# Patient Record
Sex: MALE | Race: BLACK OR AFRICAN AMERICAN | NOT HISPANIC OR LATINO | Employment: OTHER | URBAN - METROPOLITAN AREA
[De-identification: names, ages, dates, MRNs, and addresses within clinical notes are randomized per-mention and may not be internally consistent; named-entity substitution may affect disease eponyms.]

---

## 2017-07-31 ENCOUNTER — DOCUMENTATION ONLY (OUTPATIENT)
Dept: NEUROLOGY | Facility: HOSPITAL | Age: 72
End: 2017-07-31

## 2017-07-31 NOTE — PROGRESS NOTES
Patient last scoped one year ago with a single angioectasia of the duodenum.  Since then, he has been hospitalized requiring transfusions and recent VCE done at [facility] demonstrates active bleeding.  He will undergo EGD with push and colonoscopy Friday and if lesions out of reach will plan to see patient August 9th in clinic for evaluation.

## 2017-12-26 ENCOUNTER — TELEPHONE (OUTPATIENT)
Dept: NEUROLOGY | Facility: HOSPITAL | Age: 72
End: 2017-12-26

## 2017-12-26 NOTE — TELEPHONE ENCOUNTER
----- Message from Ra Soto MD sent at 12/24/2017 10:35 AM CST -----  Regarding: Clinic appt  Please arrange follow up with me in 2 weeks for small bowel bleed. [place] will bring VCE. Thanks.

## 2018-01-02 ENCOUNTER — HOSPITAL ENCOUNTER (INPATIENT)
Facility: HOSPITAL | Age: 73
LOS: 2 days | Discharge: HOME HEALTH | DRG: 378 | End: 2018-01-04
Attending: INTERNAL MEDICINE | Admitting: INTERNAL MEDICINE
Payer: MEDICARE

## 2018-01-02 DIAGNOSIS — K92.2 GASTROINTESTINAL HEMORRHAGE, UNSPECIFIED GASTROINTESTINAL HEMORRHAGE TYPE: Primary | ICD-10-CM

## 2018-01-02 DIAGNOSIS — I27.20 PULMONARY HYPERTENSION: ICD-10-CM

## 2018-01-02 DIAGNOSIS — D64.9 SYMPTOMATIC ANEMIA: ICD-10-CM

## 2018-01-02 DIAGNOSIS — I47.10 SVT (SUPRAVENTRICULAR TACHYCARDIA): ICD-10-CM

## 2018-01-02 PROCEDURE — 11000001 HC ACUTE MED/SURG PRIVATE ROOM

## 2018-01-02 RX ORDER — COLCHICINE 0.6 MG/1
0.6 TABLET, FILM COATED ORAL 2 TIMES DAILY
Status: DISCONTINUED | OUTPATIENT
Start: 2018-01-03 | End: 2018-01-04 | Stop reason: HOSPADM

## 2018-01-02 RX ORDER — ATORVASTATIN CALCIUM 40 MG/1
80 TABLET, FILM COATED ORAL DAILY
Status: DISCONTINUED | OUTPATIENT
Start: 2018-01-03 | End: 2018-01-02

## 2018-01-02 RX ORDER — COLCHICINE 0.6 MG/1
1.2 TABLET, FILM COATED ORAL DAILY
Status: DISCONTINUED | OUTPATIENT
Start: 2018-01-03 | End: 2018-01-02

## 2018-01-02 RX ORDER — FERROUS SULFATE 325(65) MG
325 TABLET, DELAYED RELEASE (ENTERIC COATED) ORAL 3 TIMES DAILY
Status: DISCONTINUED | OUTPATIENT
Start: 2018-01-03 | End: 2018-01-04 | Stop reason: HOSPADM

## 2018-01-02 RX ORDER — TRAVOPROST OPHTHALMIC SOLUTION 0.04 MG/ML
1 SOLUTION OPHTHALMIC DAILY
Status: DISCONTINUED | OUTPATIENT
Start: 2018-01-03 | End: 2018-01-04 | Stop reason: HOSPADM

## 2018-01-02 RX ORDER — METOPROLOL SUCCINATE 50 MG/1
300 TABLET, EXTENDED RELEASE ORAL DAILY
Status: DISCONTINUED | OUTPATIENT
Start: 2018-01-03 | End: 2018-01-04 | Stop reason: HOSPADM

## 2018-01-02 RX ORDER — TAMSULOSIN HYDROCHLORIDE 0.4 MG/1
0.4 CAPSULE ORAL DAILY
Status: DISCONTINUED | OUTPATIENT
Start: 2018-01-03 | End: 2018-01-04 | Stop reason: HOSPADM

## 2018-01-02 RX ORDER — METOLAZONE 5 MG/1
5 TABLET ORAL
Status: DISCONTINUED | OUTPATIENT
Start: 2018-01-03 | End: 2018-01-02

## 2018-01-02 RX ORDER — PHENYTOIN SODIUM 100 MG/1
200 CAPSULE, EXTENDED RELEASE ORAL 2 TIMES DAILY
Status: DISCONTINUED | OUTPATIENT
Start: 2018-01-02 | End: 2018-01-04 | Stop reason: HOSPADM

## 2018-01-02 RX ORDER — ALBUTEROL SULFATE 90 UG/1
2 AEROSOL, METERED RESPIRATORY (INHALATION) EVERY 6 HOURS PRN
Status: DISCONTINUED | OUTPATIENT
Start: 2018-01-02 | End: 2018-01-04 | Stop reason: HOSPADM

## 2018-01-02 RX ORDER — METOLAZONE 5 MG/1
5 TABLET ORAL
Status: DISCONTINUED | OUTPATIENT
Start: 2018-01-03 | End: 2018-01-04 | Stop reason: HOSPADM

## 2018-01-02 RX ORDER — SILDENAFIL CITRATE 20 MG/1
20 TABLET ORAL 3 TIMES DAILY
Status: DISCONTINUED | OUTPATIENT
Start: 2018-01-02 | End: 2018-01-04 | Stop reason: HOSPADM

## 2018-01-02 RX ORDER — PANTOPRAZOLE SODIUM 40 MG/10ML
40 INJECTION, POWDER, LYOPHILIZED, FOR SOLUTION INTRAVENOUS 2 TIMES DAILY
Status: DISCONTINUED | OUTPATIENT
Start: 2018-01-02 | End: 2018-01-04 | Stop reason: HOSPADM

## 2018-01-02 RX ORDER — ATORVASTATIN CALCIUM 40 MG/1
80 TABLET, FILM COATED ORAL NIGHTLY
Status: DISCONTINUED | OUTPATIENT
Start: 2018-01-02 | End: 2018-01-04 | Stop reason: HOSPADM

## 2018-01-02 RX ORDER — TRAVOPROST OPHTHALMIC SOLUTION 0.04 MG/ML
1 SOLUTION OPHTHALMIC NIGHTLY
Status: DISCONTINUED | OUTPATIENT
Start: 2018-01-02 | End: 2018-01-02

## 2018-01-02 RX ORDER — TIOTROPIUM BROMIDE 18 UG/1
1 CAPSULE ORAL; RESPIRATORY (INHALATION) DAILY
Status: DISCONTINUED | OUTPATIENT
Start: 2018-01-03 | End: 2018-01-04 | Stop reason: HOSPADM

## 2018-01-02 RX ORDER — LISINOPRIL 2.5 MG/1
2.5 TABLET ORAL DAILY
Status: DISCONTINUED | OUTPATIENT
Start: 2018-01-03 | End: 2018-01-04 | Stop reason: HOSPADM

## 2018-01-02 RX ORDER — BUMETANIDE 0.5 MG/1
4 TABLET ORAL 2 TIMES DAILY
Status: DISCONTINUED | OUTPATIENT
Start: 2018-01-02 | End: 2018-01-04 | Stop reason: HOSPADM

## 2018-01-03 ENCOUNTER — ANESTHESIA EVENT (OUTPATIENT)
Dept: ENDOSCOPY | Facility: HOSPITAL | Age: 73
DRG: 378 | End: 2018-01-03
Payer: MEDICARE

## 2018-01-03 ENCOUNTER — ANESTHESIA (OUTPATIENT)
Dept: ENDOSCOPY | Facility: HOSPITAL | Age: 73
DRG: 378 | End: 2018-01-03
Payer: MEDICARE

## 2018-01-03 PROBLEM — I47.10 SVT (SUPRAVENTRICULAR TACHYCARDIA): Status: ACTIVE | Noted: 2018-01-03

## 2018-01-03 LAB
ALBUMIN SERPL BCP-MCNC: 2.4 G/DL
ALP SERPL-CCNC: 236 U/L
ALT SERPL W/O P-5'-P-CCNC: 26 U/L
ANION GAP SERPL CALC-SCNC: 7 MMOL/L
AST SERPL-CCNC: 29 U/L
BASOPHILS # BLD AUTO: 0.01 K/UL
BASOPHILS # BLD AUTO: 0.01 K/UL
BASOPHILS NFR BLD: 0.1 %
BASOPHILS NFR BLD: 0.1 %
BILIRUB SERPL-MCNC: 0.4 MG/DL
BUN SERPL-MCNC: 45 MG/DL
CALCIUM SERPL-MCNC: 8.3 MG/DL
CHLORIDE SERPL-SCNC: 96 MMOL/L
CO2 SERPL-SCNC: 36 MMOL/L
CREAT SERPL-MCNC: 1.1 MG/DL
DIFFERENTIAL METHOD: ABNORMAL
DIFFERENTIAL METHOD: ABNORMAL
EOSINOPHIL # BLD AUTO: 0.1 K/UL
EOSINOPHIL # BLD AUTO: 0.2 K/UL
EOSINOPHIL NFR BLD: 1.1 %
EOSINOPHIL NFR BLD: 2.1 %
ERYTHROCYTE [DISTWIDTH] IN BLOOD BY AUTOMATED COUNT: 16.3 %
ERYTHROCYTE [DISTWIDTH] IN BLOOD BY AUTOMATED COUNT: 16.4 %
EST. GFR  (AFRICAN AMERICAN): >60 ML/MIN/1.73 M^2
EST. GFR  (NON AFRICAN AMERICAN): >60 ML/MIN/1.73 M^2
GLUCOSE SERPL-MCNC: 130 MG/DL
HCT VFR BLD AUTO: 25.4 %
HCT VFR BLD AUTO: 26.8 %
HGB BLD-MCNC: 7.8 G/DL
HGB BLD-MCNC: 8.2 G/DL
LYMPHOCYTES # BLD AUTO: 0.3 K/UL
LYMPHOCYTES # BLD AUTO: 0.4 K/UL
LYMPHOCYTES NFR BLD: 3.7 %
LYMPHOCYTES NFR BLD: 5.2 %
MCH RBC QN AUTO: 27.5 PG
MCH RBC QN AUTO: 27.7 PG
MCHC RBC AUTO-ENTMCNC: 30.6 G/DL
MCHC RBC AUTO-ENTMCNC: 30.7 G/DL
MCV RBC AUTO: 89 FL
MCV RBC AUTO: 91 FL
MONOCYTES # BLD AUTO: 0.8 K/UL
MONOCYTES # BLD AUTO: 0.9 K/UL
MONOCYTES NFR BLD: 10.3 %
MONOCYTES NFR BLD: 9.7 %
NEUTROPHILS # BLD AUTO: 6.6 K/UL
NEUTROPHILS # BLD AUTO: 7.1 K/UL
NEUTROPHILS NFR BLD: 82.8 %
NEUTROPHILS NFR BLD: 84.7 %
PLATELET # BLD AUTO: 235 K/UL
PLATELET # BLD AUTO: 236 K/UL
PMV BLD AUTO: 9.4 FL
PMV BLD AUTO: 9.5 FL
POTASSIUM SERPL-SCNC: 3.8 MMOL/L
PROT SERPL-MCNC: 5.6 G/DL
RBC # BLD AUTO: 2.84 M/UL
RBC # BLD AUTO: 2.96 M/UL
SODIUM SERPL-SCNC: 139 MMOL/L
WBC # BLD AUTO: 8.03 K/UL
WBC # BLD AUTO: 8.33 K/UL

## 2018-01-03 PROCEDURE — 93010 ELECTROCARDIOGRAM REPORT: CPT | Mod: ,,, | Performed by: INTERNAL MEDICINE

## 2018-01-03 PROCEDURE — 63600175 PHARM REV CODE 636 W HCPCS

## 2018-01-03 PROCEDURE — 25000003 PHARM REV CODE 250: Performed by: STUDENT IN AN ORGANIZED HEALTH CARE EDUCATION/TRAINING PROGRAM

## 2018-01-03 PROCEDURE — 36415 COLL VENOUS BLD VENIPUNCTURE: CPT

## 2018-01-03 PROCEDURE — 37000009 HC ANESTHESIA EA ADD 15 MINS: Performed by: INTERNAL MEDICINE

## 2018-01-03 PROCEDURE — 94761 N-INVAS EAR/PLS OXIMETRY MLT: CPT

## 2018-01-03 PROCEDURE — 0DJD8ZZ INSPECTION OF LOWER INTESTINAL TRACT, VIA NATURAL OR ARTIFICIAL OPENING ENDOSCOPIC: ICD-10-PCS | Performed by: INTERNAL MEDICINE

## 2018-01-03 PROCEDURE — 25000003 PHARM REV CODE 250

## 2018-01-03 PROCEDURE — C9113 INJ PANTOPRAZOLE SODIUM, VIA: HCPCS

## 2018-01-03 PROCEDURE — 93005 ELECTROCARDIOGRAM TRACING: CPT

## 2018-01-03 PROCEDURE — 25000003 PHARM REV CODE 250: Performed by: NURSE ANESTHETIST, CERTIFIED REGISTERED

## 2018-01-03 PROCEDURE — 63600175 PHARM REV CODE 636 W HCPCS: Performed by: INTERNAL MEDICINE

## 2018-01-03 PROCEDURE — 80053 COMPREHEN METABOLIC PANEL: CPT

## 2018-01-03 PROCEDURE — 85025 COMPLETE CBC W/AUTO DIFF WBC: CPT | Mod: 91

## 2018-01-03 PROCEDURE — 27201238 HC BALLOON, OVERTUBE (ANY): Performed by: INTERNAL MEDICINE

## 2018-01-03 PROCEDURE — 25000003 PHARM REV CODE 250: Performed by: INTERNAL MEDICINE

## 2018-01-03 PROCEDURE — 37000008 HC ANESTHESIA 1ST 15 MINUTES: Performed by: INTERNAL MEDICINE

## 2018-01-03 PROCEDURE — 11000001 HC ACUTE MED/SURG PRIVATE ROOM

## 2018-01-03 PROCEDURE — 63600175 PHARM REV CODE 636 W HCPCS: Performed by: NURSE ANESTHETIST, CERTIFIED REGISTERED

## 2018-01-03 PROCEDURE — 25000242 PHARM REV CODE 250 ALT 637 W/ HCPCS

## 2018-01-03 PROCEDURE — 44376 SMALL BOWEL ENDOSCOPY: CPT | Performed by: INTERNAL MEDICINE

## 2018-01-03 RX ORDER — SODIUM CHLORIDE 0.9 % (FLUSH) 0.9 %
10 SYRINGE (ML) INJECTION
Status: CANCELLED | OUTPATIENT
Start: 2018-01-03

## 2018-01-03 RX ORDER — PHENYLEPHRINE HYDROCHLORIDE 10 MG/ML
INJECTION INTRAVENOUS
Status: DISCONTINUED | OUTPATIENT
Start: 2018-01-03 | End: 2018-01-03

## 2018-01-03 RX ORDER — ONDANSETRON 2 MG/ML
4 INJECTION INTRAMUSCULAR; INTRAVENOUS DAILY PRN
Status: CANCELLED | OUTPATIENT
Start: 2018-01-03

## 2018-01-03 RX ORDER — SODIUM CHLORIDE 9 MG/ML
INJECTION, SOLUTION INTRAVENOUS CONTINUOUS PRN
Status: DISCONTINUED | OUTPATIENT
Start: 2018-01-03 | End: 2018-01-03

## 2018-01-03 RX ORDER — SODIUM CHLORIDE 0.9 % (FLUSH) 0.9 %
3 SYRINGE (ML) INJECTION
Status: CANCELLED | OUTPATIENT
Start: 2018-01-03

## 2018-01-03 RX ORDER — ACETAMINOPHEN 500 MG
500 TABLET ORAL EVERY 6 HOURS PRN
Status: DISCONTINUED | OUTPATIENT
Start: 2018-01-03 | End: 2018-01-04 | Stop reason: HOSPADM

## 2018-01-03 RX ORDER — ONDANSETRON 2 MG/ML
INJECTION INTRAMUSCULAR; INTRAVENOUS
Status: DISCONTINUED | OUTPATIENT
Start: 2018-01-03 | End: 2018-01-03

## 2018-01-03 RX ORDER — METOPROLOL TARTRATE 1 MG/ML
INJECTION, SOLUTION INTRAVENOUS
Status: DISCONTINUED | OUTPATIENT
Start: 2018-01-03 | End: 2018-01-03

## 2018-01-03 RX ORDER — HYDROMORPHONE HYDROCHLORIDE 2 MG/ML
0.5 INJECTION, SOLUTION INTRAMUSCULAR; INTRAVENOUS; SUBCUTANEOUS EVERY 5 MIN PRN
Status: CANCELLED | OUTPATIENT
Start: 2018-01-03

## 2018-01-03 RX ORDER — PROPOFOL 10 MG/ML
VIAL (ML) INTRAVENOUS
Status: DISCONTINUED | OUTPATIENT
Start: 2018-01-03 | End: 2018-01-03

## 2018-01-03 RX ORDER — ETOMIDATE 2 MG/ML
INJECTION INTRAVENOUS
Status: DISCONTINUED | OUTPATIENT
Start: 2018-01-03 | End: 2018-01-03

## 2018-01-03 RX ORDER — LIDOCAINE HCL/PF 100 MG/5ML
SYRINGE (ML) INTRAVENOUS
Status: DISCONTINUED | OUTPATIENT
Start: 2018-01-03 | End: 2018-01-03

## 2018-01-03 RX ORDER — OXYCODONE HYDROCHLORIDE 5 MG/1
5 TABLET ORAL
Status: CANCELLED | OUTPATIENT
Start: 2018-01-03

## 2018-01-03 RX ORDER — SUCCINYLCHOLINE CHLORIDE 20 MG/ML
INJECTION INTRAMUSCULAR; INTRAVENOUS
Status: DISCONTINUED | OUTPATIENT
Start: 2018-01-03 | End: 2018-01-03

## 2018-01-03 RX ORDER — DIPHENHYDRAMINE HYDROCHLORIDE 50 MG/ML
25 INJECTION INTRAMUSCULAR; INTRAVENOUS EVERY 6 HOURS PRN
Status: CANCELLED | OUTPATIENT
Start: 2018-01-03

## 2018-01-03 RX ORDER — FENTANYL CITRATE 50 UG/ML
INJECTION, SOLUTION INTRAMUSCULAR; INTRAVENOUS
Status: DISCONTINUED | OUTPATIENT
Start: 2018-01-03 | End: 2018-01-03

## 2018-01-03 RX ORDER — ESMOLOL HYDROCHLORIDE 10 MG/ML
INJECTION INTRAVENOUS
Status: DISCONTINUED | OUTPATIENT
Start: 2018-01-03 | End: 2018-01-03

## 2018-01-03 RX ADMIN — ESMOLOL HYDROCHLORIDE 30 MG: 10 INJECTION, SOLUTION INTRAVENOUS at 04:01

## 2018-01-03 RX ADMIN — COLCHICINE 0.6 MG: 0.6 TABLET, FILM COATED ORAL at 09:01

## 2018-01-03 RX ADMIN — FERROUS SULFATE TAB EC 325 MG (65 MG FE EQUIVALENT) 325 MG: 325 (65 FE) TABLET DELAYED RESPONSE at 09:01

## 2018-01-03 RX ADMIN — BUMETANIDE 4 MG: 0.5 TABLET ORAL at 09:01

## 2018-01-03 RX ADMIN — METOPROLOL TARTRATE 2 MG: 1 INJECTION, SOLUTION INTRAVENOUS at 03:01

## 2018-01-03 RX ADMIN — PANTOPRAZOLE SODIUM 40 MG: 40 INJECTION, POWDER, FOR SOLUTION INTRAVENOUS at 09:01

## 2018-01-03 RX ADMIN — PHENYLEPHRINE HYDROCHLORIDE 100 MCG: 10 INJECTION INTRAVENOUS at 03:01

## 2018-01-03 RX ADMIN — COLCHICINE 0.6 MG: 0.6 TABLET, FILM COATED ORAL at 12:01

## 2018-01-03 RX ADMIN — PROPOFOL 50 MG: 10 INJECTION, EMULSION INTRAVENOUS at 02:01

## 2018-01-03 RX ADMIN — LIDOCAINE HYDROCHLORIDE 100 MG: 20 INJECTION, SOLUTION INTRAVENOUS at 02:01

## 2018-01-03 RX ADMIN — ETOMIDATE 10 MG: 2 INJECTION, SOLUTION INTRAVENOUS at 02:01

## 2018-01-03 RX ADMIN — SUCCINYLCHOLINE CHLORIDE 100 MG: 20 INJECTION, SOLUTION INTRAMUSCULAR; INTRAVENOUS at 02:01

## 2018-01-03 RX ADMIN — PHENYTOIN SODIUM 200 MG: 100 CAPSULE, EXTENDED RELEASE ORAL at 09:01

## 2018-01-03 RX ADMIN — PANTOPRAZOLE SODIUM 40 MG: 40 INJECTION, POWDER, FOR SOLUTION INTRAVENOUS at 12:01

## 2018-01-03 RX ADMIN — METOPROLOL TARTRATE 1 MG: 1 INJECTION, SOLUTION INTRAVENOUS at 04:01

## 2018-01-03 RX ADMIN — ATORVASTATIN CALCIUM 80 MG: 40 TABLET, FILM COATED ORAL at 12:01

## 2018-01-03 RX ADMIN — FENTANYL CITRATE 50 MCG: 50 INJECTION, SOLUTION INTRAMUSCULAR; INTRAVENOUS at 02:01

## 2018-01-03 RX ADMIN — BUMETANIDE 4 MG: 0.5 TABLET ORAL at 12:01

## 2018-01-03 RX ADMIN — PHENYTOIN SODIUM 200 MG: 100 CAPSULE, EXTENDED RELEASE ORAL at 12:01

## 2018-01-03 RX ADMIN — TIOTROPIUM BROMIDE 18 MCG: 18 CAPSULE ORAL; RESPIRATORY (INHALATION) at 08:01

## 2018-01-03 RX ADMIN — ESMOLOL HYDROCHLORIDE 20 MG: 10 INJECTION, SOLUTION INTRAVENOUS at 05:01

## 2018-01-03 RX ADMIN — PROPOFOL 20 MG: 10 INJECTION, EMULSION INTRAVENOUS at 04:01

## 2018-01-03 RX ADMIN — TRAVOPROST 1 DROP: 0.04 SOLUTION/ DROPS OPHTHALMIC at 09:01

## 2018-01-03 RX ADMIN — FERROUS SULFATE TAB EC 325 MG (65 MG FE EQUIVALENT) 325 MG: 325 (65 FE) TABLET DELAYED RESPONSE at 07:01

## 2018-01-03 RX ADMIN — SODIUM CHLORIDE: 0.9 INJECTION, SOLUTION INTRAVENOUS at 02:01

## 2018-01-03 RX ADMIN — OCTREOTIDE ACETATE 50 MCG/HR: 500 INJECTION, SOLUTION INTRAVENOUS; SUBCUTANEOUS at 07:01

## 2018-01-03 RX ADMIN — ACETAMINOPHEN 500 MG: 500 TABLET ORAL at 12:01

## 2018-01-03 RX ADMIN — ONDANSETRON 4 MG: 2 INJECTION, SOLUTION INTRAMUSCULAR; INTRAVENOUS at 03:01

## 2018-01-03 RX ADMIN — TAMSULOSIN HYDROCHLORIDE 0.4 MG: 0.4 CAPSULE ORAL at 09:01

## 2018-01-03 RX ADMIN — ATORVASTATIN CALCIUM 80 MG: 40 TABLET, FILM COATED ORAL at 09:01

## 2018-01-03 RX ADMIN — METOLAZONE 5 MG: 5 TABLET ORAL at 07:01

## 2018-01-03 NOTE — PROGRESS NOTES
Pt's BP measured 101/59. MD called and notified. Ordered to hold metoprolol and lisinopril. Will continue to monitor.

## 2018-01-03 NOTE — PLAN OF CARE
Spoke with [employer] at 2:30pm after speaking with number [phone] for patient's last interrogation for ICD. Left number and they will call back.

## 2018-01-03 NOTE — CONSULTS
[facility]    CC: anemia    HPI 72 y.o. male with past medical history of HTN, mitral and aortic valve replacement due to previous endocarditis, history of ischemic dilated cardiomyopathy with an EF of 36-45 in 2014 with ICD placed, hx of small bowel angioectasias who presents with recurrent iron deficiency anemia requiring weekly blood transfusions with associated fatigue, dark stools since starting oral iron therapy without associated hematochezia, abdominal pain or unexpected weight loss.     He presents with progressively worsening normocytic anemia that required transfer from [facility] to [facility].    Patient has had multiple endoscopies at [facility] and has required transfusions he states every week. Last VCE performed recently at [facility] with findings of multiple small bowel angioectasias, none that were actively bleeding.     Past Medical History  Ischemic dilated cardiomyopathy with a history of an EF 36-45 % now with low normal EF with ICD  Hx of small bowel angioectasias  Recurrent ANDREINA    PSH  Tonsillectomy  Cardiac surgery as noted above    Social History  Social History   Substance Use Topics    Smoking status: Current Every Day Smoker     Years: 28.00     Types: Cigars    Smokeless tobacco: Not on file      Comment: pt smoke a cigar 2x weekly    Alcohol use No      Comment: socially     Family history  No family history of colon cancer    Review of Systems  General ROS: +fatigue, lethargy, negative for chills, fever or weight loss  Psychological ROS: negative for hallucination, depression or suicidal ideation  Ophthalmic ROS: negative for blurry vision, photophobia or eye pain  ENT ROS: negative for epistaxis, sore throat or rhinorrhea  Respiratory ROS: no cough, shortness of breath, or wheezing  Cardiovascular ROS: no chest pain or dyspnea on exertion  Gastrointestinal ROS: +dark stool, no abdominal pain, change in bowel habits,  Genito-Urinary ROS: no dysuria, trouble voiding, or hematuria  Musculoskeletal  ROS: +joint pain, negative for gait disturbance or muscular weakness  Neurological ROS: no syncope or seizures; no ataxia  Dermatological ROS: negative for pruritis, rash and jaundice    Physical Examination  BP (!) 99/54 (BP Location: Left arm, Patient Position: Lying)   Pulse 104   Temp 98.4 °F (36.9 °C) (Oral)   Resp 18   Ht 6' (1.829 m)   Wt 76 kg (167 lb 8.8 oz)   SpO2 99%   BMI 22.72 kg/m²   General appearance: alert, cooperative, no distress, chronically ill appearing elderly male, lying in bed  HENT: Normocephalic, atraumatic, neck symmetrical, no nasal discharge   Eyes: conjunctivae/corneas clear, PERRL, EOM's intact  Lungs: clear to auscultation bilaterally, no dullness to percussion bilaterally  Heart: regular rate and rhythm without rub; no displacement of the PMI   Abdomen: soft, non-tender; bowel sounds normoactive; no organomegaly  Extremities: extremities symmetric; swelling of left elbow, no clubbing, cyanosis,   Integument: Skin color, texture, turgor normal; no rashes; hair distrubution normal  Neurologic: Alert and oriented X 3, did not assess strength, coordination or gait  Psychiatric: no pressured speech; normal affect; no evidence of impaired cognition     Labs:    Lab Results   Component Value Date    WBC 8.03 01/03/2018    HGB 7.8 (L) 01/03/2018    HCT 25.4 (L) 01/03/2018    MCV 89 01/03/2018     01/03/2018       Imaging/Procedures:  Upper SBE: 7/7/16   - One small angioectasia vs TS ablated in the duodenum    Assessment:   72 y.o. male here with recurrent progressive iron deficiency anemia now requiring weekly blood transfusions with associated fatigue, dark stools. Recent VCE with multiple small bowel angioectasias.    Plan:   -NPO, IVFs  -Transfusion for Hb<7 or signs of GI blood loss  -Plan for upper DBE today  -Further recommendations to follow procedure

## 2018-01-03 NOTE — PROGRESS NOTES
Patient has medtronic ICD. Has not been interrogated for many years according to the patient. Has not been shocked for over a year. Does not think battery has ever been changed. Attempted to contact [employer] regarding last interrogation. On hold for over 10 minutes. Plan to contact primary team for further follow-up.

## 2018-01-03 NOTE — ANESTHESIA PREPROCEDURE EVALUATION
01/03/2018  Stefano Granger is a 72 y.o., male. Multiple medical problems including afib on coumadin, CHF EF 35-45% s/p AICD, valvular disease s/p Aortic and mitral valve repair, pulmonary hypertension, HTN, COPD and small bowel angioectasias causing anemia and multiple transfusions, here for small bowel endoscopy.     Anesthesia Evaluation    I have reviewed the Patient Summary Reports.    I have reviewed the Nursing Notes.   I have reviewed the Medications.     Review of Systems  Anesthesia Hx:  No problems with previous Anesthesia  History of prior surgery of interest to airway management or planning: heart surgery. Previous anesthesia: General   Hematology/Oncology:         -- Anemia: Hematology Comments: Small bowel angioectasias, slow drift anemia, requiring multiple recent transfusions   Cardiovascular:   Exercise tolerance: good Pacemaker Hypertension Valvular problems/Murmurs Dysrhythmias atrial fibrillation CHF ECHO 2014 CONCLUSIONS  -----------  1. Normal bioprosthetic mitral valve.  2. Normally functioning bioprosthetic valve in the aortic position.  3. The right ventricular systolic function is moderately impaired.  4. The right ventricle is moderately enlarged in the apical view.  5. The left atrium is markedly dilated by volume.  6. Moderate tricuspid regurgitation.  7. Moderate pulmonary hypertension.  8. MODERATE LV SYSTOLIC DYSFUNCTION    EKG:    NM stress 2013:  CONCLUSIONS  -----------  1. No clinical or electrocardiographic evidence for ischemia at the workload   achieved on the pharmacologic portion of the nuclear perfusion study.      CHF IDCM (LVEF 36-45% on 12/2/14) s/p Medtronic single chamber ICD for primary prevention in '09.  Aortic and mitral porcine valve 2000   Pulmonary:   Pulmonary hypertension PA pressure 60-69/18 on ECHO in 2014   Renal/:   BPH    Hepatic/GI:   Small  bowel angioectasias   Musculoskeletal:   Arthritis     Neurological:   CVA Seizures      No current facility-administered medications on file prior to encounter.      Current Outpatient Prescriptions on File Prior to Encounter   Medication Sig Dispense Refill    atorvastatin (LIPITOR) 80 MG tablet 80 mg once daily.       bumetanide (BUMEX) 0.5 MG Tab 0.5 mg as needed.   1    lisinopril 10 MG tablet Take 10 mg by mouth once daily.  1    metolazone (ZAROXOLYN) 2.5 MG tablet   1    metoprolol succinate (TOPROL-XL) 200 MG 24 hr tablet Take 200 mg by mouth once daily.  1    pantoprazole (PROTONIX) 20 MG tablet       phenytoin (DILANTIN) 100 MG ER capsule 100 mg 4 (four) times daily before meals and nightly.   0    sildenafil (REVATIO) 20 mg Tab 20 mg 3 (three) times daily.   1    SPIRIVA WITH HANDIHALER 18 mcg inhalation capsule inhale contents of 1 capsule by mouth once daily  1    TRAVATAN Z 0.004 % Drop        Active Ambulatory Problems     Diagnosis Date Noted    Iron deficiency anemia 06/02/2016    ANDREINA (iron deficiency anemia) 07/07/2016     Resolved Ambulatory Problems     Diagnosis Date Noted    No Resolved Ambulatory Problems     Past Medical History:   Diagnosis Date    Asthma     Cardiac defibrillator in place     CHF (congestive heart failure)     Gout     Hypertension     Seizures     Stroke      Past Surgical History:   Procedure Laterality Date    CARDIAC SURGERY      year 2000    TONSILLECTOMY                  Physical Exam  General:  Well nourished    Airway/Jaw/Neck:  Airway Findings: Mouth Opening: Normal Tongue: Normal  General Airway Assessment: Adult  Mallampati: II  TM Distance: Normal, at least 6 cm  Jaw/Neck Findings:  Neck ROM: Normal ROM  Neck Findings:  Facial hair      Dental:  Dental Findings:    Chest/Lungs:  Chest/Lungs Findings: Clear to auscultation     Heart/Vascular:  Heart Murmur  Systolic        Mental Status:  Mental Status Findings:  Cooperative, Alert and  Oriented         Anesthesia Plan  Type of Anesthesia, risks & benefits discussed:  Anesthesia Type:  general, MAC  Patient's Preference:   Intra-op Monitoring Plan:   Intra-op Monitoring Plan Comments:   Post Op Pain Control Plan: multimodal analgesia  Post Op Pain Control Plan Comments:   Induction:   IV  Beta Blocker:  Patient is on a Beta-Blocker and has not received dose within the past 24 hours due to non-compliance or for other reasons (Patient should receive a perioperative dose or document why it is withheld). Perioperative Beta Blocker not given due to: Other (see comments)    Beta Blocker Comments: Beta blocker held this am for hypotension  Informed Consent: Patient understands risks and agrees with Anesthesia plan.  Questions answered. Anesthesia consent signed with patient.  ASA Score: 4  emergent   Day of Surgery Review of History & Physical: I have interviewed and examined the patient. I have reviewed the patient's H&P dated:  There are no significant changes.  H&P update referred to the provider.  H&P completed by Anesthesiologist.   Anesthesia Plan Notes: SOB only when anemic  Otherwise can walk up 2 flights of stairs.  Last shock by AICD several years ago, not sure when it was last interrogated.   Hb 7.8 at 530 am  EKG pending  Will call [employer] for update concerning the AICD and it's status.        Ready For Surgery From Anesthesia Perspective.       Chemistry        Component Value Date/Time     01/03/2018 0530    K 3.8 01/03/2018 0530    CL 96 01/03/2018 0530    CO2 36 (H) 01/03/2018 0530    BUN 45 (H) 01/03/2018 0530    CREATININE 1.1 01/03/2018 0530     (H) 01/03/2018 0530        Component Value Date/Time    CALCIUM 8.3 (L) 01/03/2018 0530    ALKPHOS 236 (H) 01/03/2018 0530    AST 29 01/03/2018 0530    ALT 26 01/03/2018 0530    BILITOT 0.4 01/03/2018 0530    ESTGFRAFRICA >60 01/03/2018 0530    EGFRNONAA >60 01/03/2018 0530          Recent Labs  Lab 01/03/18  0530   WBC 8.03    RBC 2.84*   HGB 7.8*   HCT 25.4*      MCV 89   MCH 27.5   MCHC 30.7*

## 2018-01-03 NOTE — PLAN OF CARE
Patient reported he is independent with activities of daily living.  His family is supportive and provide assistance as needed. Patient had [facility] prior to admission and request to resume care with [facility].  Patient reported transportation is not an issue he drives himself to medical appointments and his family will provide transportation post discharge.    [facility] notified regarding admission and clinicals faxed via [employer]          01/03/18 1243   Discharge Assessment   Assessment Type Discharge Planning Assessment   Confirmed/corrected address and phone number on facesheet? Yes   Assessment information obtained from? Patient   Expected Length of Stay (days) 2   Communicated expected length of stay with patient/caregiver yes   Prior to hospitilization cognitive status: Alert/Oriented   Prior to hospitalization functional status: Independent  (Patient reported he has a rolling walker and cane that use occasionally.)   Current cognitive status: Alert/Oriented   Current Functional Status: Independent   Facility Arrived From: (Home)   Lives With other (see comments)  ([place] reported he has a roommate)   Able to Return to Prior Arrangements yes   Is patient able to care for self after discharge? Yes   Who are your caregiver(s) and their phone number(s)? (Daughter, Davis Sandhu [phone])   Patient's perception of discharge disposition home health  (Patient had [facility] prior to admission)   Readmission Within The Last 30 Days no previous admission in last 30 days   Patient currently being followed by outpatient case management? No   Patient currently receives any other outside agency services? No   Equipment Currently Used at Home none   Do you have any problems affording any of your prescribed medications? No   Is the patient taking medications as prescribed? yes   Does the patient receive services at the [facility]? No   Discharge Plan A Home Health   Discharge  Plan B Home Health   Patient/Family In Agreement With Plan yes   Does the patient have transportation to healthcare appointments? Yes

## 2018-01-03 NOTE — H&P
"[facility] History and Physical - Resident Note    Admitting Team: Medicine Team B  Attending Physician: Dr. NIKOLAI Hubbard  Resident: Dr. Angel Brown  Interns: Evan Mcintyre    Date of Admit: 1/2/2018    Chief Complaint     Transferred from [facility] for GI procedure w/ Dr. Soto    Subjective:      History of Present Illness:  Stefano Granger is a 72 y.o. male who  has a past medical history of Asthma; Cardiac defibrillator in place; CHF (congestive heart failure); Gout; Hypertension; Seizures; Afib, and Stroke. The patient was transferred from [facility] to [facility] on 1/2/2018 to undergo Dbl-Balloon scope with Dr. Soto for Hx of chronic GI-bleeding.    The patient reports that he was in his USOH, when on this past Friday (12/29/17), he began to feel weak and dizzy, which caused him to become unsteady on his feet. He denies any fall or LOC, but reports that these are his usual sx's when he has had "low-blood counts" in the past 2/2 his chronic GIB. His daughter brought him to [facility] where his H/H was found to be low. He was transfused 4 units and responded well. He was then transferred to [facility] to undergo Dbl-balloon scope with Dr. Soto to alleviate his suspected continual upper GI bleeding.     Pt has a reported Hx of chronic "bleeding veins" in his GI tract for which he has been hospitalized for many times over the last 3-4yrs. The patient usually requires transfusion of several units during each stay to return H/H back to baseline levels.    The Pt has been seen by Dr. Soto previously for several  C-scopes, EGD w/ push, and has had VCE to assess his chronic GI-bleeding. Pt's last Upper Gi scope revealed single angioectasia in Duodenum.   Pt reports dark stools, but says that his stools are always dark since he started daily PO iron supplementation. Pt denies Cp, SOB, change in stool character, caliber, color, or smell, urinary sx, abd pain, diarrhea, constipation, BRBPR, f/c/n/v.    Past " Medical History:  Past Medical History:   Diagnosis Date    Asthma     Cardiac defibrillator in place     CHF (congestive heart failure)     Gout     Hypertension     Seizures     Stroke        Past Surgical History:  Past Surgical History:   Procedure Laterality Date    CARDIAC SURGERY      year 2000    TONSILLECTOMY         Allergies:  Review of patient's allergies indicates:   Allergen Reactions    Coreg [carvedilol] Palpitations       Home Medications:  Prior to Admission medications    Medication Sig Start Date End Date Taking? Authorizing Provider   atorvastatin (LIPITOR) 80 MG tablet 80 mg once daily.  4/28/16   Historical Provider, MD   bumetanide (BUMEX) 0.5 MG Tab 0.5 mg as needed.  4/28/16   Historical Provider, MD   lisinopril 10 MG tablet Take 10 mg by mouth once daily. 3/31/16   Historical Provider, MD   metolazone (ZAROXOLYN) 2.5 MG tablet  4/26/16   Historical Provider, MD   metoprolol succinate (TOPROL-XL) 200 MG 24 hr tablet Take 200 mg by mouth once daily. 3/9/16   Historical Provider, MD   pantoprazole (PROTONIX) 20 MG tablet  4/26/16   Historical Provider, MD   phenytoin (DILANTIN) 100 MG ER capsule 100 mg 4 (four) times daily before meals and nightly.  4/26/16   Historical Provider, MD   sildenafil (REVATIO) 20 mg Tab 20 mg 3 (three) times daily.  4/26/16   Historical Provider, MD   SPIRIVA WITH HANDIHALER 18 mcg inhalation capsule inhale contents of 1 capsule by mouth once daily 3/31/16   Historical Provider, MD   TRAVATAN Z 0.004 % Drop  4/12/16   Historical Provider, MD   digoxin (LANOXIN) 125 mcg tablet 125 mcg once daily.  4/22/16 1/2/18  Historical Provider, MD   finasteride (PROSCAR) 5 mg tablet Take 5 mg by mouth once daily. 3/31/16 1/2/18  Historical Provider, MD   methimazole (TAPAZOLE) 10 MG Tab 10 mg once daily.  4/19/16 1/2/18  Historical Provider, MD   spironolactone (ALDACTONE) 25 MG tablet 25 mg once daily.  4/1/16 1/2/18  Historical Provider, MD   SYMBICORT 160-4.5  mcg/actuation HFAA 2 puffs 2 (two) times daily. 3/31/16 1/2/18  Historical Provider, MD       Family History:  Uncle: cancer (unknown)  Maternal Aunt- CVA  Brother- Dm2  Sister- Heart Failure (, 61 y/o)    Social History:  Social History   Substance Use Topics    Smoking status: previous Every Day Cig Smoker for 25-30 yrs;  Quit in      Years: 28.00     Types: Currently smokes Cigars a couple times/mth     Smokeless tobacco: Not on file      Comment: pt smoke a cigar 2x weekly    Alcohol use Yes; pt with previous Hx of daily, heavy etoh use; says he hasn't had a drink over the previous Yr.       Review of Systems:  Pertinent positives and negatives are listed in HPI.  All other systems are reviewed and are negative.    Health Maintaince :   Primary Care Physician: [facility] pam  Immunizations:   TDap is not up to date.  Influenza is not up to date.  Pneumovax is up to date.  Cancer Screening:  Colonoscopy: is up to date.     Objective:   Last 24 Hour Vital Signs:  No Data Recorded  There is no height or weight on file to calculate BMI.  No intake/output data recorded.    Physical Examination:  General: Alert and awake in NAD  Head:  Normocephalic and atraumatic  Eyes:  PERRL; EOMi with otto sclera and conjunctival pallor bilaterally   Mouth:  Oropharynx clear and without exudate; moist mucous membranes; poor dentition with several teeth missing  Cardio:  Tachycardic. Regular rhythm with normal S1 and S2; 3/6 systolic murmurs heard over RUSB, LLSB (pt with hx of Bio-prost Aortic and Mitral? vlaves)  Resp:  Unlabored; faint Bibasilar crackles heard bilaterally; No wheezes or rhonchi  Abdom: Soft, protuberant. NTND with normoactive bowel sounds  Extrem: WWP with no clubbing, cyanosis. 2+ pitting edema noted bilaterally from knees distally to dorsum of feet  Skin:  No rashes, lesions, or color changes  Pulses: Radial 2+ and symmetric distally bilaterally; LE opulses difficult to appreciate 2/2 edema, but  b/ Les warm w/ good cap refill  Neuro:  AAOx3; cooperative and pleasant with no focal deficits    Laboratory:  Most Recent Data:  CBC:   Lab Results   Component Value Date    WBC 5.50 07/07/2016    HGB 8.1 (L) 07/07/2016    HCT 27.4 (L) 07/07/2016     07/07/2016    MCV 98 07/07/2016    RDW 16.8 (H) 07/07/2016     BMP:   Lab Results   Component Value Date     07/07/2016    K 3.8 07/07/2016     07/07/2016    CO2 29 07/07/2016    BUN 26 (H) 07/07/2016    CREATININE 1.2 07/07/2016     07/07/2016    CALCIUM 8.8 07/07/2016     LFTs: No results found for: PROT, ALBUMIN, BILITOT, AST, ALKPHOS, ALT, GGT  Coags: No results found for: INR, PROTIME, PTT  Urinalysis: No results found for: LABURIN, COLORU, CLARITYU, SPECGRAV, LABSPEC, NITRITE, PROTEINUR, GLUCOSEU, KETONESU, UROBILINOGEN, BILIRUBINUR, BLOODU, RBCU, WBCUA    Trended Lab Data:  No results for input(s): WBC, HGB, HCT, PLT, MCV, RDW, NA, K, CL, CO2, BUN, CREATININE, GLU, PROT, ALBUMIN, BILITOT, AST, ALKPHOS, ALT in the last 168 hours.    Trended Cardiac Data:  No results for input(s): TROPONINI, CKTOTAL, CKMB, BNP in the last 168 hours.    Microbiology Data:  none    Other Laboratory Data:  none    Other Results:  EKG (7/8/2016):   ventricular paced rhythm with fusion complexes and intrinsic beat with  probable underlying atrial fibrillation   Abnormal ECG  No previous ECGs available  Confirmed by Federico Sanford MD ([id number]) on 7/8/2016 10:58:08 AM    Radiology:  Imaging Results    None            Assessment:     Stefano Granger is a 72 y.o. male with:  Patient Active Problem List   Diagnosis    Symptomatic ANDREINA (iron deficiency anemia)    GI bleed     Asthma   Cardiac defibrillator in place   CHF (congestive heart failure)   Gout   Hypertension   Seizures   Stroke     Plan:   GI Bleed  - Pt admitted to [facility] with sx of weakness, dizziness, and low H/H  - Pt w/ Hx of multiple previous hospitalizations for symptomatic anemia 2/2  suspected GIB  - Sx's improved after transfusion of 4 Units   - Pt's last Upper Gi scope revealed angioectasia in Duodenum which is likely cuprit  - Pt previously managed conservatively for anemia 2/2 blood loss, Iron Def with PO iron replacement, transfusions with no lasting resolution of sx's  - Pt to be undergo Dbl-Balloon scope procedure on 1/3/18 w/ Dr. Soto to achieve hemostasis  - NPO at 00:00   - will hold ASA, other anticoag agents for procedure tomorrow    ANDREINA  - Pt found at previous admission to be iron-deficieient likely 2/2 hx of bleeding with inadequate dietary intake  - placed on daily PO iron supplementation; will cont home supplement at this time  - last iron panel done 5/11/16 found to be WNL  - will cont to monitor during this admission    CHF  - Pt with hx of systolic HF and reports hx of chronic LE edema;  2+ pitting LE edema on exam; Bibasilar crackles on exam  - No complaints of SOB, cough at this time  - cont home Bumex, Lisinopril, metoprolol, metolazone  - admit to Tele overnight   - will cont to monitor    Gout  - cont home colchicine     HTN  - BP 93/51 on admit; Pt asymptomatic at this time  - continue home Bumex, Lisinopril  - will hold home sildenafil for now as Pt is mildly hypotensive and cont to monitor     Asthma  - no sx's, complaints at this time  - cont home Albuterol, tiotropium  - will cont to monitor    Hx of CVA  - no residual deficits noted on exam  - cont statin  - holding  for procedure in AM  - will cont to monitor    Hx of Seizure  - Pt reports last seizure episode more than 2 yrs ago  - cont home Dilantin  - willcont to monitor    Diet: NPO at 00:01; resume adult cardiac diet after procedure  PPx: PPI; SCDs; UZAIR hose  IVF: none  Dispo: pending successful recovery from GI procedure in AM      Code Status:     Full    Evan Mcintyre MD  [facility] HO-I  [facility] Medicine Service    [facility] Medicine Hospitalist Pager numbers:   [facility] Hospitalist Medicine  Team A (Lola/Larry): [phone]  [facility] (Jessie/Reinaldo):  [phone]

## 2018-01-03 NOTE — PROGRESS NOTES
.Pharmacy New Medication Education    Patient accepted medication education.    Pharmacy educated patient on name and purpose of medications and possible side effects, using the teach-back method.     D/C Current Inpatient Medication Orders   D/C albuterol inhaler 2 puff   D/C atorvastatin tablet 80 mg   D/C bumetanide tablet 4 mg   D/C colchicine capsule/tablet 0.6 mg   D/C ferrous sulfate EC tablet 325 mg   D/C lisinopril tablet 2.5 mg   D/C metOLazone tablet 5 mg   D/C metoprolol succinate (TOPROL-XL) 24 hr tablet 300 mg   D/C pantoprazole injection 40 mg   D/C phenytoin (DILANTIN) ER capsule 200 mg   D/C sildenafil (antihypertensive) tablet 20 mg   D/C tamsulosin 24 hr capsule 0.4 mg   D/C tiotropium inhalation capsule 18 mcg   D/C travoprost 0.004 % ophthalmic solution 1 drop       Learners of pharmacy medication education included:  Patient    Patient +/- learner response:  Verbalized Understanding, Teachback

## 2018-01-03 NOTE — TRANSFER OF CARE
Anesthesia Transfer of Care Note    Patient: Stefano Granger    Procedure(s) Performed: Procedure(s) (LRB):  SMALL BOWEL ENDOSCOPY-UPPER DBE (N/A)    Patient location: PACU    Anesthesia Type: general    Transport from OR: Transported from OR on 6-10 L/min O2 by face mask with adequate spontaneous ventilation    Post pain: adequate analgesia    Post assessment: no apparent anesthetic complications and tolerated procedure well    Post vital signs: stable    Level of consciousness: awake, alert and oriented    Nausea/Vomiting: no nausea/vomiting    Complications: other (see comments), melina'александр consulted for SVT with trigeminy appearance, pt ordered to ICU for amiodarone gtt    Transfer of care protocol was followed      Last vitals:   Visit Vitals  /61   Pulse (!) 138   Temp 37.2 °C (99 °F) (Oral)   Resp 20   Ht 6' (1.829 m)   Wt 76 kg (167 lb 8.8 oz)   SpO2 96%   BMI 22.72 kg/m²

## 2018-01-04 VITALS
TEMPERATURE: 98 F | WEIGHT: 167.56 LBS | HEIGHT: 72 IN | DIASTOLIC BLOOD PRESSURE: 59 MMHG | BODY MASS INDEX: 22.69 KG/M2 | HEART RATE: 85 BPM | RESPIRATION RATE: 21 BRPM | OXYGEN SATURATION: 96 % | SYSTOLIC BLOOD PRESSURE: 124 MMHG

## 2018-01-04 PROBLEM — I27.20 PULMONARY HYPERTENSION: Status: ACTIVE | Noted: 2018-01-04

## 2018-01-04 LAB
ALBUMIN SERPL BCP-MCNC: 2.6 G/DL
ALP SERPL-CCNC: 249 U/L
ALT SERPL W/O P-5'-P-CCNC: 27 U/L
ANION GAP SERPL CALC-SCNC: 7 MMOL/L
AST SERPL-CCNC: 26 U/L
BASOPHILS # BLD AUTO: 0.02 K/UL
BASOPHILS NFR BLD: 0.2 %
BILIRUB SERPL-MCNC: 0.5 MG/DL
BUN SERPL-MCNC: 35 MG/DL
CALCIUM SERPL-MCNC: 8.8 MG/DL
CHLORIDE SERPL-SCNC: 94 MMOL/L
CO2 SERPL-SCNC: 37 MMOL/L
CREAT SERPL-MCNC: 1.2 MG/DL
DIFFERENTIAL METHOD: ABNORMAL
EOSINOPHIL # BLD AUTO: 0.2 K/UL
EOSINOPHIL NFR BLD: 2 %
ERYTHROCYTE [DISTWIDTH] IN BLOOD BY AUTOMATED COUNT: 16.3 %
EST. GFR  (AFRICAN AMERICAN): >60 ML/MIN/1.73 M^2
EST. GFR  (NON AFRICAN AMERICAN): >60 ML/MIN/1.73 M^2
GLUCOSE SERPL-MCNC: 158 MG/DL
HCT VFR BLD AUTO: 27.7 %
HGB BLD-MCNC: 8.1 G/DL
LYMPHOCYTES # BLD AUTO: 0.4 K/UL
LYMPHOCYTES NFR BLD: 3.6 %
MCH RBC QN AUTO: 26.8 PG
MCHC RBC AUTO-ENTMCNC: 29.2 G/DL
MCV RBC AUTO: 92 FL
MONOCYTES # BLD AUTO: 0.9 K/UL
MONOCYTES NFR BLD: 8.9 %
NEUTROPHILS # BLD AUTO: 8.5 K/UL
NEUTROPHILS NFR BLD: 85.1 %
PLATELET # BLD AUTO: 230 K/UL
PMV BLD AUTO: 9.3 FL
POTASSIUM SERPL-SCNC: 3.8 MMOL/L
PROT SERPL-MCNC: 6.1 G/DL
RBC # BLD AUTO: 3.02 M/UL
RETICS/RBC NFR AUTO: 7.3 %
SODIUM SERPL-SCNC: 138 MMOL/L
WBC # BLD AUTO: 9.94 K/UL

## 2018-01-04 PROCEDURE — 25000242 PHARM REV CODE 250 ALT 637 W/ HCPCS

## 2018-01-04 PROCEDURE — 63600175 PHARM REV CODE 636 W HCPCS

## 2018-01-04 PROCEDURE — C9113 INJ PANTOPRAZOLE SODIUM, VIA: HCPCS

## 2018-01-04 PROCEDURE — 85025 COMPLETE CBC W/AUTO DIFF WBC: CPT

## 2018-01-04 PROCEDURE — 94761 N-INVAS EAR/PLS OXIMETRY MLT: CPT

## 2018-01-04 PROCEDURE — 25000003 PHARM REV CODE 250: Performed by: STUDENT IN AN ORGANIZED HEALTH CARE EDUCATION/TRAINING PROGRAM

## 2018-01-04 PROCEDURE — 25000003 PHARM REV CODE 250: Performed by: INTERNAL MEDICINE

## 2018-01-04 PROCEDURE — 85045 AUTOMATED RETICULOCYTE COUNT: CPT

## 2018-01-04 PROCEDURE — 36415 COLL VENOUS BLD VENIPUNCTURE: CPT

## 2018-01-04 PROCEDURE — 63600175 PHARM REV CODE 636 W HCPCS: Performed by: INTERNAL MEDICINE

## 2018-01-04 PROCEDURE — 25000003 PHARM REV CODE 250

## 2018-01-04 PROCEDURE — 80053 COMPREHEN METABOLIC PANEL: CPT

## 2018-01-04 RX ORDER — METOPROLOL SUCCINATE 100 MG/1
300 TABLET, EXTENDED RELEASE ORAL DAILY
Qty: 90 TABLET | Refills: 11
Start: 2018-01-05 | End: 2019-01-05

## 2018-01-04 RX ORDER — PHENYTOIN SODIUM 200 MG/1
200 CAPSULE, EXTENDED RELEASE ORAL 2 TIMES DAILY
Qty: 60 CAPSULE | Refills: 11
Start: 2018-01-04 | End: 2018-02-14

## 2018-01-04 RX ORDER — TAMSULOSIN HYDROCHLORIDE 0.4 MG/1
0.4 CAPSULE ORAL DAILY
Qty: 30 CAPSULE | Refills: 11
Start: 2018-01-05 | End: 2019-01-05

## 2018-01-04 RX ORDER — LISINOPRIL 2.5 MG/1
2.5 TABLET ORAL DAILY
Qty: 90 TABLET | Refills: 3 | Status: ON HOLD
Start: 2018-01-05 | End: 2018-01-12 | Stop reason: HOSPADM

## 2018-01-04 RX ORDER — SYRINGE, DISPOSABLE, 3 ML
1 SYRINGE, EMPTY DISPOSABLE MISCELLANEOUS
Qty: 12 EACH | Refills: 0 | Status: SHIPPED | OUTPATIENT
Start: 2018-01-04 | End: 2018-06-13

## 2018-01-04 RX ORDER — TRAVOPROST OPHTHALMIC SOLUTION 0.04 MG/ML
1 SOLUTION OPHTHALMIC DAILY
Start: 2018-01-05 | End: 2019-01-05

## 2018-01-04 RX ORDER — TIOTROPIUM BROMIDE 18 UG/1
1 CAPSULE ORAL; RESPIRATORY (INHALATION) DAILY
Refills: 0 | Status: ON HOLD
Start: 2018-01-05 | End: 2018-08-24 | Stop reason: CLARIF

## 2018-01-04 RX ORDER — PANTOPRAZOLE SODIUM 20 MG/1
40 TABLET, DELAYED RELEASE ORAL DAILY
Status: ON HOLD
Start: 2018-01-04 | End: 2018-01-12

## 2018-01-04 RX ORDER — SILDENAFIL CITRATE 20 MG/1
20 TABLET ORAL 3 TIMES DAILY
Qty: 90 TABLET | Refills: 11
Start: 2018-01-04 | End: 2019-01-04

## 2018-01-04 RX ORDER — FERROUS SULFATE 325(65) MG
325 TABLET, DELAYED RELEASE (ENTERIC COATED) ORAL 3 TIMES DAILY
Refills: 0 | COMMUNITY
Start: 2018-01-04

## 2018-01-04 RX ORDER — ATORVASTATIN CALCIUM 80 MG/1
80 TABLET, FILM COATED ORAL NIGHTLY
Qty: 90 TABLET | Refills: 3 | Status: ON HOLD
Start: 2018-01-04 | End: 2018-02-22 | Stop reason: HOSPADM

## 2018-01-04 RX ORDER — METOLAZONE 5 MG/1
5 TABLET ORAL
Qty: 15 TABLET | Refills: 11 | Status: ON HOLD
Start: 2018-01-05 | End: 2018-07-13

## 2018-01-04 RX ORDER — ALBUTEROL SULFATE 90 UG/1
2 AEROSOL, METERED RESPIRATORY (INHALATION) EVERY 6 HOURS PRN
Start: 2018-01-04

## 2018-01-04 RX ORDER — BUMETANIDE 2 MG/1
4 TABLET ORAL 2 TIMES DAILY
Qty: 120 TABLET | Refills: 11
Start: 2018-01-04 | End: 2019-01-04

## 2018-01-04 RX ORDER — COLCHICINE 0.6 MG/1
0.6 TABLET ORAL 2 TIMES DAILY
Qty: 60 TABLET | Refills: 11 | Status: ON HOLD
Start: 2018-01-04 | End: 2018-01-12 | Stop reason: HOSPADM

## 2018-01-04 RX ADMIN — PHENYTOIN SODIUM 200 MG: 100 CAPSULE, EXTENDED RELEASE ORAL at 08:01

## 2018-01-04 RX ADMIN — PANTOPRAZOLE SODIUM 40 MG: 40 INJECTION, POWDER, FOR SOLUTION INTRAVENOUS at 08:01

## 2018-01-04 RX ADMIN — BUMETANIDE 4 MG: 0.5 TABLET ORAL at 08:01

## 2018-01-04 RX ADMIN — METOPROLOL SUCCINATE 300 MG: 50 TABLET, EXTENDED RELEASE ORAL at 08:01

## 2018-01-04 RX ADMIN — LISINOPRIL 2.5 MG: 2.5 TABLET ORAL at 08:01

## 2018-01-04 RX ADMIN — OCTREOTIDE ACETATE 50 MCG/HR: 500 INJECTION, SOLUTION INTRAVENOUS; SUBCUTANEOUS at 01:01

## 2018-01-04 RX ADMIN — OCTREOTIDE ACETATE 50 MCG/HR: 500 INJECTION, SOLUTION INTRAVENOUS; SUBCUTANEOUS at 11:01

## 2018-01-04 RX ADMIN — TIOTROPIUM BROMIDE 18 MCG: 18 CAPSULE ORAL; RESPIRATORY (INHALATION) at 08:01

## 2018-01-04 RX ADMIN — TAMSULOSIN HYDROCHLORIDE 0.4 MG: 0.4 CAPSULE ORAL at 08:01

## 2018-01-04 RX ADMIN — FERROUS SULFATE TAB EC 325 MG (65 MG FE EQUIVALENT) 325 MG: 325 (65 FE) TABLET DELAYED RESPONSE at 02:01

## 2018-01-04 RX ADMIN — COLCHICINE 0.6 MG: 0.6 TABLET, FILM COATED ORAL at 08:01

## 2018-01-04 RX ADMIN — SILDENAFIL 20 MG: 20 TABLET ORAL at 02:01

## 2018-01-04 RX ADMIN — FERROUS SULFATE TAB EC 325 MG (65 MG FE EQUIVALENT) 325 MG: 325 (65 FE) TABLET DELAYED RESPONSE at 06:01

## 2018-01-04 NOTE — PLAN OF CARE
Tn spoke with Elizabeth in admit at [facility] to inform pt discharging home today. Elizabeth confirmed she has reviewed orders and accepted pt back. RN will visit pt tomorrow. Tn informed [facility] ICU Nurse of pt ready for d/c.     01/04/18 8442   Final Note   Assessment Type Final Discharge Note   Discharge Disposition Home-Health   What phone number can be called within the next 1-3 days to see how you are doing after discharge? [phone]   Hospital Follow Up  Appt(s) scheduled? Yes   Discharge plans and expectations educations in teach back method with documentation complete? Yes   Right Care Referral Info   Post Acute Recommendation Home-care   Facility Name [facility]

## 2018-01-04 NOTE — ANESTHESIA POSTPROCEDURE EVALUATION
Anesthesia Post Evaluation    Patient: Stefano Granger    Procedure(s) Performed: Procedure(s) (LRB):  SMALL BOWEL ENDOSCOPY-UPPER DBE (N/A)    Final Anesthesia Type: general  Patient location during evaluation: PACU  Patient participation: Yes- Able to Participate  Level of consciousness: awake and alert  Post-procedure vital signs: reviewed and stable  Pain management: adequate  Airway patency: patent  PONV status at discharge: No PONV  Anesthetic complications: no      Cardiovascular status: hemodynamically stable  Respiratory status: spontaneous ventilation  Hydration status: euvolemic  Follow-up not needed.        Visit Vitals  BP 96/61   Pulse 104   Temp 37.9 °C (100.2 °F) (Oral)   Resp (!) 23   Ht 6' (1.829 m)   Wt 76 kg (167 lb 8.8 oz)   SpO2 96%   BMI 22.72 kg/m²       Pain/William Score: Pain Assessment Performed: Yes (1/4/2018  5:35 AM)  Presence of Pain: non-verbal indicators absent (1/4/2018  5:35 AM)  Pain Rating Prior to Med Admin: 5 (1/3/2018 12:23 PM)  William Score: 9 (1/3/2018  5:30 PM)

## 2018-01-04 NOTE — PLAN OF CARE
Arrived from endo. via stretcher; awake, alert, oriented, moving all extremities, radial pulses 2+ bilaterally; dorsal pedis with doppler;3+ edema to both  legs and feet; skin dry and flaky; heart rate 102 sinus tach. Oriented to room; call light placed in hand; bed in low position, wheels locked, bed alarm on.

## 2018-01-04 NOTE — DISCHARGE SUMMARY
"[facility] Discharge Summary    Primary Team: [facility] Team B   Attending Physician: Dheeraj Najera MD  Resident: Dr. Brown  Intern: Dr. Pinto    Date of Admit: 1/2/2018  Date of Discharge: 1/4/2018    Discharge to: Home  Condition: Stable     Discharge Diagnoses     Patient Active Problem List   Diagnosis    Iron deficiency anemia    ANDREINA (iron deficiency anemia)    Symptomatic anemia    Gastrointestinal hemorrhage    SVT (supraventricular tachycardia)    Pulmonary hypertension       Consultants and Procedures     Consultants:  GI    Procedures:   Small Bowel Endoscopy- Upper DBE 1/3/2017    Brief History of Present Illness      Stefano Granger is a 72 y.o. male who  has a past medical history of Asthma; Ischemic Dilated Cardiomyopathy, CHF EF 35% s/p AICD, valvular disease, s/p AV and MV repair, Gout; Pulmonary HTN, COPD, Hypertension; Seizures; Afib on Coumadin, and Stroke. The patient was transferred from [facility] to [facility] on 1/2/2018 to undergo Dbl-Balloon scope with Dr. Soto for Hx of chronic GI-bleeding 2/2 small bowel angioectasias      The patient reports that he was in his USOH, when on this past Friday (12/29/17), he began to feel weak and dizzy, which caused him to become unsteady on his feet. He denies any fall or LOC, but reports that these are his usual sx's when he has had "low-blood counts" in the past 2/2 his chronic GIB. His daughter brought him to [facility] where his H/H was found to be low. He was transfused 4 units and responded well. He was then transferred to [facility] to undergo Dbl-balloon scope with Dr. Soto to alleviate his suspected continual upper GI bleeding.       The Pt has been seen by Dr. Soto previously for several  C-scopes, EGD w/ push, and has had VCE to assess his chronic GI-bleeding. Pt's last Upper Gi scope revealed single angioectasia in Duodenum.   Pt reports dark stools, but says that his stools are always dark since he " started daily PO iron supplementation. Pt denies Cp, SOB, change in stool character, caliber, color, or smell, urinary sx, abd pain, diarrhea, constipation, BRBPR, f/c/n/v.      Hospital Course By Problem with Pertinent Findings       Recurrent GI Bleed 2/2 Multiple Intestinal Angioectasia   - Pt admitted to [facility] with sx of weakness, dizziness, and low H/H   - Pt w/ Hx of multiple previous hospitalizations for symptomatic anemia 2/2 suspected GIB  - Sx's improved after transfusion of 4 Units   - Pt's last Upper Gi scope revealed angioectasia in Duodenum which is likely cuprit  - Pt previously managed conservatively for anemia 2/2 blood loss, Iron Def with PO iron replacement, transfusions with no lasting resolution of sx's  - Dbl-Balloon scope procedure on 1/3/18 w/ Dr. Soto without significant findings.   - Held ASA as patient with GI bleed, can reassess with PCP  - No continued evidence of active bleeding on octreotide  Hgb remained stable at 8.1 without transfusion at [facility]  Remained hemodynamically stable  diet advanced without issue  stable for discharge    - Discharged with Sandostatin LAR injections 20mg IM each month  - Follow up with GI Dr. Reynoso one month, may consider provocation     SVT  -SVT noted during Dbl-balloon scope on 1/3/18   -Etiology may be secondary to metoprolol being held 2/2 HoTN episode or 2/2 to anesthesia   -Follow up with PCP  -Continue home medication Metoprolol on discharge      Progressive ANDREINA 2/2 Chronic GI Blood Loss from Angioectasias   - Now requiring weekly blood transfusions with associated fatigue, dark stools.  - Continue home medication PO ferrous sulfate 325  - Last iron panel done 5/11/16 found to be WNL  - Follow up with GI Dr. Reynoso one month       CHF  - Pt with hx of systolic HF and reports hx of chronic LE edema;  2+ pitting LE edema on exam; Bibasilar crackles on exam  - No complaints of SOB, cough at this time  - Cont home Bumex and metolazone during  hospitalization and on discharge   - Held Lisinopril, metoprolol as patient as mildly HoTN but will resume on discharge   - Admitted to Tele overnight and stable  - F/u PCP and continue home medications on discharge        Gout  - Cont home colchicine  - Stable     HTN  - BP 93/51 on admit; Pt asymptomatic at this time  - Continued home Bumex during hospital and on discharge   - Held metoprolol and lisinopril as patient was mildly HoTN but will resume on discharge  - Held sildenafil as patient was mildly hypotensive but will resume on discharge   - F/u PCP     Asthma  - No sx's, complaints at this time  - Cont home Albuterol, tiotropium in hospital and on discharge   - F/u PCP       Hx of CVA  - No residual deficits noted on exam  - Cont statin in hospital and on discharge   - F/u PCP       Hx of Seizure  - Pt reports last seizure episode more than 2 yrs ago, no episodes in hospital   - Cont home Dilantin  - F/u PCP        Discharge Medications        Medication List      START taking these medications    albuterol 90 mcg/actuation inhaler  Inhale 2 puffs into the lungs every 6 (six) hours as needed for Wheezing. Rescue     colchicine 0.6 mg tablet  Take 1 tablet (0.6 mg total) by mouth 2 (two) times daily.     ferrous sulfate 325 (65 FE) MG EC tablet  Take 1 tablet (325 mg total) by mouth 3 (three) times daily.     octreotide lar 20 mg injection  Commonly known as:  SANDOSTATIN LAR  Inject 20 mg into the muscle every 28 days.     tamsulosin 0.4 mg Cp24  Commonly known as:  FLOMAX  Take 1 capsule (0.4 mg total) by mouth once daily.  Start taking on:  1/5/2018        CHANGE how you take these medications    atorvastatin 80 MG tablet  Commonly known as:  LIPITOR  Take 1 tablet (80 mg total) by mouth every evening.  What changed:  · how to take this  · when to take this     bumetanide 2 MG tablet  Commonly known as:  BUMEX  Take 2 tablets (4 mg total) by mouth 2 (two) times daily.  What changed:  · medication  strength  · how much to take  · how to take this  · when to take this  · reasons to take this     lisinopril 2.5 MG tablet  Commonly known as:  PRINIVIL,ZESTRIL  Take 1 tablet (2.5 mg total) by mouth once daily.  Start taking on:  1/5/2018  What changed:  · medication strength  · how much to take     metOLazone 5 MG tablet  Commonly known as:  ZAROXOLYN  Take 1 tablet (5 mg total) by mouth every 48 hours.  Start taking on:  1/5/2018  What changed:  · medication strength  · how much to take  · how to take this  · when to take this     metoprolol succinate 100 MG 24 hr tablet  Commonly known as:  TOPROL-XL  Take 3 tablets (300 mg total) by mouth once daily.  Start taking on:  1/5/2018  What changed:  · medication strength  · how much to take     pantoprazole 20 MG tablet  Commonly known as:  PROTONIX  Take 2 tablets (40 mg total) by mouth once daily.  What changed:  · how much to take  · how to take this  · when to take this     phenytoin 200 MG ER capsule  Commonly known as:  DILANTIN  Take 1 capsule (200 mg total) by mouth 2 (two) times daily.  What changed:  · medication strength  · how much to take  · how to take this  · when to take this     sildenafil (antihypertensive) 20 mg Tab  Commonly known as:  REVATIO  Take 1 tablet (20 mg total) by mouth 3 (three) times daily.  What changed:  how to take this     tiotropium 18 mcg inhalation capsule  Commonly known as:  SPIRIVA WITH HANDIHALER  Inhale 1 capsule (18 mcg total) into the lungs once daily. Controller  Start taking on:  1/5/2018  What changed:  See the new instructions.     travoprost 0.004 % Drop  Commonly known as:  TRAVATAN Z  Place 1 drop into both eyes once daily.  Start taking on:  1/5/2018  What changed:  · how much to take  · how to take this  · when to take this        STOP taking these medications    digoxin 125 mcg tablet  Commonly known as:  LANOXIN     finasteride 5 mg tablet  Commonly known as:  PROSCAR     methIMAzole 10 MG Tab  Commonly known  as:  TAPAZOLE     spironolactone 25 MG tablet  Commonly known as:  ALDACTONE     SYMBICORT 160-4.5 mcg/actuation Hfaa  Generic drug:  budesonide-formoterol 160-4.5 mcg           Where to Get Your Medications      You can get these medications from any pharmacy    Bring a paper prescription for each of these medications  · octreotide lar 20 mg injection  You don't need a prescription for these medications  · ferrous sulfate 325 (65 FE) MG EC tablet     Information about where to get these medications is not yet available    Ask your nurse or doctor about these medications  · albuterol 90 mcg/actuation inhaler  · atorvastatin 80 MG tablet  · bumetanide 2 MG tablet  · colchicine 0.6 mg tablet  · lisinopril 2.5 MG tablet  · metOLazone 5 MG tablet  · metoprolol succinate 100 MG 24 hr tablet  · pantoprazole 20 MG tablet  · phenytoin 200 MG ER capsule  · sildenafil (antihypertensive) 20 mg Tab  · tamsulosin 0.4 mg Cp24  · tiotropium 18 mcg inhalation capsule  · travoprost 0.004 % Drop         Discharge Information:   Diet:  Regular     Physical Activity:  As tolerated     Instructions:  1. Take all medications as prescribed  2. Keep all follow-up appointments  3. Return to the hospital or call your primary care physicians if any worsening symptoms such as fatigue, shortness of breath, bright red blood per rectum, or melena occur     Follow-Up Appointments:  GI Dr. Reynoso one month   Dr. Andrew in 2 weeks PCP      Dennise Pinto  [facility] Internal Medicine, -1

## 2018-01-04 NOTE — PROGRESS NOTES
[facility]    CC: anemia    HPI 72 y.o. male with past medical history of HTN, mitral and aortic valve replacement due to previous endocarditis, history of ischemic dilated cardiomyopathy with ICD placed who presents with recurrent iron deficiency anemia requiring weekly blood transfusions with associated fatigue, dark stools since starting oral iron therapy without associated hematochezia, abdominal pain or unexpected weight loss.    Upper DBE performed yesterday without significant findings. No signs of GI blood loss. Patient was transferred to ICU post-procedure due to SVT noted during procedure. No significant issues overnight per nursing.     Past Medical History  Ischemic dilated cardiomyopathy with a history of an EF 36-45 % now with low normal EF with ICD  Hx of small bowel angioectasias  Recurrent ANDREINA    Review of Systems  General ROS: +fatigue, lethargy, negative for chills, fever or weight loss  Respiratory ROS: no cough, shortness of breath, or wheezing  Cardiovascular ROS: no chest pain or dyspnea on exertion  Gastrointestinal ROS: no abdominal pain, change in bowel habits,  Genito-Urinary ROS: no dysuria, trouble voiding, or hematuria  Musculoskeletal ROS: +joint pain, negative for gait disturbance or muscular weakness    Physical Examination  BP 96/61   Pulse 104   Temp 100.2 °F (37.9 °C) (Oral)   Resp (!) 23   Ht 6' (1.829 m)   Wt 76 kg (167 lb 8.8 oz)   SpO2 96%   BMI 22.72 kg/m²   General appearance: alert, cooperative, no distress, chronically ill appearing elderly male, lying in bed  HENT: Normocephalic, atraumatic, neck symmetrical, no nasal discharge   Eyes: conjunctivae/corneas clear, PERRL, EOM's intact  Lungs: clear to auscultation bilaterally, no dullness to percussion bilaterally  Heart: regular rate and rhythm without rub; no displacement of the PMI   Abdomen: soft, non-tender; bowel sounds normoactive; no organomegaly  Extremities: extremities symmetric; swelling of left  elbow, no clubbing, cyanosis,   Integument: Skin color, texture, turgor normal; no rashes; hair distrubution normal    Labs:    Lab Results   Component Value Date    WBC 9.94 01/04/2018    HGB 8.1 (L) 01/04/2018    HCT 27.7 (L) 01/04/2018    MCV 92 01/04/2018     01/04/2018       Imaging/Procedures:  Upper DBE: Normal     Assessment:   72 y.o. male here with recurrent progressive iron deficiency anemia now requiring weekly blood transfusions with associated fatigue, dark stools. Anemia may be related to combination of chronic GI blood loss from angioectasias and potential inadequate RBC production.     Plan:   -Recommend hematology consultation for evaluation of contributors to anemia  -Will plan trial of octreotide as next step in management  -Will consider provocative testing if anemia fails to improve

## 2018-01-04 NOTE — PLAN OF CARE
Problem: Patient Care Overview  Goal: Plan of Care Review  Outcome: Ongoing (interventions implemented as appropriate)  Pt lying comfortably in bed awake and alert, oriented x4. No signs of distress noted. Respirations even and unlabored. SATS 100% on room air while awake and 88% while sleeping. SATS 100% on 2L per nasal cannula while sleeping. Systolic BP 's and diastolic 50-60's. Temp up to 100.2. Tele sinus tachycardia with HR low 100's. HR up to 130's with exertion. No needs or c/o reported. Tolerated full liquid diet well. Will continue to monitor.

## 2018-01-04 NOTE — ANESTHESIA RELEASE NOTE
Anesthesia Release from PACU Note    Patient: Stefano Granger    Procedure(s) Performed: Procedure(s) (LRB):  SMALL BOWEL ENDOSCOPY-UPPER DBE (N/A)    Anesthesia type: general    Post pain: Adequate analgesia    Post assessment: no apparent anesthetic complications    Last Vitals:   Visit Vitals  BP 96/61   Pulse 104   Temp 37.9 °C (100.2 °F) (Oral)   Resp (!) 23   Ht 6' (1.829 m)   Wt 76 kg (167 lb 8.8 oz)   SpO2 96%   BMI 22.72 kg/m²       Post vital signs: stable    Level of consciousness: awake    Nausea/Vomiting: no nausea/no vomiting    Complications: none    Airway Patency: patent    Respiratory: unassisted    Cardiovascular: stable and blood pressure at baseline    Hydration: euvolemic

## 2018-01-04 NOTE — PROGRESS NOTES
[facility] Resident HO-3 Progress Note    Subjective:   Patient feeling well this morning. No complaints.   Had an episode of SVT after procedure yesterday so put in ICU.   Denies SOB, cp, bloody stools.      Objective:   Last 24 Hour Vital Signs:  BP  Min: 94/55  Max: 134/73  Temp  Av.7 °F (37.1 °C)  Min: 97.7 °F (36.5 °C)  Max: 100.2 °F (37.9 °C)  Pulse  Av.3  Min: 68  Max: 144  Resp  Av.6  Min: 13  Max: 23  SpO2  Av.2 %  Min: 95 %  Max: 100 %  I/O last 3 completed shifts:  In: 1450 [P.O.:850; I.V.:600]  Out: 4950 [Urine:4950]    Physical Examination:  Gen: alert, NAD  Head: AC/NT  Eyes: PERRL, EOMI, conjuctiva clear  Neck: trachea midline, no LAD, no JVD  Heart: tachycardia, normal s1/s2, no murmur  Lungs: CTAB, no wheezes, crackles in bases L>R  Abd: soft, NT,obese, no masses, normal BS  Ext: no clubbing, pitting edema b/l LE  Neuro: moving all extremities, no focal defecits  Skin: no trauma, no rashes      Laboratory:  Pertinent Findings:    Recent Labs  Lab 18   WBC 9.94   RBC 3.02*   HGB 8.1*   HCT 27.7*      MCV 92   MCH 26.8*   MCHC 29.2*       Recent Labs  Lab 18      K 3.8   CL 94*   CO2 37*   *   BUN 35*   CREATININE 1.2       Recent Labs  Lab 18   PROT 6.1   ALBUMIN 2.6*   BILITOT 0.5   AST 26   ALT 27   ALKPHOS 249*     Other Results:  EKG (my interpretation): SVT post procedure     Current Medications:     Infusions:   octreotide (SANDOSTATIN) infusion 50 mcg/hr (18)        Scheduled:   atorvastatin  80 mg Oral QHS    bumetanide  4 mg Oral BID    colchicine  0.6 mg Oral BID    ferrous sulfate  325 mg Oral TID    lisinopril  2.5 mg Oral Daily    metOLazone  5 mg Oral Q48H    metoprolol succinate  300 mg Oral Daily    pantoprazole  40 mg Intravenous BID    phenytoin  200 mg Oral BID    sildenafil (antihypertensive)  20 mg Oral TID    tamsulosin  0.4 mg Oral Daily    tiotropium  1 capsule Inhalation Daily     travoprost  1 drop Both Eyes Daily        PRN:  acetaminophen, albuterol      Assessment:     Stefano Granger is a 72 y.o.male with  Patient Active Problem List    Diagnosis Date Noted    SVT (supraventricular tachycardia) 01/03/2018    Symptomatic anemia 01/02/2018    Gastrointestinal hemorrhage 01/02/2018    ANDREINA (iron deficiency anemia) 07/07/2016    Iron deficiency anemia 06/02/2016        Plan:     GI bleed  -recurrent melena, h/o AVM, h/o multiple blood transfusions sent from [facility] for DBE  -DBE with no significant findings yesterday   -no more bloody / black stools overnight  -HH stable   -continue iron replacement and PPI. Avoid nsaids.   -will do octreotide 20mg IM injections Qmonth. Prescription given. GI follow up in 4 weeks.     Combined HF s/p AICD  -no issues, continue home meds     Ao and mitral Valve replacements  -no issues, continue home meds     HTN  -no issues, continue home meds     Afib  -no issues, continue home meds     PulmHTN  -no issues, continue home meds     dispo - home today     Mohan Brown  [facility] HO-3  [facility] Team A    [facility] Medicine Hospitalist Pager numbers:   [facility] Hospitalist Medicine Team A (Lola/Larry):   [facility] Hospitalist Medicine Team B (Jessie/Reinaldo):  [phone]

## 2018-01-05 ENCOUNTER — TELEPHONE (OUTPATIENT)
Dept: NEUROLOGY | Facility: HOSPITAL | Age: 73
End: 2018-01-05

## 2018-01-05 DIAGNOSIS — R07.9 PAIN IN THE CHEST: Primary | ICD-10-CM

## 2018-01-05 NOTE — NURSING
1750 Pt discharged Home per w/c with sister, with RX and follow-up instructions given in NAD, Luz x4, skin w/d/brown.

## 2018-01-05 NOTE — TELEPHONE ENCOUNTER
----- Message from Amilcar Schneider MD sent at 1/5/2018  2:35 PM CST -----  Patient needs follow up in Dr. Brittany scott when available.

## 2018-01-09 ENCOUNTER — TELEPHONE (OUTPATIENT)
Dept: NEUROLOGY | Facility: HOSPITAL | Age: 73
End: 2018-01-09

## 2018-01-09 NOTE — TELEPHONE ENCOUNTER
----- Message from Janet Mejia sent at 1/9/2018 11:46 AM CST -----  Contact: Nurse at [facility]  GI:  Patient asked nurse at [facility] to call, he is currently admitted to ICU there which is why he missed his appointment on 1/8.  Thank you  abd

## 2018-01-10 ENCOUNTER — HOSPITAL ENCOUNTER (INPATIENT)
Facility: HOSPITAL | Age: 73
LOS: 1 days | Discharge: HOME | DRG: 812 | End: 2018-01-12
Attending: EMERGENCY MEDICINE | Admitting: INTERNAL MEDICINE
Payer: MEDICARE

## 2018-01-10 DIAGNOSIS — R53.1 WEAKNESS: ICD-10-CM

## 2018-01-10 DIAGNOSIS — D64.9 SYMPTOMATIC ANEMIA: ICD-10-CM

## 2018-01-10 DIAGNOSIS — I47.10 SVT (SUPRAVENTRICULAR TACHYCARDIA): ICD-10-CM

## 2018-01-10 DIAGNOSIS — D50.0 ANEMIA DUE TO BLOOD LOSS: ICD-10-CM

## 2018-01-10 DIAGNOSIS — D64.9 ANEMIA, UNSPECIFIED TYPE: Primary | ICD-10-CM

## 2018-01-10 DIAGNOSIS — D50.9 IRON DEFICIENCY ANEMIA, UNSPECIFIED IRON DEFICIENCY ANEMIA TYPE: ICD-10-CM

## 2018-01-10 DIAGNOSIS — K92.2 GASTROINTESTINAL HEMORRHAGE, UNSPECIFIED GASTROINTESTINAL HEMORRHAGE TYPE: ICD-10-CM

## 2018-01-10 LAB
ALBUMIN SERPL BCP-MCNC: 2.7 G/DL
ALP SERPL-CCNC: 265 U/L
ALT SERPL W/O P-5'-P-CCNC: 39 U/L
ANION GAP SERPL CALC-SCNC: 7 MMOL/L
APTT BLDCRRT: 27 SEC
AST SERPL-CCNC: 34 U/L
BASOPHILS # BLD AUTO: 0.01 K/UL
BASOPHILS NFR BLD: 0.2 %
BILIRUB SERPL-MCNC: 0.3 MG/DL
BUN SERPL-MCNC: 27 MG/DL
CALCIUM SERPL-MCNC: 8.6 MG/DL
CHLORIDE SERPL-SCNC: 97 MMOL/L
CO2 SERPL-SCNC: 35 MMOL/L
CREAT SERPL-MCNC: 1.2 MG/DL
DIFFERENTIAL METHOD: ABNORMAL
EOSINOPHIL # BLD AUTO: 0.1 K/UL
EOSINOPHIL NFR BLD: 2.3 %
ERYTHROCYTE [DISTWIDTH] IN BLOOD BY AUTOMATED COUNT: 15.3 %
EST. GFR  (AFRICAN AMERICAN): >60 ML/MIN/1.73 M^2
EST. GFR  (NON AFRICAN AMERICAN): >60 ML/MIN/1.73 M^2
GLUCOSE SERPL-MCNC: 141 MG/DL
HCT VFR BLD AUTO: 25.2 %
HGB BLD-MCNC: 7.5 G/DL
INR PPP: 1
LIPASE SERPL-CCNC: 29 U/L
LYMPHOCYTES # BLD AUTO: 0.5 K/UL
LYMPHOCYTES NFR BLD: 7.9 %
MCH RBC QN AUTO: 26.7 PG
MCHC RBC AUTO-ENTMCNC: 29.8 G/DL
MCV RBC AUTO: 90 FL
MONOCYTES # BLD AUTO: 0.8 K/UL
MONOCYTES NFR BLD: 12.1 %
NEUTROPHILS # BLD AUTO: 4.8 K/UL
NEUTROPHILS NFR BLD: 77.5 %
OB PNL STL: POSITIVE
PLATELET # BLD AUTO: 265 K/UL
PMV BLD AUTO: 10.6 FL
POTASSIUM SERPL-SCNC: 3.3 MMOL/L
PROT SERPL-MCNC: 6.3 G/DL
PROTHROMBIN TIME: 11 SEC
RBC # BLD AUTO: 2.81 M/UL
SODIUM SERPL-SCNC: 139 MMOL/L
WBC # BLD AUTO: 6.22 K/UL

## 2018-01-10 PROCEDURE — 82272 OCCULT BLD FECES 1-3 TESTS: CPT

## 2018-01-10 PROCEDURE — 86901 BLOOD TYPING SEROLOGIC RH(D): CPT

## 2018-01-10 PROCEDURE — 86920 COMPATIBILITY TEST SPIN: CPT

## 2018-01-10 PROCEDURE — 99285 EMERGENCY DEPT VISIT HI MDM: CPT | Mod: 25

## 2018-01-10 PROCEDURE — 85730 THROMBOPLASTIN TIME PARTIAL: CPT

## 2018-01-10 PROCEDURE — C9113 INJ PANTOPRAZOLE SODIUM, VIA: HCPCS | Performed by: EMERGENCY MEDICINE

## 2018-01-10 PROCEDURE — 96365 THER/PROPH/DIAG IV INF INIT: CPT

## 2018-01-10 PROCEDURE — 63600175 PHARM REV CODE 636 W HCPCS: Performed by: EMERGENCY MEDICINE

## 2018-01-10 PROCEDURE — 83690 ASSAY OF LIPASE: CPT

## 2018-01-10 PROCEDURE — 96375 TX/PRO/DX INJ NEW DRUG ADDON: CPT

## 2018-01-10 PROCEDURE — 25000003 PHARM REV CODE 250: Performed by: EMERGENCY MEDICINE

## 2018-01-10 PROCEDURE — 36430 TRANSFUSION BLD/BLD COMPNT: CPT | Mod: 59

## 2018-01-10 PROCEDURE — 85025 COMPLETE CBC W/AUTO DIFF WBC: CPT

## 2018-01-10 PROCEDURE — 93005 ELECTROCARDIOGRAM TRACING: CPT

## 2018-01-10 PROCEDURE — 85610 PROTHROMBIN TIME: CPT

## 2018-01-10 PROCEDURE — 80053 COMPREHEN METABOLIC PANEL: CPT

## 2018-01-10 PROCEDURE — 96361 HYDRATE IV INFUSION ADD-ON: CPT

## 2018-01-10 RX ORDER — SODIUM CHLORIDE 9 MG/ML
125 INJECTION, SOLUTION INTRAVENOUS CONTINUOUS
Status: DISCONTINUED | OUTPATIENT
Start: 2018-01-10 | End: 2018-01-11

## 2018-01-10 RX ORDER — PANTOPRAZOLE SODIUM 40 MG/10ML
80 INJECTION, POWDER, LYOPHILIZED, FOR SOLUTION INTRAVENOUS
Status: COMPLETED | OUTPATIENT
Start: 2018-01-10 | End: 2018-01-10

## 2018-01-10 RX ADMIN — SODIUM CHLORIDE 125 ML/HR: 0.9 INJECTION, SOLUTION INTRAVENOUS at 10:01

## 2018-01-10 RX ADMIN — PANTOPRAZOLE SODIUM 80 MG: 40 INJECTION, POWDER, FOR SOLUTION INTRAVENOUS at 10:01

## 2018-01-11 PROBLEM — D64.9 ANEMIA: Status: ACTIVE | Noted: 2018-01-11

## 2018-01-11 LAB
ABO + RH BLD: NORMAL
APTT BLDCRRT: 27.6 SEC
BASOPHILS # BLD AUTO: 0.01 K/UL
BASOPHILS NFR BLD: 0.2 %
BILIRUB UR QL STRIP: NEGATIVE
BLD GP AB SCN CELLS X3 SERPL QL: NORMAL
CLARITY UR: CLEAR
COLOR UR: YELLOW
DIFFERENTIAL METHOD: ABNORMAL
EOSINOPHIL # BLD AUTO: 0.1 K/UL
EOSINOPHIL NFR BLD: 2.1 %
ERYTHROCYTE [DISTWIDTH] IN BLOOD BY AUTOMATED COUNT: 15.2 %
GLUCOSE UR QL STRIP: NEGATIVE
HCT VFR BLD AUTO: 22.7 %
HGB BLD-MCNC: 6.8 G/DL
HGB UR QL STRIP: NEGATIVE
INR PPP: 1.1
KETONES UR QL STRIP: NEGATIVE
LEUKOCYTE ESTERASE UR QL STRIP: NEGATIVE
LYMPHOCYTES # BLD AUTO: 0.5 K/UL
LYMPHOCYTES NFR BLD: 9.9 %
MCH RBC QN AUTO: 26.9 PG
MCHC RBC AUTO-ENTMCNC: 30 G/DL
MCV RBC AUTO: 90 FL
MONOCYTES # BLD AUTO: 0.4 K/UL
MONOCYTES NFR BLD: 7.8 %
NEUTROPHILS # BLD AUTO: 4.3 K/UL
NEUTROPHILS NFR BLD: 80 %
NITRITE UR QL STRIP: NEGATIVE
PH UR STRIP: 8 [PH] (ref 5–8)
PLATELET # BLD AUTO: 260 K/UL
PMV BLD AUTO: 10.1 FL
PROT UR QL STRIP: NEGATIVE
PROTHROMBIN TIME: 11.2 SEC
RBC # BLD AUTO: 2.53 M/UL
SP GR UR STRIP: 1.01 (ref 1–1.03)
URN SPEC COLLECT METH UR: NORMAL
UROBILINOGEN UR STRIP-ACNC: NEGATIVE EU/DL
WBC # BLD AUTO: 5.36 K/UL

## 2018-01-11 PROCEDURE — 85610 PROTHROMBIN TIME: CPT

## 2018-01-11 PROCEDURE — 94640 AIRWAY INHALATION TREATMENT: CPT

## 2018-01-11 PROCEDURE — P9021 RED BLOOD CELLS UNIT: HCPCS

## 2018-01-11 PROCEDURE — 85025 COMPLETE CBC W/AUTO DIFF WBC: CPT

## 2018-01-11 PROCEDURE — 25000003 PHARM REV CODE 250

## 2018-01-11 PROCEDURE — 63600175 PHARM REV CODE 636 W HCPCS: Performed by: EMERGENCY MEDICINE

## 2018-01-11 PROCEDURE — 21400001 HC TELEMETRY ROOM

## 2018-01-11 PROCEDURE — 93005 ELECTROCARDIOGRAM TRACING: CPT

## 2018-01-11 PROCEDURE — 25500020 PHARM REV CODE 255: Performed by: STUDENT IN AN ORGANIZED HEALTH CARE EDUCATION/TRAINING PROGRAM

## 2018-01-11 PROCEDURE — 63600175 PHARM REV CODE 636 W HCPCS: Performed by: STUDENT IN AN ORGANIZED HEALTH CARE EDUCATION/TRAINING PROGRAM

## 2018-01-11 PROCEDURE — 99221 1ST HOSP IP/OBS SF/LOW 40: CPT | Mod: GC,,, | Performed by: INTERNAL MEDICINE

## 2018-01-11 PROCEDURE — 94761 N-INVAS EAR/PLS OXIMETRY MLT: CPT

## 2018-01-11 PROCEDURE — 25000003 PHARM REV CODE 250: Performed by: STUDENT IN AN ORGANIZED HEALTH CARE EDUCATION/TRAINING PROGRAM

## 2018-01-11 PROCEDURE — C9113 INJ PANTOPRAZOLE SODIUM, VIA: HCPCS

## 2018-01-11 PROCEDURE — 81003 URINALYSIS AUTO W/O SCOPE: CPT

## 2018-01-11 PROCEDURE — 25000003 PHARM REV CODE 250: Performed by: EMERGENCY MEDICINE

## 2018-01-11 PROCEDURE — 85730 THROMBOPLASTIN TIME PARTIAL: CPT

## 2018-01-11 PROCEDURE — 63600175 PHARM REV CODE 636 W HCPCS

## 2018-01-11 PROCEDURE — 36415 COLL VENOUS BLD VENIPUNCTURE: CPT

## 2018-01-11 PROCEDURE — 25000242 PHARM REV CODE 250 ALT 637 W/ HCPCS

## 2018-01-11 PROCEDURE — C9113 INJ PANTOPRAZOLE SODIUM, VIA: HCPCS | Performed by: EMERGENCY MEDICINE

## 2018-01-11 RX ORDER — PANTOPRAZOLE SODIUM 40 MG/10ML
40 INJECTION, POWDER, LYOPHILIZED, FOR SOLUTION INTRAVENOUS 2 TIMES DAILY
Status: DISCONTINUED | OUTPATIENT
Start: 2018-01-11 | End: 2018-01-11

## 2018-01-11 RX ORDER — HYDROCODONE BITARTRATE AND ACETAMINOPHEN 500; 5 MG/1; MG/1
TABLET ORAL
Status: DISCONTINUED | OUTPATIENT
Start: 2018-01-11 | End: 2018-01-12 | Stop reason: HOSPADM

## 2018-01-11 RX ORDER — TIOTROPIUM BROMIDE 18 UG/1
1 CAPSULE ORAL; RESPIRATORY (INHALATION) DAILY
Status: DISCONTINUED | OUTPATIENT
Start: 2018-01-11 | End: 2018-01-12 | Stop reason: HOSPADM

## 2018-01-11 RX ORDER — ALBUTEROL SULFATE 90 UG/1
2 AEROSOL, METERED RESPIRATORY (INHALATION) EVERY 6 HOURS PRN
Status: DISCONTINUED | OUTPATIENT
Start: 2018-01-11 | End: 2018-01-12 | Stop reason: HOSPADM

## 2018-01-11 RX ORDER — FUROSEMIDE 10 MG/ML
40 INJECTION INTRAMUSCULAR; INTRAVENOUS ONCE
Status: COMPLETED | OUTPATIENT
Start: 2018-01-12 | End: 2018-01-12

## 2018-01-11 RX ORDER — ATORVASTATIN CALCIUM 40 MG/1
80 TABLET, FILM COATED ORAL NIGHTLY
Status: DISCONTINUED | OUTPATIENT
Start: 2018-01-11 | End: 2018-01-12 | Stop reason: HOSPADM

## 2018-01-11 RX ORDER — FERROUS SULFATE 325(65) MG
325 TABLET, DELAYED RELEASE (ENTERIC COATED) ORAL 3 TIMES DAILY
Status: DISCONTINUED | OUTPATIENT
Start: 2018-01-11 | End: 2018-01-12 | Stop reason: HOSPADM

## 2018-01-11 RX ORDER — LISINOPRIL 2.5 MG/1
2.5 TABLET ORAL DAILY
Status: DISCONTINUED | OUTPATIENT
Start: 2018-01-11 | End: 2018-01-11

## 2018-01-11 RX ORDER — TRAVOPROST OPHTHALMIC SOLUTION 0.04 MG/ML
1 SOLUTION OPHTHALMIC DAILY
Status: DISCONTINUED | OUTPATIENT
Start: 2018-01-11 | End: 2018-01-12 | Stop reason: HOSPADM

## 2018-01-11 RX ORDER — ACETAMINOPHEN 500 MG
500 TABLET ORAL EVERY 6 HOURS PRN
Status: DISCONTINUED | OUTPATIENT
Start: 2018-01-11 | End: 2018-01-12 | Stop reason: HOSPADM

## 2018-01-11 RX ORDER — METOPROLOL SUCCINATE 50 MG/1
300 TABLET, EXTENDED RELEASE ORAL DAILY
Status: DISCONTINUED | OUTPATIENT
Start: 2018-01-11 | End: 2018-01-12 | Stop reason: HOSPADM

## 2018-01-11 RX ORDER — PHENYTOIN SODIUM 100 MG/1
200 CAPSULE, EXTENDED RELEASE ORAL 2 TIMES DAILY
Status: DISCONTINUED | OUTPATIENT
Start: 2018-01-11 | End: 2018-01-12 | Stop reason: HOSPADM

## 2018-01-11 RX ORDER — POTASSIUM CHLORIDE 20 MEQ/1
20 TABLET, EXTENDED RELEASE ORAL ONCE
Status: DISCONTINUED | OUTPATIENT
Start: 2018-01-11 | End: 2018-01-12 | Stop reason: HOSPADM

## 2018-01-11 RX ORDER — OCTREOTIDE ACETATE 50 UG/ML
50 INJECTION, SOLUTION INTRAVENOUS; SUBCUTANEOUS ONCE
Status: COMPLETED | OUTPATIENT
Start: 2018-01-11 | End: 2018-01-11

## 2018-01-11 RX ORDER — PANTOPRAZOLE SODIUM 40 MG/10ML
40 INJECTION, POWDER, LYOPHILIZED, FOR SOLUTION INTRAVENOUS 2 TIMES DAILY
Status: DISCONTINUED | OUTPATIENT
Start: 2018-01-11 | End: 2018-01-12 | Stop reason: HOSPADM

## 2018-01-11 RX ADMIN — PHENYTOIN SODIUM 200 MG: 100 CAPSULE, EXTENDED RELEASE ORAL at 09:01

## 2018-01-11 RX ADMIN — SODIUM CHLORIDE: 9 INJECTION, SOLUTION INTRAVENOUS at 12:01

## 2018-01-11 RX ADMIN — PHENYTOIN SODIUM 200 MG: 100 CAPSULE, EXTENDED RELEASE ORAL at 01:01

## 2018-01-11 RX ADMIN — ATORVASTATIN CALCIUM 80 MG: 40 TABLET, FILM COATED ORAL at 09:01

## 2018-01-11 RX ADMIN — OCTREOTIDE ACETATE 50 MCG: 50 INJECTION, SOLUTION INTRAVENOUS; SUBCUTANEOUS at 03:01

## 2018-01-11 RX ADMIN — FERROUS SULFATE TAB EC 325 MG (65 MG FE EQUIVALENT) 325 MG: 325 (65 FE) TABLET DELAYED RESPONSE at 05:01

## 2018-01-11 RX ADMIN — FERROUS SULFATE TAB EC 325 MG (65 MG FE EQUIVALENT) 325 MG: 325 (65 FE) TABLET DELAYED RESPONSE at 09:01

## 2018-01-11 RX ADMIN — PANTOPRAZOLE SODIUM 40 MG: 40 INJECTION, POWDER, FOR SOLUTION INTRAVENOUS at 09:01

## 2018-01-11 RX ADMIN — TRAVOPROST 1 DROP: 0.04 SOLUTION/ DROPS OPHTHALMIC at 12:01

## 2018-01-11 RX ADMIN — IOHEXOL 125 ML: 350 INJECTION, SOLUTION INTRAVENOUS at 02:01

## 2018-01-11 RX ADMIN — ATORVASTATIN CALCIUM 80 MG: 40 TABLET, FILM COATED ORAL at 01:01

## 2018-01-11 RX ADMIN — OCTREOTIDE ACETATE 50 MCG/HR: 500 INJECTION, SOLUTION INTRAVENOUS; SUBCUTANEOUS at 04:01

## 2018-01-11 RX ADMIN — TIOTROPIUM BROMIDE 18 MCG: 18 CAPSULE ORAL; RESPIRATORY (INHALATION) at 08:01

## 2018-01-11 NOTE — PLAN OF CARE
Problem: Patient Care Overview  Goal: Plan of Care Review  Outcome: Ongoing (interventions implemented as appropriate)  The proper method of use, as well as anticipated side effects, of this metered-dose inhaler are discussed and demonstrated to the patient.

## 2018-01-11 NOTE — SUBJECTIVE & OBJECTIVE
Past Medical History:   Diagnosis Date    Asthma     Cardiac defibrillator in place     CHF (congestive heart failure)     Gout     Hypertension     Seizures     Stroke        Past Surgical History:   Procedure Laterality Date    CARDIAC SURGERY      year 2000    TONSILLECTOMY         Review of patient's allergies indicates:   Allergen Reactions    Coreg [carvedilol] Palpitations     Family History     None        Social History Main Topics    Smoking status: Current Every Day Smoker     Years: 28.00     Types: Cigars    Smokeless tobacco: Not on file      Comment: pt smoke a cigar 2x weekly    Alcohol use No      Comment: socially    Drug use: Yes     Types: Marijuana      Comment: occassionally    Sexual activity: Not on file     Review of Systems   Constitutional: Positive for fatigue.   HENT: Negative.    Eyes: Negative.    Respiratory: Negative.    Cardiovascular: Negative.    Gastrointestinal: Positive for blood in stool.   Genitourinary: Negative.    Skin: Negative.    Hematological: Negative.    Psychiatric/Behavioral: Negative.    All other systems reviewed and are negative.    Objective:     Vital Signs (Most Recent):  Temp: 98.2 °F (36.8 °C) (01/11/18 0710)  Pulse: 86 (01/11/18 1020)  Resp: 17 (01/11/18 1020)  BP: (!) 98/50 (01/11/18 1020)  SpO2: 100 % (01/11/18 1020) Vital Signs (24h Range):  Temp:  [98 °F (36.7 °C)-98.2 °F (36.8 °C)] 98.2 °F (36.8 °C)  Pulse:  [86-88] 86  Resp:  [14-22] 17  SpO2:  [97 %-100 %] 100 %  BP: ()/(37-55) 98/50     Weight:  (168 lbs) (01/11/18 0000)  Body mass index is 22.78 kg/m².      Intake/Output Summary (Last 24 hours) at 01/11/18 1026  Last data filed at 01/11/18 0601   Gross per 24 hour   Intake                0 ml   Output              300 ml   Net             -300 ml       Lines/Drains/Airways     Peripheral Intravenous Line                 Peripheral IV - Single Lumen 01/10/18 2115 Right Hand less than 1 day                Physical Exam    Constitutional: He is oriented to person, place, and time. He appears well-developed and well-nourished.   HENT:   Head: Normocephalic and atraumatic.   Eyes: Pupils are equal, round, and reactive to light.   Cardiovascular: Normal rate and regular rhythm.    Pulmonary/Chest: Effort normal and breath sounds normal.   Abdominal: Soft. Bowel sounds are normal.   Neurological: He is alert and oriented to person, place, and time.   Skin: Skin is warm and dry.   Psychiatric: He has a normal mood and affect. His behavior is normal.       Significant Labs:  CBC:   Recent Labs  Lab 01/10/18  2204   WBC 6.22   HGB 7.5*   HCT 25.2*          Significant Imaging:  Imaging results within the past 24 hours have been reviewed.

## 2018-01-11 NOTE — PLAN OF CARE
Patient just recently in the hospital discharged on 1/4 (had double balloon endoscopy with Dr. Soto).    Owns [facility]    Has followup with Dr. Andrew at [facility] on 2/7 at 3pm ([phone])       01/11/18 1620   Discharge Assessment   Assessment Type Discharge Planning Assessment   Confirmed/corrected address and phone number on facesheet? Yes   Assessment information obtained from? Patient   Communicated expected length of stay with patient/caregiver yes   Prior to hospitilization cognitive status: Alert/Oriented   Prior to hospitalization functional status: Independent   Current cognitive status: Alert/Oriented   Current Functional Status: Independent   Lives With other (see comments)   Able to Return to Prior Arrangements yes   Is patient able to care for self after discharge? Yes   Patient's perception of discharge disposition home or selfcare   Readmission Within The Last 30 Days no previous admission in last 30 days   Patient currently being followed by outpatient case management? No   Patient currently receives any other outside agency services? No   Equipment Currently Used at Home none   Do you have any problems affording any of your prescribed medications? No   Is the patient taking medications as prescribed? yes   Does the patient have transportation home? Yes   Transportation Available family or friend will provide   Discharge Plan A Home   Discharge Plan B Home;Home Health   Patient/Family In Agreement With Plan yes     Vy Ramos, RN, CCM, CMSRN  RN Transition Navigator  [phone]

## 2018-01-11 NOTE — ED NOTES
Pt presents to ed with c/o muscle weakness and possible GI bleed. Pt has hx of GI bleed, [facility]. Reports possible dark stool. Pt is aaox4 at this time, neurologically intact. Denies pain at this time. Denies urinary symptoms. Denies chest pain, denies nausea, denies SOB. Lungs clear to auscultation. Will continue to monitor.

## 2018-01-11 NOTE — ED PROVIDER NOTES
Encounter Date: 1/10/2018    SCRIBE #1 NOTE: I, Ansley Moulton, am scribing for, and in the presence of,  Dr. Bush. I have scribed the entire note.   SCRIBE #2 NOTE: I, Luther George, am scribing for, and in the presence of,  Dr. Bush. I have scribed the remaining portions of the note not scribed by Scribe #1.     History     Chief Complaint   Patient presents with    Weakness     reports was admitted to hospital early january for surgery.  was seen at [facility] last night and was told blood count was low. Unsure if pt signed AMA or was d/c from [facility].  has been feeling weak today.      Time patient was seen by the provider: 9:26 PM      The patient is a 72 y.o. male with co-morbidities including CHF (AFib) and HTN who presents to the ED with a complaint of hypotension. Patient reports that he feels weak, that his pressure has fallen, and that his blood count has dropped from 8 to 6. Patient states that he had a colonoscopy 6 months ago, and that he has since reported trace amounts of blood in his stool. The patient was at [facility] last night, but left because there were no beds. The patient has had a blood transfusion in the past, and has reported having both aortic and mitral valve replacements. Patient denies drinking alcohol. He has had no additional chest pain, shortness of breath, or abdominal pain.              Review of patient's allergies indicates:   Allergen Reactions    Coreg [carvedilol] Palpitations     Past Medical History:   Diagnosis Date    Asthma     Cardiac defibrillator in place     CHF (congestive heart failure)     Gout     Hypertension     Seizures     Stroke      Past Surgical History:   Procedure Laterality Date    CARDIAC SURGERY      year 2000    TONSILLECTOMY       No family history on file.  Social History   Substance Use Topics    Smoking status: Current Every Day Smoker     Years: 28.00     Types: Cigars    Smokeless tobacco: Not on file      Comment: pt  smoke a cigar 2x weekly    Alcohol use No      Comment: socially     Review of Systems   Constitutional: Negative for fever.   HENT: Negative for sore throat.    Eyes: Negative.    Respiratory: Negative for shortness of breath.    Cardiovascular: Negative for chest pain.   Gastrointestinal: Positive for blood in stool. Negative for nausea.   Endocrine: Negative.    Genitourinary: Negative for dysuria.   Musculoskeletal: Negative for back pain.   Skin: Negative for rash.   Neurological: Negative for weakness.   Hematological: Does not bruise/bleed easily.   Psychiatric/Behavioral: Negative for agitation, behavioral problems and confusion.   All other systems reviewed and are negative.      Physical Exam     Initial Vitals [01/10/18 2048]   BP Pulse Resp Temp SpO2   (!) 86/51 88 18 98 °F (36.7 °C) 100 %      MAP       62.67         Physical Exam    Nursing note and vitals reviewed.  Constitutional: He appears well-developed and well-nourished.   HENT:   Head: Normocephalic and atraumatic.   Eyes: EOM are normal. Pupils are equal, round, and reactive to light.   Pale conjunctivae.   Neck: Neck supple.   Cardiovascular: Normal rate, regular rhythm, normal heart sounds and intact distal pulses. Exam reveals no gallop and no friction rub.    No murmur heard.  Pulmonary/Chest: Breath sounds normal. He has no wheezes. He has no rhonchi. He has no rales.   Abdominal: Soft. He exhibits no distension. There is no tenderness.   Genitourinary:   Genitourinary Comments: Normal rectal tone, melena, no active bleeding   Musculoskeletal: Normal range of motion.   Neurological: He is alert and oriented to person, place, and time.   Skin: No rash noted. No erythema.   Psychiatric: He has a normal mood and affect. Thought content normal.         ED Course   Procedures  Labs Reviewed   CBC W/ AUTO DIFFERENTIAL - Abnormal; Notable for the following:        Result Value    RBC 2.81 (*)     Hemoglobin 7.5 (*)     Hematocrit 25.2 (*)      MCH 26.7 (*)     MCHC 29.8 (*)     RDW 15.3 (*)     Lymph # 0.5 (*)     Gran% 77.5 (*)     Lymph% 7.9 (*)     All other components within normal limits   COMPREHENSIVE METABOLIC PANEL - Abnormal; Notable for the following:     Potassium 3.3 (*)     CO2 35 (*)     Glucose 141 (*)     BUN, Bld 27 (*)     Calcium 8.6 (*)     Albumin 2.7 (*)     Alkaline Phosphatase 265 (*)     Anion Gap 7 (*)     All other components within normal limits   OCCULT BLOOD X 1, STOOL - Abnormal; Notable for the following:     Occult Blood Positive (*)     All other components within normal limits   PROTIME-INR   APTT   URINALYSIS   LIPASE   PROTIME-INR   PROTIME-INR   APTT   TYPE & SCREEN   PREPARE RBC SOFT     EKG Readings: (Independently Interpreted)   Initial Reading: No STEMI. Rhythm: Normal Sinus Rhythm. Heart Rate: 88.       diffuse t waves, LAFB, 1st degree AV block,          Medical Decision Making:   Initial Assessment:   The patient is a 72 y.o. male with co-morbidities including CHF (AFib) and HTN who presents to the ED with a complaint of hypotension.  Differential Diagnosis:   Anemia, GI bleeding   ED Management:  11:19 PM -- discussed patient case with [facility] Gastroenterologist on call (Dr. Prakash) -- told him to call Dr. Chris Del Rosario   12:09 AM -- discussed patient case with Dr. Del Rosario  12:11 AM -- discussed case with Dr. Evan Leon of [facility] family practice -- they will admit              Attending Attestation:           Physician Attestation for Darrel:      Comments: I, Dr. Bush, personally performed the services described in this documentation. All medical record entries made by the joseeibmed were at my direction and in my presence.  I have reviewed the chart and agree that the record reflects my personal performance and is accurate and complete.              ED Course      Clinical Impression:     1. Anemia, unspecified type    2. Weakness    3. Anemia due to blood loss    4. Gastrointestinal hemorrhage, unspecified  gastrointestinal hemorrhage type          Disposition:   Disposition: Admitted  Condition: Stable                        Fauzia Bush MD  01/11/18 8701

## 2018-01-11 NOTE — ASSESSMENT & PLAN NOTE
Assessment:   72 y.o. male here with recurrent progressive iron deficiency anemia now requiring weekly blood transfusions with associated fatigue, dark stools. Recent upper DBE unrevealing of source of blood loss despite small amount of blood seen in mid ileum on last VCE. No significant blood loss noted this admission despite report of dark stools and no severe drop in H/H. Could potentially be bleeding from angioectasia vs. Dieulafoy lesion in that location in mid-ileum.     Plan:   -CT Angiography for evaluation of source of GI blood loss  -Would start Octreotide with 50 mcg bolus then 50 mcg/hr   -Transfusion for Hb<7 or signs of GI blood loss

## 2018-01-11 NOTE — ED NOTES
Admitting resident confirmed blood was cancelled for now. Consent will remain at bedside incase patient needs blood later on.

## 2018-01-11 NOTE — H&P
"[facility] Internal Medicine History and Physical - Resident Note    Admitting Team: Medicine Team B  Attending Physician: Dr. BREANNA Roman  Resident: Dr. Angel Brwon  Interns: Evan Mcintyre    Date of Admit: 1/10/2018    Chief Complaint     Weakness (reports was admitted to hospital early january for surgery. states was seen at [facility] last night and was told blood count was low. Unsure if pt signed AMA or was d/c from [facility]. States has been feeling weak today. )   for 3 days    Subjective:      History of Present Illness:  Stefano Granger is a 72 y.o. male who  has a past medical history of Asthma; Cardiac defibrillator in place; CHF (congestive heart failure); Gout; Hypertension; Seizures; and Stroke.. The patient presented to [facility] on 1/10/2018 with a primary complaint of Weakness (reports was admitted to hospital early january for surgery. states was seen at [facility] last night and was told blood count was low. Unsure if pt signed AMA or was d/c from [facility]. States has been feeling weak today. )    Patient reports feeling "weak and chilly" upon waking up from sleep Monday morning. He reports that he usually feels that way when his "blood counts are low" as he has a hx of chronic GI bleed and anemia and the patient is well known top [facility]. He also rep[orts a 3-day hx of diarrhea as well, but denies dark-color or unusually fols smell to the diarrhea. He does however report 1 episode of dark stool prior to the diarrheal episodes on Subday night. He reports that he went to [facility], was told that his blood counts were low, but eloped after waiting 6 hrs in the Ed. He then decided to come to [facility] to get checked out.   The patient was transferred from [facility] to [facility] on 1/2/2018  and underwent Dbl-Balloon scope with Dr. Soto for Hx of chronic GI-bleeding.  Pt has a reported Hx of chronic "bleeding veins" in his GI tract for which he has been hospitalized for many times over the last 3-4yrs. The " patient usually requires transfusion of several units during each stay to return H/H back to baseline levels.   The Pt has been seen by Dr. Soto previously for several  C-scopes, EGD w/ push, and has had VCE to assess his chronic GI-bleeding. Pt's last Upper Gi scope revealed single angioectasia in Duodenum.   Pt denies Cp, SOB, change in stool character, caliber, color, or smell, urinary sx, abd pain, diarrhea, constipation, BRBPR, f/c/n/v.    Past Medical History:  Past Medical History:   Diagnosis Date    Asthma     Cardiac defibrillator in place     CHF (congestive heart failure)     Gout     Hypertension     Seizures     Stroke        Past Surgical History:  Past Surgical History:   Procedure Laterality Date    CARDIAC SURGERY      year 2000    TONSILLECTOMY       Allergies:  Review of patient's allergies indicates:   Allergen Reactions    Coreg [carvedilol] Palpitations     Home Medications:  Prior to Admission medications    Medication Sig Start Date End Date Taking? Authorizing Provider   albuterol 90 mcg/actuation inhaler Inhale 2 puffs into the lungs every 6 (six) hours as needed for Wheezing. Rescue 1/4/18   Mohan Brown MD   atorvastatin (LIPITOR) 80 MG tablet Take 1 tablet (80 mg total) by mouth every evening. 1/4/18 1/4/19  Mohan Brown MD   bumetanide (BUMEX) 2 MG tablet Take 2 tablets (4 mg total) by mouth 2 (two) times daily. 1/4/18 1/4/19  Mohan Brown MD   colchicine 0.6 mg tablet Take 1 tablet (0.6 mg total) by mouth 2 (two) times daily. 1/4/18 1/4/19  Mohan Brown MD   ferrous sulfate 325 (65 FE) MG EC tablet Take 1 tablet (325 mg total) by mouth 3 (three) times daily. 1/4/18   Mohan Brown MD   lisinopril (PRINIVIL,ZESTRIL) 2.5 MG tablet Take 1 tablet (2.5 mg total) by mouth once daily. 1/5/18 1/5/19  Mohan Brown MD   metOLazone (ZAROXOLYN) 5 MG tablet Take 1 tablet (5 mg total) by mouth every 48 hours. 1/5/18 1/5/19  Mohan VILLANUEVA  "MD Stephanie   metoprolol succinate (TOPROL-XL) 100 MG 24 hr tablet Take 3 tablets (300 mg total) by mouth once daily. 18  Mohan Brown MD   needle, disp, 21 G 21 gauge x 1 1/2" Ndle 1 each by Misc.(Non-Drug; Combo Route) route every 30 days. 18   Mohan Brown MD   octreotide lar (SANDOSTATIN LAR) 20 mg injection Inject 20 mg into the muscle every 28 days. 18  Mohan Brown MD   pantoprazole (PROTONIX) 20 MG tablet Take 2 tablets (40 mg total) by mouth once daily. 18   Mohan Brown MD   phenytoin (DILANTIN) 200 MG ER capsule Take 1 capsule (200 mg total) by mouth 2 (two) times daily. 18  Mohan Brown MD   sildenafil, antihypertensive, (REVATIO) 20 mg Tab Take 1 tablet (20 mg total) by mouth 3 (three) times daily. 18  Mohan Brwon MD   syringe, disposable, 20 mL Syrg 1 each by Misc.(Non-Drug; Combo Route) route every 30 days. 18   Mohan Brown MD   tamsulosin (FLOMAX) 0.4 mg Cp24 Take 1 capsule (0.4 mg total) by mouth once daily. 18  Mohan Brown MD   tiotropium (SPIRIVA WITH HANDIHALER) 18 mcg inhalation capsule Inhale 1 capsule (18 mcg total) into the lungs once daily. Controller 18  Mohan Brown MD   travoprost (TRAVATAN Z) 0.004 % Drop Place 1 drop into both eyes once daily. 18  Mohan Brown MD     Family History:  Uncle: cancer (unknown)  Maternal Aunt- CVA  Brother- Dm2  Sister- Heart Failure (, 59 y/o)     Social History:  Social History   Substance Use Topics    Smoking status: Current Every Day Smoker     Years: 28.00     Types: Cigars    Smokeless tobacco: Not on file      Comment: pt smoke a cigar 2x weekly    Alcohol use No      Comment: socially     Review of Systems:  Pertinent positives and negatives are listed in HPI.  All other systems are reviewed and are negative.    Health Maintaince :   Primary Care Physician: " Larry  Immunizations:   TDap is not up to date.  Influenza is not up to date.  Pneumovax is up to date.  Cancer Screening:  Colonoscopy: is up to date.     Objective:   Last 24 Hour Vital Signs:  BP  Min: 84/38  Max: 111/55  Temp  Av.1 °F (36.7 °C)  Min: 98 °F (36.7 °C)  Max: 98.2 °F (36.8 °C)  Pulse  Av.9  Min: 86  Max: 88  Resp  Av.9  Min: 14  Max: 22  SpO2  Av.8 %  Min: 97 %  Max: 100 %  Height  Av' (182.9 cm)  Min: 6' (182.9 cm)  Max: 6' (182.9 cm)  Weight  Av.2 kg (168 lb)  Min: 76.2 kg (168 lb)  Max: 76.2 kg (168 lb)  Body mass index is 22.78 kg/m².  I/O last 3 completed shifts:  In: -   Out: 300 [Urine:300]    Physical Examination:  General:          Alert and awake in NAD  Head:               Normocephalic and atraumatic  Eyes:               PERRL; EOMi with otto sclera and conjunctival pallor bilaterally   Mouth:             Oropharynx clear and without exudate; dry mucous membranes; poor dentition with several teeth missing  Cardio:             Tachycardic. Regular rhythm with normal S1 and S2; 3/6 systolic murmurs heard over RUSB, LLSB (pt with hx of Bio-prost Aortic and Mitral? vlaves)  Resp:               CTAB; No wheezes or rhonchi  Abdom:            Soft, protuberant. NTND with normoactive bowel sounds  Extrem:            WWP with no clubbing, cyanosis. 2+ pitting edema noted bilaterally from knees distally to dorsum of feet  Skin:                No rashes, lesions, or color changes  Pulses:            Radial 2+ and symmetric distally bilaterally; LE opulses difficult to appreciate 2/2 edema, but b/ Les warm w/ good cap refill  Neuro:             AAOx3; cooperative and pleasant with no focal deficits    Laboratory:  Most Recent Data:  CBC: Lab Results   Component Value Date    WBC 6.22 01/10/2018    HGB 7.5 (L) 01/10/2018    HCT 25.2 (L) 01/10/2018     01/10/2018    MCV 90 01/10/2018    RDW 15.3 (H) 01/10/2018     BMP: Lab Results   Component Value Date    NA  139 01/10/2018    K 3.3 (L) 01/10/2018    CL 97 01/10/2018    CO2 35 (H) 01/10/2018    BUN 27 (H) 01/10/2018    CREATININE 1.2 01/10/2018     (H) 01/10/2018    CALCIUM 8.6 (L) 01/10/2018     LFTs: Lab Results   Component Value Date    PROT 6.3 01/10/2018    ALBUMIN 2.7 (L) 01/10/2018    BILITOT 0.3 01/10/2018    AST 34 01/10/2018    ALKPHOS 265 (H) 01/10/2018    ALT 39 01/10/2018     Coags:   Lab Results   Component Value Date    INR 1.0 01/10/2018     Urinalysis: Lab Results   Component Value Date    COLORU Yellow 01/11/2018    SPECGRAV 1.010 01/11/2018    NITRITE Negative 01/11/2018    KETONESU Negative 01/11/2018    UROBILINOGEN Negative 01/11/2018       Trended Lab Data:    Recent Labs  Lab 01/10/18  2204   WBC 6.22   HGB 7.5*   HCT 25.2*      MCV 90   RDW 15.3*      K 3.3*   CL 97   CO2 35*   BUN 27*   CREATININE 1.2   *   PROT 6.3   ALBUMIN 2.7*   BILITOT 0.3   AST 34   ALKPHOS 265*   ALT 39       Trended Cardiac Data:  No results for input(s): TROPONINI, CKTOTAL, CKMB, BNP in the last 168 hours.    Microbiology Data:  none    Other Laboratory Data:  none    Other Results:  EKG (my interpretation):   Sinus rhythm with 1st degree A-V block  Left posterior fascicular block  LVH with QRS widening and repolarization abnormality  Cannot rule out Inferior infarct ,age undetermined  Abnormal ECG  No previous ECGs available    Radiology:  Imaging Results          X-Ray Chest AP Portable (Final result)  Result time 01/10/18 23:42:18    Final result by Andrea Richards MD (01/10/18 23:42:18)                 Impression:       Cardiomegaly with bibasilar airspace opacities.  The findings are suggestive of mild pulmonary edema secondary to CHF.              Electronically signed by: ANDREA RICHARDS MD  Date:     01/10/18  Time:    23:42              Narrative:    Exam: [id number]  01/10/18  22:54:06 [id number] ([facility]) : XR CHEST AP PORTABLE    Technique:    Single frontal chest x-ray    Comparison:     None      Findings:      There is a left-sided single lead pacer with the tip overlying the right ventricular apex.  The patient is status post median sternotomy with valvular replacement.  Monitoring EKG leads are present.    The trachea is unremarkable.  There is enlargement of the cardiomediastinal silhouette.  There may be a small right-sided pleural effusion.  The left hemidiaphragm appears unremarkable.  There is no evidence of free air in the hemidiaphragms.  There is no evidence of pneumothorax.  There is no evidence of pneumomediastinum.  There are bibasilar airspace opacities.  There are degenerative changes in the osseous structures.                            Assessment:     Stefano Granger is a 72 y.o. male with:  Patient Active Problem List   Diagnosis    Iron deficiency anemia    ANDREINA (iron deficiency anemia)    Symptomatic anemia    Gastrointestinal hemorrhage    SVT (supraventricular tachycardia)    Pulmonary hypertension    Anemia       Plan:   GI bleed  - Hx of recurrent melena, h/o AVM, h/o multiple blood transfusions last week sent from [facility] for DBE w/ Dr. Soto  -no bloody/black stools over last 24 hrs; No diarrhea in last 24 hrs  -HH stable on admit (7.5/25.2); will transfuse if Hgb<7  - will continue iron replacement and PPI. Cont to avoid NSAIDs.  - Pt underwent DB-scope with Dr. Soto last week; No overt bleeding source found   - Pt currently receiving octreotide 20mg IM injections Qmonth -- Pt has GI follow up in 3 weeks scheduled from previous admission.  - will consult GI as patient is followed by Dr. Bender; appreciate recs     Combined H F s/p AICD  -no issues, continue home meds      Hx of AO and mitral Valve replacements  -no issues, continue home meds      HTN  -no issues, continue home meds      Afib  -no issues, continue home meds      PulmHTN  -no issues, continue home meds      Dispo - pending GI recs; H/H status      Code Status:     Full    Evan Mcintyre MD  U  Internal Medicine HO-I  [facility] Medicine Service    [facility] Medicine Hospitalist Pager numbers:   [facility] Hospitalist Medicine Team A (Lola/Larry): [phone]  [facility] Hospitalist Medicine Team B (Jessie/Reinaldo):  [phone]

## 2018-01-11 NOTE — CONSULTS
[facility]  Gastroenterology  Consult Note    Patient Name: Stefano Granger  MRN: [id number]  Admission Date: 1/10/2018  Hospital Length of Stay: 0 days  Code Status: Full Code   Attending Provider: No att. providers found   Consulting Provider: Amilcar Solitario MD  Primary Care Physician: VIRAJ Juarez MD  Principal Problem:Anemia    Inpatient consult to [facility]  Consult performed by: AMILCAR SOLITARIO  Consult ordered by: LOIS BRADY        Subjective:     HPI:  72 y.o. male with past medical history of HTN, mitral and aortic valve replacement due to previous endocarditis, history of ischemic dilated cardiomyopathy with an EF of 36-45 in 2014 with ICD placed, hx of small bowel angioectasias who presents with recurrent iron deficiency anemia requiring weekly blood transfusions with associated fatigue, dark stools ongoing since Monday. Patient was recently discharged from the hospital on 1/4/17, was doing well then on Monday started having multiple dark stools. He reports associated fatigue. He denies hematemesis or hematochezia. He denies rectal bleeding. He had recent upper DBE without significant findings but prior capsule at [facility] showed small volume red blood in mid ileum.       Past Medical History  Ischemic dilated cardiomyopathy with a history of an EF 36-45 % now with low normal EF with ICD  Hx of small bowel angioectasias  Recurrent ANDREINA     PSH  Tonsillectomy  Cardiac surgery as noted above     Social History         Social History   Substance Use Topics    Smoking status: Current Every Day Smoker       Years: 28.00       Types: Cigars    Smokeless tobacco: Not on file         Comment: pt smoke a cigar 2x weekly    Alcohol use No         Comment: socially      Family history  No family history of colon cancer         Past Medical History:   Diagnosis Date    Asthma     Cardiac defibrillator in place     CHF (congestive heart failure)     Gout      Hypertension     Seizures     Stroke        Past Surgical History:   Procedure Laterality Date    CARDIAC SURGERY      year 2000    TONSILLECTOMY         Review of patient's allergies indicates:   Allergen Reactions    Coreg [carvedilol] Palpitations     Family History     None        Social History Main Topics    Smoking status: Current Every Day Smoker     Years: 28.00     Types: Cigars    Smokeless tobacco: Not on file      Comment: pt smoke a cigar 2x weekly    Alcohol use No      Comment: socially    Drug use: Yes     Types: Marijuana      Comment: occassionally    Sexual activity: Not on file     Review of Systems   Constitutional: Positive for fatigue.   HENT: Negative.    Eyes: Negative.    Respiratory: Negative.    Cardiovascular: Negative.    Gastrointestinal: Positive for blood in stool.   Genitourinary: Negative.    Skin: Negative.    Hematological: Negative.    Psychiatric/Behavioral: Negative.    All other systems reviewed and are negative.    Objective:     Vital Signs (Most Recent):  Temp: 98.2 °F (36.8 °C) (01/11/18 0710)  Pulse: 86 (01/11/18 1020)  Resp: 17 (01/11/18 1020)  BP: (!) 98/50 (01/11/18 1020)  SpO2: 100 % (01/11/18 1020) Vital Signs (24h Range):  Temp:  [98 °F (36.7 °C)-98.2 °F (36.8 °C)] 98.2 °F (36.8 °C)  Pulse:  [86-88] 86  Resp:  [14-22] 17  SpO2:  [97 %-100 %] 100 %  BP: ()/(37-55) 98/50     Weight:  (168 lbs) (01/11/18 0000)  Body mass index is 22.78 kg/m².      Intake/Output Summary (Last 24 hours) at 01/11/18 1026  Last data filed at 01/11/18 0601   Gross per 24 hour   Intake                0 ml   Output              300 ml   Net             -300 ml       Lines/Drains/Airways     Peripheral Intravenous Line                 Peripheral IV - Single Lumen 01/10/18 2115 Right Hand less than 1 day                Physical Exam   Constitutional: He is oriented to person, place, and time. He appears well-developed and well-nourished.   HENT:   Head: Normocephalic and  atraumatic.   Eyes: Pupils are equal, round, and reactive to light.   Cardiovascular: Normal rate and regular rhythm.    Pulmonary/Chest: Effort normal and breath sounds normal.   Abdominal: Soft. Bowel sounds are normal.   Neurological: He is alert and oriented to person, place, and time.   Skin: Skin is warm and dry.   Psychiatric: He has a normal mood and affect. His behavior is normal.       Significant Labs:  CBC:   Recent Labs  Lab 01/10/18  2204   WBC 6.22   HGB 7.5*   HCT 25.2*          Significant Imaging:  Imaging results within the past 24 hours have been reviewed.    Assessment/Plan:     Iron deficiency anemia    Assessment:   72 y.o. male here with recurrent progressive iron deficiency anemia now requiring weekly blood transfusions with associated fatigue, dark stools. Recent upper DBE unrevealing of source of blood loss despite small amount of blood seen in mid ileum on last VCE. No significant blood loss noted this admission despite report of dark stools and no severe drop in H/H. Could potentially be bleeding from angioectasia vs. Dieulafoy lesion in that location in mid-ileum.     Plan:   -CT Angiography for evaluation of source of GI blood loss  -Would start Octreotide with 50 mcg bolus then 50 mcg/hr   -Transfusion for Hb<7 or signs of GI blood loss               Thank you for your consult. I will follow-up with patient. Please contact us if you have any additional questions.    Amilcar Schneider MD  Gastroenterology  [facility]

## 2018-01-11 NOTE — HPI
72 y.o. male with past medical history of HTN, mitral and aortic valve replacement due to previous endocarditis, history of ischemic dilated cardiomyopathy with an EF of 36-45 in 2014 with ICD placed, hx of small bowel angioectasias who presents with recurrent iron deficiency anemia requiring weekly blood transfusions with associated fatigue, dark stools ongoing since Monday. Patient was recently discharged from the hospital on 1/4/17, was doing well then on Monday started having multiple dark stools. He reports associated fatigue. He denies hematemesis or hematochezia. He denies rectal bleeding. He had recent upper DBE without significant findings but prior capsule at [facility] showed small volume red blood in mid ileum.       Past Medical History  Ischemic dilated cardiomyopathy with a history of an EF 36-45 % now with low normal EF with ICD  Hx of small bowel angioectasias  Recurrent ANDREINA     PSH  Tonsillectomy  Cardiac surgery as noted above     Social History         Social History   Substance Use Topics    Smoking status: Current Every Day Smoker       Years: 28.00       Types: Cigars    Smokeless tobacco: Not on file         Comment: pt smoke a cigar 2x weekly    Alcohol use No         Comment: socially      Family history  No family history of colon cancer

## 2018-01-12 VITALS
TEMPERATURE: 98 F | HEART RATE: 84 BPM | OXYGEN SATURATION: 94 % | RESPIRATION RATE: 19 BRPM | DIASTOLIC BLOOD PRESSURE: 58 MMHG | BODY MASS INDEX: 22.72 KG/M2 | WEIGHT: 167.75 LBS | HEIGHT: 72 IN | SYSTOLIC BLOOD PRESSURE: 101 MMHG

## 2018-01-12 LAB
ANISOCYTOSIS BLD QL SMEAR: SLIGHT
APTT BLDCRRT: 25.2 SEC
BASOPHILS # BLD AUTO: 0.01 K/UL
BASOPHILS # BLD AUTO: 0.02 K/UL
BASOPHILS # BLD AUTO: 0.02 K/UL
BASOPHILS NFR BLD: 0.1 %
BASOPHILS NFR BLD: 0.2 %
BASOPHILS NFR BLD: 0.2 %
BLD PROD TYP BPU: NORMAL
BLD PROD TYP BPU: NORMAL
BLOOD UNIT EXPIRATION DATE: NORMAL
BLOOD UNIT EXPIRATION DATE: NORMAL
BLOOD UNIT TYPE CODE: 5100
BLOOD UNIT TYPE CODE: 5100
BLOOD UNIT TYPE: NORMAL
BLOOD UNIT TYPE: NORMAL
CODING SYSTEM: NORMAL
CODING SYSTEM: NORMAL
DIFFERENTIAL METHOD: ABNORMAL
DISPENSE STATUS: NORMAL
DISPENSE STATUS: NORMAL
EOSINOPHIL # BLD AUTO: 0.1 K/UL
EOSINOPHIL # BLD AUTO: 0.1 K/UL
EOSINOPHIL # BLD AUTO: 0.2 K/UL
EOSINOPHIL NFR BLD: 1 %
EOSINOPHIL NFR BLD: 1.1 %
EOSINOPHIL NFR BLD: 1.8 %
ERYTHROCYTE [DISTWIDTH] IN BLOOD BY AUTOMATED COUNT: 15 %
ERYTHROCYTE [DISTWIDTH] IN BLOOD BY AUTOMATED COUNT: 15.2 %
ERYTHROCYTE [DISTWIDTH] IN BLOOD BY AUTOMATED COUNT: 15.3 %
HCT VFR BLD AUTO: 23.8 %
HCT VFR BLD AUTO: 25.9 %
HCT VFR BLD AUTO: 28 %
HGB BLD-MCNC: 7.1 G/DL
HGB BLD-MCNC: 7.7 G/DL
HGB BLD-MCNC: 8.3 G/DL
INR PPP: 1
LYMPHOCYTES # BLD AUTO: 0.4 K/UL
LYMPHOCYTES # BLD AUTO: 0.5 K/UL
LYMPHOCYTES # BLD AUTO: 0.7 K/UL
LYMPHOCYTES NFR BLD: 3.9 %
LYMPHOCYTES NFR BLD: 6.3 %
LYMPHOCYTES NFR BLD: 8.4 %
MCH RBC QN AUTO: 26 PG
MCH RBC QN AUTO: 26.3 PG
MCH RBC QN AUTO: 26.6 PG
MCHC RBC AUTO-ENTMCNC: 29.6 G/DL
MCHC RBC AUTO-ENTMCNC: 29.7 G/DL
MCHC RBC AUTO-ENTMCNC: 29.8 G/DL
MCV RBC AUTO: 88 FL
MCV RBC AUTO: 88 FL
MCV RBC AUTO: 89 FL
MONOCYTES # BLD AUTO: 0.6 K/UL
MONOCYTES # BLD AUTO: 0.9 K/UL
MONOCYTES # BLD AUTO: 0.9 K/UL
MONOCYTES NFR BLD: 11.6 %
MONOCYTES NFR BLD: 6.6 %
MONOCYTES NFR BLD: 8.4 %
NEUTROPHILS # BLD AUTO: 6.5 K/UL
NEUTROPHILS # BLD AUTO: 7 K/UL
NEUTROPHILS # BLD AUTO: 9.3 K/UL
NEUTROPHILS NFR BLD: 80.1 %
NEUTROPHILS NFR BLD: 83.8 %
NEUTROPHILS NFR BLD: 86.1 %
PLATELET # BLD AUTO: 267 K/UL
PLATELET # BLD AUTO: 281 K/UL
PLATELET # BLD AUTO: 341 K/UL
PMV BLD AUTO: 10.3 FL
PMV BLD AUTO: 10.4 FL
PMV BLD AUTO: 9.9 FL
POIKILOCYTOSIS BLD QL SMEAR: SLIGHT
POLYCHROMASIA BLD QL SMEAR: ABNORMAL
PROTHROMBIN TIME: 11 SEC
RBC # BLD AUTO: 2.67 M/UL
RBC # BLD AUTO: 2.93 M/UL
RBC # BLD AUTO: 3.19 M/UL
TRANS ERYTHROCYTES VOL PATIENT: NORMAL ML
TRANS ERYTHROCYTES VOL PATIENT: NORMAL ML
WBC # BLD AUTO: 10.77 K/UL
WBC # BLD AUTO: 8.13 K/UL
WBC # BLD AUTO: 8.32 K/UL

## 2018-01-12 PROCEDURE — C9113 INJ PANTOPRAZOLE SODIUM, VIA: HCPCS

## 2018-01-12 PROCEDURE — 25000003 PHARM REV CODE 250

## 2018-01-12 PROCEDURE — 85025 COMPLETE CBC W/AUTO DIFF WBC: CPT

## 2018-01-12 PROCEDURE — 63600175 PHARM REV CODE 636 W HCPCS: Performed by: STUDENT IN AN ORGANIZED HEALTH CARE EDUCATION/TRAINING PROGRAM

## 2018-01-12 PROCEDURE — 85610 PROTHROMBIN TIME: CPT

## 2018-01-12 PROCEDURE — 94761 N-INVAS EAR/PLS OXIMETRY MLT: CPT

## 2018-01-12 PROCEDURE — 63600175 PHARM REV CODE 636 W HCPCS: Performed by: INTERNAL MEDICINE

## 2018-01-12 PROCEDURE — 25000003 PHARM REV CODE 250: Performed by: STUDENT IN AN ORGANIZED HEALTH CARE EDUCATION/TRAINING PROGRAM

## 2018-01-12 PROCEDURE — 36415 COLL VENOUS BLD VENIPUNCTURE: CPT

## 2018-01-12 PROCEDURE — P9021 RED BLOOD CELLS UNIT: HCPCS

## 2018-01-12 PROCEDURE — 94640 AIRWAY INHALATION TREATMENT: CPT

## 2018-01-12 PROCEDURE — 85730 THROMBOPLASTIN TIME PARTIAL: CPT

## 2018-01-12 PROCEDURE — 25000242 PHARM REV CODE 250 ALT 637 W/ HCPCS

## 2018-01-12 PROCEDURE — 63600175 PHARM REV CODE 636 W HCPCS

## 2018-01-12 RX ORDER — FUROSEMIDE 10 MG/ML
80 INJECTION INTRAMUSCULAR; INTRAVENOUS ONCE
Status: COMPLETED | OUTPATIENT
Start: 2018-01-12 | End: 2018-01-12

## 2018-01-12 RX ORDER — PANTOPRAZOLE SODIUM 40 MG/1
40 TABLET, DELAYED RELEASE ORAL
Start: 2018-01-12 | End: 2018-02-14

## 2018-01-12 RX ORDER — SILDENAFIL CITRATE 20 MG/1
20 TABLET ORAL 3 TIMES DAILY
Status: DISCONTINUED | OUTPATIENT
Start: 2018-01-12 | End: 2018-01-12 | Stop reason: HOSPADM

## 2018-01-12 RX ORDER — BUMETANIDE 0.5 MG/1
4 TABLET ORAL 2 TIMES DAILY
Status: DISCONTINUED | OUTPATIENT
Start: 2018-01-13 | End: 2018-01-12 | Stop reason: HOSPADM

## 2018-01-12 RX ADMIN — FUROSEMIDE 40 MG: 10 INJECTION, SOLUTION INTRAMUSCULAR; INTRAVENOUS at 03:01

## 2018-01-12 RX ADMIN — OCTREOTIDE ACETATE 50 MCG/HR: 500 INJECTION, SOLUTION INTRAVENOUS; SUBCUTANEOUS at 01:01

## 2018-01-12 RX ADMIN — TIOTROPIUM BROMIDE 18 MCG: 18 CAPSULE ORAL; RESPIRATORY (INHALATION) at 07:01

## 2018-01-12 RX ADMIN — PHENYTOIN SODIUM 200 MG: 100 CAPSULE, EXTENDED RELEASE ORAL at 09:01

## 2018-01-12 RX ADMIN — FUROSEMIDE 80 MG: 10 INJECTION, SOLUTION INTRAMUSCULAR; INTRAVENOUS at 09:01

## 2018-01-12 RX ADMIN — TRAVOPROST 1 DROP: 0.04 SOLUTION/ DROPS OPHTHALMIC at 12:01

## 2018-01-12 RX ADMIN — FERROUS SULFATE TAB EC 325 MG (65 MG FE EQUIVALENT) 325 MG: 325 (65 FE) TABLET DELAYED RESPONSE at 02:01

## 2018-01-12 RX ADMIN — METOPROLOL SUCCINATE 300 MG: 50 TABLET, EXTENDED RELEASE ORAL at 09:01

## 2018-01-12 RX ADMIN — SILDENAFIL 20 MG: 20 TABLET ORAL at 02:01

## 2018-01-12 RX ADMIN — FERROUS SULFATE TAB EC 325 MG (65 MG FE EQUIVALENT) 325 MG: 325 (65 FE) TABLET DELAYED RESPONSE at 05:01

## 2018-01-12 RX ADMIN — OCTREOTIDE ACETATE 50 MCG/HR: 500 INJECTION, SOLUTION INTRAVENOUS; SUBCUTANEOUS at 09:01

## 2018-01-12 RX ADMIN — PANTOPRAZOLE SODIUM 40 MG: 40 INJECTION, POWDER, FOR SOLUTION INTRAVENOUS at 09:01

## 2018-01-12 NOTE — PLAN OF CARE
Care assumed and report received from Marielle. Patient in NAD. Fall precautions maintained. Bed in lowest position, locked, call light within reach, and bed alarm on. Side rails up x's 2 with slip resistant socks on. Nurse instructed patient to notify staff for any assistance and pt verbalized complete understanding.

## 2018-01-12 NOTE — PROGRESS NOTES
.Pharmacy New Medication Education    Patient accepted medication education.    Pharmacy educated patient on name and purpose of medications and possible side effects, using the teach-back method.     D/C Current Inpatient Medication Orders   D/C 0.9% NaCl infusion (for blood administration)   D/C 0.9% NaCl infusion (for blood administration)   D/C acetaminophen tablet 500 mg   D/C albuterol inhaler 2 puff   D/C atorvastatin tablet 80 mg   D/C ferrous sulfate EC tablet 325 mg   D/C metoprolol succinate (TOPROL-XL) 24 hr tablet 300 mg   D/C octreotide (SANDOSTATIN) 500 mcg in sodium chloride 0.9% 100 mL infusion   D/C pantoprazole injection 40 mg   D/C phenytoin (DILANTIN) ER capsule 200 mg   D/C potassium chloride SA CR tablet 20 mEq   D/C tiotropium inhalation capsule 18 mcg   D/C travoprost 0.004 % ophthalmic solution 1 drop       Learners of pharmacy medication education included:  Patient    Patient +/- learner response:  Verbalized Understanding, Teachback

## 2018-01-12 NOTE — NURSING
MD notified of pt low H/H. Will place orders for blood transfusion. No acute distress, will continue to monitor.

## 2018-01-12 NOTE — PROGRESS NOTES
[facility] IM Resident HO-3 Progress Note    Subjective:   Pt doing well this morning.   Getting 2units RBC.     Objective:   Last 24 Hour Vital Signs:  BP  Min: 90/48  Max: 112/55  Temp  Av.1 °F (37.3 °C)  Min: 98.7 °F (37.1 °C)  Max: 99.4 °F (37.4 °C)  Pulse  Av.2  Min: 67  Max: 87  Resp  Av  Min: 14  Max: 20  SpO2  Av.7 %  Min: 94 %  Max: 100 %  Weight  Av.1 kg (167 lb 12.3 oz)  Min: 76.1 kg (167 lb 12.3 oz)  Max: 76.1 kg (167 lb 12.3 oz)  I/O last 3 completed shifts:  In: 709 [P.O.:250; I.V.:107.3; Blood:351.7]  Out: 1400 [Urine:1400]    Physical Examination:  Gen: alert, NAD  Head: AC/NT  Eyes: PERRL, EOMI, conjuctiva clear  Neck: trachea midline, no LAD, no JVD  Heart: RRR, normal s1/s2, no murmur  Lungs: CTAB, no wheezes, no crackles  Abd: soft, NT, ND, no masses, normal BS  Ext: no clubbing, no cyanosis, no edema  Neuro: moving all extremities, no focal defecits  Skin: no trauma, no rashes      Laboratory:  Pertinent Findings:    Recent Labs  Lab 18  0524   WBC 10.77   RBC 2.67*   HGB 7.1*   HCT 23.8*      MCV 89   MCH 26.6*   MCHC 29.8*     No results for input(s): NA, K, CL, CO2, GLU, BUN, GLUCOSE in the last 24 hours.    Invalid input(s): CR  No results for input(s): PROT, ALBUMIN, BILITOT, AST, ALT, ALKPHOS in the last 24 hours.     Current Medications:     Infusions:   octreotide (SANDOSTATIN) infusion 50 mcg/hr (18 0100)        Scheduled:   atorvastatin  80 mg Oral QHS    ferrous sulfate  325 mg Oral TID    metoprolol succinate  300 mg Oral Daily    pantoprazole  40 mg Intravenous BID    phenytoin  200 mg Oral BID    potassium chloride  20 mEq Oral Once    tiotropium  1 capsule Inhalation Daily    travoprost  1 drop Both Eyes Daily        PRN:  sodium chloride, sodium chloride, acetaminophen, albuterol    Assessment:     Stefano Granger is a 72 y.o.male with  Patient Active Problem List    Diagnosis Date Noted    Anemia 2018    Pulmonary  hypertension 01/04/2018    SVT (supraventricular tachycardia) 01/03/2018    Symptomatic anemia 01/02/2018    Gastrointestinal hemorrhage 01/02/2018    ANDREINA (iron deficiency anemia) 07/07/2016    Iron deficiency anemia 06/02/2016        Plan:     GI bleed  - Hx of recurrent melena, h/o AVM, h/o multiple blood transfusions last week sent from [facility] for DBE w/ Dr. Soto  - HH stable on admit (7.5/25.2); will transfuse if Hgb<7  - will continue iron replacement and PPI. Cont to avoid NSAIDs.  - Pt underwent DB-scope with Dr. Soto last week; No overt bleeding source found   - Pt currently receiving octreotide 20mg IM injections Qmonth -- Pt has GI follow up in 3 weeks scheduled from previous admission.  - GI following. Continue octreotide. Continue PPI BID  - CTA negative   - getting 2 units for drop in HH     Combined H F s/p AICD  -cont lasix, BB, ACEi      Hx of AO and mitral Valve replacements  -no issues, continue home meds      HTN  -no issues, continue home meds      Afib  -no issues, continue toprolxl      PulmHTN  -no issues, continue sildenafil        Dispo - pending GI recs; H/H status      Mohan Brown  [facility] Internal Medicine HO-3  [facility] Team B    [facility] Medicine Hospitalist Pager numbers:   [facility] Hospitalist Medicine Team A (Lola/Larry): [phone]  [facility] Hospitalist Medicine Team B (Jessie/Reinaldo):  [phone]

## 2018-01-12 NOTE — PLAN OF CARE
Problem: Patient Care Overview  Goal: Plan of Care Review  Outcome: Ongoing (interventions implemented as appropriate)  Plan of care reviewed with patient. Patient verbalized complete understanding. Fall/safety precautions maintained. Slip resistant socks on. Bed in lowest position, locked, call light within reach. Bed alarm on, Side rails up x's 2. Nurse instructed patient to notify staff for any assistance and pt verbalized complete understanding. I&O's done. Pt on continuous Sandostatin running at 10 ml/hr. Pt currently denies pain, SOB, or chills. Pt receiving 1 unit out of 2 units of RBC's. New IV access started for blood administration, IV CDI. Bleeding risk assessed, will continue to monitor H/H values. No acute distress noted, will continue to monitor.    Pt on telemetry, no ectopy or true red alarms noted. SR, HR 80's

## 2018-01-12 NOTE — NURSING
Notified MD with Jessie team about pt HR sustaining 172's and HR adjusting to 80's after vagal manuvers. No new orders given, will continue to monitor.

## 2018-01-12 NOTE — PLAN OF CARE
01/12/18 1721   Final Note   Assessment Type Final Discharge Note   Discharge Disposition Home   Hospital Follow Up  Appt(s) scheduled? Yes   Discharge plans and expectations educations in teach back method with documentation complete? Yes   Right Care Referral Info   Post Acute Recommendation No Care

## 2018-01-12 NOTE — PROGRESS NOTES
[facility] Gastroenterology  Patient Name: Stefano Granger  MRN: [id number]  Admission Date: 1/10/2018  Hospital Length of Stay: 1 days  Code Status: Full Code   Attending Provider: No att. providers found   Consulting Provider: Amilcar Schneider MD  Primary Care Physician: VIRAJ Juarez MD  Principal Problem:Anemia    CC: anemia    HPI 72 y.o. male with past medical history of HTN, mitral and aortic valve replacement due to previous endocarditis, history of ischemic dilated cardiomyopathy with an EF of 36-45 in 2014 with ICD placed, hx of small bowel angioectasias who presents with recurrent iron deficiency anemia requiring weekly blood transfusions with associated fatigue, dark stools ongoing since Monday. Patient was recently discharged from the hospital on 1/4/17, was doing well then on Monday started having multiple dark stools. He reports associated fatigue. He denies hematemesis or hematochezia. He denies rectal bleeding. He had recent upper DBE without significant findings but prior capsule at [facility] showed small volume red blood in mid ileum.      This morning, patient states he is feeling ok and is hungry.  Endorsed one dark stool yesterday.  Hemoglobin was 7.1 this morning; 1u pRBCs was transfusing.     Past Medical History:   Diagnosis Date    Asthma     Cardiac defibrillator in place     CHF (congestive heart failure)     Gout     Hypertension     Seizures     Stroke        Review of Systems  General ROS: negative for chills, fever or weight loss  Cardiovascular ROS: no chest pain or dyspnea on exertion  Gastrointestinal ROS: no abdominal pain, change in bowel habits, or black/ bloody stools    Physical Examination  BP (!) 112/55   Pulse 86   Temp 99.2 °F (37.3 °C)   Resp 14   Ht 6' (1.829 m)   Wt 76.1 kg (167 lb 12.3 oz)   SpO2 (!) 94%   BMI 22.75 kg/m²   General appearance: alert, cooperative, no distress  HENT: Normocephalic, atraumatic, neck symmetrical, no nasal discharge   Lungs: clear to  auscultation bilaterally, no dullness to percussion bilaterally  Heart: regular rate and rhythm without rub; no displacement of the PMI   Abdomen: soft, non-tender; bowel sounds normoactive; no organomegaly  Extremities: extremities symmetric; no clubbing, cyanosis, or edema  Neurologic: Alert and oriented X 3, normal strength, normal coordination and gait    Labs:    Lab Results   Component Value Date    WBC 10.77 01/12/2018    HGB 7.1 (L) 01/12/2018    HCT 23.8 (L) 01/12/2018    MCV 89 01/12/2018     01/12/2018     BMP  Lab Results   Component Value Date     01/10/2018    K 3.3 (L) 01/10/2018    CL 97 01/10/2018    CO2 35 (H) 01/10/2018    BUN 27 (H) 01/10/2018    CREATININE 1.2 01/10/2018    CALCIUM 8.6 (L) 01/10/2018    ANIONGAP 7 (L) 01/10/2018    ESTGFRAFRICA >60 01/10/2018    EGFRNONAA >60 01/10/2018     Lab Results   Component Value Date    ALT 39 01/10/2018    AST 34 01/10/2018    ALKPHOS 265 (H) 01/10/2018    BILITOT 0.3 01/10/2018     Lab Results   Component Value Date    IRON 105 05/11/2016    TIBC 376 05/11/2016    FERRITIN 56 05/11/2016     Lab Results   Component Value Date    INR 1.0 01/12/2018    INR 1.1 01/11/2018    INR 1.0 01/10/2018     Imaging:  CT Angio:     CT ABDOMEN, and, PELVIS without and with IV contrast    Protocol:  Axial CT images of the abdomen and pelvis were acquired without and are in  after the use of  cc [id number] IV contrast.  Coronal and sagittal reconstructions were acquired.    HISTORY:  72 year old M with GI bleed    COMPARISON: None.     FINDINGS:  Heart: Normal in size. No pericardial effusion.     Lung Bases: There are bilateral pleural effusions right larger than left     Liver: Normal in size and attenuation, with no focal hepatic lesions.       Gallbladder: No calcified gallstones.     Bile Ducts: No evidence of dilated ducts.     Pancreas: No mass or peripancreatic fat stranding.      Spleen: Unremarkable.     Adrenals: Unremarkable.     Kidneys/ Ureters:  Normal in size and location. Normal concentration and excretion of contrast. No hydronephrosis or nephrolithiasis. No ureteral dilatation.     Bladder: No evidence of wall thickening.     GI Tract/Mesentery: No evidence of bowel obstruction or inflammation.  Enhanced imaging demonstrates no evidence of active contrast extravasation or pseudoaneurysm.     Peritoneal Space: No ascites. No free air.     Retroperitoneum:  No significant adenopathy.      Abdominal wall:  Unremarkable.     Vasculature: No significant atherosclerosis or aneurysm.     Bones: No acute fracture. Age-appropriate degenerative changes.   Impression        1. No evidence of active contrast extravasation or pseudoaneurysm  2. Right greater than left bilateral pleural effusions      Electronically signed by: DARA FLORES  Date: 01/11/18  Time: 14:33        Assessment:  Iron deficiency anemia:    72 y.o. male here with recurrent progressive iron deficiency anemia now requiring weekly blood transfusions with associated fatigue, dark stools. Recent upper DBE unrevealing of source of blood loss despite small amount of blood seen in mid ileum on last VCE. No significant blood loss noted this admission despite report of dark stools and no severe drop in H/H. Could potentially be bleeding from angioectasia vs. Dieulafoy lesion in that location in mid-ileum.    Plan:    -CT Angiography for evaluation of source of GI blood loss showed No evidence of bowel obstruction or inflammation, no evidence of active contrast extravasation or pseudoaneurysm. No signs of GI blood loss.  -Continue Octreotide 50 mcg/hr during admission  -Transfusion for Hb<7 or signs of GI blood loss  -advance diet as tolerated, ok for discharge  -consider IV iron as needed prior to discharge  -Follow-up with GI outpatient upon discharge    Thank you for your consult. I will follow-up with patient. Please contact us if you have any additional questions.      Renu Montoya  [facility]  Internal Medicine/Emergency Medicine, PGY-1  8:34 AM

## 2018-01-12 NOTE — PLAN OF CARE
Problem: Patient Care Overview  Goal: Plan of Care Review  Outcome: Ongoing (interventions implemented as appropriate)  The proper method of use, as well as anticipated side effects, of this metered-dose inhaler are discussed and demonstrated to the patient. Pt on RA with sats of 94%. Will continue to monitor.

## 2018-01-13 PROBLEM — D50.0 ANEMIA DUE TO BLOOD LOSS: Status: ACTIVE | Noted: 2018-01-11

## 2018-01-15 ENCOUNTER — PATIENT OUTREACH (OUTPATIENT)
Dept: ADMINISTRATIVE | Facility: CLINIC | Age: 73
End: 2018-01-15

## 2018-01-15 ENCOUNTER — TELEPHONE (OUTPATIENT)
Dept: NEUROLOGY | Facility: HOSPITAL | Age: 73
End: 2018-01-15

## 2018-01-15 NOTE — TELEPHONE ENCOUNTER
Hospital follow up scheduled with pt on Wednesday, January 24, 2018 at 1015am.  Pt will call this nurse back to confirm he has transportation to this appt.

## 2018-01-15 NOTE — TELEPHONE ENCOUNTER
----- Message from Ra Soto MD sent at 1/13/2018  9:39 AM CST -----  María,   Please schedule this patient to see me as a hospital follow up in 1-2 weeks     ----- Message -----  From: Mohan Brown MD  Sent: 1/12/2018   2:33 PM  To: Ra Soto MD    For 1 week follow up

## 2018-01-15 NOTE — DISCHARGE SUMMARY
hospitals Hospital Medicine Discharge Summary    Primary Team: hospitals Hospitalist Team B  Attending Physician: Jessie  Resident: Stephanie  Intern: Blair    Date of Admit: 1/10/2018  Date of Discharge: 1/12/18    Discharge to: home   Condition: stable    Discharge Diagnoses     Patient Active Problem List   Diagnosis    Iron deficiency anemia    ANDREINA (iron deficiency anemia)    Symptomatic anemia    Gastrointestinal hemorrhage    SVT (supraventricular tachycardia)    Pulmonary hypertension    Anemia due to blood loss       Consultants and Procedures     Consultants:  GI    Procedures:   n/a    Imaging:  CXR  CTA abd/pelvis - no active bleeding    Brief History of Present Illness      73 yo M with PMH Combined HF s/p AICD, AVR, MVR, PulmHTN, Afib, HTN, h/o CVA, h/o seizure d/o, h/o GIB 2/2 small bowel AVM who presented for recurrent melena and weakness x 3 days. He was seen at [facility] day prior to admit and was told he had low blood counts.     For the full HPI please refer to the History & Physical from this admission.    Hospital Course By Problem with Pertinent Findings     Recurrent GI bleed  - Hx of recurrent melena, h/o AVM, h/o multiple blood transfusions  - last week sent from [facility] for DBE w/ Dr. Soto - no source of bleeding found at that time   - HH stable on admit (7.5/25.2);   - Pt currently receiving octreotide 20mg IM injections Qmonth   - GI consulted and followed. Continue octreotide and IV PPI BID  - CTA abdomen pelvis done - negative   - transfused 2 units for drop in HH. Appropriate response after transfusion.   - GI follow up arrange - possible repeat DBE as outpatient     Combined H F s/p AICD  -cont lasix, BB, ACEi      Hx of AO and mitral Valve replacements  -no issues while in house       HTN  -continue home meds      Afib  -continue toprolxl 300 qd      PulmHTN  -no issues, continue sildenafil        Discharge Medications      Stefano Granger   Home Medication Instructions CHARLI:[id number]     "Printed on:01/14/18 2051   Medication Information                      albuterol 90 mcg/actuation inhaler  Inhale 2 puffs into the lungs every 6 (six) hours as needed for Wheezing. Rescue             atorvastatin (LIPITOR) 80 MG tablet  Take 1 tablet (80 mg total) by mouth every evening.             bumetanide (BUMEX) 2 MG tablet  Take 2 tablets (4 mg total) by mouth 2 (two) times daily.             ferrous sulfate 325 (65 FE) MG EC tablet  Take 1 tablet (325 mg total) by mouth 3 (three) times daily.             metOLazone (ZAROXOLYN) 5 MG tablet  Take 1 tablet (5 mg total) by mouth every 48 hours.             metoprolol succinate (TOPROL-XL) 100 MG 24 hr tablet  Take 3 tablets (300 mg total) by mouth once daily.             needle, disp, 21 G 21 gauge x 1 1/2" Ndle  1 each by Misc.(Non-Drug; Combo Route) route every 30 days.             octreotide lar (SANDOSTATIN LAR) 20 mg injection  Inject 20 mg into the muscle every 28 days.             pantoprazole (PROTONIX) 40 MG tablet  Take 1 tablet (40 mg total) by mouth 2 (two) times daily before meals.             phenytoin (DILANTIN) 200 MG ER capsule  Take 1 capsule (200 mg total) by mouth 2 (two) times daily.             sildenafil, antihypertensive, (REVATIO) 20 mg Tab  Take 1 tablet (20 mg total) by mouth 3 (three) times daily.             syringe, disposable, 20 mL Syrg  1 each by Misc.(Non-Drug; Combo Route) route every 30 days.             tamsulosin (FLOMAX) 0.4 mg Cp24  Take 1 capsule (0.4 mg total) by mouth once daily.             tiotropium (SPIRIVA WITH HANDIHALER) 18 mcg inhalation capsule  Inhale 1 capsule (18 mcg total) into the lungs once daily. Controller             travoprost (TRAVATAN Z) 0.004 % Drop  Place 1 drop into both eyes once daily.                 Discharge Information:   Diet:  Cardiac     Physical Activity:  As tolerated              Instructions:  1. Take all medications as prescribed  2. Keep all follow-up appointments  3. Return to the " hospital or call your primary care physicians if any worsening symptoms such as fever, chest pain, shortness of breath, return of symptoms, or any other concerns.    Follow-Up Appointments:  GI, Dr. Soto in 1 week  PCP in 1 week    Mohan Brown  [facility] Internal Medicine, [facility]

## 2018-01-15 NOTE — PATIENT INSTRUCTIONS

## 2018-01-16 ENCOUNTER — TELEPHONE (OUTPATIENT)
Dept: NEUROLOGY | Facility: HOSPITAL | Age: 73
End: 2018-01-16

## 2018-01-16 NOTE — TELEPHONE ENCOUNTER
Elizabeth with [facility] request resume of care orders for Nursing, PT/OT for this pt.  Elizabeth notified request to be forwarded to Dr. Soto.

## 2018-01-16 NOTE — TELEPHONE ENCOUNTER
----- Message from Mindy Blackwell sent at 1/16/2018  1:29 PM CST -----  Contact: Home Health nurse Elizabeth  GI- Home Health nurse called for orders for patient. Call back number is [phone]

## 2018-01-19 ENCOUNTER — TELEPHONE (OUTPATIENT)
Dept: NEUROLOGY | Facility: HOSPITAL | Age: 73
End: 2018-01-19

## 2018-01-19 NOTE — TELEPHONE ENCOUNTER
----- Message from Debbie Monet sent at 1/19/2018 11:41 AM CST -----  GI- Patient is headed back to the ER at [facility] because his high heart rate and low BP per the home health nurse.

## 2018-01-26 ENCOUNTER — TELEPHONE (OUTPATIENT)
Dept: NEUROLOGY | Facility: HOSPITAL | Age: 73
End: 2018-01-26

## 2018-01-26 NOTE — TELEPHONE ENCOUNTER
Pt notified of rescheduled follow up appt on Wednesday, February 7, 2018 at 145pm.  Pt given clinic address and telephone number.  Pt repeated information given correctly.

## 2018-01-29 ENCOUNTER — TELEPHONE (OUTPATIENT)
Dept: NEUROLOGY | Facility: HOSPITAL | Age: 73
End: 2018-01-29

## 2018-01-29 NOTE — TELEPHONE ENCOUNTER
Pt hospitalized and discharged from [facility], orders for resumption of care requested by Birgit with [facility].  Verbal orders given to resume care.  Birgit to fax forms to [phone].

## 2018-01-29 NOTE — TELEPHONE ENCOUNTER
----- Message from Tanya Carnes sent at 1/29/2018 10:08 AM CST -----  Contact: Yulia Genao with [facility] called for resumption of care orders for the patient. Phone number is [phone] and fax is [phone].

## 2018-01-31 ENCOUNTER — HOSPITAL ENCOUNTER (INPATIENT)
Facility: HOSPITAL | Age: 73
LOS: 5 days | Discharge: HOME HEALTH | DRG: 377 | End: 2018-02-05
Attending: EMERGENCY MEDICINE | Admitting: INTERNAL MEDICINE
Payer: MEDICARE

## 2018-01-31 ENCOUNTER — ANESTHESIA EVENT (OUTPATIENT)
Dept: EMERGENCY MEDICINE | Facility: HOSPITAL | Age: 73
DRG: 377 | End: 2018-01-31
Payer: MEDICARE

## 2018-01-31 ENCOUNTER — ANESTHESIA (OUTPATIENT)
Dept: EMERGENCY MEDICINE | Facility: HOSPITAL | Age: 73
DRG: 377 | End: 2018-01-31
Payer: MEDICARE

## 2018-01-31 DIAGNOSIS — I48.0 PAROXYSMAL ATRIAL FIBRILLATION: ICD-10-CM

## 2018-01-31 DIAGNOSIS — Z95.2 HISTORY OF AORTIC VALVE REPLACEMENT: ICD-10-CM

## 2018-01-31 DIAGNOSIS — R79.89 ELEVATED TROPONIN: ICD-10-CM

## 2018-01-31 DIAGNOSIS — Z95.2 H/O MITRAL VALVE REPLACEMENT: ICD-10-CM

## 2018-01-31 DIAGNOSIS — R53.83 FATIGUE, UNSPECIFIED TYPE: ICD-10-CM

## 2018-01-31 DIAGNOSIS — D50.0 ANEMIA DUE TO BLOOD LOSS: ICD-10-CM

## 2018-01-31 DIAGNOSIS — K92.2 GASTROINTESTINAL HEMORRHAGE, UNSPECIFIED GASTROINTESTINAL HEMORRHAGE TYPE: ICD-10-CM

## 2018-01-31 DIAGNOSIS — K92.1 MELENA: ICD-10-CM

## 2018-01-31 DIAGNOSIS — I27.20 PULMONARY HYPERTENSION: ICD-10-CM

## 2018-01-31 DIAGNOSIS — D50.0 BLOOD LOSS ANEMIA: ICD-10-CM

## 2018-01-31 DIAGNOSIS — I47.20 VENTRICULAR TACHYCARDIA: ICD-10-CM

## 2018-01-31 DIAGNOSIS — R00.0 INCREASED HEART RATE: ICD-10-CM

## 2018-01-31 DIAGNOSIS — I50.42 CHRONIC COMBINED SYSTOLIC AND DIASTOLIC HEART FAILURE: ICD-10-CM

## 2018-01-31 DIAGNOSIS — K92.2 ACUTE GI BLEEDING: Primary | ICD-10-CM

## 2018-01-31 DIAGNOSIS — R79.89 PRERENAL AZOTEMIA: ICD-10-CM

## 2018-01-31 PROBLEM — I48.91 ATRIAL FIBRILLATION: Status: ACTIVE | Noted: 2018-01-31

## 2018-01-31 LAB
ABO + RH BLD: NORMAL
ALBUMIN SERPL BCP-MCNC: 2.4 G/DL
ALP SERPL-CCNC: 223 U/L
ALT SERPL W/O P-5'-P-CCNC: 28 U/L
ANION GAP SERPL CALC-SCNC: 7 MMOL/L
AST SERPL-CCNC: 27 U/L
BILIRUB SERPL-MCNC: 0.3 MG/DL
BILIRUB UR QL STRIP: NEGATIVE
BLD GP AB SCN CELLS X3 SERPL QL: NORMAL
BUN SERPL-MCNC: 67 MG/DL
CALCIUM SERPL-MCNC: 7.9 MG/DL
CHLORIDE SERPL-SCNC: 95 MMOL/L
CLARITY UR: CLEAR
CO2 SERPL-SCNC: 32 MMOL/L
COLOR UR: YELLOW
CREAT SERPL-MCNC: 1.3 MG/DL
ERYTHROCYTE [DISTWIDTH] IN BLOOD BY AUTOMATED COUNT: 17.3 %
EST. GFR  (AFRICAN AMERICAN): >60 ML/MIN/1.73 M^2
EST. GFR  (NON AFRICAN AMERICAN): 55 ML/MIN/1.73 M^2
GLUCOSE SERPL-MCNC: 198 MG/DL
GLUCOSE UR QL STRIP: NEGATIVE
HCT VFR BLD AUTO: 14.9 %
HGB BLD-MCNC: 4.3 G/DL
HGB UR QL STRIP: NEGATIVE
KETONES UR QL STRIP: NEGATIVE
LEUKOCYTE ESTERASE UR QL STRIP: NEGATIVE
LIPASE SERPL-CCNC: 31 U/L
MCH RBC QN AUTO: 26.9 PG
MCHC RBC AUTO-ENTMCNC: 28.9 G/DL
MCV RBC AUTO: 93 FL
NITRITE UR QL STRIP: NEGATIVE
OB PNL STL: POSITIVE
PH UR STRIP: 6 [PH] (ref 5–8)
PLATELET # BLD AUTO: 274 K/UL
PMV BLD AUTO: 9.5 FL
POTASSIUM SERPL-SCNC: 3.7 MMOL/L
PROT SERPL-MCNC: 5.4 G/DL
PROT UR QL STRIP: NEGATIVE
RBC # BLD AUTO: 1.6 M/UL
SODIUM SERPL-SCNC: 134 MMOL/L
SP GR UR STRIP: 1.01 (ref 1–1.03)
TROPONIN I SERPL DL<=0.01 NG/ML-MCNC: 0.03 NG/ML
URN SPEC COLLECT METH UR: NORMAL
UROBILINOGEN UR STRIP-ACNC: NEGATIVE EU/DL
WBC # BLD AUTO: 6.56 K/UL

## 2018-01-31 PROCEDURE — 93010 ELECTROCARDIOGRAM REPORT: CPT | Mod: ,,, | Performed by: INTERNAL MEDICINE

## 2018-01-31 PROCEDURE — 83690 ASSAY OF LIPASE: CPT

## 2018-01-31 PROCEDURE — 96374 THER/PROPH/DIAG INJ IV PUSH: CPT

## 2018-01-31 PROCEDURE — 12000002 HC ACUTE/MED SURGE SEMI-PRIVATE ROOM

## 2018-01-31 PROCEDURE — 86901 BLOOD TYPING SEROLOGIC RH(D): CPT

## 2018-01-31 PROCEDURE — 85027 COMPLETE CBC AUTOMATED: CPT

## 2018-01-31 PROCEDURE — 25000003 PHARM REV CODE 250: Performed by: EMERGENCY MEDICINE

## 2018-01-31 PROCEDURE — 81003 URINALYSIS AUTO W/O SCOPE: CPT

## 2018-01-31 PROCEDURE — 99285 EMERGENCY DEPT VISIT HI MDM: CPT | Mod: 25

## 2018-01-31 PROCEDURE — 82272 OCCULT BLD FECES 1-3 TESTS: CPT

## 2018-01-31 PROCEDURE — 96361 HYDRATE IV INFUSION ADD-ON: CPT

## 2018-01-31 PROCEDURE — 80053 COMPREHEN METABOLIC PANEL: CPT

## 2018-01-31 PROCEDURE — 93005 ELECTROCARDIOGRAM TRACING: CPT

## 2018-01-31 PROCEDURE — P9016 RBC LEUKOCYTES REDUCED: HCPCS

## 2018-01-31 PROCEDURE — C9113 INJ PANTOPRAZOLE SODIUM, VIA: HCPCS | Performed by: STUDENT IN AN ORGANIZED HEALTH CARE EDUCATION/TRAINING PROGRAM

## 2018-01-31 PROCEDURE — 84484 ASSAY OF TROPONIN QUANT: CPT

## 2018-01-31 PROCEDURE — 36430 TRANSFUSION BLD/BLD COMPNT: CPT

## 2018-01-31 PROCEDURE — 25000003 PHARM REV CODE 250: Performed by: STUDENT IN AN ORGANIZED HEALTH CARE EDUCATION/TRAINING PROGRAM

## 2018-01-31 PROCEDURE — 36000 PLACE NEEDLE IN VEIN: CPT | Performed by: ANESTHESIOLOGY

## 2018-01-31 PROCEDURE — 86920 COMPATIBILITY TEST SPIN: CPT

## 2018-01-31 PROCEDURE — 63600175 PHARM REV CODE 636 W HCPCS: Performed by: STUDENT IN AN ORGANIZED HEALTH CARE EDUCATION/TRAINING PROGRAM

## 2018-01-31 RX ORDER — BUMETANIDE 1 MG/1
4 TABLET ORAL 2 TIMES DAILY
Status: DISCONTINUED | OUTPATIENT
Start: 2018-01-31 | End: 2018-02-01

## 2018-01-31 RX ORDER — TAMSULOSIN HYDROCHLORIDE 0.4 MG/1
0.4 CAPSULE ORAL DAILY
Status: DISCONTINUED | OUTPATIENT
Start: 2018-02-01 | End: 2018-02-05 | Stop reason: HOSPADM

## 2018-01-31 RX ORDER — DIGOXIN 125 MCG
125 TABLET ORAL EVERY OTHER DAY
COMMUNITY

## 2018-01-31 RX ORDER — ATORVASTATIN CALCIUM 40 MG/1
80 TABLET, FILM COATED ORAL NIGHTLY
Status: DISCONTINUED | OUTPATIENT
Start: 2018-01-31 | End: 2018-02-05 | Stop reason: HOSPADM

## 2018-01-31 RX ORDER — RAMELTEON 8 MG/1
8 TABLET ORAL NIGHTLY PRN
Status: DISCONTINUED | OUTPATIENT
Start: 2018-01-31 | End: 2018-02-05 | Stop reason: HOSPADM

## 2018-01-31 RX ORDER — LISINOPRIL 2.5 MG/1
2.5 TABLET ORAL DAILY
COMMUNITY
End: 2018-06-13

## 2018-01-31 RX ORDER — TRAVOPROST OPHTHALMIC SOLUTION 0.04 MG/ML
1 SOLUTION OPHTHALMIC DAILY
Status: DISCONTINUED | OUTPATIENT
Start: 2018-02-01 | End: 2018-02-05 | Stop reason: HOSPADM

## 2018-01-31 RX ORDER — HYDROCODONE BITARTRATE AND ACETAMINOPHEN 500; 5 MG/1; MG/1
TABLET ORAL
Status: DISCONTINUED | OUTPATIENT
Start: 2018-01-31 | End: 2018-02-02

## 2018-01-31 RX ORDER — PHENYTOIN SODIUM 100 MG/1
200 CAPSULE, EXTENDED RELEASE ORAL 2 TIMES DAILY
Status: DISCONTINUED | OUTPATIENT
Start: 2018-01-31 | End: 2018-02-02

## 2018-01-31 RX ORDER — PANTOPRAZOLE SODIUM 40 MG/10ML
40 INJECTION, POWDER, LYOPHILIZED, FOR SOLUTION INTRAVENOUS 2 TIMES DAILY
Status: DISCONTINUED | OUTPATIENT
Start: 2018-01-31 | End: 2018-02-05

## 2018-01-31 RX ORDER — TIOTROPIUM BROMIDE 18 UG/1
1 CAPSULE ORAL; RESPIRATORY (INHALATION) DAILY
Status: DISCONTINUED | OUTPATIENT
Start: 2018-02-01 | End: 2018-02-05 | Stop reason: HOSPADM

## 2018-01-31 RX ORDER — METOLAZONE 5 MG/1
5 TABLET ORAL
Status: DISCONTINUED | OUTPATIENT
Start: 2018-01-31 | End: 2018-02-01

## 2018-01-31 RX ORDER — HYDROCODONE BITARTRATE AND ACETAMINOPHEN 500; 5 MG/1; MG/1
TABLET ORAL
Status: DISCONTINUED | OUTPATIENT
Start: 2018-01-31 | End: 2018-02-03

## 2018-01-31 RX ORDER — DIGOXIN 125 MCG
125 TABLET ORAL EVERY OTHER DAY
Status: DISCONTINUED | OUTPATIENT
Start: 2018-02-01 | End: 2018-02-02

## 2018-01-31 RX ADMIN — BUMETANIDE 4 MG: 1 TABLET ORAL at 09:01

## 2018-01-31 RX ADMIN — ATORVASTATIN CALCIUM 80 MG: 40 TABLET, FILM COATED ORAL at 09:01

## 2018-01-31 RX ADMIN — PHENYTOIN SODIUM 200 MG: 100 CAPSULE, EXTENDED RELEASE ORAL at 09:01

## 2018-01-31 RX ADMIN — METOLAZONE 5 MG: 5 TABLET ORAL at 09:01

## 2018-01-31 RX ADMIN — PANTOPRAZOLE SODIUM 40 MG: 40 INJECTION, POWDER, FOR SOLUTION INTRAVENOUS at 10:01

## 2018-01-31 RX ADMIN — SODIUM CHLORIDE 2000 ML: 0.9 INJECTION, SOLUTION INTRAVENOUS at 03:01

## 2018-01-31 NOTE — ED NOTES
"APPEARANCE: Alert, oriented and in no acute distress.  CARDIAC: increassed rate and normal rhythm, no murmur heard. defibrillator to left upper chest wall.   PERIPHERAL VASCULAR: peripheral pulses present. delayed cap refill. Pitting edema. Warm to touch.    RESPIRATORY:Normal rate and effort, breath sounds clear bilaterally throughout chest. Respirations are equal and unlabored no obvious signs of distress.  GASTRO: soft, bowel sounds normal, no tenderness, no abdominal distention. Denies pain. Reports "black stool"   MUSC: Full ROM. No bony tenderness or soft tissue tenderness. No obvious deformity.  SKIN: Skin is warm,dry, and flaky delayed skin turgor, mucous membranes moist.  NEURO: 5/5 strength major flexors/extensors bilaterally. Sensory intact to light touch bilaterally. [place] coma scale: eyes open spontaneously-4, oriented & converses-5, obeys commands-6. No neurological abnormalities.   MENTAL STATUS: awake, alert and aware of environment.  EYE: PERRL, both eyes: pupils brisk and reactive to light. Normal size.  ENT: EARS: no obvious drainage. NOSE: no active bleeding.       "

## 2018-01-31 NOTE — MEDICAL/APP STUDENT
[facility] Internal Medicine History and Physical - Resident Note     Admitting Team: [facility] Internal Medicine Team A  Attending Physician: Lola  Resident: Moses  Intern: Antony     Date of Admit: 1/31/2018     Chief Complaint     Weakness and melena x 3 days     Subjective:      History of Present Illness:  Stefano Granger is a 72 y.o. male with pmhx of recurrent low GI bleed, combined HF s/p AICD, HTN, Afib, PulmHTN, seizures, gout, CVA, and Ao+mitral valve replacements who presents to the ED at [facility] with c/o weakness and dark/black stools x 3 days. Patient reports he has been coming to the hospital once a week for the past few months for said problem. He receives blood transfusions, his condition improves, and he is discharged. Patient complains of dizziness and fatigue, but denies syncope. Endorses 1 black stool each day for the past 3 days.     Patient has seen Dr. Soto for several C-scopes, EGD w/ push, and vce to assess his chronic GI bleeding. Last upper GI scope at [facility] (1/3/18) revealed normal upper GI through ileum, however patient has a hx of several angioectasias in the small bowel likely leading to chronic GI blood loss.     Patient currently denies any n/v, constipation, diarrhea, SOB, CP, fevers, chills, bright red blood per rectum.     Past Medical History:       Past Medical History:   Diagnosis Date    Asthma      Cardiac defibrillator in place      CHF (congestive heart failure)      Gout      Hypertension      Seizures      Stroke           Past Surgical History:        Past Surgical History:   Procedure Laterality Date    CARDIAC SURGERY         year 2000    TONSILLECTOMY             Allergies:       Review of patient's allergies indicates:   Allergen Reactions    Coreg [carvedilol] Palpitations         Home Medications:          Prior to Admission medications    Medication Sig Start Date End Date Taking? Authorizing Provider   albuterol 90 mcg/actuation  "inhaler Inhale 2 puffs into the lungs every 6 (six) hours as needed for Wheezing. Rescue 1/4/18     Mohan Brown MD   atorvastatin (LIPITOR) 80 MG tablet Take 1 tablet (80 mg total) by mouth every evening. 1/4/18 1/4/19   Mohan Brown MD   bumetanide (BUMEX) 2 MG tablet Take 2 tablets (4 mg total) by mouth 2 (two) times daily. 1/4/18 1/4/19   Mohan Brown MD   ferrous sulfate 325 (65 FE) MG EC tablet Take 1 tablet (325 mg total) by mouth 3 (three) times daily. 1/4/18     Mohan Brown MD   metOLazone (ZAROXOLYN) 5 MG tablet Take 1 tablet (5 mg total) by mouth every 48 hours. 1/5/18 1/5/19   Mohan Brown MD   metoprolol succinate (TOPROL-XL) 100 MG 24 hr tablet Take 3 tablets (300 mg total) by mouth once daily. 1/5/18 1/5/19   Mohan Brown MD   needle, disp, 21 G 21 gauge x 1 1/2" Ndle 1 each by Misc.(Non-Drug; Combo Route) route every 30 days. 1/4/18     Mohan Brown MD   octreotide lar (SANDOSTATIN LAR) 20 mg injection Inject 20 mg into the muscle every 28 days. 1/4/18 1/4/19   Mohan Brown MD   pantoprazole (PROTONIX) 40 MG tablet Take 1 tablet (40 mg total) by mouth 2 (two) times daily before meals. 1/12/18     Mohan Brown MD   phenytoin (DILANTIN) 200 MG ER capsule Take 1 capsule (200 mg total) by mouth 2 (two) times daily. 1/4/18 1/4/19   Mohan Brown MD   sildenafil, antihypertensive, (REVATIO) 20 mg Tab Take 1 tablet (20 mg total) by mouth 3 (three) times daily. 1/4/18 1/4/19   Mohan Brown MD   syringe, disposable, 20 mL Syrg 1 each by Misc.(Non-Drug; Combo Route) route every 30 days. 1/4/18     Mohan Brown MD   tamsulosin (FLOMAX) 0.4 mg Cp24 Take 1 capsule (0.4 mg total) by mouth once daily. 1/5/18 1/5/19   Mohan Brown MD   tiotropium (SPIRIVA WITH HANDIHALER) 18 mcg inhalation capsule Inhale 1 capsule (18 mcg total) into the lungs once daily. Controller 1/5/18 1/5/19   Mohan Brown MD "   travoprost (TRAVATAN Z) 0.004 % Drop Place 1 drop into both eyes once daily. 18   Mohan Brown MD         Family History:  Uncle: cancer (unknown)  Maternal Aunt- CVA  Brother- Dm2  Sister- Heart Failure (, 59 y/o)     Social History:         Social History   Substance Use Topics    Smoking status: Quit smoking 6 months ago       Years: 28.00       Types: Cigars    Smokeless tobacco: Not on file         Comment: pt smoke a cigar 2x weekly    Alcohol use No         Comment: socially         Review of Systems:  Pertinent positives and negatives listed in HPI. All other systems are reviewed and are negative.     Health Maintaince :   Primary Care Physician: Larry  Immunizations:   TDap is not up to date  Influenza is up to date  Pneumovax is up to date.  Cancer Screening:  Colonoscopy: is up to date     Objective:   Last 24 Hour Vital Signs:  BP  Min: 78/44  Max: 91/42  Temp  Av.6 °F (36.4 °C)  Min: 97.6 °F (36.4 °C)  Max: 97.6 °F (36.4 °C)  Pulse  Av  Min: 101  Max: 121  Resp  Av  Min: 10  Max: 16  SpO2  Av %  Min: 95 %  Max: 99 %  Height  Av' (182.9 cm)  Min: 6' (182.9 cm)  Max: 6' (182.9 cm)  Weight  Av.8 kg (165 lb)  Min: 74.8 kg (165 lb)  Max: 74.8 kg (165 lb)  Body mass index is 22.38 kg/m².  No intake/output data recorded.     Physical Examination:  General: Oriented to person, place, and time. Appears weak.   Head: Normocephalic and atraumatic.   Eyes: Giancarlo sclera and conjunctival pallor bilaterally, moist. Pupils are equal, round, and reactive to light. Extraocular movements intact.  Neck: Normal range of motion, neck supple.  JVP cm.  Cardiovascular: Normal rate, regular rhythm. No murmurs, rub, or extra heart sounds appreciated. 3/6 systolic murmurs heard over RUSB, LLSB (pt with hx of Bio-prost Aortic and Mitral valves)  Pulmonary/Chest: Effort normal and breath sounds normal without wheezing, crackles, or rhonchi.   Abdominal: Soft, bowel  sounds are normoactive.  No tenderness, guarding, or rebound.  Musculoskeletal: Normal range of motion, 5/5 strength to gross flexion and extension to upper and lower extremities, bilaterally.  Extremities: 2+ radial pulses, CR <2s, no cyanosis or 2+ pitting edema bilaterally from knees distally to dorsum of feet.  Skin: Skin is warm and dry.     Laboratory:  Most Recent Data:        CBC: Lab Results   Component Value Date     WBC 6.56 01/31/2018     HGB 4.3 (LL) 01/31/2018     HCT 14.9 (LL) 01/31/2018      01/31/2018     MCV 93 01/31/2018     RDW 17.3 (H) 01/31/2018            BMP: Lab Results   Component Value Date      (L) 01/31/2018     K 3.7 01/31/2018     CL 95 01/31/2018     CO2 32 (H) 01/31/2018     BUN 67 (H) 01/31/2018     CREATININE 1.3 01/31/2018      (H) 01/31/2018     CALCIUM 7.9 (L) 01/31/2018      LFTs: Lab Results   Component Value Date     PROT 5.4 (L) 01/31/2018     ALBUMIN 2.4 (L) 01/31/2018     BILITOT 0.3 01/31/2018     AST 27 01/31/2018     ALKPHOS 223 (H) 01/31/2018     ALT 28 01/31/2018      Coags:         Lab Results   Component Value Date     INR 1.0 01/12/2018      FLP: No results found for: CHOL, HDL, LDLCALC, TRIG, CHOLHDL        DM: Lab Results   Component Value Date     CREATININE 1.3 01/31/2018      Thyroid: No results found for: TSH, FREET4, S6OEMBS, W6DBFQB, THYROIDAB        Anemia: Lab Results   Component Value Date     IRON 105 05/11/2016     TIBC 376 05/11/2016     FERRITIN 56 05/11/2016            Cardiac: Lab Results   Component Value Date     TROPONINI 0.030 (H) 01/31/2018            Urinalysis: Lab Results   Component Value Date     COLORU Yellow 01/31/2018     SPECGRAV 1.015 01/31/2018     NITRITE Negative 01/31/2018     KETONESU Negative 01/31/2018     UROBILINOGEN Negative 01/31/2018         Trended Cardiac Data:     Recent Labs  Lab 01/31/18  1414   TROPONINI 0.030*         Microbiology Data:  n/a     Other Laboratory Data:  H/H: 4.3/14.9  MCV:  93  WBC: 6.56  Platelets: 274     Other Results:  EKG (my interpretation): none available     Radiology:  Imaging Results    None               Assessment:      Stefano Granger is a 72 y.o. male with          Patient Active Problem List     Diagnosis Date Noted    Anemia due to blood loss 01/11/2018    Pulmonary hypertension 01/04/2018    SVT (supraventricular tachycardia) 01/03/2018    Symptomatic anemia 01/02/2018    Gastrointestinal hemorrhage 01/02/2018    ANDREINA (iron deficiency anemia) 07/07/2016    Iron deficiency anemia 06/02/2016         Plan:      GI bleed  - Hx of recurrent melena, h/o AVM, h/o multiple blood transfusions beginning of month sent from [facility] for DBE w/ Dr. Soto  -3 black stools over last 3 days; No diarrhea in last 24 hrs  -H/H on admit 4.3/14.9; will transfuse  - will continue iron replacement and PPI. Avoid NSAIDs.  - Pt underwent DB-scope with Dr. Soto January 3, 2018; No overt bleeding source found.  - Pt currently receiving octreotide 20mg IM injections Qmonth  - will consult GI as patient is followed by Dr. Bender; appreciate recs     Combined H F s/p AICD  -no issues, continue home meds      Hx of AO and mitral Valve replacements  -no issues, continue home meds      HTN  -no issues, continue home meds      Afib  -no issues, continue home meds      PulmHTN  -no issues, continue home meds      Dispo - pending GI recs; H/H s/p blood transfusion          Code Status:     Full     Lonnie Celis  [employer]     [facility] Medicine Hospitalist Pager numbers:   [facility] Hospitalist Medicine Team A (Lola/Larry):          [phone]

## 2018-01-31 NOTE — H&P
\[facility]\"" Internal Medicine History and Physical - Resident Note    Admitting Team: \[facility]\"" Internal Medicine Team A  Attending Physician: Dr. Toya Juarez   Resident: Dr. Bailey   Interns: Dr. Hardy    Date of Admit: 1/31/2018    Chief Complaint     Weakness and melena x 3 days    Subjective:      History of Present Illness:  Stefano Granger is a 72 y.o. male with pmhx of recurrent low GI bleed, combined HF s/p AICD, HTN, Afib, PulmHTN, seizures, gout, CVA, and Ao+mitral valve replacements who presents to the ED at [facility] with c/o weakness and dark/black stools x 3 days. Patient reports he has been coming to the hospital once a week for the past few months for said problem. He receives blood transfusions, his condition improves, and he is discharged. Patient complains of dizziness and fatigue, but denies syncope. Endorses 1 black stool each day for the past 3 days.     Patient has seen Dr. oSto for several C-scopes, EGD w/ push, and vce to assess his chronic GI bleeding. Last upper GI scope at [facility] (1/3/18) revealed normal upper GI through ileum, however patient has a hx of several angioectasias in the small bowel likely leading to chronic GI blood loss.     Patient currently denies any n/v, constipation, diarrhea, SOB, CP, fevers, chills, bright red blood per rectum.    Past Medical History:  Past Medical History:   Diagnosis Date    Asthma     Cardiac defibrillator in place     CHF (congestive heart failure)     Gout     Hypertension     Seizures     Stroke        Past Surgical History:  Past Surgical History:   Procedure Laterality Date    CARDIAC SURGERY      year 2000    TONSILLECTOMY         Allergies:  Review of patient's allergies indicates:   Allergen Reactions    Coreg [carvedilol] Palpitations       Home Medications:  Prior to Admission medications    Medication Sig Start Date End Date Taking? Authorizing Provider   albuterol 90 mcg/actuation inhaler Inhale 2 puffs  into the lungs every 6 (six) hours as needed for Wheezing. Rescue 1/4/18  Yes Mohan Brown MD   atorvastatin (LIPITOR) 80 MG tablet Take 1 tablet (80 mg total) by mouth every evening. 1/4/18 1/4/19 Yes Mohan Brown MD   bumetanide (BUMEX) 2 MG tablet Take 2 tablets (4 mg total) by mouth 2 (two) times daily. 1/4/18 1/4/19 Yes Mohan Brown MD   digoxin (LANOXIN) 125 mcg tablet Take 125 mcg by mouth every other day.   Yes Historical Provider, MD   ferrous sulfate 325 (65 FE) MG EC tablet Take 1 tablet (325 mg total) by mouth 3 (three) times daily. 1/4/18  Yes Mohan Brown MD   lisinopril (PRINIVIL,ZESTRIL) 2.5 MG tablet Take 2.5 mg by mouth once daily.   Yes Historical Provider, MD   metOLazone (ZAROXOLYN) 5 MG tablet Take 1 tablet (5 mg total) by mouth every 48 hours. 1/5/18 1/5/19 Yes Mohan Brown MD   metoprolol succinate (TOPROL-XL) 100 MG 24 hr tablet Take 3 tablets (300 mg total) by mouth once daily.  Patient taking differently: Take 200 mg by mouth 2 (two) times daily.  1/5/18 1/5/19 Yes Mohan Brown MD   pantoprazole (PROTONIX) 40 MG tablet Take 1 tablet (40 mg total) by mouth 2 (two) times daily before meals. 1/12/18  Yes Mohan Brown MD   phenytoin (DILANTIN) 200 MG ER capsule Take 1 capsule (200 mg total) by mouth 2 (two) times daily. 1/4/18 1/4/19 Yes Mohan Brown MD   sildenafil, antihypertensive, (REVATIO) 20 mg Tab Take 1 tablet (20 mg total) by mouth 3 (three) times daily. 1/4/18 1/4/19 Yes Mohan Brown MD   tamsulosin (FLOMAX) 0.4 mg Cp24 Take 1 capsule (0.4 mg total) by mouth once daily. 1/5/18 1/5/19 Yes Mohan Brown MD   tiotropium (SPIRIVA WITH HANDIHALER) 18 mcg inhalation capsule Inhale 1 capsule (18 mcg total) into the lungs once daily. Controller 1/5/18 1/5/19 Yes Mohan Brown MD   travoprost (TRAVATAN Z) 0.004 % Drop Place 1 drop into both eyes once daily. 1/5/18 1/5/19 Yes Mohan Brown MD   needle,  "disp, 21 G 21 gauge x 1 " Ndle 1 each by Misc.(Non-Drug; Combo Route) route every 30 days. 18   Mohan Brown MD   octreotide lar (SANDOSTATIN LAR) 20 mg injection Inject 20 mg into the muscle every 28 days. 18  Mohan Brown MD   syringe, disposable, 20 mL Syrg 1 each by Misc.(Non-Drug; Combo Route) route every 30 days. 18   Mohan Brown MD       Family History:  No family history on file.    Social History:  Social History   Substance Use Topics    Smoking status: Current Every Day Smoker     Years: 28.00     Types: Cigars    Smokeless tobacco: Not on file      Comment: pt smoke a cigar 2x weekly    Alcohol use No      Comment: socially       Review of Systems:  Pertinent positives and negatives listed in HPI. All other systems are reviewed and are negative.    Health Maintaince :   Primary Care Physician: No primary care provider on file.  Immunizations:   TDap is not up to date, Will administer during this Admit   Influenza is up to date, .  Pneumovax is up to date, .  Cancer Screening:  Colonoscopy: is up to date. 2018     Objective:   Last 24 Hour Vital Signs:  BP  Min: 78/44  Max: 91/42  Temp  Av.6 °F (36.4 °C)  Min: 97.6 °F (36.4 °C)  Max: 97.6 °F (36.4 °C)  Pulse  Av  Min: 101  Max: 121  Resp  Av  Min: 10  Max: 16  SpO2  Av %  Min: 95 %  Max: 99 %  Height  Av' (182.9 cm)  Min: 6' (182.9 cm)  Max: 6' (182.9 cm)  Weight  Av.8 kg (165 lb)  Min: 74.8 kg (165 lb)  Max: 74.8 kg (165 lb)  Body mass index is 22.38 kg/m².  No intake/output data recorded.    Physical Examination:  General: Oriented to person, place, and time. Appears weak.   Head: Normocephalic and atraumatic.   Eyes:  conjunctival pallor bilaterally Pupils are equal, round, and reactive to light. Extraocular movements intact.  Neck: Normal range of motion, neck supple.  JVP unable to assess to hypovolemia   Cardiovascular: Normal rate, regular rhythm. +1 pulses, weak but " palpable on UE not on Le. No murmurs, rub, or extra heart sounds appreciated. 3/6 systolic murmurs heard over RUSB, LLSB (pt with hx of Bio-prost Aortic and Mitral valves)  Pulmonary/Chest: Effort normal and breath sounds normal without wheezing, crackles, or rhonchi.   Abdominal: Soft, bowel sounds are normoactive.  No tenderness, guarding, or rebound.  Musculoskeletal: Normal range of motion, 4/5 strength to gross flexion and extension to upper and lower extremities, bilaterally.  Extremities: 2+ radial pulses,  no cyanosis or 2+ pitting edema bilaterally from knees distally to dorsum of feet.   Skin: Stasis Dermatitis of both Le, Dermatitis of Left forearm (resolving infection, not active)      Laboratory:  Most Recent Data:  CBC: Lab Results   Component Value Date    WBC 6.56 01/31/2018    HGB 4.3 (LL) 01/31/2018    HCT 14.9 (LL) 01/31/2018     01/31/2018    MCV 93 01/31/2018    RDW 17.3 (H) 01/31/2018     BMP: Lab Results   Component Value Date     (L) 01/31/2018    K 3.7 01/31/2018    CL 95 01/31/2018    CO2 32 (H) 01/31/2018    BUN 67 (H) 01/31/2018    CREATININE 1.3 01/31/2018     (H) 01/31/2018    CALCIUM 7.9 (L) 01/31/2018     LFTs: Lab Results   Component Value Date    PROT 5.4 (L) 01/31/2018    ALBUMIN 2.4 (L) 01/31/2018    BILITOT 0.3 01/31/2018    AST 27 01/31/2018    ALKPHOS 223 (H) 01/31/2018    ALT 28 01/31/2018     Coags:   Lab Results   Component Value Date    INR 1.0 01/12/2018     FLP: No results found for: CHOL, HDL, LDLCALC, TRIG, CHOLHDL  DM: Lab Results   Component Value Date    CREATININE 1.3 01/31/2018       Anemia: Lab Results   Component Value Date    IRON 105 05/11/2016    TIBC 376 05/11/2016    FERRITIN 56 05/11/2016     Cardiac: Lab Results   Component Value Date    TROPONINI 0.030 (H) 01/31/2018     Urinalysis: Lab Results   Component Value Date    COLORU Yellow 01/31/2018    SPECGRAV 1.015 01/31/2018    NITRITE Negative 01/31/2018    KETONESU Negative 01/31/2018     UROBILINOGEN Negative 01/31/2018       Trended Cardiac Data:    Recent Labs  Lab 01/31/18  1414   TROPONINI 0.030*       Microbiology Data:  none        Radiology:  Imaging Results    None            Assessment:     Stefano Granger is a 72 y.o. male with:  Patient Active Problem List    Diagnosis Date Noted    Atrial fibrillation 01/31/2018    Chronic combined systolic and diastolic heart failure 01/31/2018    Acute GI bleeding 01/31/2018    Anemia due to blood loss 01/11/2018    Pulmonary hypertension 01/04/2018    SVT (supraventricular tachycardia) 01/03/2018    Symptomatic anemia 01/02/2018    Gastrointestinal hemorrhage 01/02/2018    ANDREINA (iron deficiency anemia) 07/07/2016    Iron deficiency anemia 06/02/2016        Plan:     Hypovolemic shock 2/2 to Acute blood loss Anemia 2/2 to Recurrent GI bleed  - Hx of recurrent melena, h/o AVM, h/o multiple blood transfusions  -pt recently discharged on 01/14/2018 from this hospital due to GI bleed. Was transfused 2 units.   - pt cared for in 11/2017, and 12/2017, and early 1/2018 at [facility] for DBE w/ Dr. Soto - no source of bleeding found at that time   - presented with Hb=4.3, will receive a total of 4 units. 2 units first with recheck CBC, then another 2  -BUN=67, will monitor, elevated 2/2 to GI bleed  - GI consulted, appreciate recs   -imaging: CTA abdomen pelvis negative from 1/11/18    Intermediate Troponin  -trop =0.030  -trending trop and ekg  -will continue to monitor       Combined H F s/p AICD  -cont lasix, all other meds that will effect his BP are held  -echo from [facility] showed EF 45-50 (12/2017)  -AICD placement evaluated via CXR  -we are continuing his flomax as well     Hx of AO and mitral Valve replacements  -currently not on Ac, home med aspirin is being held     HTN  -holding home meds, except lasix       Afib  -giving Digitalis, holding beta-blocker due to hypotensive      PulmHTN  -no issues, holding sildenafil, due to hypotensive     Diet:  NPO  Ppx: SCD  Dispo: placed in ICU, GI consulted, receiving 2 units, pending improvement, pending GI recs and possible endoscoping procedure         Code Status:     Full    Vlad Hardy  [facility]  [facility]

## 2018-01-31 NOTE — ED PROVIDER NOTES
Encounter Date: 1/31/2018    SCRIBE #1 NOTE: I, Rosalba Keane, am scribing for, and in the presence of,  Dr. Stewart. I have scribed the entire note.       History     Chief Complaint   Patient presents with    Hypotension     pt c/o hypotension, dizziness, and fatigue since this am.  Pt adds that he noticied blood to BM since yesterday     This is a 72 y.o. male who  has a past medical history of Asthma; Cardiac defibrillator in place; CHF (congestive heart failure); Gout; Hypertension; Seizures; and Stroke.    Time seen by provider: 1:18 PM     This patient presents with complaint of weakness and fatigue, starting 2 days ago. The patient reports noticing black stools this AM. He denies associated N/V, abdominal pain, painful bowel movements, chest pain, or shortness of breath. He denies any headache, syncope, or fall/LOC.    He reports similar symptoms last week, and was admitted from 1/10-1/12. He reports symptoms improved after discharge, and states multiple normal bowel movements during that time. He has an appointment with a gastroenterologist scheduled for next week. He is followed by Dr. Soto.    Patient has a past surgical history that includes Tonsillectomy and Cardiac surgery.        The history is provided by the patient.     Review of patient's allergies indicates:   Allergen Reactions    Coreg [carvedilol] Palpitations     Past Medical History:   Diagnosis Date    Asthma     Cardiac defibrillator in place     CHF (congestive heart failure)     Gout     Hypertension     Seizures     Stroke      Past Surgical History:   Procedure Laterality Date    CARDIAC SURGERY      year 2000    TONSILLECTOMY       No family history on file.  Social History   Substance Use Topics    Smoking status: Current Every Day Smoker     Years: 28.00     Types: Cigars    Smokeless tobacco: Not on file      Comment: pt smoke a cigar 2x weekly    Alcohol use No      Comment: socially     Review of Systems    Constitutional: Positive for fatigue. Negative for activity change and fever.   HENT: Negative for congestion, ear pain and sore throat.    Respiratory: Negative for cough and shortness of breath.    Cardiovascular: Negative for chest pain.   Gastrointestinal: Positive for blood in stool. Negative for abdominal distention, abdominal pain, constipation, diarrhea, nausea and vomiting.   Genitourinary: Negative for dysuria and flank pain.   Musculoskeletal: Negative for back pain and myalgias.   Skin: Negative for rash and wound.   Neurological: Positive for weakness. Negative for dizziness, numbness and headaches.   All other systems reviewed and are negative.      Physical Exam     Initial Vitals [01/31/18 1249]   BP Pulse Resp Temp SpO2   (!) 78/44 (!) 121 16 97.6 °F (36.4 °C) 99 %      MAP       55.33         Physical Exam    Nursing note and vitals reviewed.  Constitutional: He appears well-developed and well-nourished. He is not diaphoretic. No distress.   HENT:   Head: Normocephalic and atraumatic.   Mouth/Throat: Oropharynx is clear and moist.   Pale mucous membranes   Eyes: EOM are normal. Pupils are equal, round, and reactive to light.   Pale conjunctiva   Neck: Normal range of motion. Neck supple. No tracheal deviation present.   Cardiovascular: Normal rate, regular rhythm, normal heart sounds and intact distal pulses. Exam reveals no gallop and no friction rub.    No murmur heard.  Mild tachycardia to 105 bpm   Pulmonary/Chest: Breath sounds normal. No stridor. No respiratory distress. He has no wheezes. He has no rhonchi. He has no rales.   Abdominal: Soft. He exhibits no distension and no mass. There is no tenderness. There is no rebound and no guarding.   Genitourinary:   Genitourinary Comments: Dark black stool on rectal exam   Musculoskeletal: Normal range of motion. He exhibits no edema.   Neurological: He is alert and oriented to person, place, and time. He has normal strength. No cranial nerve  deficit or sensory deficit.   Skin: Skin is warm and dry. Capillary refill takes less than 2 seconds. No rash noted. No erythema.   Pitting edema up to the knees bilaterally   Psychiatric: He has a normal mood and affect. His behavior is normal. Thought content normal.         ED Course   US - ED Performed - Guidance Vascular Access  Date/Time: 1/31/2018 3:14 PM  Performed by: JORGE MONTOYA  Authorized by: JORGE MONTOYA   Comments: LUE PIV under US guidance placed    Critical Care  Date/Time: 1/31/2018 4:16 PM  Performed by: MEÑO BARBER  Authorized by: JORGE MONTOYA   Direct patient critical care time: 15 minutes  Additional history critical care time: 10 minutes  Ordering / reviewing critical care time: 5 minutes  Documentation critical care time: 15 minutes  Consulting other physicians critical care time: 10 minutes  Consult with family critical care time: 5 minutes  Total critical care time (exclusive of procedural time) : 60 minutes  Critical care was time spent personally by me on the following activities: blood draw for specimens, development of treatment plan with patient or surrogate, discussions with consultants, interpretation of cardiac output measurements, evaluation of patient's response to treatment, examination of patient, obtaining history from patient or surrogate, ordering and performing treatments and interventions, ordering and review of laboratory studies, re-evaluation of patient's condition, review of old charts and vascular access procedures.        Labs Reviewed   CBC WITHOUT DIFFERENTIAL - Abnormal; Notable for the following:        Result Value    RBC 1.60 (*)     Hemoglobin 4.3 (*)     Hematocrit 14.9 (*)     MCH 26.9 (*)     MCHC 28.9 (*)     RDW 17.3 (*)     All other components within normal limits    Narrative:      Hgb/Hct  critical result(s) called and verbal readback obtained from   Stephanie Daley RN. at 15:58 on 01/31/2018, 01/31/2018 15:58   COMPREHENSIVE METABOLIC PANEL -  Abnormal; Notable for the following:     Sodium 134 (*)     CO2 32 (*)     Glucose 198 (*)     BUN, Bld 67 (*)     Calcium 7.9 (*)     Total Protein 5.4 (*)     Albumin 2.4 (*)     Alkaline Phosphatase 223 (*)     Anion Gap 7 (*)     eGFR if non  55 (*)     All other components within normal limits   OCCULT BLOOD X 1, STOOL - Abnormal; Notable for the following:     Occult Blood Positive (*)     All other components within normal limits   TROPONIN I - Abnormal; Notable for the following:     Troponin I 0.030 (*)     All other components within normal limits   CBC W/ AUTO DIFFERENTIAL - Abnormal; Notable for the following:     RBC 2.14 (*)     Hemoglobin 6.0 (*)     Hematocrit 19.7 (*)     MCHC 30.5 (*)     RDW 15.8 (*)     Lymph # 0.5 (*)     Gran% 79.4 (*)     Lymph% 7.5 (*)     All other components within normal limits    Narrative:     h/h   critical result(s) called and verbal readback obtained from   tata munoz, 02/01/2018 06:30   COMPREHENSIVE METABOLIC PANEL - Abnormal; Notable for the following:     CO2 30 (*)     Glucose 118 (*)     BUN, Bld 65 (*)     Calcium 7.8 (*)     Total Protein 5.3 (*)     Albumin 2.5 (*)     Alkaline Phosphatase 212 (*)     All other components within normal limits   CBC W/ AUTO DIFFERENTIAL - Abnormal; Notable for the following:     RBC 2.32 (*)     Hemoglobin 6.4 (*)     Hematocrit 21.4 (*)     MCHC 29.9 (*)     RDW 15.6 (*)     MPV 8.8 (*)     Lymph # 0.5 (*)     Gran% 81.8 (*)     Lymph% 7.1 (*)     All other components within normal limits   URINALYSIS   LIPASE   TROPONIN I   LACTIC ACID, PLASMA   PROTIME-INR   TYPE & SCREEN   PREPARE RBC SOFT     EKG Readings: (Independently Interpreted)   Heart Rate: 106.   Sinus rhythm, T wave inversion to inferolateral leads, stable from prior 01//2018          Medical Decision Making:   History:   Old Medical Records: I decided to obtain old medical records.  Old Records Summarized: records from previous admission(s).        <> Summary of Records: Most recent endoscopy on 1/3/18 revealed no source of bleeding.  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  Initial Assessment:   72 year old with history of CHF, HTN, seizures, recurrent lower GI bleed presents with generalized fatigue, weakness and dark black stool for last 3 days. Pt with recent admission from 1/10-1/12 for lower GI bleed, required multiple transfusions. At that time, he was scheduled to follow up with Gi, with appointment next week. On arrival, BP 90s/60s, mild tachycardia to 105. Gross melena on exam. Plan to obtain labs including type and screen and obtain stool guaiac. Will give IVF bolus. Will plan for GI consult and medicine admission for recurrent GI bleed and potential symptomatic anemia.  Differential Diagnosis:   Differential Diagnosis includes, but is not limited to:  Lower GI bleed (AVM, colitis, colon cancer, polyp, internal/external hemorrhoid, IBS, rectal injury/foreign body, chronic diarrhea, anal fissure), upper GI bleed (PUD, perforated ulcer, esophageal variceal bleed), Crohn's disease, ulcerative colitis, chronic diarrhea, dietary intake   Clinical Tests:   Lab Tests: Ordered and Reviewed  Radiological Study: Reviewed  ED Management:  4:09 PM   Labs with H/H 4.3/14.9. 2U RBCs ordered for emergent transfusion.  Labs otherwise stable.    Consulted [facility], who recommend NPO and will scope tomorrow.   U hospital medicine contacted for admission.    On reassessment, patient hemodynamically stable after IVF and blood transfusion. Recommend ICU placement given patient's recurrent bleeding and low H/H.  Upon re-evaluation, the patient's status has remained stable.  At this time, I believe the patient should be admitted to the hospital for further evaluation and management of LGIB and blood loss anemia.    [facility] service was contacted and the case was discussed. The  consulting physician agrees with plan and will admit under their service. Additional recommendations at this time: none    The patient and family were updated with test results, overall impression, and further plan of care. All questions were answered. The patient expressed understanding and agreed with the current plan.                       ED Course      Clinical Impression:     1. Acute GI bleeding    2. Blood loss anemia    3. Melena    4. Fatigue, unspecified type    5. Elevated troponin    6. Anemia due to blood loss    7. Prerenal azotemia    8. Paroxysmal atrial fibrillation    9. Chronic combined systolic and diastolic heart failure    10. Pulmonary hypertension    11. History of aortic valve replacement    12. H/O mitral valve replacement    13. Increased heart rate    14. Ventricular tachycardia    15. Gastrointestinal hemorrhage, unspecified gastrointestinal hemorrhage type         Disposition:   Disposition: Admitted       I, Dr. Edi Stewart, personally performed the services described in this documentation. All medical record entries made by the scribe were at my direction and in my presence.  I have reviewed the chart and agree that the record reflects my personal performance and is accurate and complete.     Edi Stewart MD.             Edi Stewart MD  03/07/18 [id number]

## 2018-01-31 NOTE — PHYSICIAN QUERY
PT Name: Stefano Granger  MR #: [id number]    Physician Query Form - Atrial Fibrillation Specificity     CDS/: Aissatou Karimi               Contact information: [email or web address]       This form is a permanent document in the medical record.     Query Date: January 31, 2018    By submitting this query, we are merely seeking further clarification of documentation. Please utilize your independent clinical judgment when addressing the question(s) below.    The medical record contains the following:   Indicators     Supporting Clinical Findings Location in Medical Record   X Atrial Fibrillation Afib  -no issues, continue home meds  Progress notes    EKG results     X Medication metoprolol succinate (TOPROL-XL) 24 hr tablet 300 mg  Dose: 300 mg  Freq: Daily Route: Oral  Start: 01/03/18 0900 End: 01/04/18 2020 MAR     Treatment      Other         Provider, please further specify the Atrial Fibrillation diagnosis.    [  ] Chronic  [  ] Paroxysmal  [x  ] Permanent  [  ] Persistent  [  ] Other (please specify): ____________________________  [  ] Clinically Undetermined    Please document in your progress notes daily for the duration of treatment until resolved, and include in your discharge summary.

## 2018-01-31 NOTE — PHYSICIAN QUERY
PT Name: Stefano Granger  MR #: [id number]     Physician Query Form - Documentation Clarification      CDS/: Aissatou Karimi               Contact information: [email or web address]      This form is a permanent document in the medical record.     Query Date: January 31, 2018    By submitting this query, we are merely seeking further clarification of documentation. Please utilize your independent clinical judgment when addressing the question(s) below.    The Medical record reflects the following:    Supporting Clinical Findings Location in Medical Record     PulmHTN  -no issues, continue home meds     sildenafil (REVATIO) 20 mg Tab 20 mg 3 (three) times daily    Combined HF s/p AICD   COPD   Progress notes      Home medications H&P      Progress notes   Discharge summary                                                                             Doctor, Please specify diagnosis or diagnoses associated with above clinical findings.    Provider Use Only      Please specify the type of Pulmonary Hypertension:    [     ] Primary pulmonary hypertension (Group 1)  [   x  ] Pulmonary hypertension due to Left  heart disease (Group 2)  [     ] Pulmonary hypertension due to Lung disease and hypoxia (Group 3)  [     ] Pulmonary hypertension, unspecified   [     ] Other: ___________________                                                                                                                           [  ] Clinically undetermined

## 2018-02-01 ENCOUNTER — ANESTHESIA (OUTPATIENT)
Dept: ENDOSCOPY | Facility: HOSPITAL | Age: 73
End: 2018-02-01

## 2018-02-01 ENCOUNTER — ANESTHESIA EVENT (OUTPATIENT)
Dept: ENDOSCOPY | Facility: HOSPITAL | Age: 73
End: 2018-02-01

## 2018-02-01 LAB
ALBUMIN SERPL BCP-MCNC: 2.5 G/DL
ALP SERPL-CCNC: 212 U/L
ALT SERPL W/O P-5'-P-CCNC: 25 U/L
ANION GAP SERPL CALC-SCNC: 9 MMOL/L
ANISOCYTOSIS BLD QL SMEAR: SLIGHT
AST SERPL-CCNC: 25 U/L
BASOPHILS # BLD AUTO: 0.02 K/UL
BASOPHILS # BLD AUTO: 0.03 K/UL
BASOPHILS # BLD AUTO: 0.03 K/UL
BASOPHILS NFR BLD: 0.3 %
BASOPHILS NFR BLD: 0.4 %
BASOPHILS NFR BLD: 0.5 %
BILIRUB SERPL-MCNC: 0.5 MG/DL
BLD PROD TYP BPU: NORMAL
BLD PROD TYP BPU: NORMAL
BLOOD UNIT EXPIRATION DATE: NORMAL
BLOOD UNIT EXPIRATION DATE: NORMAL
BLOOD UNIT TYPE CODE: 5100
BLOOD UNIT TYPE CODE: 5100
BLOOD UNIT TYPE: NORMAL
BLOOD UNIT TYPE: NORMAL
BUN SERPL-MCNC: 65 MG/DL
CALCIUM SERPL-MCNC: 7.8 MG/DL
CHLORIDE SERPL-SCNC: 98 MMOL/L
CO2 SERPL-SCNC: 30 MMOL/L
CODING SYSTEM: NORMAL
CODING SYSTEM: NORMAL
CREAT SERPL-MCNC: 1.2 MG/DL
DIFFERENTIAL METHOD: ABNORMAL
DISPENSE STATUS: NORMAL
DISPENSE STATUS: NORMAL
EOSINOPHIL # BLD AUTO: 0.1 K/UL
EOSINOPHIL NFR BLD: 0.9 %
EOSINOPHIL NFR BLD: 1.1 %
EOSINOPHIL NFR BLD: 1.7 %
ERYTHROCYTE [DISTWIDTH] IN BLOOD BY AUTOMATED COUNT: 15.6 %
ERYTHROCYTE [DISTWIDTH] IN BLOOD BY AUTOMATED COUNT: 15.8 %
ERYTHROCYTE [DISTWIDTH] IN BLOOD BY AUTOMATED COUNT: 16.2 %
EST. GFR  (AFRICAN AMERICAN): >60 ML/MIN/1.73 M^2
EST. GFR  (NON AFRICAN AMERICAN): >60 ML/MIN/1.73 M^2
GLUCOSE SERPL-MCNC: 118 MG/DL
HCT VFR BLD AUTO: 19.7 %
HCT VFR BLD AUTO: 21.4 %
HCT VFR BLD AUTO: 22.9 %
HGB BLD-MCNC: 6 G/DL
HGB BLD-MCNC: 6.4 G/DL
HGB BLD-MCNC: 7.1 G/DL
HYPOCHROMIA BLD QL SMEAR: ABNORMAL
HYPOCHROMIA BLD QL SMEAR: ABNORMAL
INR PPP: 1.1
LACTATE SERPL-SCNC: 1.2 MMOL/L
LYMPHOCYTES # BLD AUTO: 0.5 K/UL
LYMPHOCYTES NFR BLD: 7.1 %
LYMPHOCYTES NFR BLD: 7.3 %
LYMPHOCYTES NFR BLD: 7.5 %
MCH RBC QN AUTO: 27.6 PG
MCH RBC QN AUTO: 28 PG
MCH RBC QN AUTO: 28.3 PG
MCHC RBC AUTO-ENTMCNC: 29.9 G/DL
MCHC RBC AUTO-ENTMCNC: 30.5 G/DL
MCHC RBC AUTO-ENTMCNC: 31 G/DL
MCV RBC AUTO: 91 FL
MCV RBC AUTO: 92 FL
MCV RBC AUTO: 92 FL
MONOCYTES # BLD AUTO: 0.5 K/UL
MONOCYTES # BLD AUTO: 0.6 K/UL
MONOCYTES # BLD AUTO: 0.8 K/UL
MONOCYTES NFR BLD: 11.6 %
MONOCYTES NFR BLD: 7 %
MONOCYTES NFR BLD: 9.7 %
NEUTROPHILS # BLD AUTO: 5.2 K/UL
NEUTROPHILS # BLD AUTO: 5.7 K/UL
NEUTROPHILS # BLD AUTO: 5.8 K/UL
NEUTROPHILS NFR BLD: 79.4 %
NEUTROPHILS NFR BLD: 81.8 %
NEUTROPHILS NFR BLD: 83.7 %
NUM UNITS TRANS PACKED RBC: NORMAL
NUM UNITS TRANS PACKED RBC: NORMAL
PLATELET # BLD AUTO: 230 K/UL
PLATELET # BLD AUTO: 232 K/UL
PLATELET # BLD AUTO: 245 K/UL
PLATELET BLD QL SMEAR: ABNORMAL
PMV BLD AUTO: 10 FL
PMV BLD AUTO: 10.4 FL
PMV BLD AUTO: 8.8 FL
POIKILOCYTOSIS BLD QL SMEAR: SLIGHT
POLYCHROMASIA BLD QL SMEAR: ABNORMAL
POLYCHROMASIA BLD QL SMEAR: ABNORMAL
POTASSIUM SERPL-SCNC: 3.7 MMOL/L
PROT SERPL-MCNC: 5.3 G/DL
PROTHROMBIN TIME: 11.3 SEC
RBC # BLD AUTO: 2.14 M/UL
RBC # BLD AUTO: 2.32 M/UL
RBC # BLD AUTO: 2.51 M/UL
SODIUM SERPL-SCNC: 137 MMOL/L
TARGETS BLD QL SMEAR: ABNORMAL
TROPONIN I SERPL DL<=0.01 NG/ML-MCNC: 0.04 NG/ML
WBC # BLD AUTO: 6.36 K/UL
WBC # BLD AUTO: 6.98 K/UL
WBC # BLD AUTO: 7.23 K/UL

## 2018-02-01 PROCEDURE — 85610 PROTHROMBIN TIME: CPT

## 2018-02-01 PROCEDURE — 25000003 PHARM REV CODE 250: Performed by: STUDENT IN AN ORGANIZED HEALTH CARE EDUCATION/TRAINING PROGRAM

## 2018-02-01 PROCEDURE — 84484 ASSAY OF TROPONIN QUANT: CPT | Mod: 91

## 2018-02-01 PROCEDURE — 93005 ELECTROCARDIOGRAM TRACING: CPT

## 2018-02-01 PROCEDURE — 99900035 HC TECH TIME PER 15 MIN (STAT)

## 2018-02-01 PROCEDURE — 80053 COMPREHEN METABOLIC PANEL: CPT

## 2018-02-01 PROCEDURE — P9016 RBC LEUKOCYTES REDUCED: HCPCS

## 2018-02-01 PROCEDURE — 85025 COMPLETE CBC W/AUTO DIFF WBC: CPT | Mod: 91

## 2018-02-01 PROCEDURE — C9113 INJ PANTOPRAZOLE SODIUM, VIA: HCPCS | Performed by: STUDENT IN AN ORGANIZED HEALTH CARE EDUCATION/TRAINING PROGRAM

## 2018-02-01 PROCEDURE — 63600175 PHARM REV CODE 636 W HCPCS: Performed by: STUDENT IN AN ORGANIZED HEALTH CARE EDUCATION/TRAINING PROGRAM

## 2018-02-01 PROCEDURE — 99221 1ST HOSP IP/OBS SF/LOW 40: CPT | Mod: GC,,, | Performed by: INTERNAL MEDICINE

## 2018-02-01 PROCEDURE — 83605 ASSAY OF LACTIC ACID: CPT

## 2018-02-01 PROCEDURE — 93010 ELECTROCARDIOGRAM REPORT: CPT | Mod: 76,,, | Performed by: INTERNAL MEDICINE

## 2018-02-01 PROCEDURE — 93010 ELECTROCARDIOGRAM REPORT: CPT | Mod: ,,, | Performed by: INTERNAL MEDICINE

## 2018-02-01 PROCEDURE — 11000001 HC ACUTE MED/SURG PRIVATE ROOM

## 2018-02-01 PROCEDURE — 36415 COLL VENOUS BLD VENIPUNCTURE: CPT

## 2018-02-01 RX ORDER — METOPROLOL TARTRATE 50 MG/1
200 TABLET ORAL 2 TIMES DAILY
Status: DISCONTINUED | OUTPATIENT
Start: 2018-02-01 | End: 2018-02-01

## 2018-02-01 RX ADMIN — TRAVOPROST 1 DROP: 0.04 SOLUTION/ DROPS OPHTHALMIC at 11:02

## 2018-02-01 RX ADMIN — ATORVASTATIN CALCIUM 80 MG: 40 TABLET, FILM COATED ORAL at 10:02

## 2018-02-01 RX ADMIN — RAMELTEON 8 MG: 8 TABLET, FILM COATED ORAL at 10:02

## 2018-02-01 RX ADMIN — DIGOXIN 125 MCG: 0.12 TABLET ORAL at 12:02

## 2018-02-01 RX ADMIN — PHENYTOIN SODIUM 200 MG: 100 CAPSULE, EXTENDED RELEASE ORAL at 10:02

## 2018-02-01 RX ADMIN — PANTOPRAZOLE SODIUM 40 MG: 40 INJECTION, POWDER, FOR SOLUTION INTRAVENOUS at 10:02

## 2018-02-01 RX ADMIN — TAMSULOSIN HYDROCHLORIDE 0.4 MG: 0.4 CAPSULE ORAL at 10:02

## 2018-02-01 NOTE — ED NOTES
Multiple attempts for IV establishment unsuccessful. Pt states RN cannot attempt in left arm. States he was told by MD that no IV should be placed in left arm. Informed by admit team no contraindication to left arm IV. Informed pt, pt states he will not allow RN to attempt left arm unless specific MD who told pt it was contraindicated that it is ok. Unable to administer medications while blood is infusing through same line. Admit team notified. Orders to infuse blood and hold other medications. Reports will put in consult to anesthesia for line placement.

## 2018-02-01 NOTE — ANESTHESIA PREPROCEDURE EVALUATION
02/01/2018  Stefano Granger is a 72 y.o., male undergoing EGD under MAC.    PRIOR ANES (in Epic)  [id number]   none    ANES-RELATED MED/SURG  Patient Active Problem List   Diagnosis    Iron deficiency anemia    ANDREINA (iron deficiency anemia)    Symptomatic anemia    Gastrointestinal hemorrhage    SVT (supraventricular tachycardia)    Pulmonary hypertension    Anemia due to blood loss    Atrial fibrillation    Chronic combined systolic and diastolic heart failure    Acute GI bleeding     Past Medical History:   Diagnosis Date    Asthma     Cardiac defibrillator in place     CHF (congestive heart failure)     Gout     Hypertension     Seizures     Stroke      Past Surgical History:   Procedure Laterality Date    CARDIAC SURGERY      year 2000    TONSILLECTOMY          ~ Mitral & Aortic Valve Replacement (porcine)          Also AICD Placement    ALLERGIES  Review of patient's allergies indicates:   Allergen Reactions    Coreg [carvedilol] Palpitations       ANES-RELATED HOME Rx [id number]  Digoxin Albuterol Octreotide LAR   Metoprolol Tiotropium  Lisinopril  Bumetanide  Atorvastatin      phenytoin      Anesthesia Evaluation    I have reviewed the Patient Summary Reports.    I have reviewed the Nursing Notes.   I have reviewed the Medications.     Review of Systems  Anesthesia Hx:  No problems with previous Anesthesia Denies Hx of Anesthetic complications  History of prior surgery of interest to airway management or planning: Previous anesthesia: General Tonsillectomy, AICD placement with general anesthesia.  Denies Family Hx of Anesthesia complications.   Denies Personal Hx of Anesthesia complications.   Social:  Alcohol Use, Smoker    Hematology/Oncology:         -- Anemia:   Cardiovascular:   Exercise tolerance: good Pacemaker Hypertension  Denies Angina. CHF     ECG has been reviewed.     Pulmonary:   Asthma    Renal/:  Renal/ Normal     Hepatic/GI:  Hepatic/GI Normal Denies Liver Disease.  Denies Hepatitis.    Neurological:   CVA Seizures      Wt Readings from Last 1 Encounters:   01/31/18 74.8 kg (165 lb)     Temp Readings from Last 1 Encounters:   02/01/18 36.8 °C (98.2 °F) (Oral)     BP Readings from Last 1 Encounters:   02/01/18 130/78     Pulse Readings from Last 1 Encounters:   02/01/18 72     SpO2 Readings from Last 1 Encounters:   02/01/18 99%         Physical Exam  General:  Well nourished    Airway/Jaw/Neck:  Airway Findings: Mouth Opening: Normal Tongue: Normal  General Airway Assessment: Adult  Mallampati: II  TM Distance: Normal, at least 6 cm      Dental:  Dental Findings:    Chest/Lungs:  Chest/Lungs Findings: Clear to auscultation     Heart/Vascular:  Heart Findings: Rate: Tachycardia  Rhythm: Regular Rhythm     Abdomen:  Abdomen Findings:  Normal     Musculoskeletal:  Musculoskeletal Findings: (Patient tremulous on exam)     Mental Status:  Mental Status Findings:  Cooperative         Recent Labs  Lab 02/01/18  0519   WBC 7.23   RBC 2.14*   HGB 6.0*   HCT 19.7*      MCV 92   MCH 28.0   MCHC 30.5*       Chemistry        Component Value Date/Time     02/01/2018 0519    K 3.7 02/01/2018 0519    CL 98 02/01/2018 0519    CO2 30 (H) 02/01/2018 0519    BUN 65 (H) 02/01/2018 0519    CREATININE 1.2 02/01/2018 0519     (H) 02/01/2018 0519        Component Value Date/Time    CALCIUM 7.8 (L) 02/01/2018 0519    ALKPHOS 212 (H) 02/01/2018 0519    AST 25 02/01/2018 0519    ALT 25 02/01/2018 0519    BILITOT 0.5 02/01/2018 0519    ESTGFRAFRICA >60 02/01/2018 0519    EGFRNONAA >60 02/01/2018 0519        Last EKG 1/11/2018:  Accelerated Junctional rhythm  Nonspecific intraventricular conduction delay  T wave abnormality, consider inferolateral ischemia  Prolonged QT  Abnormal ECG        Anesthesia Plan  Type of Anesthesia, risks & benefits discussed:  Anesthesia Type:   general, MAC  Patient's Preference:   Intra-op Monitoring Plan: standard ASA monitors  Intra-op Monitoring Plan Comments:   Post Op Pain Control Plan: per primary service following discharge from PACU  Post Op Pain Control Plan Comments:   Induction:   IV  Beta Blocker:  Patient is not currently on a Beta-Blocker (No further documentation required).       Informed Consent: Patient understands risks and agrees with Anesthesia plan.  Questions answered. Anesthesia consent signed with patient.  ASA Score: 3  emergent   Day of Surgery Review of History & Physical:  There are no significant changes.   H&P completed by Anesthesiologist.   Anesthesia Plan Notes: [id number]   - double valve replacement   - AICD (caution w electocautery)     ~ last shock < 1 yr ago   - CHF unknown cause        Ready For Surgery From Anesthesia Perspective.   72 year old M w/ hx of HIV, CHF s/p aortic, and mitral valve replacements, AICD placement with last event <1yr ago, HTN, seizures, recurrent lower GI bleed who presented today with symptoms of fatigue and lower GI bleed. He has had these symptoms before and has had multiple transfusions. On arrival today patient was hypotensive and tachycardic; h/h 6.0/17.7. pRBC transfusion ordered.   Patient describes >4METS stating he can walk two flights of stairs w/o stopping. He denies exertional CP or SOB, just chronic fatigue.     We have no documented previous ECHO on file. Physical exam shows no signs of CHF (bilateral lower extremity edema; patient laying flat in bed currently w/o SOB).  He does not appear to have the AICD info with him. He does have information regarding his aortic valve replacement.    Aortic valve (?) Model: F02295L  Placed 1/24/2000  [employer]: [phone]; ext [phone]  MRI safe

## 2018-02-01 NOTE — ANESTHESIA PROCEDURE NOTES
Peripheral IV Insertion    Diagnosis: gi bleed and I99.8 Other disorder of circulatory system    Patient location during procedure: ED  Procedure start time: 1/31/2018 5:26 PM  Timeout: 1/31/2018 5:25 PM  Procedure end time: 1/31/2018 5:30 PM  Staffing  Anesthesiologist: ANGELLA JIANG  Performed: anesthesiologist   Anesthesiologist was present at the time of the procedure.  Preanesthetic Checklist  Completed: patient identified, pre-op evaluation, timeout performed, IV checked, risks and benefits discussed and monitors and equipment checkedPeripheral IV Insertion  Skin Prep: chlorhexidine gluconate  Orientation: right  Location: antecubital  Catheter Size: 20 G  Catheter placement by Ultrasound guidance. Heme positive aspiration all ports.  Vessel Caliber: small, patent, compressibility normalInsertion Attempts: 1  Assessment  Dressing: secured with tape and tegaderm  Patient: Tolerated well  Line flushed easily.

## 2018-02-01 NOTE — CONSULTS
[facility]    CC: Gi bleed    HPI 72 y.o. male here with recurrent, severe, overt, obscure GI bleeding associated with severe symptomatic anemia.  He has had recurrent admissions between [facility] and here with severe anemia but no clear source of GI blood loss noted on multiple endoscopic evaluations.  He presents today with similar issues. Denies any fevers, hematemesis, chest pain, or any other related complaints.     Past records reviewed.       Past Medical History:   Diagnosis Date    Asthma     Cardiac defibrillator in place     CHF (congestive heart failure)     Gout     Hypertension     Seizures     Stroke          Review of Systems  General ROS: negative for chills, fever or weight loss  Cardiovascular ROS: no chest pain or dyspnea on exertion  Gastrointestinal ROS: no abdominal pain, change in bowel habits, Positive for black stools    Physical Examination  /66   Pulse 107   Temp 97.8 °F (36.6 °C) (Oral)   Resp (!) 21   Ht 6' (1.829 m)   Wt 74.8 kg (165 lb)   SpO2 100%   BMI 22.38 kg/m²   General appearance: alert, cooperative, no distress  HENT: Normocephalic, atraumatic, neck symmetrical, no nasal discharge   Lungs: clear to auscultation bilaterally, no dullness to percussion bilaterally  Heart: regular rate and rhythm without rub; no displacement of the PMI   Abdomen: soft, non-tender; bowel sounds normoactive; no organomegaly  Extremities: extremities symmetric; no clubbing, cyanosis, or edema  Neurologic: Alert and oriented X 3, normal strength, normal coordination and gait    Labs:  Lab Results   Component Value Date    WBC 7.23 02/01/2018    HGB 6.0 (L) 02/01/2018    HCT 19.7 (LL) 02/01/2018    MCV 92 02/01/2018     02/01/2018         Imaging: CTA 1/11  1. No evidence of active contrast extravasation or pseudoaneurysm  2. Right greater than left bilateral pleural effusions    Reviewed by me.    Assessment:   72 year old male with multiple medical co-morbidities and  recurrent overt, obscure GI bleeding. No plans for endoscopy today given no avilablity for balloon enteroscopy.  Patient will need to be transfused and monitored overnight and will be re-evaluated tomorrow.  If evidence of ongoing bleeding, will consider endoscopy.  Otherwise, will plan to see in clinic next week vs. Direct schedule for balloon enteroscopy.     Plan:  1. Trend H/H  2. Transfuse as needed  3. Will -re-evaluate need for urgent endoscopy tomorrow

## 2018-02-01 NOTE — ED NOTES
Report received. Care assumed. Pt AAOx4. Respirations even and unlabored. Pt VSS. Pt informed of NPO status. Pt verbalized understanding. Pt instructed on use of call light. Call light within reach. Will continue to monitor. Per report pharmacy is working on overdue medication.

## 2018-02-01 NOTE — PROGRESS NOTES
[facility] Resident NATALII Progress Note    Subjective:      Stefano Granger received two units of RBCs overnight with improvement in H/H to . No complaints this AM. Hemodynamically stable.     Objective:   Last 24 Hour Vital Signs:  BP  Min: 78/44  Max: 115/78  Temp  Av.9 °F (36.6 °C)  Min: 97.6 °F (36.4 °C)  Max: 98.3 °F (36.8 °C)  Pulse  Av.8  Min: 101  Max: 121  Resp  Avg: 15.8  Min: 10  Max: 22  SpO2  Av.5 %  Min: 95 %  Max: 100 %  Height  Av' (182.9 cm)  Min: 6' (182.9 cm)  Max: 6' (182.9 cm)  Weight  Av.8 kg (165 lb)  Min: 74.8 kg (165 lb)  Max: 74.8 kg (165 lb)  No intake/output data recorded.    Physical Examination:  General: Alert, Awake, Oriented x 3, No Acute Distress  Head: normocephalic, atraumatic  Eyes: PERRL, EOMI, no scleral icterus, conjunctival pallor  Nose: no tenderness to palpation, no drainage or erythema appreciated  Mouth and Throat: poor dentition, no lesions noted, moist mucus membranes  Neck: no lymphadenopathy appreciated, No carotid bruits  Respiratory: lungs clear to ascultation bilaterally, no accessory use of muscles   Cardiovascular: regular rate with regular rhythm, no mumurs, rubs, or S3, S4, normal apical impulse.  Gastrointestinal: soft, nontender, nondistended,no rebound or guarding, normoactive bowel sounds.   Extremities: pulses 2+ in all extremities, 2+ pitting edema to knee with venous stasis changes bilaterally, normal ROM   Neuro:  full strength even in all extremities, no sensory deficits.       Laboratory:  Laboratory Data Reviewed:  Trended Lab Data:    Recent Labs  Lab 18  1414 18  0519   WBC 6.56  --    HGB 4.3* 6.0*   HCT 14.9* 19.7*    245   MCV 93 92   RDW 17.3* 15.8*   * 137   K 3.7 3.7   CL 95 98   CO2 32* 30*   BUN 67* 65*   CREATININE 1.3 1.2   * 118*   PROT 5.4* 5.3*   ALBUMIN 2.4* 2.5*   BILITOT 0.3 0.5   AST 27 25   ALKPHOS 223* 212*   ALT 28 25       Trended Cardiac Data:    Recent  Labs  Lab 01/31/18  1414   TROPONINI 0.030*       Microbiology Data   Microbiology Results (last 7 days)     ** No results found for the last 168 hours. **          Radiology:  Imaging Results    None           Current Medications:     Infusions:       Scheduled:   atorvastatin  80 mg Oral QHS    bumetanide  4 mg Oral BID    digoxin  125 mcg Oral Every other day    metOLazone  5 mg Oral Q48H    pantoprazole  40 mg Intravenous BID    phenytoin  200 mg Oral BID    tamsulosin  0.4 mg Oral Daily    tiotropium  1 capsule Inhalation Daily    travoprost  1 drop Both Eyes Daily        PRN:  sodium chloride, sodium chloride, ramelteon    Antibiotics and Day Number of Therapy:      Lines and Day Number of Therapy:  PIV x2    Assessment and Plan     Stefano Granger is a 72 y.o.male with    Hypovolemic shock 2/2 to Acute blood loss Anemia 2/2 to Recurrent GI bleed  - Hx of recurrent melena, h/o AVM, h/o multiple blood transfusions  -pt recently discharged on 01/14/2018 from this hospital due to GI bleed. Was transfused 2 units.   - pt cared for in 11/2017, and 12/2017, and early 1/2018 at [facility] for DBE w/ Dr. Soto - no source of bleeding found at that time   - presented with Hb=4.3, will receive a total of 4 units. 2 units first with recheck CBC, then another 2  -BUN=67, will monitor, elevated 2/2 to GI bleed  - GI consulted, appreciate recs   -imaging: CTA abdomen pelvis negative from 1/11/18  - hemodynamically stable--improved H/H to 6.0/19 after 2 units--receiving another unit this AM     Intermediate Troponin  -trop =0.030; repeat pending  -trending trop and ekg  -will continue to monitor    Pre-renal azotemia  - BUN 67; will monitor  - likely secondary to GI bleed     Combined H F s/p AICD  -cont lasix, all other meds that will effect his BP are held  -echo from [facility] showed EF 45-50 (12/2017)  -AICD placement evaluated via CXR  -we are continuing his flomax as well     Hx of AO and mitral Valve  replacements  -currently not on Ac, home med aspirin is being held     HTN  -holding home meds, except lasix       Afib  -giving Digitalis, holding beta-blocker due to hypotensive      PulmHTN  -no issues, holding sildenafil, due to hypotensive      Diet: NPO  Ppx: SCD  Dispo: placed in ICU, GI consulted, receiving 2 units, pending improvement, pending GI recs and possible endoscoping procedure         Evan Sibley  [facility] HO-I  [facility] Hospitalitis Service Team A    [facility] Medicine Hospitalist Pager numbers:   [facility] Hospitalist Medicine Team A (Lola/Larry): [phone]  [facility] Hospitalist Medicine Team B (Jessie/Reinaldo):  [phone]

## 2018-02-01 NOTE — ED NOTES
Spoke with admit team regarding blood transfusion. States to administer 2 units, redraw CBC. After CBC decision for following units will be made.

## 2018-02-02 PROBLEM — I47.20 VENTRICULAR TACHYCARDIA: Status: ACTIVE | Noted: 2018-02-02

## 2018-02-02 PROBLEM — K92.1 MELENA: Status: ACTIVE | Noted: 2018-02-02

## 2018-02-02 PROBLEM — R53.83 FATIGUE: Status: ACTIVE | Noted: 2018-02-02

## 2018-02-02 PROBLEM — D50.0 BLOOD LOSS ANEMIA: Status: ACTIVE | Noted: 2018-02-02

## 2018-02-02 PROBLEM — R00.0 INCREASED HEART RATE: Status: ACTIVE | Noted: 2018-02-02

## 2018-02-02 PROBLEM — R79.89 ELEVATED TROPONIN: Status: ACTIVE | Noted: 2018-02-02

## 2018-02-02 LAB
ALBUMIN SERPL BCP-MCNC: 2.6 G/DL
ALP SERPL-CCNC: 229 U/L
ALT SERPL W/O P-5'-P-CCNC: 31 U/L
ANION GAP SERPL CALC-SCNC: 10 MMOL/L
ANISOCYTOSIS BLD QL SMEAR: ABNORMAL
AST SERPL-CCNC: 33 U/L
BASOPHILS # BLD AUTO: 0.01 K/UL
BASOPHILS # BLD AUTO: 0.01 K/UL
BASOPHILS # BLD AUTO: ABNORMAL K/UL
BASOPHILS NFR BLD: 0 %
BASOPHILS NFR BLD: 0.1 %
BASOPHILS NFR BLD: 0.1 %
BILIRUB SERPL-MCNC: 0.4 MG/DL
BLD PROD TYP BPU: NORMAL
BLD PROD TYP BPU: NORMAL
BLOOD UNIT EXPIRATION DATE: NORMAL
BLOOD UNIT EXPIRATION DATE: NORMAL
BLOOD UNIT TYPE CODE: 5100
BLOOD UNIT TYPE CODE: 5100
BLOOD UNIT TYPE: NORMAL
BLOOD UNIT TYPE: NORMAL
BNP SERPL-MCNC: 241 PG/ML
BUN SERPL-MCNC: 57 MG/DL
CALCIUM SERPL-MCNC: 7.9 MG/DL
CHLORIDE SERPL-SCNC: 98 MMOL/L
CO2 SERPL-SCNC: 29 MMOL/L
CODING SYSTEM: NORMAL
CODING SYSTEM: NORMAL
CREAT SERPL-MCNC: 1.3 MG/DL
DIFFERENTIAL METHOD: ABNORMAL
DIGOXIN SERPL-MCNC: 0.2 NG/ML
DISPENSE STATUS: NORMAL
DISPENSE STATUS: NORMAL
EOSINOPHIL # BLD AUTO: 0.1 K/UL
EOSINOPHIL # BLD AUTO: 0.1 K/UL
EOSINOPHIL # BLD AUTO: ABNORMAL K/UL
EOSINOPHIL NFR BLD: 0 %
EOSINOPHIL NFR BLD: 0.6 %
EOSINOPHIL NFR BLD: 1.4 %
ERYTHROCYTE [DISTWIDTH] IN BLOOD BY AUTOMATED COUNT: 16 %
ERYTHROCYTE [DISTWIDTH] IN BLOOD BY AUTOMATED COUNT: 16.1 %
ERYTHROCYTE [DISTWIDTH] IN BLOOD BY AUTOMATED COUNT: 16.1 %
EST. GFR  (AFRICAN AMERICAN): >60 ML/MIN/1.73 M^2
EST. GFR  (NON AFRICAN AMERICAN): 55 ML/MIN/1.73 M^2
GLUCOSE SERPL-MCNC: 146 MG/DL
HCT VFR BLD AUTO: 20.2 %
HCT VFR BLD AUTO: 20.2 %
HCT VFR BLD AUTO: 24.6 %
HGB BLD-MCNC: 6.2 G/DL
HGB BLD-MCNC: 6.2 G/DL
HGB BLD-MCNC: 7.5 G/DL
HYPOCHROMIA BLD QL SMEAR: ABNORMAL
INR PPP: 1.1
LYMPHOCYTES # BLD AUTO: 0.3 K/UL
LYMPHOCYTES # BLD AUTO: 0.5 K/UL
LYMPHOCYTES # BLD AUTO: ABNORMAL K/UL
LYMPHOCYTES NFR BLD: 12 %
LYMPHOCYTES NFR BLD: 3.7 %
LYMPHOCYTES NFR BLD: 5.6 %
MAGNESIUM SERPL-MCNC: 2.1 MG/DL
MCH RBC QN AUTO: 27.5 PG
MCH RBC QN AUTO: 27.9 PG
MCH RBC QN AUTO: 28.1 PG
MCHC RBC AUTO-ENTMCNC: 30.5 G/DL
MCHC RBC AUTO-ENTMCNC: 30.7 G/DL
MCHC RBC AUTO-ENTMCNC: 30.7 G/DL
MCV RBC AUTO: 90 FL
MCV RBC AUTO: 91 FL
MCV RBC AUTO: 91 FL
MONOCYTES # BLD AUTO: 0.8 K/UL
MONOCYTES # BLD AUTO: 1.2 K/UL
MONOCYTES # BLD AUTO: ABNORMAL K/UL
MONOCYTES NFR BLD: 13.8 %
MONOCYTES NFR BLD: 7 %
MONOCYTES NFR BLD: 9 %
NEUTROPHILS # BLD AUTO: 7 K/UL
NEUTROPHILS # BLD AUTO: 7.2 K/UL
NEUTROPHILS NFR BLD: 80 %
NEUTROPHILS NFR BLD: 81.6 %
NEUTROPHILS NFR BLD: 83.7 %
NEUTS BAND NFR BLD MANUAL: 1 %
NUM UNITS TRANS PACKED RBC: NORMAL
NUM UNITS TRANS PACKED RBC: NORMAL
OVALOCYTES BLD QL SMEAR: ABNORMAL
PLATELET # BLD AUTO: 164 K/UL
PLATELET # BLD AUTO: 187 K/UL
PLATELET # BLD AUTO: 221 K/UL
PLATELET BLD QL SMEAR: ABNORMAL
PMV BLD AUTO: 10.4 FL
PMV BLD AUTO: 10.5 FL
PMV BLD AUTO: 9.5 FL
POCT GLUCOSE: 146 MG/DL (ref 70–110)
POIKILOCYTOSIS BLD QL SMEAR: ABNORMAL
POLYCHROMASIA BLD QL SMEAR: ABNORMAL
POTASSIUM SERPL-SCNC: 4.2 MMOL/L
PROT SERPL-MCNC: 5.3 G/DL
PROTHROMBIN TIME: 11.2 SEC
RBC # BLD AUTO: 2.21 M/UL
RBC # BLD AUTO: 2.22 M/UL
RBC # BLD AUTO: 2.73 M/UL
SCHISTOCYTES BLD QL SMEAR: PRESENT
SODIUM SERPL-SCNC: 137 MMOL/L
TROPONIN I SERPL DL<=0.01 NG/ML-MCNC: 0.04 NG/ML
TROPONIN I SERPL DL<=0.01 NG/ML-MCNC: 0.04 NG/ML
WBC # BLD AUTO: 8.56 K/UL
WBC # BLD AUTO: 8.56 K/UL
WBC # BLD AUTO: 9.16 K/UL

## 2018-02-02 PROCEDURE — 85007 BL SMEAR W/DIFF WBC COUNT: CPT

## 2018-02-02 PROCEDURE — 85610 PROTHROMBIN TIME: CPT

## 2018-02-02 PROCEDURE — 94761 N-INVAS EAR/PLS OXIMETRY MLT: CPT

## 2018-02-02 PROCEDURE — 25000003 PHARM REV CODE 250: Performed by: STUDENT IN AN ORGANIZED HEALTH CARE EDUCATION/TRAINING PROGRAM

## 2018-02-02 PROCEDURE — 63600175 PHARM REV CODE 636 W HCPCS: Performed by: STUDENT IN AN ORGANIZED HEALTH CARE EDUCATION/TRAINING PROGRAM

## 2018-02-02 PROCEDURE — 85027 COMPLETE CBC AUTOMATED: CPT

## 2018-02-02 PROCEDURE — 36415 COLL VENOUS BLD VENIPUNCTURE: CPT

## 2018-02-02 PROCEDURE — 83880 ASSAY OF NATRIURETIC PEPTIDE: CPT

## 2018-02-02 PROCEDURE — 11000001 HC ACUTE MED/SURG PRIVATE ROOM

## 2018-02-02 PROCEDURE — 83735 ASSAY OF MAGNESIUM: CPT

## 2018-02-02 PROCEDURE — 94640 AIRWAY INHALATION TREATMENT: CPT

## 2018-02-02 PROCEDURE — 80162 ASSAY OF DIGOXIN TOTAL: CPT

## 2018-02-02 PROCEDURE — 99232 SBSQ HOSP IP/OBS MODERATE 35: CPT | Mod: GC,,, | Performed by: INTERNAL MEDICINE

## 2018-02-02 PROCEDURE — 25000242 PHARM REV CODE 250 ALT 637 W/ HCPCS: Performed by: STUDENT IN AN ORGANIZED HEALTH CARE EDUCATION/TRAINING PROGRAM

## 2018-02-02 PROCEDURE — 85025 COMPLETE CBC W/AUTO DIFF WBC: CPT

## 2018-02-02 PROCEDURE — 93010 ELECTROCARDIOGRAM REPORT: CPT | Mod: ,,, | Performed by: INTERNAL MEDICINE

## 2018-02-02 PROCEDURE — P9016 RBC LEUKOCYTES REDUCED: HCPCS

## 2018-02-02 PROCEDURE — 93005 ELECTROCARDIOGRAM TRACING: CPT

## 2018-02-02 PROCEDURE — C9113 INJ PANTOPRAZOLE SODIUM, VIA: HCPCS | Performed by: STUDENT IN AN ORGANIZED HEALTH CARE EDUCATION/TRAINING PROGRAM

## 2018-02-02 PROCEDURE — 80053 COMPREHEN METABOLIC PANEL: CPT

## 2018-02-02 PROCEDURE — 84484 ASSAY OF TROPONIN QUANT: CPT

## 2018-02-02 RX ORDER — HYDROCODONE BITARTRATE AND ACETAMINOPHEN 500; 5 MG/1; MG/1
TABLET ORAL
Status: DISCONTINUED | OUTPATIENT
Start: 2018-02-02 | End: 2018-02-05

## 2018-02-02 RX ORDER — BUMETANIDE 0.5 MG/1
2 TABLET ORAL 2 TIMES DAILY
Status: DISCONTINUED | OUTPATIENT
Start: 2018-02-02 | End: 2018-02-03

## 2018-02-02 RX ORDER — METOPROLOL SUCCINATE 25 MG/1
100 TABLET, EXTENDED RELEASE ORAL DAILY
Status: DISCONTINUED | OUTPATIENT
Start: 2018-02-02 | End: 2018-02-05 | Stop reason: HOSPADM

## 2018-02-02 RX ORDER — METOPROLOL TARTRATE 50 MG/1
50 TABLET ORAL ONCE
Status: COMPLETED | OUTPATIENT
Start: 2018-02-02 | End: 2018-02-02

## 2018-02-02 RX ADMIN — METOPROLOL SUCCINATE 100 MG: 25 TABLET, EXTENDED RELEASE ORAL at 08:02

## 2018-02-02 RX ADMIN — METOPROLOL TARTRATE 50 MG: 50 TABLET ORAL at 04:02

## 2018-02-02 RX ADMIN — ATORVASTATIN CALCIUM 80 MG: 40 TABLET, FILM COATED ORAL at 09:02

## 2018-02-02 RX ADMIN — PHENYTOIN SODIUM 200 MG: 100 CAPSULE, EXTENDED RELEASE ORAL at 08:02

## 2018-02-02 RX ADMIN — TIOTROPIUM BROMIDE 18 MCG: 18 CAPSULE ORAL; RESPIRATORY (INHALATION) at 11:02

## 2018-02-02 RX ADMIN — BUMETANIDE 2 MG: 0.5 TABLET ORAL at 08:02

## 2018-02-02 RX ADMIN — PANTOPRAZOLE SODIUM 40 MG: 40 INJECTION, POWDER, FOR SOLUTION INTRAVENOUS at 08:02

## 2018-02-02 RX ADMIN — PANTOPRAZOLE SODIUM 40 MG: 40 INJECTION, POWDER, FOR SOLUTION INTRAVENOUS at 09:02

## 2018-02-02 RX ADMIN — OCTREOTIDE ACETATE 50 MCG/HR: 500 INJECTION, SOLUTION INTRAVENOUS; SUBCUTANEOUS at 01:02

## 2018-02-02 RX ADMIN — BUMETANIDE 2 MG: 0.5 TABLET ORAL at 09:02

## 2018-02-02 RX ADMIN — TAMSULOSIN HYDROCHLORIDE 0.4 MG: 0.4 CAPSULE ORAL at 08:02

## 2018-02-02 NOTE — PLAN OF CARE
Future Appointments  Date Time Provider Department Center   2/7/2018 1:45 PM Ra Soto MD [facility]        02/02/18 1722   Readmission Questionnaire   At the time of your discharge, did someone talk to you about what your health problems were? Yes   At the time of discharge, did someone talk to you about what to watch out for regarding worsening of your health problem? Yes   At the time of discharge, did someone talk to you about what to do if you experienced worsening of your health problem? Yes   At the time of discharge, did someone talk to you about which medication to take when you left the hospital and which ones to stop taking? Yes   At the time of discharge, did someone talk to you about when and where to follow up with a doctor after you left the hospital? Yes   What do you believe caused you to be sick enough to be re-admitted? feeling weak   How often do you need to have someone help you when you read instructions, pamphlets, or other written material from your doctor or pharmacy? Rarely   Do you have problems taking your medications as prescribed? No   Do you have any problems affording any of  your prescribed medications? No   Do you have problems obtaining/receiving your medications? No   Does the patient have transportation to healthcare appointments? Yes   Living Arrangements house   Does the patient have family/friends to help with healtcare needs after discharge? no   Who are your caregiver(s) and their phone number(s)? self care has a daughter who can assit at times   Does your caregiver provide all the help you need? I don't know   If no, what kind of help do you need at home? none   Are you currently feeling confused? No   Are you currently having problems thinking? No   Are you currently having memory problems? No   Have you felt down, depressed, or hopeless? Unable to Assess   Have you felt little interest or pleasure in doing things? Unable to assess   In the last 7  days, my sleep quality was: fair

## 2018-02-02 NOTE — PLAN OF CARE
Problem: Patient Care Overview  Goal: Plan of Care Review  Outcome: Ongoing (interventions implemented as appropriate)  Pt on RA with sats of 95. Will continue to monitor.  '

## 2018-02-02 NOTE — MEDICAL/APP STUDENT
[facility] Progress Note     CC: Weakness and melena x 3 days      Subjective:      Stefano Granger has received 5 units pRBC total since admit.     On admission home bumex and metroprolol were held given hypotension - however, on this morning patient complaining of SOB, worse with lying flat and worsening LE edema.     HR was labile overnight, ranging from 90 at rest to 190 with activity. Will resume 1/2 home dose of BB and bumex this AM and, if well tolerated, will resume full dose of home regimen. He had 2 black tarry stools since yesterday. He denies any other problems overnight.      Objective:   Last 24 Hour Vital Signs:  BP  Min: 80/51  Max: 141/65  Temp  Av.1 °F (36.7 °C)  Min: 97.1 °F (36.2 °C)  Max: 98.9 °F (37.2 °C)  Pulse  Av.7  Min: 88  Max: 136  Resp  Av.1  Min: 15  Max: 20  SpO2  Av.1 %  Min: 95 %  Max: 100 %  Height  Av' (182.9 cm)  Min: 6' (182.9 cm)  Max: 6' (182.9 cm)  Weight  Av.8 kg (164 lb 14.5 oz)  Min: 74.8 kg (164 lb 14.5 oz)  Max: 74.8 kg (164 lb 14.5 oz)  I/O last 3 completed shifts:  In: 3522 [P.O.:572; Blood:950; IV Piggyback:2000]  Out: 3000 [Urine:3000]     Physical Examination:  General: Alert, Awake, Oriented x 3, No Acute Distress  Head: normocephalic, atraumatic  Eyes: PERRL, EOMI, no scleral icterus, conjunctival pallor b/l  Nose: no tenderness to palpation, no drainage or erythema appreciated  Mouth and Throat: poor dentition, no lesions noted, moist mucus membranes  Neck: no lymphadenopathy appreciated, No carotid bruits  Respiratory: bibasilar crackles in lower lung fields, no accessory use of muscles   Cardiovascular: tachycardic with regular rhythm, 3/6 systolic murmurs heard over RUSB, LLSB (pt with hx of Bio-prost Aortic and Mitral valves)  Gastrointestinal: soft, nontender, nondistended,no rebound or guarding, normoactive bowel sounds.   Extremities: pulses 2+ in all extremities, 2+ pitting edema to knee with venous stasis changes  bilaterally, normal ROM   Neuro:  full strength even in all extremities, no sensory deficits.         Laboratory:  Laboratory Data Reviewed:  Trended Lab Data:     Recent Labs  Lab 01/31/18  1414 02/01/18  0519   02/01/18 1906 02/01/18 2355 02/02/18  0401   WBC 6.56 7.23  < > 6.98 8.56 9.16   HGB 4.3* 6.0*  < > 7.1* 6.2* 6.2*   HCT 14.9* 19.7*  < > 22.9* 20.2* 20.2*    245  < > 230 187 221   MCV 93 92  < > 91 91 91   RDW 17.3* 15.8*  < > 16.2* 16.1* 16.0*   * 137  --   --   --  137   K 3.7 3.7  --   --   --  4.2   CL 95 98  --   --   --  98   CO2 32* 30*  --   --   --  29   BUN 67* 65*  --   --   --  57*   CREATININE 1.3 1.2  --   --   --  1.3   * 118*  --   --   --  146*   PROT 5.4* 5.3*  --   --   --  5.3*   ALBUMIN 2.4* 2.5*  --   --   --  2.6*   BILITOT 0.3 0.5  --   --   --  0.4   AST 27 25  --   --   --  33   ALKPHOS 223* 212*  --   --   --  229*   ALT 28 25  --   --   --  31   < > = values in this interval not displayed.     Trended Cardiac Data:     Recent Labs  Lab 02/01/18 1906 02/01/18 2355 02/02/18  0401   TROPONINI 0.037* 0.039* 0.041*         Microbiology Data       Microbiology Results (last 7 days)      ** No results found for the last 168 hours. **             Radiology:  Imaging Results    None            Current Medications:     Infusions:     Scheduled:   atorvastatin  80 mg Oral QHS    bumetanide  2 mg Oral BID    digoxin  125 mcg Oral Every other day    metoprolol succinate  100 mg Oral Daily    pantoprazole  40 mg Intravenous BID    phenytoin  200 mg Oral BID    tamsulosin  0.4 mg Oral Daily    tiotropium  1 capsule Inhalation Daily    travoprost  1 drop Both Eyes Daily         PRN:  sodium chloride, sodium chloride, ramelteon     Lines and Day Number of Therapy:  PIV x2     Assessment and Plan      Stefano CANO Elkin is a 72 y.o.male with     Hypovolemic shock 2/2 to Acute blood loss Anemia 2/2 to Recurrent GI bleed  - Hx of recurrent melena, h/o AVM, h/o  multiple blood transfusions  -pt recently discharged on 01/14/2018 from this hospital due to GI bleed. Was transfused 2 units at that time.   - pt cared for in 11/2017, 12/2017, and early 1/2018 at [facility] for DBE w/ Dr. Soto - no source of bleeding found at that time   - presented with Hb=4.3, will receive a total of 4 units. 2 units first with recheck CBC, then another 2 with appropriate response. Necessitated an additional 1 unit overnight 2/2.   -imaging: CTA abdomen pelvis negative from 1/11/18  - GI consulted, appreciate recs: transfuse as needed, considering octreotide ggt, endoscopy with Raines Monday if still in house.  - H/H 6.2 on AM labs. Receiving 5th unit this Am. Will continue to monitor and transfuse as necessary.     Intermediate Troponin  -trop =0.030 --> 0.039 --> 0.041   -EKG with no acute ST segment changes, likely secondary to demand given elevated HRs   -Will continue to monitor     Pre-renal azotemia  - Cr 1.2 BUN 67 on admit --> Cr 1.3 (2/2)  - likely pre-renal secondary to GI bleed  - Receiving IVF and pRBC, will continue to monitor with daily CMP     Combined H F s/p AICD  -On home metoprolol and bumex, held all meds that will effect his BP    -Echo from [facility] showed EF 45-50 (12/2017)  -AICD placement evaluated via CXR  - Patient appeared volume overloaded this morning (2/2), resuming 1/2 dose home lasix. May resume 4mg BID this afternoon if well tolerated.  - HRs elevated overnight, resumed 100 toprol XL this Am (1/2 home dose). Will resume full dose if well tolerated.     BPH  -continuing flomax   -no acute concerns     Hx of AO and mitral Valve replacements  -currently not on AC, home med aspirin is being held given GIB     HTN  -holding home antihypertensive meds as above      Afib  -giving Digitalis, initially held beta-blocker due to hypotension  - resumed metoprolol today (2/2) given elevated HRs overnight.      PulmHTN  -no issues, holding sildenafil, due to hypotension      Diet:  Regular  Ppx: SCD  Dispo: pending stabilization and cessation of GIB. possible endoscopic procedure Monday, Tuesday, or Wednesday w/ Dr. Brittany Celis, [facility] Medical Student

## 2018-02-02 NOTE — NURSING
Notified MD Conway/MD Gandhi of pt's increased HR-184-185.  Patient OOB to restroom.  Patient denied any present discomforts.  Will monitor pt. Throughout shift.  MD Gandhi asked that we supply a bedpan for the pt.  Pt. Refused, will place a commode at bedside.

## 2018-02-02 NOTE — PLAN OF CARE
TN met with pt at bedside. Pt  Plan for scope on Monday,  Patient states she sees Dr Juarez at [facility].      Future Appointments  Date Time Provider Department Center   2/7/2018 1:45 PM Ra Soto MD [facility] TUMOR [facility]           02/02/18 1728   Discharge Assessment   Assessment Type Discharge Planning Assessment   Confirmed/corrected address and phone number on facesheet? Yes   Assessment information obtained from? Patient   Prior to hospitilization cognitive status: Alert/Oriented   Prior to hospitalization functional status: Assistive Equipment   Current cognitive status: Alert/Oriented   Current Functional Status: Assistive Equipment   Lives With other (see comments)  (has a room mate)   Able to Return to Prior Arrangements yes   Is patient able to care for self after discharge? Unable to determine at this time (comments)   Who are your caregiver(s) and their phone number(s)? self care and  has a daughter Phoebe    Patient's perception of discharge disposition home or selfcare   Readmission Within The Last 30 Days no previous admission in last 30 days   Patient currently being followed by outpatient case management? No   Patient currently receives any other outside agency services? No   Equipment Currently Used at Home cane, straight;walker, rolling   Do you have any problems affording any of your prescribed medications? No   Is the patient taking medications as prescribed? yes   Does the patient have transportation home? Yes   Transportation Available family or friend will provide   Does the patient receive services at the [facility]? No   Discharge Plan A Home with family   Discharge Plan B Home with family   Patient/Family In Agreement With Plan yes

## 2018-02-02 NOTE — NURSING
MD Ovalle at pt's bedside, explaining to pt. Reasons for not ordering Metoprolol.  Pt. Verbalized understanding.

## 2018-02-02 NOTE — NURSING
Dr. Gandhi at bedside to evaluate patient.  States they will hold off on Metoprolol and will wait for patient's labs to result.  Report given to Renetta Maguire RN who will assume patient care for the night shift.  Dr. Gandhi giving her instructions for care during the overnight hours.

## 2018-02-02 NOTE — NURSING
Monitor tech reports that patient's heart rate is 170.  Upon entering room, patient is sitting on the side of the bed and requesting Respiratory for a treatment.  Dr. Hartman notified, received a telephone order for a STAT EKG and that she would come see patient.  EKG order entered and respiratory notified for EKG.  Will continue to monitor.

## 2018-02-02 NOTE — NURSING
Patient out of restroom, sitting up on side of bed HR-190's sustained for 3minutes.  Patient denied any discomforts.  MET in progress. B/p 134/-96%oxygen sats on room air.  MD Gandhi/Zoraida at bedside. MD OJ lee reinforcing the reasons for not adm. Metoprolol to pt., low b/p.  Patient stated he remembered her telling him the reasons earlier, but he takes the medication twice daily at home.  However his medical records list the Metoprolol being ordered daily.  Will continue to monitor pt. Throughout shift, bed alarm set and audible.

## 2018-02-02 NOTE — PROGRESS NOTES
Patient c/o SOB after sitting up on the bedside to use urinal. Tele called stated patient -190s.  Paged Dr. Richards. New orders in for Bumex. Will continue to monitor. Nurse at bedside.

## 2018-02-02 NOTE — PROGRESS NOTES
[facility]      HPI 72 y.o. male presented with recurrent, obscure GI bleeding.    Overnight, complained of shortness of breath due he thinks due to his diuretics being held. Denies chest pain or abdominal pain. Reports having 2 tarry stools overnight. Denies any other complaints      Past Medical History:   Diagnosis Date    Asthma     Cardiac defibrillator in place     CHF (congestive heart failure)     Gout     Hypertension     Seizures     Stroke          Review of Systems  General ROS: negative for chills, fever or weight loss  Cardiovascular ROS: no chest pain or dyspnea on exertion  Gastrointestinal ROS: no abdominal pain, change in bowel habits    Physical Examination  BP (!) 106/56   Pulse 92   Temp 98.8 °F (37.1 °C) (Oral)   Resp 18   Ht 6' (1.829 m)   Wt 74.8 kg (164 lb 14.5 oz)   SpO2 99%   BMI 22.37 kg/m²   General appearance: alert, cooperative, no distress  HENT: Normocephalic, atraumatic, neck symmetrical, no nasal discharge   Lungs: clear to auscultation bilaterally, no dullness to percussion bilaterally  Heart: regular rate and rhythm without rub; no displacement of the PMI   Abdomen: soft, non-tender; bowel sounds normoactive; no organomegaly  Extremities: extremities symmetric; no clubbing, cyanosis. 2+ pitting edema from ankle to knee with venous stasis changes bilaterally  Neurologic: Alert and oriented X 3, normal strength, normal coordination and gait    Labs:  WBC 9.2  H/H 6.2/20.2  Platelet 221    Imaging:  No new imaging    Assessment:   72 year old male with multiple medical comorbidities and recurrent over, obscure GI bleeding.     Plan:  1. Continue to trend H/H and transfuse as needed  2. Start octreotide infusion, 50 mcg/hr  3. Monitor over the weekend, will plan for balloon enteroscopy on Monday 2/5    Chelly Abraham DO  [facility]  [facility]

## 2018-02-02 NOTE — PROGRESS NOTES
.Pharmacy New Medication Education    Patient accepted medication education.    Pharmacy educated patient on name and purpose of medications and possible side effects, using the teach-back method.     Lipitor  Bumex  Digoxin  Toprol  Pantoprazole  Dilantin  Ramelteon  Flomax  Spiriva  travoprost    Learners of pharmacy medication education included:  Patient    Patient +/- learner response:  Verbalized Understanding, Teachback

## 2018-02-02 NOTE — PLAN OF CARE
Problem: Patient Care Overview  Goal: Plan of Care Review  Outcome: Ongoing (interventions implemented as appropriate)  Reviewed plan of care with patient.  Patient verbalized understanding.  Will monitor pt. Throughout shift.

## 2018-02-02 NOTE — NURSING
Patient resting quietly in a right lateral position, eyes closed.  Pt. Easily aroused to voice, HR-90.

## 2018-02-02 NOTE — NURSING
EKG resulted, Dr. Hartman notified.  She came to the unit to evaluate.  Verbal order received for Metoprolol Tartrate 200 mg BID to start now.

## 2018-02-02 NOTE — PROGRESS NOTES
Spoke with Dr. Richards, informed that patient did receive IV access via Rt thumb and of patient's CBC. Order to hold off on another order for blood transfusion. Patient will have repeat h/h.

## 2018-02-02 NOTE — NURSING
Patient AAOx3, denies any present discomforts,  HR-91.  Reinforced the importance of bed alarm.  Pt. Verbalized understanding.  Will monitor pt. Throughout shift.  MD Mock at bedside explaining to pt. The reasons for not adm. Metoprolol.  Patient verbalized understanding.

## 2018-02-02 NOTE — NURSING
EKG to bedside.  Dr. Hartman reports that her Senior Resident has told her to hold off on ordering Metoprolol at this time.

## 2018-02-02 NOTE — NURSING
Started transfusion of 1 unit  PRBC without any difficulties.  Pt. AAOx3, NAD noted. Reported off to on coming nurse.

## 2018-02-02 NOTE — NURSING
Notified MD Ovalle of pt's increased HR-150 while sitting on side of bed urinating in urinal.  Once pt. Laid down HR-94.  Pt. Requesting Metoprolol stated he takes med. Daily and today is day 2 without.  MD will visit pt. To discuss matter

## 2018-02-02 NOTE — NURSING
"Monitor tech reports patient's heart rate is increased into the 180's-190's  Upon entering room, patient is sitting on the toilet attempting to have a bowel movement.  Patient states, " I need my Metoprolol, my heart is racing."  At this point, monitor tech reports that patient's heart rate had decreased significantly to 105.  A check of patient's orders reveals no order for Metoprolol.  Dr. Michaela Hartman notified.  Dr. Hartman states to call ojhan if patient's heart rate goes up again, but she would hold off on ordering anything at this time.  Will continue to monitor.    "

## 2018-02-02 NOTE — CONSULTS
Cardiology    Consult Requested By:   Reason for Consult: abnormal EKG    SUBJECTIVE:     History of Present Illness:  Patient is a 72 y.o. male presents with history of bypass surgery most likely valvular with mitral valve replacement; ICD placement and followed by  at [facility]. He was recently seen there and apparently needs a battery change soon. He has no history of hypertension or diabetes but has a history of smoking in the past. He has had GI bleeds and low H/H for which he has followed . Admitted due to weakness and fatigue and black stools       Review of patient's allergies indicates:   Allergen Reactions    Coreg [carvedilol] Palpitations       Past Medical History:   Diagnosis Date    Asthma     Cardiac defibrillator in place     CHF (congestive heart failure)     Gout     Hypertension     Seizures     Stroke      Past Surgical History:   Procedure Laterality Date    CARDIAC SURGERY      year 2000    TONSILLECTOMY       History reviewed. No pertinent family history.  Social History   Substance Use Topics    Smoking status: Current Every Day Smoker     Years: 28.00     Types: Cigars    Smokeless tobacco: Not on file      Comment: pt smoke a cigar 2x weekly    Alcohol use No      Comment: socially        Home meds:  No current facility-administered medications on file prior to encounter.      Current Outpatient Prescriptions on File Prior to Encounter   Medication Sig Dispense Refill    albuterol 90 mcg/actuation inhaler Inhale 2 puffs into the lungs every 6 (six) hours as needed for Wheezing. Rescue      atorvastatin (LIPITOR) 80 MG tablet Take 1 tablet (80 mg total) by mouth every evening. 90 tablet 3    bumetanide (BUMEX) 2 MG tablet Take 2 tablets (4 mg total) by mouth 2 (two) times daily. 120 tablet 11    ferrous sulfate 325 (65 FE) MG EC tablet Take 1 tablet (325 mg total) by mouth 3 (three) times daily.  0    metOLazone (ZAROXOLYN) 5 MG tablet Take 1  "tablet (5 mg total) by mouth every 48 hours. 15 tablet 11    metoprolol succinate (TOPROL-XL) 100 MG 24 hr tablet Take 3 tablets (300 mg total) by mouth once daily. (Patient taking differently: Take 200 mg by mouth 2 (two) times daily. ) 90 tablet 11    pantoprazole (PROTONIX) 40 MG tablet Take 1 tablet (40 mg total) by mouth 2 (two) times daily before meals.      phenytoin (DILANTIN) 200 MG ER capsule Take 1 capsule (200 mg total) by mouth 2 (two) times daily. 60 capsule 11    sildenafil, antihypertensive, (REVATIO) 20 mg Tab Take 1 tablet (20 mg total) by mouth 3 (three) times daily. 90 tablet 11    tamsulosin (FLOMAX) 0.4 mg Cp24 Take 1 capsule (0.4 mg total) by mouth once daily. 30 capsule 11    tiotropium (SPIRIVA WITH HANDIHALER) 18 mcg inhalation capsule Inhale 1 capsule (18 mcg total) into the lungs once daily. Controller  0    travoprost (TRAVATAN Z) 0.004 % Drop Place 1 drop into both eyes once daily.      needle, disp, 21 G 21 gauge x 1 1/2" Ndle 1 each by Misc.(Non-Drug; Combo Route) route every 30 days. 12 each 0    octreotide lar (SANDOSTATIN LAR) 20 mg injection Inject 20 mg into the muscle every 28 days. 1 kit 11    syringe, disposable, 20 mL Syrg 1 each by Misc.(Non-Drug; Combo Route) route every 30 days. 12 each 0       Current meds:  Scheduled Meds:   atorvastatin  80 mg Oral QHS    bumetanide  2 mg Oral BID    digoxin  125 mcg Oral Every other day    metoprolol succinate  100 mg Oral Daily    pantoprazole  40 mg Intravenous BID    phenytoin  200 mg Oral BID    tamsulosin  0.4 mg Oral Daily    tiotropium  1 capsule Inhalation Daily    travoprost  1 drop Both Eyes Daily     Continuous Infusions:  PRN Meds:.sodium chloride, sodium chloride, ramelteon      OBJECTIVE:     Vital Signs (Most Recent)  Temp: 98.8 °F (37.1 °C) (02/02/18 0700)  Pulse: 92 (02/02/18 0800)  Resp: 18 (02/02/18 0700)  BP: (!) 106/56 (02/02/18 0700)  SpO2: 99 % (02/02/18 0704)    Vital Signs Range (Last " 24H):  Temp:  [97.1 °F (36.2 °C)-98.9 °F (37.2 °C)]   Pulse:  []   Resp:  [15-20]   BP: ()/(43-81)   SpO2:  [95 %-100 %]     Physical Exam:  Neck: normal JVP, normal carotids  Lungs: basilar bilateral crackles  Heart: regular, normal S1 prosthetic sound, normal S2 , systolic ejection murmur base and LSB, no AI, no MR or TR heard  Abd: negative  Exts: bilateral LE edema 1+, decreased arterial pulses but faint PT bilaterally     Laboratory:  LABS  CBC    Recent Labs  Lab 02/01/18 1906 02/01/18 2355 02/02/18  0401   WBC 6.98 8.56 9.16   RBC 2.51* 2.21* 2.22*   HGB 7.1* 6.2* 6.2*   HCT 22.9* 20.2* 20.2*    187 221   MCV 91 91 91   MCH 28.3 28.1 27.9   MCHC 31.0* 30.7* 30.7*     BMP    Recent Labs  Lab 01/31/18  1414 02/01/18  0519 02/02/18  0401   * 137 137   K 3.7 3.7 4.2   CO2 32* 30* 29   CL 95 98 98   BUN 67* 65* 57*   CREATININE 1.3 1.2 1.3   * 118* 146*         Recent Labs  Lab 01/31/18  1414 02/01/18  0519 02/02/18  0401   CALCIUM 7.9* 7.8* 7.9*   MG  --   --  2.1       LFT    Recent Labs  Lab 01/31/18  1414 02/01/18  0519 02/02/18  0401   PROT 5.4* 5.3* 5.3*   ALBUMIN 2.4* 2.5* 2.6*   BILITOT 0.3 0.5 0.4   AST 27 25 33   ALKPHOS 223* 212* 229*   ALT 28 25 31       COAGS    Recent Labs  Lab 02/01/18  0519 02/02/18  0401   INR 1.1 1.1     CE    Recent Labs  Lab 02/01/18  1906 02/01/18  2355 02/02/18  0401   TROPONINI 0.037* 0.039* 0.041*     BNP    Recent Labs  Lab 02/02/18  0401   *     Lipid panel:  No results found for: CHOL  No results found for: HDL  No results found for: LDLCALC  No results found for: TRIG  No results found for: CHOLHDL  Diagnostic Results:  EKG: regular rhthm - in 2016, had P waves with long IA but all other EKG's can't see P and wonder if this is all junctional  CXR:increased heart size; increased vascular congestion; prosthetic valve with ICD single lead       Chart review:  None but EKG's - in the past at [facility] showing atrial fibrillation;   Implant  of single lead medtronics: 2009  Echo Report 5/19/2017: Moderate LV systolic dysfunction, severe pulmonary hypertension, normal prosthetic aortic valve, abnormal prosthetic mitral valve with high gradient of questionable etiology, high output failure due to severe anemia, findings are essentially as before on 11/30/16.  CHIKI done at [facility] 12/17: normal aortic and mitral bioprosthic valves; TV ring with moderately severe TR   From notes; history of bypass as well X2, in 2000, atrial fibrillation/flutter in the past but not on anticoagulation due to bleeding; EF in the past of 35%   ASSESSMENT/PLAN:     1. Patient with junctional rhythm vs long DC which is not very clear: in either case, he has remained stable and would not pursue this since he does have an ICD lead with a backup pacemaker. He has a past history of taking 400 mg of metoprolol Xl and also digoxin. Junctional rhythm is usually from digoxin toxicity.   2. Severe anemia     Plan: 1. Discontinue digoxin  2. Continue with lower doses of metoprolol  3. Keep intake and output matched and in fact, with blood transfusion, consider use of lasix to keep his urine output better  4. Nothing further needs to be done.   Federico Bella MD

## 2018-02-03 LAB
ALBUMIN SERPL BCP-MCNC: 2.6 G/DL
ALP SERPL-CCNC: 215 U/L
ALT SERPL W/O P-5'-P-CCNC: 25 U/L
ANION GAP SERPL CALC-SCNC: 9 MMOL/L
ANISOCYTOSIS BLD QL SMEAR: SLIGHT
AST SERPL-CCNC: 21 U/L
BASO STIPL BLD QL SMEAR: ABNORMAL
BASOPHILS # BLD AUTO: 0.03 K/UL
BASOPHILS NFR BLD: 0.4 %
BILIRUB SERPL-MCNC: 0.4 MG/DL
BLD PROD TYP BPU: NORMAL
BLD PROD TYP BPU: NORMAL
BLOOD UNIT EXPIRATION DATE: NORMAL
BLOOD UNIT EXPIRATION DATE: NORMAL
BLOOD UNIT TYPE CODE: 5100
BLOOD UNIT TYPE CODE: 5100
BLOOD UNIT TYPE: NORMAL
BLOOD UNIT TYPE: NORMAL
BUN SERPL-MCNC: 39 MG/DL
CALCIUM SERPL-MCNC: 8.4 MG/DL
CHLORIDE SERPL-SCNC: 99 MMOL/L
CO2 SERPL-SCNC: 32 MMOL/L
CODING SYSTEM: NORMAL
CODING SYSTEM: NORMAL
CREAT SERPL-MCNC: 1.1 MG/DL
DIFFERENTIAL METHOD: ABNORMAL
DISPENSE STATUS: NORMAL
DISPENSE STATUS: NORMAL
EOSINOPHIL # BLD AUTO: 0.3 K/UL
EOSINOPHIL NFR BLD: 3.5 %
ERYTHROCYTE [DISTWIDTH] IN BLOOD BY AUTOMATED COUNT: 16.1 %
EST. GFR  (AFRICAN AMERICAN): >60 ML/MIN/1.73 M^2
EST. GFR  (NON AFRICAN AMERICAN): >60 ML/MIN/1.73 M^2
GLUCOSE SERPL-MCNC: 116 MG/DL
HCT VFR BLD AUTO: 22.8 %
HGB BLD-MCNC: 6.9 G/DL
HYPOCHROMIA BLD QL SMEAR: ABNORMAL
INR PPP: 1.1
LYMPHOCYTES # BLD AUTO: 0.6 K/UL
LYMPHOCYTES NFR BLD: 7.4 %
MAGNESIUM SERPL-MCNC: 2.2 MG/DL
MCH RBC QN AUTO: 27.7 PG
MCHC RBC AUTO-ENTMCNC: 30.3 G/DL
MCV RBC AUTO: 92 FL
MONOCYTES # BLD AUTO: 0.7 K/UL
MONOCYTES NFR BLD: 9.3 %
NEUTROPHILS # BLD AUTO: 6 K/UL
NEUTROPHILS NFR BLD: 79.4 %
NUM UNITS TRANS PACKED RBC: NORMAL
PLATELET # BLD AUTO: 231 K/UL
PMV BLD AUTO: 9.8 FL
POIKILOCYTOSIS BLD QL SMEAR: SLIGHT
POLYCHROMASIA BLD QL SMEAR: ABNORMAL
POTASSIUM SERPL-SCNC: 4.6 MMOL/L
PROT SERPL-MCNC: 5.8 G/DL
PROTHROMBIN TIME: 11.1 SEC
RBC # BLD AUTO: 2.49 M/UL
SODIUM SERPL-SCNC: 140 MMOL/L
TARGETS BLD QL SMEAR: ABNORMAL
TRANS ERYTHROCYTES VOL PATIENT: NORMAL ML
WBC # BLD AUTO: 7.53 K/UL

## 2018-02-03 PROCEDURE — 63600175 PHARM REV CODE 636 W HCPCS: Performed by: STUDENT IN AN ORGANIZED HEALTH CARE EDUCATION/TRAINING PROGRAM

## 2018-02-03 PROCEDURE — 80053 COMPREHEN METABOLIC PANEL: CPT

## 2018-02-03 PROCEDURE — 11000001 HC ACUTE MED/SURG PRIVATE ROOM

## 2018-02-03 PROCEDURE — P9016 RBC LEUKOCYTES REDUCED: HCPCS

## 2018-02-03 PROCEDURE — 36430 TRANSFUSION BLD/BLD COMPNT: CPT

## 2018-02-03 PROCEDURE — 85610 PROTHROMBIN TIME: CPT

## 2018-02-03 PROCEDURE — 83735 ASSAY OF MAGNESIUM: CPT

## 2018-02-03 PROCEDURE — 85025 COMPLETE CBC W/AUTO DIFF WBC: CPT

## 2018-02-03 PROCEDURE — 25000242 PHARM REV CODE 250 ALT 637 W/ HCPCS: Performed by: STUDENT IN AN ORGANIZED HEALTH CARE EDUCATION/TRAINING PROGRAM

## 2018-02-03 PROCEDURE — 94761 N-INVAS EAR/PLS OXIMETRY MLT: CPT

## 2018-02-03 PROCEDURE — 94640 AIRWAY INHALATION TREATMENT: CPT

## 2018-02-03 PROCEDURE — 25000003 PHARM REV CODE 250: Performed by: STUDENT IN AN ORGANIZED HEALTH CARE EDUCATION/TRAINING PROGRAM

## 2018-02-03 PROCEDURE — 86644 CMV ANTIBODY: CPT

## 2018-02-03 PROCEDURE — P9021 RED BLOOD CELLS UNIT: HCPCS

## 2018-02-03 PROCEDURE — C9113 INJ PANTOPRAZOLE SODIUM, VIA: HCPCS | Performed by: STUDENT IN AN ORGANIZED HEALTH CARE EDUCATION/TRAINING PROGRAM

## 2018-02-03 PROCEDURE — 36415 COLL VENOUS BLD VENIPUNCTURE: CPT

## 2018-02-03 RX ORDER — BUMETANIDE 1 MG/1
4 TABLET ORAL 2 TIMES DAILY
Status: DISCONTINUED | OUTPATIENT
Start: 2018-02-03 | End: 2018-02-05 | Stop reason: HOSPADM

## 2018-02-03 RX ORDER — PHENYTOIN SODIUM 100 MG/1
200 CAPSULE, EXTENDED RELEASE ORAL 2 TIMES DAILY
Status: DISCONTINUED | OUTPATIENT
Start: 2018-02-03 | End: 2018-02-05 | Stop reason: HOSPADM

## 2018-02-03 RX ORDER — HYDROCODONE BITARTRATE AND ACETAMINOPHEN 500; 5 MG/1; MG/1
TABLET ORAL
Status: DISCONTINUED | OUTPATIENT
Start: 2018-02-03 | End: 2018-02-05 | Stop reason: HOSPADM

## 2018-02-03 RX ADMIN — PHENYTOIN SODIUM 200 MG: 100 CAPSULE, EXTENDED RELEASE ORAL at 06:02

## 2018-02-03 RX ADMIN — PHENYTOIN SODIUM 200 MG: 100 CAPSULE, EXTENDED RELEASE ORAL at 08:02

## 2018-02-03 RX ADMIN — ATORVASTATIN CALCIUM 80 MG: 40 TABLET, FILM COATED ORAL at 08:02

## 2018-02-03 RX ADMIN — BUMETANIDE 4 MG: 1 TABLET ORAL at 08:02

## 2018-02-03 RX ADMIN — TAMSULOSIN HYDROCHLORIDE 0.4 MG: 0.4 CAPSULE ORAL at 09:02

## 2018-02-03 RX ADMIN — TIOTROPIUM BROMIDE 18 MCG: 18 CAPSULE ORAL; RESPIRATORY (INHALATION) at 08:02

## 2018-02-03 RX ADMIN — TRAVOPROST 1 DROP: 0.04 SOLUTION/ DROPS OPHTHALMIC at 09:02

## 2018-02-03 RX ADMIN — PANTOPRAZOLE SODIUM 40 MG: 40 INJECTION, POWDER, FOR SOLUTION INTRAVENOUS at 08:02

## 2018-02-03 RX ADMIN — PANTOPRAZOLE SODIUM 40 MG: 40 INJECTION, POWDER, FOR SOLUTION INTRAVENOUS at 09:02

## 2018-02-03 NOTE — PROGRESS NOTES
Tolerated 1st blood transfusion well.  2nd blood transfusion initiated.  NAD, VSS.  Will continue to monitor.

## 2018-02-03 NOTE — PLAN OF CARE
Problem: Patient Care Overview  Goal: Plan of Care Review  Outcome: Ongoing (interventions implemented as appropriate)  Pt's SpO2 92% on room air. No adverse reactions to MDI. No respiratory distress noted. Continue to monitor SpO2.

## 2018-02-03 NOTE — PLAN OF CARE
Problem: Patient Care Overview  Goal: Plan of Care Review  Outcome: Outcome(s) achieved Date Met: 02/03/18  PT AAOx4. Respirations even, unlabored on room air. Pt denies any pain/discomfort. No acute events overnight pt remained stable. Encouraged pt to call for assistance. Call light within reach. No distress noted. Will continue to monitor.

## 2018-02-03 NOTE — PROGRESS NOTES
[facility]    CC: melena    HPI 72 y.o. male recurrent, severe, overt, obscure GI bleeding associated with severe symptomatic anemia.  He has had recurrent admissions between [facility] and here with severe anemia but no clear source of GI blood loss noted on multiple endoscopic evaluations. Patient with continued melena overnight. Denies dizziness, lightheadedness, no BRBPR. Denies pain.    Past Medical History    has a past medical history of Asthma; Cardiac defibrillator in place; CHF (congestive heart failure); Gout; Hypertension; Seizures; and Stroke.      Review of Systems  General ROS: negative for - chills, fever or weight loss  Cardiovascular ROS: no chest pain or dyspnea on exertion  Gastrointestinal ROS: no pain, no n/v/d    Physical Examination  BP (!) 105/58   Pulse 94   Temp 99.1 °F (37.3 °C)   Resp 16   Ht 6' (1.829 m)   Wt 74.8 kg (164 lb 14.5 oz)   SpO2 (!) 92%   BMI 22.37 kg/m²   General appearance: alert, cooperative, no distress  HENT: Normocephalic, atraumatic, neck symmetrical, no nasal discharge   Lungs: clear to auscultation in all fields, symmetric chest wall expansion bilaterally, no wheeze, rale, or rhonchi  Heart: normal rate, regular rhythm without rub; palpable peripheral pulsesI   Abdomen: soft, non-tender, non-distended, normoactive bowel sounds  Extremities: extremities symmetric; no clubbing, cyanosis, or edema  Neurologic: Alert and oriented X 3, normal strength, normal coordination    Labs:  Recent Results (from the past 336 hour(s))   CBC auto differential    Collection Time: 02/03/18  5:34 AM   Result Value Ref Range    WBC 7.53 3.90 - 12.70 K/uL    Hemoglobin 6.9 (L) 14.0 - 18.0 g/dL    Hematocrit 22.8 (L) 40.0 - 54.0 %   CBC auto differential    Collection Time: 02/02/18 11:55 AM   Result Value Ref Range    WBC 8.56 3.90 - 12.70 K/uL    Hemoglobin 7.5 (L) 14.0 - 18.0 g/dL    Hematocrit 24.6 (L) 40.0 - 54.0 %    Platelets 164 150 - 350 K/uL   CBC auto differential     Collection Time: 02/02/18  4:01 AM   Result Value Ref Range    WBC 9.16 3.90 - 12.70 K/uL    Hemoglobin 6.2 (L) 14.0 - 18.0 g/dL    Hematocrit 20.2 (L) 40.0 - 54.0 %    Platelets 221 150 - 350 K/uL       Assessment:   72 yoM with prolonged course of obscure GI bleed suspected small bowel etiology. Patient with continued downtrending hgb and melenic stools. Repeat DBE with possible provocation planned.    Plan:   - Transfuse PRBC, consider transfusing to higher hgb given NSTEMI. hgb >8   - continue octreotide gtt for now   - clear liquid diet tomorrow with bowel prep tomorrow evening   - DBE planning for 2/5    Juan Luis Nicolas MD  [facility], PGY IV  [phone]

## 2018-02-03 NOTE — PROGRESS NOTES
[facility] Resident RUI Progress Note    Subjective:      Stefano Granger reports no issues overnight. He had 1 dark, tarry bowel movement. He denies SOB, chest pain, and palpitations. His HR has been better controlled on his home medications. He is urinating often s/p resuming home bumex at 1/2 dose. He is tolerating PO intake without issues. Aware of plan for balloon enteroscopy with GI on Monday.    Adding home anti-epileptic medication this Am.     Objective:   Last 24 Hour Vital Signs:  BP  Min: 93/51  Max: 119/59  Temp  Av.3 °F (36.8 °C)  Min: 96.5 °F (35.8 °C)  Max: 98.9 °F (37.2 °C)  Pulse  Av.6  Min: 79  Max: 92  Resp  Av.8  Min: 17  Max: 18  SpO2  Av.6 %  Min: 92 %  Max: 99 %  I/O last 3 completed shifts:  In: 2972 [P.O.:572; Blood:400; IV Piggyback:]  Out:  [Urine:]    Physical Examination:  General: Alert, Awake, Oriented x 3, No Acute Distress  Head: normocephalic, atraumatic  Eyes: PERRL, EOMI, no scleral icterus, conjunctival pallor  Nose: no tenderness to palpation, no drainage or erythema appreciated  Mouth and Throat: poor dentition, no lesions noted, moist mucus membranes  Neck: no lymphadenopathy appreciated, No carotid bruits  Respiratory: bibasilar crackles appreciated in lower and mid-lung fields, no accessory use of muscles. Breathing comfortably on RA  Cardiovascular: regular rate with regular rhythm, no mumurs, rubs, or S3, S4, normal apical impulse.  Gastrointestinal: soft, nontender, nondistended,no rebound or guarding, normoactive bowel sounds.   Extremities: pulses 2+ in all extremities, 2+ pitting edema to knee with venous stasis changes bilaterally, normal ROM   Neuro:  full strength even in all extremities, no sensory deficits.     Laboratory:  Laboratory Data Reviewed:  Trended Lab Data:    Recent Labs  Lab 18  1414 18  0519  18  2355 18  0401 18  1155   WBC 6.56 7.23  < > 8.56 9.16 8.56   HGB 4.3* 6.0*  < >  6.2* 6.2* 7.5*   HCT 14.9* 19.7*  < > 20.2* 20.2* 24.6*    245  < > 187 221 164   MCV 93 92  < > 91 91 90   RDW 17.3* 15.8*  < > 16.1* 16.0* 16.1*   * 137  --   --  137  --    K 3.7 3.7  --   --  4.2  --    CL 95 98  --   --  98  --    CO2 32* 30*  --   --  29  --    BUN 67* 65*  --   --  57*  --    CREATININE 1.3 1.2  --   --  1.3  --    * 118*  --   --  146*  --    PROT 5.4* 5.3*  --   --  5.3*  --    ALBUMIN 2.4* 2.5*  --   --  2.6*  --    BILITOT 0.3 0.5  --   --  0.4  --    AST 27 25  --   --  33  --    ALKPHOS 223* 212*  --   --  229*  --    ALT 28 25  --   --  31  --    < > = values in this interval not displayed.    Trended Cardiac Data:    Recent Labs  Lab 02/01/18  1906 02/01/18  2355 02/02/18  0401   TROPONINI 0.037* 0.039* 0.041*   BNP  --   --  241*     Current Medications:     Infusions:   octreotide (SANDOSTATIN) infusion 50 mcg/hr (02/02/18 1320)        Scheduled:   atorvastatin  80 mg Oral QHS    bumetanide  2 mg Oral BID    metoprolol succinate  100 mg Oral Daily    pantoprazole  40 mg Intravenous BID    phenytoin  200 mg Oral BID    tamsulosin  0.4 mg Oral Daily    tiotropium  1 capsule Inhalation Daily    travoprost  1 drop Both Eyes Daily        PRN:  sodium chloride, sodium chloride, ramelteon    Lines and Day Number of Therapy:  PIV     Assessment and Plan     Stefano Granger is a 72 y.o.male with    Hypovolemic shock (resolved) 2/2 to Acute blood loss Anemia 2/2 to Recurrent GI bleed  - Hx of recurrent melena, h/o AVM, h/o multiple blood transfusions  - Pt recently discharged on 01/14/2018 from this hospital due to GI bleed. Was transfused 2 units at that time.   - pt cared for in 11/2017, 12/2017, and early 1/2018 at [facility] for DBE w/ Dr. Soto - no source of bleeding found at that time   - presented with Hb=4.3, will receive a total of 4 units. 2 units first with recheck CBC, then another 2 with poor response. Necessitated an additional 1 unit overnight 2/2. Has  received a total of 4.5 u pRBC since admit (repeat CBC Hgb >7 s/p 1/2 of the 5th unit, the remainder had to be disposed given access lost)   - Imaging: CTA abdomen pelvis negative from 1/11/18  - GI consulted, appreciate recs: transfuse as needed, began octreotide ggt, endoscopy scheduled with Raines Monday   - 1 episode of melena overnight. AM labs pending. Am. Will continue to monitor and transfuse as necessary.     Intermediate Troponin  -trop =0.030 --> 0.039 --> 0.041   -EKG with no acute ST segment changes, likely secondary to demand given elevated HRs   -Will continue to monitor. No acute events recorded on telemetry.    Pre-renal azotemia  - Cr 1.2 BUN 67 on admit --> Cr 1.3 (2/2)  - Likely pre-renal secondary to GI bleed  - Receiving IVF and pRBC, will continue to monitor with daily CMP  - AM labs pending     Combined HF s/p AICD  -On home metoprolol and bumex, initially held all meds that will effect his BP    -Echo from [facility] showed EF 45-50 (12/2017)  - AICD placement evaluated via CXR  - Several episodes of SVT and HR elevated to 190s overnight 2/2.   - HR improved s/p resuming 100 toprol XL (1/2 home dose).  - Cardiology consulted: agree with lower dose of home metoprolol, rec' discontinuing digoxin given concern for toxicity w/ junctional rhythm on EKG. Dig level low.   - Patient appeared volume overloaded 2/2, resumed 1/2 dose home lasix.   - Resumed home dose (4mg BID) 2/3 given stable pressures and crackles on exam.    Seizure Disorder  - Home med diltantin BID  - No seizure activity since admit, last seizure several years ago  - Resumed home medication.    BPH  -continuing flomax   -no acute concerns     Hx of AO and mitral Valve replacements  -currently not on AC, home med aspirin is being held given GIB     HTN  -holding home antihypertensive meds as above      Afib  -On Digitalis as home, initially held beta-blocker due to hypotension  -Resumed metoprolol (2/2) given elevated HRs overnight and  discontinued digoxin per cardiology rec' as above.     PulmHTN  -no issues, holding sildenafil due to hypotension      Diet: Regular  Ppx: SCD  Dispo: pending cessation of GIB/VS stabilization, endoscopic procedure Monday w/ Dr. Brittany Richards  [facility] Internal Medicine HO-I  [facility] Hospitalitis Service Team A    [facility] Medicine Hospitalist Pager numbers:   [facility] Hospitalist Medicine Team A (Lola/Larry): [phone]

## 2018-02-04 ENCOUNTER — ANESTHESIA EVENT (OUTPATIENT)
Dept: ENDOSCOPY | Facility: HOSPITAL | Age: 73
DRG: 377 | End: 2018-02-04
Payer: MEDICARE

## 2018-02-04 PROBLEM — I48.0 PAROXYSMAL ATRIAL FIBRILLATION: Status: ACTIVE | Noted: 2018-02-04

## 2018-02-04 LAB
ABO + RH BLD: NORMAL
ALBUMIN SERPL BCP-MCNC: 2.6 G/DL
ALP SERPL-CCNC: 205 U/L
ALT SERPL W/O P-5'-P-CCNC: 20 U/L
ANION GAP SERPL CALC-SCNC: 6 MMOL/L
ANISOCYTOSIS BLD QL SMEAR: SLIGHT
ANISOCYTOSIS BLD QL SMEAR: SLIGHT
AST SERPL-CCNC: 16 U/L
BASO STIPL BLD QL SMEAR: ABNORMAL
BASOPHILS # BLD AUTO: 0.02 K/UL
BASOPHILS # BLD AUTO: ABNORMAL K/UL
BASOPHILS NFR BLD: 0 %
BASOPHILS NFR BLD: 0.2 %
BILIRUB SERPL-MCNC: 0.5 MG/DL
BLD GP AB SCN CELLS X3 SERPL QL: NORMAL
BLD PROD TYP BPU: NORMAL
BLD PROD TYP BPU: NORMAL
BLOOD UNIT EXPIRATION DATE: NORMAL
BLOOD UNIT EXPIRATION DATE: NORMAL
BLOOD UNIT TYPE CODE: 5100
BLOOD UNIT TYPE CODE: 5100
BLOOD UNIT TYPE: NORMAL
BLOOD UNIT TYPE: NORMAL
BUN SERPL-MCNC: 36 MG/DL
CALCIUM SERPL-MCNC: 8.2 MG/DL
CHLORIDE SERPL-SCNC: 99 MMOL/L
CO2 SERPL-SCNC: 32 MMOL/L
CODING SYSTEM: NORMAL
CODING SYSTEM: NORMAL
CREAT SERPL-MCNC: 1.1 MG/DL
DACRYOCYTES BLD QL SMEAR: ABNORMAL
DIFFERENTIAL METHOD: ABNORMAL
DIFFERENTIAL METHOD: ABNORMAL
DISPENSE STATUS: NORMAL
DISPENSE STATUS: NORMAL
EOSINOPHIL # BLD AUTO: 0.2 K/UL
EOSINOPHIL # BLD AUTO: ABNORMAL K/UL
EOSINOPHIL NFR BLD: 2.1 %
EOSINOPHIL NFR BLD: 4 %
ERYTHROCYTE [DISTWIDTH] IN BLOOD BY AUTOMATED COUNT: 16.1 %
ERYTHROCYTE [DISTWIDTH] IN BLOOD BY AUTOMATED COUNT: 16.3 %
EST. GFR  (AFRICAN AMERICAN): >60 ML/MIN/1.73 M^2
EST. GFR  (NON AFRICAN AMERICAN): >60 ML/MIN/1.73 M^2
GLUCOSE SERPL-MCNC: 107 MG/DL
HCT VFR BLD AUTO: 29.2 %
HCT VFR BLD AUTO: 30.3 %
HGB BLD-MCNC: 8.7 G/DL
HGB BLD-MCNC: 9.2 G/DL
HYPOCHROMIA BLD QL SMEAR: ABNORMAL
INR PPP: 1.1
LYMPHOCYTES # BLD AUTO: 0.8 K/UL
LYMPHOCYTES # BLD AUTO: ABNORMAL K/UL
LYMPHOCYTES NFR BLD: 7 %
LYMPHOCYTES NFR BLD: 9.3 %
MAGNESIUM SERPL-MCNC: 1.8 MG/DL
MCH RBC QN AUTO: 27.4 PG
MCH RBC QN AUTO: 27.7 PG
MCHC RBC AUTO-ENTMCNC: 29.8 G/DL
MCHC RBC AUTO-ENTMCNC: 30.4 G/DL
MCV RBC AUTO: 91 FL
MCV RBC AUTO: 92 FL
MONOCYTES # BLD AUTO: 0.5 K/UL
MONOCYTES # BLD AUTO: ABNORMAL K/UL
MONOCYTES NFR BLD: 0 %
MONOCYTES NFR BLD: 6.4 %
NEUTROPHILS # BLD AUTO: 6.7 K/UL
NEUTROPHILS NFR BLD: 82 %
NEUTROPHILS NFR BLD: 88 %
NEUTS BAND NFR BLD MANUAL: 1 %
NUM UNITS TRANS PACKED RBC: NORMAL
OVALOCYTES BLD QL SMEAR: ABNORMAL
PLATELET # BLD AUTO: 229 K/UL
PLATELET # BLD AUTO: 290 K/UL
PLATELET BLD QL SMEAR: ABNORMAL
PLATELET BLD QL SMEAR: ABNORMAL
PMV BLD AUTO: 11.4 FL
PMV BLD AUTO: 9.6 FL
POCT GLUCOSE: 137 MG/DL (ref 70–110)
POIKILOCYTOSIS BLD QL SMEAR: SLIGHT
POIKILOCYTOSIS BLD QL SMEAR: SLIGHT
POLYCHROMASIA BLD QL SMEAR: ABNORMAL
POLYCHROMASIA BLD QL SMEAR: ABNORMAL
POTASSIUM SERPL-SCNC: 4.2 MMOL/L
PROT SERPL-MCNC: 5.8 G/DL
PROTHROMBIN TIME: 11.3 SEC
RBC # BLD AUTO: 3.17 M/UL
RBC # BLD AUTO: 3.32 M/UL
SCHISTOCYTES BLD QL SMEAR: PRESENT
SODIUM SERPL-SCNC: 137 MMOL/L
TARGETS BLD QL SMEAR: ABNORMAL
TRANS ERYTHROCYTES VOL PATIENT: NORMAL ML
WBC # BLD AUTO: 7.58 K/UL
WBC # BLD AUTO: 8.15 K/UL

## 2018-02-04 PROCEDURE — 63600175 PHARM REV CODE 636 W HCPCS: Performed by: STUDENT IN AN ORGANIZED HEALTH CARE EDUCATION/TRAINING PROGRAM

## 2018-02-04 PROCEDURE — 25000003 PHARM REV CODE 250: Performed by: INTERNAL MEDICINE

## 2018-02-04 PROCEDURE — 11000001 HC ACUTE MED/SURG PRIVATE ROOM

## 2018-02-04 PROCEDURE — 25000242 PHARM REV CODE 250 ALT 637 W/ HCPCS: Performed by: STUDENT IN AN ORGANIZED HEALTH CARE EDUCATION/TRAINING PROGRAM

## 2018-02-04 PROCEDURE — 25000003 PHARM REV CODE 250: Performed by: RADIOLOGY

## 2018-02-04 PROCEDURE — P9021 RED BLOOD CELLS UNIT: HCPCS

## 2018-02-04 PROCEDURE — 85007 BL SMEAR W/DIFF WBC COUNT: CPT

## 2018-02-04 PROCEDURE — P9016 RBC LEUKOCYTES REDUCED: HCPCS

## 2018-02-04 PROCEDURE — 86901 BLOOD TYPING SEROLOGIC RH(D): CPT

## 2018-02-04 PROCEDURE — 85027 COMPLETE CBC AUTOMATED: CPT

## 2018-02-04 PROCEDURE — 85025 COMPLETE CBC W/AUTO DIFF WBC: CPT

## 2018-02-04 PROCEDURE — 86920 COMPATIBILITY TEST SPIN: CPT

## 2018-02-04 PROCEDURE — 25000003 PHARM REV CODE 250: Performed by: STUDENT IN AN ORGANIZED HEALTH CARE EDUCATION/TRAINING PROGRAM

## 2018-02-04 PROCEDURE — 80053 COMPREHEN METABOLIC PANEL: CPT

## 2018-02-04 PROCEDURE — 85610 PROTHROMBIN TIME: CPT

## 2018-02-04 PROCEDURE — 94761 N-INVAS EAR/PLS OXIMETRY MLT: CPT

## 2018-02-04 PROCEDURE — 36415 COLL VENOUS BLD VENIPUNCTURE: CPT

## 2018-02-04 PROCEDURE — C9113 INJ PANTOPRAZOLE SODIUM, VIA: HCPCS | Performed by: STUDENT IN AN ORGANIZED HEALTH CARE EDUCATION/TRAINING PROGRAM

## 2018-02-04 PROCEDURE — 94640 AIRWAY INHALATION TREATMENT: CPT

## 2018-02-04 PROCEDURE — 83735 ASSAY OF MAGNESIUM: CPT

## 2018-02-04 RX ORDER — ACETAMINOPHEN 325 MG/1
650 TABLET ORAL ONCE
Status: COMPLETED | OUTPATIENT
Start: 2018-02-04 | End: 2018-02-04

## 2018-02-04 RX ORDER — METOLAZONE 5 MG/1
5 TABLET ORAL EVERY OTHER DAY
Status: DISCONTINUED | OUTPATIENT
Start: 2018-02-04 | End: 2018-02-05 | Stop reason: HOSPADM

## 2018-02-04 RX ORDER — POLYETHYLENE GLYCOL 3350, SODIUM SULFATE ANHYDROUS, SODIUM BICARBONATE, SODIUM CHLORIDE, POTASSIUM CHLORIDE 236; 22.74; 6.74; 5.86; 2.97 G/4L; G/4L; G/4L; G/4L; G/4L
4000 POWDER, FOR SOLUTION ORAL ONCE
Status: COMPLETED | OUTPATIENT
Start: 2018-02-04 | End: 2018-02-04

## 2018-02-04 RX ORDER — HYDROCODONE BITARTRATE AND ACETAMINOPHEN 500; 5 MG/1; MG/1
TABLET ORAL
Status: DISCONTINUED | OUTPATIENT
Start: 2018-02-04 | End: 2018-02-05

## 2018-02-04 RX ORDER — ONDANSETRON 4 MG/1
4 TABLET, ORALLY DISINTEGRATING ORAL ONCE
Status: DISCONTINUED | OUTPATIENT
Start: 2018-02-04 | End: 2018-02-05 | Stop reason: HOSPADM

## 2018-02-04 RX ORDER — DABIGATRAN ETEXILATE 75 MG/1
150 CAPSULE ORAL ONCE
Status: COMPLETED | OUTPATIENT
Start: 2018-02-05 | End: 2018-02-05

## 2018-02-04 RX ADMIN — METOPROLOL SUCCINATE 100 MG: 25 TABLET, EXTENDED RELEASE ORAL at 08:02

## 2018-02-04 RX ADMIN — BUMETANIDE 4 MG: 1 TABLET ORAL at 08:02

## 2018-02-04 RX ADMIN — RAMELTEON 8 MG: 8 TABLET, FILM COATED ORAL at 08:02

## 2018-02-04 RX ADMIN — BUMETANIDE 4 MG: 1 TABLET ORAL at 09:02

## 2018-02-04 RX ADMIN — PANTOPRAZOLE SODIUM 40 MG: 40 INJECTION, POWDER, FOR SOLUTION INTRAVENOUS at 08:02

## 2018-02-04 RX ADMIN — ATORVASTATIN CALCIUM 80 MG: 40 TABLET, FILM COATED ORAL at 08:02

## 2018-02-04 RX ADMIN — TIOTROPIUM BROMIDE 18 MCG: 18 CAPSULE ORAL; RESPIRATORY (INHALATION) at 09:02

## 2018-02-04 RX ADMIN — TAMSULOSIN HYDROCHLORIDE 0.4 MG: 0.4 CAPSULE ORAL at 09:02

## 2018-02-04 RX ADMIN — ACETAMINOPHEN 650 MG: 325 TABLET ORAL at 10:02

## 2018-02-04 RX ADMIN — POLYETHYLENE GLYCOL 3350, SODIUM SULFATE ANHYDROUS, SODIUM BICARBONATE, SODIUM CHLORIDE, POTASSIUM CHLORIDE 4000 ML: 236; 22.74; 6.74; 5.86; 2.97 POWDER, FOR SOLUTION ORAL at 06:02

## 2018-02-04 RX ADMIN — PHENYTOIN SODIUM 200 MG: 100 CAPSULE, EXTENDED RELEASE ORAL at 08:02

## 2018-02-04 RX ADMIN — TRAVOPROST 1 DROP: 0.04 SOLUTION/ DROPS OPHTHALMIC at 09:02

## 2018-02-04 RX ADMIN — OCTREOTIDE ACETATE 50 MCG/HR: 500 INJECTION, SOLUTION INTRAVENOUS; SUBCUTANEOUS at 06:02

## 2018-02-04 RX ADMIN — METOLAZONE 5 MG: 5 TABLET ORAL at 11:02

## 2018-02-04 NOTE — ANESTHESIA PREPROCEDURE EVALUATION
02/04/2018  Stefano Granger is a 72 y.o., male undergoing DBE under MAC.  72 year old M w/ hx of HIV, CHF s/p aortic, and mitral valve replacements, AICD placement with last event <1yr ago, HTN, seizures, recurrent lower GI bleed scheduled for DBE 2/5/2018. Patient describes >4METS stating he can walk two flights of stairs w/o stopping easily. He denies exertional CP or SOB, just chronic fatigue.    Patient Active Problem List   Diagnosis    Iron deficiency anemia    ANDREINA (iron deficiency anemia)    Symptomatic anemia    Gastrointestinal hemorrhage    SVT (supraventricular tachycardia)    Pulmonary hypertension    Anemia due to blood loss    Atrial fibrillation    Chronic combined systolic and diastolic heart failure    Acute GI bleeding    Prerenal azotemia    History of aortic valve replacement    H/O mitral valve replacement    Blood loss anemia    Elevated troponin    Fatigue    Increased heart rate    Melena    Ventricular tachycardia    Paroxysmal atrial fibrillation     Past Medical History:   Diagnosis Date    Asthma     Cardiac defibrillator in place     CHF (congestive heart failure)     Gout     Hypertension     Seizures     Stroke      Past Surgical History:   Procedure Laterality Date    CARDIAC SURGERY      year 2000    TONSILLECTOMY          ~ Mitral & Aortic Valve Replacement (porcine)          Also AICD Placement    ALLERGIES  Review of patient's allergies indicates:   Allergen Reactions    Coreg [carvedilol] Palpitations       Pre-op Assessment    I have reviewed the Patient Summary Reports.     I have reviewed the Nursing Notes.   I have reviewed the Medications.     Review of Systems  Anesthesia Hx:  No problems with previous Anesthesia Denies Hx of Anesthetic complications  History of prior surgery of interest to airway management or planning: Previous  anesthesia: General, MAC Tonsillectomy, AICD placement, colonoscopy with general anesthesia.  Denies Family Hx of Anesthesia complications.   Denies Personal Hx of Anesthesia complications.   Social:  Alcohol Use, Smoker    Hematology/Oncology:         -- Anemia:   Cardiovascular:   Exercise tolerance: good Pacemaker Hypertension  Denies Angina. CHF     ECG has been reviewed.    Pulmonary:   Asthma    Renal/:  Renal/ Normal     Hepatic/GI:  Hepatic/GI Normal    Neurological:   CVA Seizures      Wt Readings from Last 1 Encounters:   02/01/18 74.8 kg (164 lb 14.5 oz)     Temp Readings from Last 1 Encounters:   02/04/18 37.1 °C (98.7 °F)     BP Readings from Last 1 Encounters:   02/04/18 123/63     Pulse Readings from Last 1 Encounters:   02/04/18 90     SpO2 Readings from Last 1 Encounters:   02/04/18 97%         Physical Exam  General:  Well nourished    Airway/Jaw/Neck:  Airway Findings: Mouth Opening: Normal Tongue: Normal  General Airway Assessment: Adult  Mallampati: II  TM Distance: Normal, at least 6 cm      Dental:  Dental Findings:    Chest/Lungs:  Chest/Lungs Findings: Clear to auscultation     Heart/Vascular:  Heart Findings: Rate: Tachycardia  Rhythm: Regular Rhythm     Abdomen:  Abdomen Findings:  Normal     Musculoskeletal:  Musculoskeletal Findings: (Patient tremulous on exam)     Mental Status:  Mental Status Findings:  Cooperative         Recent Labs  Lab 02/04/18  0850   WBC 8.15   RBC 3.17*   HGB 8.7*   HCT 29.2*      MCV 92   MCH 27.4   MCHC 29.8*       Chemistry        Component Value Date/Time     02/04/2018 0608    K 4.2 02/04/2018 0608    CL 99 02/04/2018 0608    CO2 32 (H) 02/04/2018 0608    BUN 36 (H) 02/04/2018 0608    CREATININE 1.1 02/04/2018 0608     02/04/2018 0608        Component Value Date/Time    CALCIUM 8.2 (L) 02/04/2018 0608    ALKPHOS 205 (H) 02/04/2018 0608    AST 16 02/04/2018 0608    ALT 20 02/04/2018 0608    BILITOT 0.5 02/04/2018 0608    ESTGFRAFRICA  >60 02/04/2018 0608    EGFRNONAA >60 02/04/2018 0608        Last EKG 1/11/2018:  Accelerated Junctional rhythm  Nonspecific intraventricular conduction delay  T wave abnormality, consider inferolateral ischemia  Prolonged QTAbn    Combined H F s/p AICD  -cont lasix, all other meds that will effect his BP are held  -echo from [facility] showed EF 45-50 (12/2017)  -AICD placement evaluated via CXR  -we are continuing his flomax as wellormal ECG        Anesthesia Plan  Type of Anesthesia, risks & benefits discussed:  Anesthesia Type:  general, MAC  Patient's Preference:   Intra-op Monitoring Plan: standard ASA monitors  Intra-op Monitoring Plan Comments:   Post Op Pain Control Plan: per primary service following discharge from PACU  Post Op Pain Control Plan Comments:   Induction:   IV  Beta Blocker:  Patient is not currently on a Beta-Blocker (No further documentation required).       Informed Consent: Patient understands risks and agrees with Anesthesia plan.  Questions answered. Anesthesia consent signed with patient.  ASA Score: 3     Day of Surgery Review of History & Physical:    H&P update referred to the provider.     Anesthesia Plan Notes: [id number]   - double valve replacement   - AICD (caution w electocautery)     ~ last shock < 1 yr ago   - CHF unknown cause        Ready For Surgery From Anesthesia Perspective.       Aortic valve (?) Model: [id number]  Placed 1/24/2000  [employer]: [phone]; ext [phone]  MRI safe    AICD [employer]:   Model: [id number]  Defibrillator only.  Magnet mode: will deactivate the device's response to tachyarrhythmias.

## 2018-02-04 NOTE — PROGRESS NOTES
[facility] Resident RUI Progress Note    Subjective:      Stefano Granger reports no issues overnight.     He is tolerating PO intake without issues. Aware of plan for balloon enteroscopy with GI on Monday. No further episodes of melena or blood in stool. Denies chest pain, SOB and lightheadedness.     Objective:   Last 24 Hour Vital Signs:  BP  Min: 100/58  Max: 117/57  Temp  Av.5 °F (36.9 °C)  Min: 97.8 °F (36.6 °C)  Max: 99.2 °F (37.3 °C)  Pulse  Av  Min: 66  Max: 95  Resp  Av  Min: 12  Max: 18  SpO2  Av.8 %  Min: 92 %  Max: 95 %  I/O last 3 completed shifts:  In: 1729.2 [P.O.:910; I.V.:250; Blood:569.2]  Out: 3350 [Urine:3350]    Physical Examination:  General: Alert, Awake, Oriented x 3, No Acute Distress  Head: normocephalic, atraumatic  Eyes: PERRL, EOMI, no scleral icterus, conjunctival pallor  Nose: no tenderness to palpation, no drainage or erythema appreciated  Mouth and Throat: poor dentition, no lesions noted, moist mucus membranes  Neck: no lymphadenopathy appreciated, No carotid bruits  Respiratory: bibasilar crackles appreciated in lower and mid-lung fields, no accessory use of muscles. Breathing comfortably on RA  Cardiovascular: regular rate with regular rhythm, no mumurs, rubs, or S3, S4, normal apical impulse.  Gastrointestinal: soft, nontender, nondistended,no rebound or guarding, normoactive bowel sounds.   Extremities: pulses 2+ in all extremities, 2+ pitting edema to knee with venous stasis changes bilaterally, normal ROM   Neuro:  full strength even in all extremities, no sensory deficits.     Laboratory:  Laboratory Data Reviewed:  Trended Lab Data:    Recent Labs  Lab 18  0519  18  0401 18  1155 18  0534 18  0018   WBC 7.23  < > 9.16 8.56 7.53 7.58   HGB 6.0*  < > 6.2* 7.5* 6.9* 9.2*   HCT 19.7*  < > 20.2* 24.6* 22.8* 30.3*     < > 221 164 231 229   MCV 92  < > 91 90 92 91   RDW 15.8*  < > 16.0* 16.1* 16.1* 16.3*      --  137  --  140  --    K 3.7  --  4.2  --  4.6  --    CL 98  --  98  --  99  --    CO2 30*  --  29  --  32*  --    BUN 65*  --  57*  --  39*  --    CREATININE 1.2  --  1.3  --  1.1  --    *  --  146*  --  116*  --    PROT 5.3*  --  5.3*  --  5.8*  --    ALBUMIN 2.5*  --  2.6*  --  2.6*  --    BILITOT 0.5  --  0.4  --  0.4  --    AST 25  --  33  --  21  --    ALKPHOS 212*  --  229*  --  215*  --    ALT 25  --  31  --  25  --    < > = values in this interval not displayed.    Trended Cardiac Data:    Recent Labs  Lab 02/01/18  1906 02/01/18  2355 02/02/18  0401   TROPONINI 0.037* 0.039* 0.041*   BNP  --   --  241*     Current Medications:     Infusions:   octreotide (SANDOSTATIN) infusion 50 mcg/hr (02/04/18 0622)        Scheduled:   atorvastatin  80 mg Oral QHS    bumetanide  4 mg Oral BID    metoprolol succinate  100 mg Oral Daily    pantoprazole  40 mg Intravenous BID    phenytoin  200 mg Oral BID    tamsulosin  0.4 mg Oral Daily    tiotropium  1 capsule Inhalation Daily    travoprost  1 drop Both Eyes Daily        PRN:  sodium chloride, sodium chloride, ramelteon    Lines and Day Number of Therapy:  PIV     Assessment and Plan     Stefano Granger is a 72 y.o.male with    Hypovolemic shock (resolved) 2/2 to Acute blood loss Anemia 2/2 to Recurrent GI bleed  - Hx of recurrent melena, h/o AVM, h/o multiple blood transfusions  - Pt recently discharged on 01/14/2018 from this hospital due to GI bleed. Was transfused 2 units at that time.   - pt cared for in 11/2017, 12/2017, and early 1/2018 at [facility] for DBE w/ Dr. Soto - no source of bleeding found at that time   - presented with Hb=4.3, will receive a total of 4 units. 2 units first with recheck CBC, then another 2 with poor response. Necessitated an additional 1 unit overnight 2/2. Has received a total of 4.5 u pRBC since admit (repeat CBC Hgb >7 s/p 1/2 of the 5th unit, the remainder had to be disposed given access lost)   - Imaging: CTA abdomen  pelvis negative from 1/11/18  - GI consulted, appreciate recs: transfuse as needed, began octreotide ggt, endoscopy scheduled with Raines Monday   - H/H much improved this AM--9/30, no further bloody BM or melena      Intermediate Troponin  -trop =0.030 --> 0.039 --> 0.041   -EKG with no acute ST segment changes, likely secondary to demand given elevated HRs   -Will continue to monitor. No acute events recorded on telemetry.    Pre-renal azotemia  - Cr 1.2 BUN 67 on admit --> Cr 1.3 (2/2)  - Likely pre-renal secondary to GI bleed  - Receiving IVF and pRBC, will continue to monitor with daily CMP  - 2/4 with 1.1/36     Combined HF s/p AICD  -On home metoprolol and bumex, initially held all meds that will effect his BP    -Echo from [facility] showed EF 45-50 (12/2017)  - AICD placement evaluated via CXR  - Several episodes of SVT and HR elevated to 190s overnight 2/2.   - HR improved s/p resuming 100 toprol XL (1/2 home dose).  - Cardiology consulted: agree with lower dose of home metoprolol, rec' discontinuing digoxin given concern for toxicity w/ junctional rhythm on EKG. Dig level low.   - Patient appeared volume overloaded 2/2, resumed 1/2 dose home lasix.   - Resumed home dose (4mg BID) 2/3 given stable pressures and crackles on exam.    Seizure Disorder  - Home med diltantin BID  - No seizure activity since admit, last seizure several years ago  - Resumed home medication.    BPH  -continuing flomax   -no acute concerns     Hx of AO and mitral Valve replacements  -currently not on AC, home med aspirin is being held given GIB     HTN  -holding home antihypertensive meds as above      Afib  -On Digitalis as home, initially held beta-blocker due to hypotension  -Resumed metoprolol (2/2) given elevated HRs overnight and discontinued digoxin per cardiology rec' as above.     PulmHTN  -no issues, holding sildenafil due to hypotension      Diet: Regular  Ppx: SCD  Dispo: pending cessation of GIB/VS stabilization, endoscopic  procedure Monday w/ Dr. Brittany Sibley  [facility] Internal Medicine HO-I  [facility] Hospitalitis Service Team A    [facility] Medicine Hospitalist Pager numbers:   [facility] Hospitalist Medicine Team A (Lola/Larry): [phone]

## 2018-02-04 NOTE — PROGRESS NOTES
[facility]    CC: melena    HPI 72 y.o. male recurrent, severe, overt, obscure GI bleeding associated with severe symptomatic anemia.  He has had recurrent admissions between [facility] and here with severe anemia but no clear source of GI blood loss noted on multiple endoscopic evaluations. Patient with continued melena overnight. Denies dizziness, lightheadedness, no BRBPR. Denies pain.    Past Medical History    has a past medical history of Asthma; Cardiac defibrillator in place; CHF (congestive heart failure); Gout; Hypertension; Seizures; and Stroke.      Review of Systems  General ROS: negative for - chills, fever or weight loss  Cardiovascular ROS: no chest pain or dyspnea on exertion  Gastrointestinal ROS: no pain, no n/v/d    Physical Examination  BP (!) 117/57 (BP Location: Left arm, Patient Position: Sitting)   Pulse 94   Temp 98.3 °F (36.8 °C) (Oral)   Resp 18   Ht 6' (1.829 m)   Wt 74.8 kg (164 lb 14.5 oz)   SpO2 95%   BMI 22.37 kg/m²   General appearance: alert, cooperative, no distress  HENT: Normocephalic, atraumatic, neck symmetrical, no nasal discharge   Lungs: clear to auscultation in all fields, symmetric chest wall expansion bilaterally, no wheeze, rale, or rhonchi  Heart: normal rate, regular rhythm without rub; palpable peripheral pulsesI   Abdomen: soft, non-tender, non-distended, normoactive bowel sounds  Extremities: extremities symmetric; no clubbing, cyanosis, or edema  Neurologic: Alert and oriented X 3, normal strength, normal coordination    Labs:  Recent Results (from the past 336 hour(s))   CBC auto differential    Collection Time: 02/04/18 12:18 AM   Result Value Ref Range    WBC 7.58 3.90 - 12.70 K/uL    Hemoglobin 9.2 (L) 14.0 - 18.0 g/dL    Hematocrit 30.3 (L) 40.0 - 54.0 %    Platelets 229 150 - 350 K/uL   CBC auto differential    Collection Time: 02/03/18  5:34 AM   Result Value Ref Range    WBC 7.53 3.90 - 12.70 K/uL    Hemoglobin 6.9 (L) 14.0 - 18.0 g/dL     Hematocrit 22.8 (L) 40.0 - 54.0 %    Platelets 231 150 - 350 K/uL   CBC auto differential    Collection Time: 02/02/18 11:55 AM   Result Value Ref Range    WBC 8.56 3.90 - 12.70 K/uL    Hemoglobin 7.5 (L) 14.0 - 18.0 g/dL    Hematocrit 24.6 (L) 40.0 - 54.0 %    Platelets 164 150 - 350 K/uL       Assessment:   72 yoM with prolonged course of obscure GI bleed suspected small bowel etiology. Patient with continued downtrending hgb and melenic stools. Repeat DBE with possible provocation planned.    Plan:   - continue trending H/H   - discontinue octreotide gtt now. Will provoke with pradaxa 150mg tonight. Confirmed praxbind is available in-house.   - clear liquid diet today. bowel prep tonight   - DBE planning for 2/5     Juan Luis Nicolas MD  [facility], PGY IV  [phone]

## 2018-02-04 NOTE — MEDICAL/APP STUDENT
[facility] Medical Student Progress Note    Subjective:      Stefano Granger is a 72 y.o. male who is being followed by the [facility] internal medicine service at [facility] for recurrent lower GI bleeding.    Patient is doing well, had no acute events overnight. Does report insomnia, only having gotten 2-3 hours of sleep. Would like a medication to help him sleep tonight. Has taken Sominex in the past with some success. Has had 3 brown BM since yesterday- 2 soft and solid, 1 this morning more liquid. Denies CP, SOB, palpitations. Weakness has drastically improved. Patient received pRBC x 2 units yesterday, which he tolerated well.    Patient aware of plan for clear liquid diet today, NPO at midnight, and balloon enteroscopy with GI tomorrow     Objective:   Last 24 Hour Vital Signs:  BP  Min: 100/58  Max: 117/57  Temp  Av.5 °F (36.9 °C)  Min: 97.8 °F (36.6 °C)  Max: 99.2 °F (37.3 °C)  Pulse  Av.1  Min: 87  Max: 95  Resp  Av  Min: 12  Max: 18  SpO2  Av.8 %  Min: 92 %  Max: 95 %  I/O last 3 completed shifts:  In: 1729.2 [P.O.:910; I.V.:250; Blood:569.2]  Out: 3350 [Urine:3350]    Physical Examination:  General: Alert, Awake, Oriented x 3, No Acute Distress  Head: normocephalic, atraumatic  Eyes: PERRL, EOMI, no scleral icterus, conjunctival pallor b/l  Nose: no tenderness to palpation, no drainage or erythema appreciated  Mouth and Throat: poor dentition, no lesions noted, moist mucus membranes  Neck: no lymphadenopathy appreciated, No carotid bruits  Respiratory: no crackles, wheezes, rhonchi, no accessory use of muscles. Breathing comfortably on RA  Cardiovascular: regular rate with regular rhythm, 3/6 systolic murmurs heard over RUSB, LLSB (pt with hx of Bio-prost Aortic and Mitral valves)  Gastrointestinal: soft, nontender, nondistended,no rebound or guarding, normoactive bowel sounds.   Extremities: pulses 2+ in all extremities, 2+ pitting edema to knee with venous stasis changes  bilaterally, normal ROM   Neuro:  full strength even in all extremities, no sensory deficits.     Laboratory:  Laboratory Data Reviewed: yes  Pertinent Findings:  H/H 2/4 12AM  9.2/30.3    Microbiology Data Reviewed: yes  Pertinent Findings:  n/a    Other Results:  EKG (my interpretation): no recent    Radiology Data Reviewed: yes  Pertinent Findings:  N/a- no new    Current Medications:     Infusions:   octreotide (SANDOSTATIN) infusion 50 mcg/hr (02/04/18 0622)        Scheduled:   atorvastatin  80 mg Oral QHS    bumetanide  4 mg Oral BID    metoprolol succinate  100 mg Oral Daily    pantoprazole  40 mg Intravenous BID    phenytoin  200 mg Oral BID    tamsulosin  0.4 mg Oral Daily    tiotropium  1 capsule Inhalation Daily    travoprost  1 drop Both Eyes Daily        PRN:  sodium chloride, sodium chloride, ramelteon    Antibiotics and Day Number of Therapy:  none    Lines and Day Number of Therapy:  PIV x4    Assessment:     Stefano Granger is a 72 y.o.male with  Patient Active Problem List    Diagnosis Date Noted    Blood loss anemia 02/02/2018    Elevated troponin 02/02/2018    Fatigue 02/02/2018    Increased heart rate 02/02/2018    Melena 02/02/2018    Ventricular tachycardia 02/02/2018    Prerenal azotemia     History of aortic valve replacement     H/O mitral valve replacement     Atrial fibrillation 01/31/2018    Chronic combined systolic and diastolic heart failure 01/31/2018    Acute GI bleeding 01/31/2018    Anemia due to blood loss 01/11/2018    Pulmonary hypertension 01/04/2018    SVT (supraventricular tachycardia) 01/03/2018    Symptomatic anemia 01/02/2018    Gastrointestinal hemorrhage 01/02/2018    ANDREINA (iron deficiency anemia) 07/07/2016    Iron deficiency anemia 06/02/2016        Plan:     Hypovolemic shock (resolved) 2/2 to Acute blood loss Anemia 2/2 to Recurrent GI bleed  - Hx of recurrent melena, h/o AVM, h/o multiple blood transfusions  - Pt recently discharged on  01/14/2018 from this hospital due to GI bleed. Was transfused 2 units at that time.   - pt cared for in 11/2017, 12/2017, and early 1/2018 at [facility] for DBE w/ Dr. Soto - no source of bleeding found at that time   - presented with Hb=4.3, has received 7.5 units pRBC since admission  - Imaging: CTA abdomen pelvis negative from 1/11/18  - GI consulted, appreciate recs: transfuse as needed, began octreotide ggt, endoscopy scheduled with Brittany Monday   - H/H much improved this AM--8.7/29.2, no further bloody BM or melena  - monitor H/H closely  - transfuse 2 units pRBC if Hb<7  - stop octreotide drip per GI recs  - provoke w/ pradaxa 150mg tonight per GI recs  - Type and screen and give 2 units of blood today to get Hb from 8.7 to 10.    Intermediate Troponin  -trop =0.030 --> 0.039 --> 0.041   -EKG with no acute ST segment changes, likely secondary to demand given elevated HRs   -Will continue to monitor. No acute events recorded on telemetry.     Pre-renal azotemia  - Cr 1.2 BUN 67 on admit --> Cr 1.3 (2/2)  - Likely pre-renal secondary to GI bleed  - Receiving IVF and pRBC, will continue to monitor with daily CMP  - 2/4 with 1.1/36     Combined HF s/p AICD  - On home metoprolol and bumex, initially held all meds that would affect his BP    - Echo from [facility] showed EF 45-50 (12/2017)  - AICD placement evaluated via CXR  - Several episodes of SVT and HR elevated to 190s overnight 2/2.   - HR improved s/p resuming 100 toprol XL (1/2 home dose).  - Cardiology consulted: agree with lower dose of home metoprolol, rec' discontinuing digoxin given concern for toxicity w/ junctional rhythm on EKG. Dig level low.   - Patient appeared volume overloaded 2/2, resumed 1/2 dose home bumex.   - Resumed home dose (4mg BID) 2/3 given stable pressures and crackles on exam.  - no crackles on exam today, still 2+ b/l LE pitting edeme     Seizure Disorder  - Home med diltantin BID  - No seizure activity since admit, last seizure several  years ago  - Resumed home medication.     BPH  -continuing flomax   -no acute concerns     Hx of AO and mitral Valve replacements  -currently not on AC, home med aspirin is being held given GIB     HTN  -holding home antihypertensive meds as above      Afib  -On Digitalis at home, initially held beta-blocker due to hypotension  -Resumed metoprolol (2/2) given elevated HRs overnight and discontinued digoxin per cardiology rec     PulmHTN  -no issues, holding sildenafil due to hypotension      Diet: Regular  Ppx: SCD  Dispo: Endoscopic procedure Monday w/ Dr. Soto. Provocation of bleed w/ Pradaxa prior to procedure. Before provocation will transfuse with 2 units pRBC to increase Hb to 10.    Lonnie Celis  [facility] Medical Student    [facility] Medicine Hospitalist Pager numbers:   [facility] Hospitalist Medicine Team A (Lola/Larry): [phone]  [facility] Hospitalist Medicine Team B (Jessie/Reinaldo):  [phone]

## 2018-02-05 ENCOUNTER — ANESTHESIA (OUTPATIENT)
Dept: ENDOSCOPY | Facility: HOSPITAL | Age: 73
DRG: 377 | End: 2018-02-05
Payer: MEDICARE

## 2018-02-05 VITALS
RESPIRATION RATE: 14 BRPM | TEMPERATURE: 99 F | HEART RATE: 84 BPM | SYSTOLIC BLOOD PRESSURE: 111 MMHG | HEIGHT: 72 IN | OXYGEN SATURATION: 97 % | WEIGHT: 164.88 LBS | BODY MASS INDEX: 22.33 KG/M2 | DIASTOLIC BLOOD PRESSURE: 58 MMHG

## 2018-02-05 LAB
ALBUMIN SERPL BCP-MCNC: 2.7 G/DL
ALP SERPL-CCNC: 246 U/L
ALT SERPL W/O P-5'-P-CCNC: 22 U/L
ANION GAP SERPL CALC-SCNC: 11 MMOL/L
AST SERPL-CCNC: 29 U/L
BASOPHILS # BLD AUTO: 0.02 K/UL
BASOPHILS # BLD AUTO: 0.02 K/UL
BASOPHILS NFR BLD: 0.3 %
BASOPHILS NFR BLD: 0.3 %
BILIRUB SERPL-MCNC: 0.9 MG/DL
BUN SERPL-MCNC: 21 MG/DL
CALCIUM SERPL-MCNC: 8.9 MG/DL
CHLORIDE SERPL-SCNC: 96 MMOL/L
CO2 SERPL-SCNC: 29 MMOL/L
CREAT SERPL-MCNC: 0.9 MG/DL
DIFFERENTIAL METHOD: ABNORMAL
DIFFERENTIAL METHOD: ABNORMAL
EOSINOPHIL # BLD AUTO: 0.2 K/UL
EOSINOPHIL # BLD AUTO: 0.2 K/UL
EOSINOPHIL NFR BLD: 2.9 %
EOSINOPHIL NFR BLD: 2.9 %
ERYTHROCYTE [DISTWIDTH] IN BLOOD BY AUTOMATED COUNT: 15.5 %
ERYTHROCYTE [DISTWIDTH] IN BLOOD BY AUTOMATED COUNT: 15.5 %
EST. GFR  (AFRICAN AMERICAN): >60 ML/MIN/1.73 M^2
EST. GFR  (NON AFRICAN AMERICAN): >60 ML/MIN/1.73 M^2
GLUCOSE SERPL-MCNC: 96 MG/DL
HCT VFR BLD AUTO: 34.1 %
HCT VFR BLD AUTO: 34.4 %
HGB BLD-MCNC: 10.8 G/DL
HGB BLD-MCNC: 10.9 G/DL
LYMPHOCYTES # BLD AUTO: 0.6 K/UL
LYMPHOCYTES # BLD AUTO: 0.7 K/UL
LYMPHOCYTES NFR BLD: 8.3 %
LYMPHOCYTES NFR BLD: 8.8 %
MAGNESIUM SERPL-MCNC: 1.7 MG/DL
MCH RBC QN AUTO: 27.6 PG
MCH RBC QN AUTO: 28.1 PG
MCHC RBC AUTO-ENTMCNC: 31.4 G/DL
MCHC RBC AUTO-ENTMCNC: 32 G/DL
MCV RBC AUTO: 88 FL
MCV RBC AUTO: 88 FL
MONOCYTES # BLD AUTO: 0.4 K/UL
MONOCYTES # BLD AUTO: 0.5 K/UL
MONOCYTES NFR BLD: 5.2 %
MONOCYTES NFR BLD: 6.2 %
NEUTROPHILS # BLD AUTO: 6.3 K/UL
NEUTROPHILS # BLD AUTO: 6.3 K/UL
NEUTROPHILS NFR BLD: 81.7 %
NEUTROPHILS NFR BLD: 83 %
PLATELET # BLD AUTO: 252 K/UL
PLATELET # BLD AUTO: 257 K/UL
PMV BLD AUTO: 10.2 FL
PMV BLD AUTO: 9.8 FL
POCT GLUCOSE: 97 MG/DL (ref 70–110)
POTASSIUM SERPL-SCNC: 3.8 MMOL/L
PROT SERPL-MCNC: 6.3 G/DL
RBC # BLD AUTO: 3.88 M/UL
RBC # BLD AUTO: 3.92 M/UL
SODIUM SERPL-SCNC: 136 MMOL/L
WBC # BLD AUTO: 7.56 K/UL
WBC # BLD AUTO: 7.71 K/UL

## 2018-02-05 PROCEDURE — 27201028 HC NEEDLE, SCLERO: Performed by: INTERNAL MEDICINE

## 2018-02-05 PROCEDURE — 25000003 PHARM REV CODE 250: Performed by: INTERNAL MEDICINE

## 2018-02-05 PROCEDURE — 80053 COMPREHEN METABOLIC PANEL: CPT

## 2018-02-05 PROCEDURE — 94761 N-INVAS EAR/PLS OXIMETRY MLT: CPT

## 2018-02-05 PROCEDURE — 25000003 PHARM REV CODE 250: Performed by: NURSE ANESTHETIST, CERTIFIED REGISTERED

## 2018-02-05 PROCEDURE — 36415 COLL VENOUS BLD VENIPUNCTURE: CPT

## 2018-02-05 PROCEDURE — 44799 UNLISTED PX SMALL INTESTINE: CPT | Performed by: INTERNAL MEDICINE

## 2018-02-05 PROCEDURE — 45381 COLONOSCOPY SUBMUCOUS NJX: CPT | Performed by: INTERNAL MEDICINE

## 2018-02-05 PROCEDURE — 27201030 HC OVERTUBE: Performed by: INTERNAL MEDICINE

## 2018-02-05 PROCEDURE — 25000242 PHARM REV CODE 250 ALT 637 W/ HCPCS: Performed by: STUDENT IN AN ORGANIZED HEALTH CARE EDUCATION/TRAINING PROGRAM

## 2018-02-05 PROCEDURE — 94640 AIRWAY INHALATION TREATMENT: CPT

## 2018-02-05 PROCEDURE — 37000008 HC ANESTHESIA 1ST 15 MINUTES: Performed by: INTERNAL MEDICINE

## 2018-02-05 PROCEDURE — 25000003 PHARM REV CODE 250: Performed by: STUDENT IN AN ORGANIZED HEALTH CARE EDUCATION/TRAINING PROGRAM

## 2018-02-05 PROCEDURE — 0DJ08ZZ INSPECTION OF UPPER INTESTINAL TRACT, VIA NATURAL OR ARTIFICIAL OPENING ENDOSCOPIC: ICD-10-PCS | Performed by: INTERNAL MEDICINE

## 2018-02-05 PROCEDURE — 83735 ASSAY OF MAGNESIUM: CPT

## 2018-02-05 PROCEDURE — 85025 COMPLETE CBC W/AUTO DIFF WBC: CPT | Mod: 91

## 2018-02-05 PROCEDURE — 37000009 HC ANESTHESIA EA ADD 15 MINS: Performed by: INTERNAL MEDICINE

## 2018-02-05 PROCEDURE — 63600175 PHARM REV CODE 636 W HCPCS: Performed by: NURSE ANESTHETIST, CERTIFIED REGISTERED

## 2018-02-05 RX ORDER — PANTOPRAZOLE SODIUM 40 MG/1
40 TABLET, DELAYED RELEASE ORAL
Status: DISCONTINUED | OUTPATIENT
Start: 2018-02-05 | End: 2018-02-05 | Stop reason: HOSPADM

## 2018-02-05 RX ORDER — SODIUM CHLORIDE 9 MG/ML
INJECTION, SOLUTION INTRAVENOUS CONTINUOUS PRN
Status: DISCONTINUED | OUTPATIENT
Start: 2018-02-05 | End: 2018-02-05

## 2018-02-05 RX ORDER — PROPOFOL 10 MG/ML
VIAL (ML) INTRAVENOUS
Status: DISCONTINUED | OUTPATIENT
Start: 2018-02-05 | End: 2018-02-05

## 2018-02-05 RX ORDER — ETOMIDATE 2 MG/ML
INJECTION INTRAVENOUS
Status: DISCONTINUED | OUTPATIENT
Start: 2018-02-05 | End: 2018-02-05

## 2018-02-05 RX ORDER — LIDOCAINE HCL/PF 100 MG/5ML
SYRINGE (ML) INTRAVENOUS
Status: DISCONTINUED | OUTPATIENT
Start: 2018-02-05 | End: 2018-02-05

## 2018-02-05 RX ORDER — PROPOFOL 10 MG/ML
VIAL (ML) INTRAVENOUS CONTINUOUS PRN
Status: DISCONTINUED | OUTPATIENT
Start: 2018-02-05 | End: 2018-02-05

## 2018-02-05 RX ADMIN — PROPOFOL 20 MG: 10 INJECTION, EMULSION INTRAVENOUS at 11:02

## 2018-02-05 RX ADMIN — SODIUM CHLORIDE: 0.9 INJECTION, SOLUTION INTRAVENOUS at 11:02

## 2018-02-05 RX ADMIN — TIOTROPIUM BROMIDE 18 MCG: 18 CAPSULE ORAL; RESPIRATORY (INHALATION) at 08:02

## 2018-02-05 RX ADMIN — METOPROLOL SUCCINATE 100 MG: 25 TABLET, EXTENDED RELEASE ORAL at 09:02

## 2018-02-05 RX ADMIN — PROPOFOL 50 MCG/KG/MIN: 10 INJECTION, EMULSION INTRAVENOUS at 11:02

## 2018-02-05 RX ADMIN — LIDOCAINE HYDROCHLORIDE 100 MG: 20 INJECTION, SOLUTION INTRAVENOUS at 11:02

## 2018-02-05 RX ADMIN — PANTOPRAZOLE SODIUM 40 MG: 40 TABLET, DELAYED RELEASE ORAL at 04:02

## 2018-02-05 RX ADMIN — PROPOFOL 30 MG: 10 INJECTION, EMULSION INTRAVENOUS at 12:02

## 2018-02-05 RX ADMIN — ETOMIDATE 6 MG: 2 INJECTION, SOLUTION INTRAVENOUS at 12:02

## 2018-02-05 RX ADMIN — DABIGATRAN ETEXILATE MESYLATE 150 MG: 75 CAPSULE ORAL at 12:02

## 2018-02-05 RX ADMIN — ETOMIDATE 2 MG: 2 INJECTION, SOLUTION INTRAVENOUS at 12:02

## 2018-02-05 NOTE — PLAN OF CARE
PATIENT TRANSPORT VIA STRETCHER WITH TELEMETRY MONITOR ON BY [facility]. PATIENT VITAL SIGNS STABLE WITH NO SIGNS OR SYMPTOMS OF DISTRESS OR DISCOMFORT NOTED.

## 2018-02-05 NOTE — MEDICAL/APP STUDENT
[employer] Medical Student Progress Note     Subjective:      Mr. Granger is doing well this morning. He had no problems overnight. Reports no dyspnea and improving lower extremity edema. Has had several brown, loose BM since yesterday; no black or bloody BMs.    Pradaxa was started and patient was made NPO overnight. He received 2 units of pRBCs yesterday, which he tolerated well. Patient is ready for enteroscope this morning.        Objective:   Last 24 Hour Vital Signs:  BP  Min: 105/56  Max: 123/56  Temp  Av.4 °F (36.9 °C)  Min: 97.6 °F (36.4 °C)  Max: 98.7 °F (37.1 °C)  Pulse  Av.3  Min: 68  Max: 95  Resp  Av  Min: 16  Max: 20  SpO2  Av.5 %  Min: 94 %  Max: 98 %  I/O last 3 completed shifts:  In: 1080 [P.O.:1010; I.V.:70]  Out: 4550 [Urine:4550]     Physical Examination:  General: Alert, Awake, Oriented x 3, No Acute Distress  Head: normocephalic, atraumatic  Eyes: PERRL, EOMI, no scleral icterus, conjunctival pallor  Nose: no tenderness to palpation, no drainage or erythema appreciated  Mouth and Throat: poor dentition, no lesions noted, moist mucus membranes  Neck: no lymphadenopathy appreciated, No carotid bruits  Respiratory: bibasilar crackles appreciated in lower and mid-lung fields, no accessory use of muscles. Breathing comfortably on RA  Cardiovascular: regular rate with regular rhythm, 3/6 systolic murmurs heard over RUSB, LLSB (pt with hx of Bio-prost Aortic and Mitral valves)  Gastrointestinal: soft, nontender, nondistended,no rebound or guarding, normoactive bowel sounds.   Extremities: pulses 2+ in all extremities, 2+ pitting edema to knee with venous stasis changes bilaterally, normal ROM   Neuro:  full strength even in all extremities, no sensory deficits.      Laboratory:  Laboratory Data Reviewed:  Trended Lab Data:     Recent Labs  Lab 18  0401   18  0534 18  0018 18  0608 18  0850   WBC 9.16  < > 7.53 7.58  --  8.15   HGB 6.2*  < > 6.9* 9.2*  --   8.7*   HCT 20.2*  < > 22.8* 30.3*  --  29.2*     < > 231 229  --  290   MCV 91  < > 92 91  --  92   RDW 16.0*  < > 16.1* 16.3*  --  16.1*     --  140  --  137  --    K 4.2  --  4.6  --  4.2  --    CL 98  --  99  --  99  --    CO2 29  --  32*  --  32*  --    BUN 57*  --  39*  --  36*  --    CREATININE 1.3  --  1.1  --  1.1  --    *  --  116*  --  107  --    PROT 5.3*  --  5.8*  --  5.8*  --    ALBUMIN 2.6*  --  2.6*  --  2.6*  --    BILITOT 0.4  --  0.4  --  0.5  --    AST 33  --  21  --  16  --    ALKPHOS 229*  --  215*  --  205*  --    ALT 31  --  25  --  20  --    < > = values in this interval not displayed.     Trended Cardiac Data:     Recent Labs  Lab 02/01/18  1906 02/01/18  2355 02/02/18  0401   TROPONINI 0.037* 0.039* 0.041*   BNP  --   --  241*      Current Medications:     Infusions:     Scheduled:   atorvastatin  80 mg Oral QHS    bumetanide  4 mg Oral BID    metOLazone  5 mg Oral Every other day    metoprolol succinate  100 mg Oral Daily    ondansetron  4 mg Oral Once    pantoprazole  40 mg Intravenous BID    phenytoin  200 mg Oral BID    tamsulosin  0.4 mg Oral Daily    tiotropium  1 capsule Inhalation Daily    travoprost  1 drop Both Eyes Daily         PRN:  sodium chloride, sodium chloride, sodium chloride, ramelteon     Lines and Day Number of Therapy:  PIV      Assessment and Plan      Stefano Granger is a 72 y.o.male with     Hypovolemic shock (resolved) 2/2 to Acute blood loss Anemia 2/2 to Recurrent GI bleed  - Hx of recurrent melena, h/o AVM, h/o multiple blood transfusions  - Pt recently discharged on 01/14/2018 from this hospital due to GI bleed. Was transfused 2 units at that time.   - pt cared for in 11/2017, 12/2017, and early 1/2018 at [facility] for DBE w/ Dr. Soto - no source of bleeding found at that time   - presented with Hb=4.3  -Has received a total of 8.5 u pRBC since admit  - Imaging: CTA abdomen pelvis negative from 1/11/18  - GI consulted, appreciate  recs: transfuse as needed, began octreotide ggt, endoscopy scheduled with Raines Monday   - H/H much improved yesterday AM--9/30, no further bloody BM or melena  - pradaxa started; no active bleeding  - GI wants Hbg to be 10 before enteroscope.  - labs not drawn yesterday for unknown reason; lab contacted about draws this morning     Intermediate Troponin  -trop =0.030 --> 0.039 --> 0.041   -EKG with no acute ST segment changes, likely secondary to demand given elevated HRs   -Will continue to monitor. No acute events recorded on telemetry.     Pre-renal azotemia  - Cr 1.2 BUN 67 on admit --> Cr 1.3 (2/2)  - Likely pre-renal secondary to GI bleed  - Receiving IVF and pRBC, will continue to monitor with daily CMP  - 2/4 with 1.1/36  - labs pending     Combined HF s/p AICD  -On home metoprolol and bumex, initially held all meds that will effect his BP    -Echo from [facility] showed EF 45-50 (12/2017)  - AICD placement evaluated via CXR  - Several episodes of SVT and HR elevated to 190s overnight 2/2.   - HR improved s/p resuming 100 toprol XL (1/2 home dose).  - Cardiology consulted: agree with lower dose of home metoprolol, rec' discontinuing digoxin given concern for toxicity w/ junctional rhythm on EKG. Dig level low.   - Patient appeared volume overloaded 2/2, resumed 1/2 dose home lasix.   - Resumed home dose (4mg BID) 2/3 given stable pressures and crackles on exam.  - resumed metolazone as well  - negative 1.95 L last 24 hours     Seizure Disorder  - Home med dilantin BID  - No seizure activity since admit, last seizure several years ago  - Resumed home medication.  - no acute issues     BPH  -continuing flomax   -no acute concerns     Hx of AO and mitral Valve replacements  -currently not on AC, home med aspirin is being held given GIB     HTN  -holding home antihypertensive meds as above      Afib  -On Digitalis as home, initially held beta-blocker due to hypotension  -Resumed metoprolol (2/2) given elevated HRs  overnight and discontinued digoxin per cardiology recs as above.     PulmHTN  -no issues, holding sildenafil due to hypotension      Diet: Regular  Ppx: SCD  Dispo: pending GI recs s/p endoscopic procedure today w/ Dr. Brittany Celis  [facility] Medical Student  [facility] Hospitalitis Service Team A     [facility] Medicine Hospitalist Pager numbers:   [facility] Hospitalist Medicine Team A (Lola/Larry):          [phone]

## 2018-02-05 NOTE — OR NURSING
Patient transported to endoscopy dept for lower DBE exam. Alert and talkative, no complaints. Vital signs recorded, consent obtained per Dr. Soto for exam. Anesthesia consent verified. Comfort measures provided.

## 2018-02-05 NOTE — DISCHARGE SUMMARY
[employer] IM Discharge Summary    Primary Team: [employer] IM Team A  Attending Physician: Leonora Davila MD  Resident: Gabriel  Intern: Susie    Date of Admit: 1/31/2018  Date of Discharge: 2/5/2018    Discharge to: Home   Condition: Stable    Discharge Diagnoses     Patient Active Problem List   Diagnosis    Iron deficiency anemia    ANDREINA (iron deficiency anemia)    Symptomatic anemia    Gastrointestinal hemorrhage    SVT (supraventricular tachycardia)    Pulmonary hypertension    Anemia due to blood loss    Atrial fibrillation    Chronic combined systolic and diastolic heart failure    Acute GI bleeding    Prerenal azotemia    History of aortic valve replacement    H/O mitral valve replacement    Blood loss anemia    Elevated troponin    Fatigue    Increased heart rate    Melena    Ventricular tachycardia    Paroxysmal atrial fibrillation       Consultants and Procedures     Consultants:  Gi, Cardiology     Procedures:   Balloon Enteroscopy    Brief History of Present Illness      Stefano Granger is a 72 y.o. male with pmhx of recurrent low GI bleed, combined HF s/p AICD, HTN, Afib, PulmHTN, seizures, gout, CVA, and Ao+mitral valve replacements who presents to the ED at [facility] with c/o weakness and dark/black stools x 3 days. Patient reports he has been coming to the hospital once a week for the past few months for said problem. He receives blood transfusions, his condition improves, and he is discharged. Patient complains of dizziness and fatigue, but denies syncope. Endorses 1 black stool each day for the past 3 days.     Patient has seen Dr. Soto for several C-scopes, EGD w/ push, and vce to assess his chronic GI bleeding. Last upper GI scope at [facility] (1/3/18) revealed normal upper GI through ileum, however patient has a hx of several angioectasias in the small bowel likely leading to chronic GI blood loss.     Patient currently denies any n/v, constipation, diarrhea,  SOB, CP, fevers, chills, bright red blood per rectum.    For the full HPI please refer to the History & Physical from this admission.    Hospital Course By Problem with Pertinent Findings     Hypovolemic shock (resolved) 2/2 to Acute blood loss Anemia 2/2 to Recurrent GI bleed  - Hx of recurrent melena, h/o AVM, h/o multiple blood transfusions  - Pt recently discharged on 01/14/2018 from this hospital due to GI bleed. Was transfused 2 units at that time.   - pt cared for in 11/2017, 12/2017, and early 1/2018 at [facility] for DBE w/ Dr. Soto - no source of bleeding found at that time   - presented with Hb=4.3, will receive a total of 4 units. 2 units first with recheck CBC, then another 2 with poor response. Necessitating an additional transfusions over the next couple of days, total of 9 units given during his day (1/31-2/5).   - Imaging: CTA abdomen pelvis negative from 1/11/18  - GI consulted, appreciate recs: transfuse as needed, began octreotide ggt, DBE performed with Dr. Soto (2/5) with no source of active bleeding identified.  - Pradaxa started with no active bleeding noted.  - H/H stable on day of discharge at 10.8/31.4.   - Discussed with patient the need for outpatient GI follow up with Dr. Soto. Discussed that he will likely need octreotide injections as an outpatient. Return precautions were provided - informed patient to return to [place] ED if melena/BRBPR returns or if he begins to feel SOB, lightheaded, dizzy, and/or chest pain. He acknowledged understanding.    Intermediate Troponin  -trop =0.030 --> 0.039 --> 0.041   -EKG with no acute ST segment changes, likely secondary to demand given elevated HRs   -No acute events recorded on telemetry.     Pre-renal azotemia, resolved   - Cr 1.2 BUN 67 on admit   - Likely pre-renal secondary to GI bleed  - Received IVF and pRBC with renal function monitored daily.   - Cr stable and at baseline on discharge.      Combined HF s/p AICD  - On home metoprolol and  bumex, initially held all meds that will effect his BP    - Echo from [facility] showed EF 45-50 (12/2017)  - AICD placement evaluated via CXR  - Several episodes of SVT and HR elevated to 190s overnight on 2/2.   - HR improved s/p resuming 100 toprol XL (1/2 home dose).  - Cardiology consulted: agree with lower dose of home metoprolol, rec' discontinuing digoxin given concern for toxicity w/ junctional rhythm on EKG. Dig level low. Resumed medication on discharge.   - Patient appeared volume overloaded 2/2, resumed 1/2 dose home bumex.   - Resumed home dose (4mg BID) 2/3 given stable pressures and crackles on exam.  - BP remained stable, resumed metolazone as well.   - No changes to home medications were made at discharge.     Seizure Disorder  - Home med diltantin BID  - No seizure activity since admit, last seizure several years ago  - Resumed home medication.  - no acute issues     BPH  -continued flomax   -no acute concerns     Hx of AO and mitral Valve replacements  -currently not on AC, home med aspirin held initially given GIB  -resumed at discharge     HTN  -held home antihypertensive meds as above   -resumed at discharge     Afib  -On Digitalis at home, initially held beta-blocker due to hypotension as above  -Resumed metoprolol (2/2) given elevated HRs overnight and discontinued digoxin per cardiology rec' as above.  -both resumed at discharge     PulmHTN  -no issues, held sildenafil due to hypotension initially  -resumed at discharge    Discharge Medications        Medication List      CHANGE how you take these medications    metoprolol succinate 100 MG 24 hr tablet  Commonly known as:  TOPROL-XL  Take 3 tablets (300 mg total) by mouth once daily.  What changed:  · how much to take  · when to take this        CONTINUE taking these medications    albuterol 90 mcg/actuation inhaler  Inhale 2 puffs into the lungs every 6 (six) hours as needed for Wheezing. Rescue     atorvastatin 80 MG tablet  Commonly known as:   "LIPITOR  Take 1 tablet (80 mg total) by mouth every evening.     bumetanide 2 MG tablet  Commonly known as:  BUMEX  Take 2 tablets (4 mg total) by mouth 2 (two) times daily.     digoxin 125 mcg tablet  Commonly known as:  LANOXIN     ferrous sulfate 325 (65 FE) MG EC tablet  Take 1 tablet (325 mg total) by mouth 3 (three) times daily.     lisinopril 2.5 MG tablet  Commonly known as:  PRINIVIL,ZESTRIL     metOLazone 5 MG tablet  Commonly known as:  ZAROXOLYN  Take 1 tablet (5 mg total) by mouth every 48 hours.     needle (disp) 21 G 21 gauge x 1 1/2" Ndle  1 each by Misc.(Non-Drug; Combo Route) route every 30 days.     octreotide lar 20 mg injection  Commonly known as:  SANDOSTATIN LAR  Inject 20 mg into the muscle every 28 days.     pantoprazole 40 MG tablet  Commonly known as:  PROTONIX  Take 1 tablet (40 mg total) by mouth 2 (two) times daily before meals.     phenytoin 200 MG ER capsule  Commonly known as:  DILANTIN  Take 1 capsule (200 mg total) by mouth 2 (two) times daily.     sildenafil (antihypertensive) 20 mg Tab  Commonly known as:  REVATIO  Take 1 tablet (20 mg total) by mouth 3 (three) times daily.     syringe (disposable) 20 mL Syrg  1 each by Misc.(Non-Drug; Combo Route) route every 30 days.     tamsulosin 0.4 mg Cp24  Commonly known as:  FLOMAX  Take 1 capsule (0.4 mg total) by mouth once daily.     tiotropium 18 mcg inhalation capsule  Commonly known as:  SPIRIVA WITH HANDIHALER  Inhale 1 capsule (18 mcg total) into the lungs once daily. Controller     travoprost 0.004 % Drop  Commonly known as:  TRAVATAN Z  Place 1 drop into both eyes once daily.            Discharge Information:   Diet:  Cardiac    Physical Activity:  As tolerated    Instructions:  1. Take all medications as prescribed  2. Keep all follow-up appointments  3. Return to the hospital or call your primary care physicians if any worsening symptoms such as recurrent GI blood loss (BRBPR, melena), SOB, Chest Pain, or any other acute " concerns occur.      Follow-Up Appointments:  Dr. Soto with GI within 1 wk    Val Richards  [facility] Internal Medicine, [facility]

## 2018-02-05 NOTE — PROGRESS NOTES
Progress notes reviewed,  Pt for Double Ballon Enteroscopy today.  D/c pending GI reccs. TN to follow up.

## 2018-02-05 NOTE — PROGRESS NOTES
[facility] Resident RUI Progress Note    Subjective:      Stefano Granger reports no issues overnight. He reports loose, brown bowel movements overnight but no blood or black stools.     Pradaxa started overnight. Transfused 2 units yesterday--no worsening of SOB. Plan for enteroscopy today.      Objective:   Last 24 Hour Vital Signs:  BP  Min: 105/56  Max: 123/56  Temp  Av.4 °F (36.9 °C)  Min: 97.6 °F (36.4 °C)  Max: 98.7 °F (37.1 °C)  Pulse  Av.3  Min: 68  Max: 95  Resp  Av  Min: 16  Max: 20  SpO2  Av.5 %  Min: 94 %  Max: 98 %  I/O last 3 completed shifts:  In: 1080 [P.O.:1010; I.V.:70]  Out: 4550 [Urine:4550]    Physical Examination:  General: Alert, Awake, Oriented x 3, No Acute Distress  Head: normocephalic, atraumatic  Eyes: PERRL, EOMI, no scleral icterus, conjunctival pallor  Nose: no tenderness to palpation, no drainage or erythema appreciated  Mouth and Throat: poor dentition, no lesions noted, moist mucus membranes  Neck: no lymphadenopathy appreciated, No carotid bruits  Respiratory: bibasilar crackles appreciated in lower and mid-lung fields, no accessory use of muscles. Breathing comfortably on RA  Cardiovascular: regular rate with regular rhythm, no mumurs, rubs, or S3, S4, normal apical impulse.  Gastrointestinal: soft, nontender, nondistended,no rebound or guarding, normoactive bowel sounds.   Extremities: pulses 2+ in all extremities, 2+ pitting edema to knee with venous stasis changes bilaterally, normal ROM   Neuro:  full strength even in all extremities, no sensory deficits.     Laboratory:  Laboratory Data Reviewed:  Trended Lab Data:    Recent Labs  Lab 18  0401  18  0534 18  0018 18  0608 18  0850   WBC 9.16  < > 7.53 7.58  --  8.15   HGB 6.2*  < > 6.9* 9.2*  --  8.7*   HCT 20.2*  < > 22.8* 30.3*  --  29.2*     < > 231 229  --  290   MCV 91  < > 92 91  --  92   RDW 16.0*  < > 16.1* 16.3*  --  16.1*     --  140  --  137   --    K 4.2  --  4.6  --  4.2  --    CL 98  --  99  --  99  --    CO2 29  --  32*  --  32*  --    BUN 57*  --  39*  --  36*  --    CREATININE 1.3  --  1.1  --  1.1  --    *  --  116*  --  107  --    PROT 5.3*  --  5.8*  --  5.8*  --    ALBUMIN 2.6*  --  2.6*  --  2.6*  --    BILITOT 0.4  --  0.4  --  0.5  --    AST 33  --  21  --  16  --    ALKPHOS 229*  --  215*  --  205*  --    ALT 31  --  25  --  20  --    < > = values in this interval not displayed.    Trended Cardiac Data:    Recent Labs  Lab 02/01/18  1906 02/01/18  2355 02/02/18  0401   TROPONINI 0.037* 0.039* 0.041*   BNP  --   --  241*     Current Medications:     Infusions:       Scheduled:   atorvastatin  80 mg Oral QHS    bumetanide  4 mg Oral BID    metOLazone  5 mg Oral Every other day    metoprolol succinate  100 mg Oral Daily    ondansetron  4 mg Oral Once    pantoprazole  40 mg Intravenous BID    phenytoin  200 mg Oral BID    tamsulosin  0.4 mg Oral Daily    tiotropium  1 capsule Inhalation Daily    travoprost  1 drop Both Eyes Daily        PRN:  sodium chloride, sodium chloride, sodium chloride, ramelteon    Lines and Day Number of Therapy:  PIV     Assessment and Plan     Stefano Granger is a 72 y.o.male with    Hypovolemic shock (resolved) 2/2 to Acute blood loss Anemia 2/2 to Recurrent GI bleed  - Hx of recurrent melena, h/o AVM, h/o multiple blood transfusions  - Pt recently discharged on 01/14/2018 from this hospital due to GI bleed. Was transfused 2 units at that time.   - pt cared for in 11/2017, 12/2017, and early 1/2018 at [facility] for DBE w/ Dr. Soto - no source of bleeding found at that time   - presented with Hb=4.3, will receive a total of 4 units. 2 units first with recheck CBC, then another 2 with poor response. Necessitated an additional 1 unit overnight 2/2. Has received a total of 4.5 u pRBC since admit (repeat CBC Hgb >7 s/p 1/2 of the 5th unit, the remainder had to be disposed given access lost)   - Imaging: CTA  abdomen pelvis negative from 1/11/18  - GI consulted, appreciate recs: transfuse as needed, began octreotide ggt, endoscopy scheduled with [facility]es Monday   - H/H much improved this AM--9/30, no further bloody BM or melena   - pradaxa started; no active bleeding  - labs not drawn yesterday for unknown reason; lab contacted about draws this morning     Intermediate Troponin  -trop =0.030 --> 0.039 --> 0.041   -EKG with no acute ST segment changes, likely secondary to demand given elevated HRs   -Will continue to monitor. No acute events recorded on telemetry.    Pre-renal azotemia  - Cr 1.2 BUN 67 on admit --> Cr 1.3 (2/2)  - Likely pre-renal secondary to GI bleed  - Receiving IVF and pRBC, will continue to monitor with daily CMP  - 2/4 with 1.1/36  - labs pending     Combined HF s/p AICD  -On home metoprolol and bumex, initially held all meds that will effect his BP    -Echo from [facility] showed EF 45-50 (12/2017)  - AICD placement evaluated via CXR  - Several episodes of SVT and HR elevated to 190s overnight 2/2.   - HR improved s/p resuming 100 toprol XL (1/2 home dose).  - Cardiology consulted: agree with lower dose of home metoprolol, rec' discontinuing digoxin given concern for toxicity w/ junctional rhythm on EKG. Dig level low.   - Patient appeared volume overloaded 2/2, resumed 1/2 dose home lasix.   - Resumed home dose (4mg BID) 2/3 given stable pressures and crackles on exam.  - resumed metolazone as well  - negative 2.3 L last 24 hours    Seizure Disorder  - Home med diltantin BID  - No seizure activity since admit, last seizure several years ago  - Resumed home medication.  - no acute issues    BPH  -continuing flomax   -no acute concerns     Hx of AO and mitral Valve replacements  -currently not on AC, home med aspirin is being held given GIB     HTN  -holding home antihypertensive meds as above      Afib  -On Digitalis as home, initially held beta-blocker due to hypotension  -Resumed metoprolol (2/2) given  elevated HRs overnight and discontinued digoxin per cardiology rec' as above.     PulmHTN  -no issues, holding sildenafil due to hypotension      Diet: Regular  Ppx: SCD  Dispo: pending cessation of GIB/VS stabilization, endoscopic procedure Monday w/ Dr. Brittany Sibley  [facility] HO-I  [facility] Hospitalitis Service Team A    [facility] Medicine Hospitalist Pager numbers:   [facility] Hospitalist Medicine Team A (Lola/Larry): [phone]

## 2018-02-05 NOTE — PLAN OF CARE
Retrograde DBE to the mid ileum unrevealing despite provocation with Pradaxa 150mg last night. 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

## 2018-02-05 NOTE — TRANSFER OF CARE
Anesthesia Transfer of Care Note    Patient: Stefano Granger    Procedure(s) Performed: Procedure(s) (LRB):  Lower DBE (N/A)    Patient location: GI    Anesthesia Type: MAC    Transport from OR: Transported from OR on room air with adequate spontaneous ventilation    Post pain: adequate analgesia    Post assessment: no apparent anesthetic complications and tolerated procedure well    Post vital signs: stable    Level of consciousness: awake    Nausea/Vomiting: no nausea/vomiting    Complications: none    Transfer of care protocol was followed      Last vitals:   Visit Vitals  /67   Pulse 83   Temp 37.3 °C (99.2 °F) (Oral)   Resp 20   Ht 6' (1.829 m)   Wt 74.8 kg (164 lb 14.5 oz)   SpO2 97%   BMI 22.37 kg/m²

## 2018-02-05 NOTE — NURSING
Notified Lab that pt.s blood transfusion is done and labs can now be drawn.Lab states that they are on their way.

## 2018-02-06 ENCOUNTER — TELEPHONE (OUTPATIENT)
Dept: NEUROLOGY | Facility: HOSPITAL | Age: 73
End: 2018-02-06

## 2018-02-06 ENCOUNTER — PATIENT OUTREACH (OUTPATIENT)
Dept: ADMINISTRATIVE | Facility: CLINIC | Age: 73
End: 2018-02-06

## 2018-02-06 NOTE — PATIENT INSTRUCTIONS
When You Have Gastrointestinal (GI) Bleeding    Blood in your vomit or stool can be a sign of gastrointestinal (GI) bleeding. GI bleeding can be scary. But the cause may not be serious. You should always see a doctor if GI bleeding occurs.  The GI tract  The GI tract is the path through which food travels in the body. Food passes from the mouth down the esophagus (the tube from the mouth to the stomach). Food begins to break down in the stomach. It then moves through the duodenum, the first part of the small intestine. Nutrients are absorbed as food travels through the small intestine. What is left passes into the colon (large intestine) as waste. The colon removes water from the waste. Waste continues from the colon to the rectum (where stool is stored). Waste then leaves the body through the anus.  Causes of GI bleeding  GI bleeding can be caused by many different problems. Some of the more common causes include:  · Swollen veins in the anus (hemorrhoids)  · Swollen veins in the esophagus (varices)  · Sore on the lining of the GI tract (ulcer)  · Cuts or scrapes in the mouth or throat  · Infection caused by germs such as bacteria or parasites  · Food allergies, such as milk allergy in young children  · Medicines  · Inflammation of the GI tract (gastritis or esophagitis)  · Colitis (Crohn's disease or ulcerative colitis)  · Cancer (tumors or polyps)  · Abnormal pouches in the colon (diverticula)  · Tears in the esophagus or anus  · Nosebleed  · Abnormal blood vessels in the GI tract (angiodysplasia)  Diagnosing the cause of blood in stool  If blood is coming out in your stool, you may have a lower GI tract problem or a very fast upper GI tract bleed. Bleeding from the GI tract can be bright red. Or it may look dark and tarry. Tests may also find blood in your stool that cant be seen with the eye (occult blood). To find out the cause, tests that may be ordered include:  · Blood tests. A blood sample is taken and  sent to a lab for exam.  · Hemoccult test. Checks a stool sample for blood.  · Stool culture. Checks a stool sample for bacteria or parasites.  · X-ray, ultrasound, or CT scan. Imaging tests that take pictures of the digestive tract.  · Colonoscopy or sigmoidoscopy. This test uses a flexible tube with a tiny camera. The tube is inserted through your anus into your rectum to see the inside of your colon. Your provider can also take a tiny tissue sample (biopsy) and treat a bleeding source  Diagnosing the cause of blood in vomit  If you are vomiting blood or something that looks like coffee grounds, you may have an upper GI tract problem. To find the cause, tests that may be done include:  · Upper Endoscopy. A flexible tube with a tiny camera is inserted through your mouth and throat to see inside your upper GI tract. This lets your provider take a tiny tissue sample (biopsy) and treat a bleeding source.  · Nasogastric lavage. This can tell if you have upper GI or lower GI bleeding.  · X-ray, ultrasound, or CT scan. Imaging tests that take pictures of your digestive tract.  · Upper GI series. X-rays of the upper part of your GI tract taken from inside your body.  · Enteroscopy. This sends a flexible tube or a small, swallowed capsule camera into your small intestine.  When to call your healthcare provider  Call your healthcare provider right away if you have any of the following:  · Bleeding from your mouth or anus that can't be stopped  · Fever of 100.4°F (38.0°) or higher  · Bleeding along with feeling lightheaded or dizzy  · Signs of fluid loss (dehydration). These include a dry, sticky mouth, decreased urine output; and very dark urine.  · Belly (abdominal) pain   Date Last Reviewed: 7/1/2016  © 8974[employer]2327 [employer]. [address] [place] [address], [place] [address]. All rights reserved. This information is not intended as a substitute for professional medical care. Always follow your healthcare  professional's instructions.

## 2018-02-06 NOTE — PLAN OF CARE
Pt to d/c home, pt has no new DME needs. Pt HH resumed with [facility] . Pt's daughter will come  patient after work.      Future Appointments  Date Time Provider Department Center   2/7/2018 1:45 PM Ra Soto MD [facility]           02/05/18 1813   Final Note   Assessment Type Final Discharge Note   Discharge Disposition Home   What phone number can be called within the next 1-3 days to see how you are doing after discharge? [phone]   Hospital Follow Up  Appt(s) scheduled? Yes   Discharge plans and expectations educations in teach back method with documentation complete? Yes   Right Care Referral Info   Post Acute Recommendation Home-care   Referral Type home health    Facility Name [facility]

## 2018-02-06 NOTE — TELEPHONE ENCOUNTER
Clinic follow up rescheduled with pt on Wednesday, February 14, 2018, at 115pm.  Pt acknowledged understanding.

## 2018-02-07 ENCOUNTER — TELEPHONE (OUTPATIENT)
Dept: NEUROLOGY | Facility: HOSPITAL | Age: 73
End: 2018-02-07

## 2018-02-08 ENCOUNTER — TELEPHONE (OUTPATIENT)
Dept: NEUROLOGY | Facility: HOSPITAL | Age: 73
End: 2018-02-08

## 2018-02-08 NOTE — TELEPHONE ENCOUNTER
----- Message from Tanya Carnes sent at 2/7/2018  3:35 PM CST -----  Contact: Kennedy Bender  GI- Patient had severe back pain and fatigue so Kennedy with [facility] sent the patient to [facility]. Home Health recommends a skilled or nursing rehab facility for the patient until a colonoscopy can be done due to blood loss.  Call Kennedy Bender at [phone].

## 2018-02-08 NOTE — TELEPHONE ENCOUNTER
Kennedy with [facility] recommended pt go to [facility] on 2/7/18 for severe back pain and fatigue.  [facility] is recommending skilled nursing or rehab for this patient.  [facility] does not feel pt is safe in home setting.  Kennedy notified Dr. Soto to be contacted with  recommendations.

## 2018-02-08 NOTE — TELEPHONE ENCOUNTER
"Call made to check status of patient.  Pt did not go to Emergency Dept on yesterday.  Pt states "my daughter has my truck and she started a new job.  I'll go when she is able to bring me".  Pt is taking Tylenol for back pain.  Pt advised to go to ER.    "

## 2018-02-09 ENCOUNTER — HOSPITAL ENCOUNTER (OUTPATIENT)
Facility: HOSPITAL | Age: 73
Discharge: HOME | End: 2018-02-11
Attending: EMERGENCY MEDICINE | Admitting: EMERGENCY MEDICINE
Payer: MEDICARE

## 2018-02-09 DIAGNOSIS — I50.42 CHRONIC COMBINED SYSTOLIC AND DIASTOLIC HEART FAILURE: ICD-10-CM

## 2018-02-09 DIAGNOSIS — Z95.2 H/O MITRAL VALVE REPLACEMENT: ICD-10-CM

## 2018-02-09 DIAGNOSIS — Z95.810 AICD (AUTOMATIC CARDIOVERTER/DEFIBRILLATOR) PRESENT: ICD-10-CM

## 2018-02-09 DIAGNOSIS — I10 HTN (HYPERTENSION): ICD-10-CM

## 2018-02-09 DIAGNOSIS — R06.02 SOB (SHORTNESS OF BREATH): ICD-10-CM

## 2018-02-09 DIAGNOSIS — Z95.2 HISTORY OF AORTIC VALVE REPLACEMENT: ICD-10-CM

## 2018-02-09 DIAGNOSIS — J90 PLEURAL EFFUSION: ICD-10-CM

## 2018-02-09 DIAGNOSIS — K92.1 MELENA: Primary | ICD-10-CM

## 2018-02-09 DIAGNOSIS — I48.91 ATRIAL FIBRILLATION, UNSPECIFIED TYPE: ICD-10-CM

## 2018-02-09 DIAGNOSIS — K92.2 ACUTE GI BLEEDING: ICD-10-CM

## 2018-02-09 PROBLEM — Z87.898 HISTORY OF SEIZURE: Status: ACTIVE | Noted: 2018-02-09

## 2018-02-09 PROBLEM — E78.5 HYPERLIPIDEMIA: Status: ACTIVE | Noted: 2018-02-09

## 2018-02-09 PROBLEM — N40.0 BPH (BENIGN PROSTATIC HYPERPLASIA): Status: ACTIVE | Noted: 2018-02-09

## 2018-02-09 LAB
ABO + RH BLD: NORMAL
ALBUMIN SERPL BCP-MCNC: 2.5 G/DL
ALP SERPL-CCNC: 355 U/L
ALT SERPL W/O P-5'-P-CCNC: 46 U/L
ANION GAP SERPL CALC-SCNC: 10 MMOL/L
AST SERPL-CCNC: 60 U/L
BASOPHILS # BLD AUTO: 0.04 K/UL
BASOPHILS NFR BLD: 0.6 %
BILIRUB SERPL-MCNC: 0.5 MG/DL
BLD GP AB SCN CELLS X3 SERPL QL: NORMAL
BNP SERPL-MCNC: 239 PG/ML
BUN SERPL-MCNC: 23 MG/DL
CALCIUM SERPL-MCNC: 9.2 MG/DL
CHLORIDE SERPL-SCNC: 99 MMOL/L
CO2 SERPL-SCNC: 30 MMOL/L
CREAT SERPL-MCNC: 1.2 MG/DL
DIFFERENTIAL METHOD: ABNORMAL
EOSINOPHIL # BLD AUTO: 0.1 K/UL
EOSINOPHIL NFR BLD: 2.1 %
ERYTHROCYTE [DISTWIDTH] IN BLOOD BY AUTOMATED COUNT: 15.8 %
EST. GFR  (AFRICAN AMERICAN): >60 ML/MIN/1.73 M^2
EST. GFR  (NON AFRICAN AMERICAN): >60 ML/MIN/1.73 M^2
GLUCOSE SERPL-MCNC: 104 MG/DL
HCT VFR BLD AUTO: 30.6 %
HGB BLD-MCNC: 9.5 G/DL
IMM GRANULOCYTES # BLD AUTO: 0.02 K/UL
IMM GRANULOCYTES NFR BLD AUTO: 0.3 %
INR PPP: 1
LYMPHOCYTES # BLD AUTO: 0.4 K/UL
LYMPHOCYTES NFR BLD: 6.8 %
MCH RBC QN AUTO: 27.5 PG
MCHC RBC AUTO-ENTMCNC: 31 G/DL
MCV RBC AUTO: 89 FL
MONOCYTES # BLD AUTO: 0.5 K/UL
MONOCYTES NFR BLD: 7.9 %
NEUTROPHILS # BLD AUTO: 5.2 K/UL
NEUTROPHILS NFR BLD: 82.3 %
NRBC BLD-RTO: 0 /100 WBC
PLATELET # BLD AUTO: 277 K/UL
PMV BLD AUTO: 10.5 FL
POCT GLUCOSE: 99 MG/DL (ref 70–110)
POTASSIUM SERPL-SCNC: 3.9 MMOL/L
PROT SERPL-MCNC: 6.9 G/DL
PROTHROMBIN TIME: 10.8 SEC
RBC # BLD AUTO: 3.45 M/UL
SODIUM SERPL-SCNC: 139 MMOL/L
WBC # BLD AUTO: 6.34 K/UL

## 2018-02-09 PROCEDURE — 99220 PR INITIAL OBSERVATION CARE,LEVL III: CPT | Mod: ,,, | Performed by: NURSE PRACTITIONER

## 2018-02-09 PROCEDURE — 85610 PROTHROMBIN TIME: CPT

## 2018-02-09 PROCEDURE — 25000003 PHARM REV CODE 250: Performed by: NURSE PRACTITIONER

## 2018-02-09 PROCEDURE — 80053 COMPREHEN METABOLIC PANEL: CPT

## 2018-02-09 PROCEDURE — 99285 EMERGENCY DEPT VISIT HI MDM: CPT | Mod: 25

## 2018-02-09 PROCEDURE — 96374 THER/PROPH/DIAG INJ IV PUSH: CPT

## 2018-02-09 PROCEDURE — 85025 COMPLETE CBC W/AUTO DIFF WBC: CPT

## 2018-02-09 PROCEDURE — 93010 ELECTROCARDIOGRAM REPORT: CPT | Mod: ,,, | Performed by: INTERNAL MEDICINE

## 2018-02-09 PROCEDURE — 83880 ASSAY OF NATRIURETIC PEPTIDE: CPT

## 2018-02-09 PROCEDURE — 86901 BLOOD TYPING SEROLOGIC RH(D): CPT

## 2018-02-09 PROCEDURE — G0378 HOSPITAL OBSERVATION PER HR: HCPCS

## 2018-02-09 PROCEDURE — 63600175 PHARM REV CODE 636 W HCPCS: Performed by: PHYSICIAN ASSISTANT

## 2018-02-09 PROCEDURE — 93005 ELECTROCARDIOGRAM TRACING: CPT

## 2018-02-09 PROCEDURE — 86920 COMPATIBILITY TEST SPIN: CPT

## 2018-02-09 PROCEDURE — 99285 EMERGENCY DEPT VISIT HI MDM: CPT | Mod: ,,, | Performed by: EMERGENCY MEDICINE

## 2018-02-09 RX ORDER — PANTOPRAZOLE SODIUM 40 MG/1
40 TABLET, DELAYED RELEASE ORAL
Status: DISCONTINUED | OUTPATIENT
Start: 2018-02-09 | End: 2018-02-11 | Stop reason: HOSPADM

## 2018-02-09 RX ORDER — FERROUS SULFATE 325(65) MG
325 TABLET, DELAYED RELEASE (ENTERIC COATED) ORAL 3 TIMES DAILY
Status: DISCONTINUED | OUTPATIENT
Start: 2018-02-10 | End: 2018-02-11 | Stop reason: HOSPADM

## 2018-02-09 RX ORDER — INSULIN ASPART 100 [IU]/ML
0-5 INJECTION, SOLUTION INTRAVENOUS; SUBCUTANEOUS
Status: DISCONTINUED | OUTPATIENT
Start: 2018-02-09 | End: 2018-02-10

## 2018-02-09 RX ORDER — ATORVASTATIN CALCIUM 20 MG/1
80 TABLET, FILM COATED ORAL NIGHTLY
Status: DISCONTINUED | OUTPATIENT
Start: 2018-02-09 | End: 2018-02-11 | Stop reason: HOSPADM

## 2018-02-09 RX ORDER — FUROSEMIDE 10 MG/ML
160 INJECTION INTRAMUSCULAR; INTRAVENOUS
Status: COMPLETED | OUTPATIENT
Start: 2018-02-09 | End: 2018-02-09

## 2018-02-09 RX ORDER — ACETAMINOPHEN 325 MG/1
650 TABLET ORAL EVERY 4 HOURS PRN
Status: DISCONTINUED | OUTPATIENT
Start: 2018-02-09 | End: 2018-02-11 | Stop reason: HOSPADM

## 2018-02-09 RX ORDER — TAMSULOSIN HYDROCHLORIDE 0.4 MG/1
0.4 CAPSULE ORAL DAILY
Status: DISCONTINUED | OUTPATIENT
Start: 2018-02-10 | End: 2018-02-11 | Stop reason: HOSPADM

## 2018-02-09 RX ORDER — GLUCAGON 1 MG
1 KIT INJECTION
Status: DISCONTINUED | OUTPATIENT
Start: 2018-02-09 | End: 2018-02-11 | Stop reason: HOSPADM

## 2018-02-09 RX ORDER — ONDANSETRON 2 MG/ML
4 INJECTION INTRAMUSCULAR; INTRAVENOUS EVERY 8 HOURS PRN
Status: DISCONTINUED | OUTPATIENT
Start: 2018-02-09 | End: 2018-02-11 | Stop reason: HOSPADM

## 2018-02-09 RX ORDER — ONDANSETRON 8 MG/1
8 TABLET, ORALLY DISINTEGRATING ORAL EVERY 8 HOURS PRN
Status: DISCONTINUED | OUTPATIENT
Start: 2018-02-09 | End: 2018-02-11 | Stop reason: HOSPADM

## 2018-02-09 RX ORDER — BUMETANIDE 1 MG/1
4 TABLET ORAL 2 TIMES DAILY
Status: DISCONTINUED | OUTPATIENT
Start: 2018-02-09 | End: 2018-02-11 | Stop reason: HOSPADM

## 2018-02-09 RX ORDER — ALBUTEROL SULFATE 90 UG/1
2 AEROSOL, METERED RESPIRATORY (INHALATION) EVERY 6 HOURS PRN
Status: DISCONTINUED | OUTPATIENT
Start: 2018-02-09 | End: 2018-02-11 | Stop reason: HOSPADM

## 2018-02-09 RX ORDER — SODIUM CHLORIDE 0.9 % (FLUSH) 0.9 %
5 SYRINGE (ML) INJECTION
Status: DISCONTINUED | OUTPATIENT
Start: 2018-02-09 | End: 2018-02-11 | Stop reason: HOSPADM

## 2018-02-09 RX ORDER — IBUPROFEN 200 MG
24 TABLET ORAL
Status: DISCONTINUED | OUTPATIENT
Start: 2018-02-09 | End: 2018-02-11 | Stop reason: HOSPADM

## 2018-02-09 RX ORDER — TIOTROPIUM BROMIDE 18 UG/1
1 CAPSULE ORAL; RESPIRATORY (INHALATION) DAILY
Status: DISCONTINUED | OUTPATIENT
Start: 2018-02-10 | End: 2018-02-11 | Stop reason: HOSPADM

## 2018-02-09 RX ORDER — PHENYTOIN SODIUM 100 MG/1
200 CAPSULE, EXTENDED RELEASE ORAL 2 TIMES DAILY
Status: DISCONTINUED | OUTPATIENT
Start: 2018-02-09 | End: 2018-02-11 | Stop reason: HOSPADM

## 2018-02-09 RX ORDER — IPRATROPIUM BROMIDE AND ALBUTEROL SULFATE 2.5; .5 MG/3ML; MG/3ML
3 SOLUTION RESPIRATORY (INHALATION) EVERY 4 HOURS PRN
Status: DISCONTINUED | OUTPATIENT
Start: 2018-02-09 | End: 2018-02-11 | Stop reason: HOSPADM

## 2018-02-09 RX ORDER — IBUPROFEN 200 MG
16 TABLET ORAL
Status: DISCONTINUED | OUTPATIENT
Start: 2018-02-09 | End: 2018-02-11 | Stop reason: HOSPADM

## 2018-02-09 RX ORDER — BUMETANIDE 0.25 MG/ML
4 INJECTION INTRAMUSCULAR; INTRAVENOUS
Status: DISCONTINUED | OUTPATIENT
Start: 2018-02-09 | End: 2018-02-09

## 2018-02-09 RX ORDER — METOLAZONE 2.5 MG/1
5 TABLET ORAL
Status: DISCONTINUED | OUTPATIENT
Start: 2018-02-09 | End: 2018-02-11 | Stop reason: HOSPADM

## 2018-02-09 RX ORDER — DIGOXIN 125 MCG
125 TABLET ORAL EVERY OTHER DAY
Status: DISCONTINUED | OUTPATIENT
Start: 2018-02-10 | End: 2018-02-11 | Stop reason: HOSPADM

## 2018-02-09 RX ORDER — SILDENAFIL CITRATE 20 MG/1
20 TABLET ORAL 3 TIMES DAILY
Status: DISCONTINUED | OUTPATIENT
Start: 2018-02-10 | End: 2018-02-11 | Stop reason: HOSPADM

## 2018-02-09 RX ORDER — LISINOPRIL 2.5 MG/1
2.5 TABLET ORAL DAILY
Status: DISCONTINUED | OUTPATIENT
Start: 2018-02-10 | End: 2018-02-11 | Stop reason: HOSPADM

## 2018-02-09 RX ORDER — METOPROLOL SUCCINATE 100 MG/1
200 TABLET, EXTENDED RELEASE ORAL DAILY
Status: DISCONTINUED | OUTPATIENT
Start: 2018-02-10 | End: 2018-02-11 | Stop reason: HOSPADM

## 2018-02-09 RX ADMIN — BUMETANIDE 4 MG: 1 TABLET ORAL at 10:02

## 2018-02-09 RX ADMIN — ACETAMINOPHEN 650 MG: 325 TABLET, FILM COATED ORAL at 11:02

## 2018-02-09 RX ADMIN — ATORVASTATIN CALCIUM 80 MG: 20 TABLET, FILM COATED ORAL at 10:02

## 2018-02-09 RX ADMIN — PANTOPRAZOLE SODIUM 40 MG: 40 TABLET, DELAYED RELEASE ORAL at 10:02

## 2018-02-09 RX ADMIN — FUROSEMIDE 160 MG: 10 INJECTION, SOLUTION INTRAMUSCULAR; INTRAVENOUS at 09:02

## 2018-02-09 RX ADMIN — PHENYTOIN SODIUM 200 MG: 100 CAPSULE ORAL at 10:02

## 2018-02-10 PROBLEM — E78.5 HYPERLIPIDEMIA: Status: ACTIVE | Noted: 2018-02-10

## 2018-02-10 PROBLEM — R79.89 ELEVATED LFTS: Status: ACTIVE | Noted: 2018-02-10

## 2018-02-10 PROBLEM — E46 PROTEIN CALORIE MALNUTRITION: Status: ACTIVE | Noted: 2018-02-10

## 2018-02-10 PROBLEM — N40.0 BPH (BENIGN PROSTATIC HYPERPLASIA): Status: ACTIVE | Noted: 2018-02-10

## 2018-02-10 LAB
ALBUMIN SERPL BCP-MCNC: 2.3 G/DL
ALP SERPL-CCNC: 313 U/L
ALT SERPL W/O P-5'-P-CCNC: 41 U/L
ANION GAP SERPL CALC-SCNC: 7 MMOL/L
AST SERPL-CCNC: 57 U/L
BASOPHILS # BLD AUTO: 0.02 K/UL
BASOPHILS NFR BLD: 0.5 %
BILIRUB SERPL-MCNC: 0.4 MG/DL
BLD PROD TYP BPU: NORMAL
BLOOD UNIT EXPIRATION DATE: NORMAL
BLOOD UNIT TYPE CODE: 5100
BLOOD UNIT TYPE: NORMAL
BUN SERPL-MCNC: 22 MG/DL
CALCIUM SERPL-MCNC: 8.7 MG/DL
CHLORIDE SERPL-SCNC: 99 MMOL/L
CO2 SERPL-SCNC: 33 MMOL/L
CODING SYSTEM: NORMAL
CREAT SERPL-MCNC: 1 MG/DL
DIFFERENTIAL METHOD: ABNORMAL
DISPENSE STATUS: NORMAL
EOSINOPHIL # BLD AUTO: 0.2 K/UL
EOSINOPHIL NFR BLD: 3.9 %
ERYTHROCYTE [DISTWIDTH] IN BLOOD BY AUTOMATED COUNT: 15.4 %
EST. GFR  (AFRICAN AMERICAN): >60 ML/MIN/1.73 M^2
EST. GFR  (NON AFRICAN AMERICAN): >60 ML/MIN/1.73 M^2
ESTIMATED AVG GLUCOSE: 94 MG/DL
GLUCOSE SERPL-MCNC: 88 MG/DL
HBA1C MFR BLD HPLC: 4.9 %
HCT VFR BLD AUTO: 25.6 %
HCT VFR BLD AUTO: 26.7 %
HCT VFR BLD AUTO: 30.2 %
HGB BLD-MCNC: 7.8 G/DL
HGB BLD-MCNC: 8.2 G/DL
HGB BLD-MCNC: 8.2 G/DL
HGB BLD-MCNC: 9.3 G/DL
IMM GRANULOCYTES # BLD AUTO: 0.02 K/UL
IMM GRANULOCYTES NFR BLD AUTO: 0.5 %
LYMPHOCYTES # BLD AUTO: 0.4 K/UL
LYMPHOCYTES NFR BLD: 9 %
MAGNESIUM SERPL-MCNC: 1.7 MG/DL
MCH RBC QN AUTO: 26.4 PG
MCHC RBC AUTO-ENTMCNC: 30.5 G/DL
MCV RBC AUTO: 87 FL
MONOCYTES # BLD AUTO: 0.5 K/UL
MONOCYTES NFR BLD: 11 %
NEUTROPHILS # BLD AUTO: 3.1 K/UL
NEUTROPHILS NFR BLD: 75.1 %
NRBC BLD-RTO: 0 /100 WBC
PHOSPHATE SERPL-MCNC: 3.1 MG/DL
PLATELET # BLD AUTO: 241 K/UL
PMV BLD AUTO: 10.1 FL
POCT GLUCOSE: 85 MG/DL (ref 70–110)
POTASSIUM SERPL-SCNC: 3.2 MMOL/L
PREALB SERPL-MCNC: 18 MG/DL
PROT SERPL-MCNC: 5.9 G/DL
RBC # BLD AUTO: 2.95 M/UL
SODIUM SERPL-SCNC: 139 MMOL/L
TRANS ERYTHROCYTES VOL PATIENT: NORMAL ML
TSH SERPL DL<=0.005 MIU/L-ACNC: 0.65 UIU/ML
WBC # BLD AUTO: 4.09 K/UL

## 2018-02-10 PROCEDURE — P9021 RED BLOOD CELLS UNIT: HCPCS

## 2018-02-10 PROCEDURE — 85014 HEMATOCRIT: CPT | Mod: 91

## 2018-02-10 PROCEDURE — 84134 ASSAY OF PREALBUMIN: CPT

## 2018-02-10 PROCEDURE — 99226 PR SUBSEQUENT OBSERVATION CARE,LEVEL III: CPT | Mod: ,,, | Performed by: PHYSICIAN ASSISTANT

## 2018-02-10 PROCEDURE — G8987 SELF CARE CURRENT STATUS: HCPCS | Mod: CJ

## 2018-02-10 PROCEDURE — 25000242 PHARM REV CODE 250 ALT 637 W/ HCPCS: Performed by: NURSE PRACTITIONER

## 2018-02-10 PROCEDURE — 85025 COMPLETE CBC W/AUTO DIFF WBC: CPT

## 2018-02-10 PROCEDURE — 84100 ASSAY OF PHOSPHORUS: CPT

## 2018-02-10 PROCEDURE — 25000003 PHARM REV CODE 250: Performed by: NURSE PRACTITIONER

## 2018-02-10 PROCEDURE — G0378 HOSPITAL OBSERVATION PER HR: HCPCS

## 2018-02-10 PROCEDURE — 25000003 PHARM REV CODE 250: Performed by: PHYSICIAN ASSISTANT

## 2018-02-10 PROCEDURE — 80074 ACUTE HEPATITIS PANEL: CPT

## 2018-02-10 PROCEDURE — 84443 ASSAY THYROID STIM HORMONE: CPT

## 2018-02-10 PROCEDURE — 80053 COMPREHEN METABOLIC PANEL: CPT

## 2018-02-10 PROCEDURE — 36415 COLL VENOUS BLD VENIPUNCTURE: CPT

## 2018-02-10 PROCEDURE — 83735 ASSAY OF MAGNESIUM: CPT

## 2018-02-10 PROCEDURE — G8988 SELF CARE GOAL STATUS: HCPCS | Mod: CI

## 2018-02-10 PROCEDURE — 85018 HEMOGLOBIN: CPT | Mod: 91

## 2018-02-10 PROCEDURE — 36430 TRANSFUSION BLD/BLD COMPNT: CPT

## 2018-02-10 PROCEDURE — 83036 HEMOGLOBIN GLYCOSYLATED A1C: CPT

## 2018-02-10 PROCEDURE — 97165 OT EVAL LOW COMPLEX 30 MIN: CPT

## 2018-02-10 RX ORDER — COLCHICINE 0.6 MG/1
0.6 TABLET, FILM COATED ORAL DAILY
Status: DISCONTINUED | OUTPATIENT
Start: 2018-02-10 | End: 2018-02-11 | Stop reason: HOSPADM

## 2018-02-10 RX ORDER — HYDROCODONE BITARTRATE AND ACETAMINOPHEN 500; 5 MG/1; MG/1
TABLET ORAL
Status: DISCONTINUED | OUTPATIENT
Start: 2018-02-10 | End: 2018-02-11 | Stop reason: HOSPADM

## 2018-02-10 RX ADMIN — SILDENAFIL 20 MG: 20 TABLET ORAL at 05:02

## 2018-02-10 RX ADMIN — SILDENAFIL 20 MG: 20 TABLET ORAL at 10:02

## 2018-02-10 RX ADMIN — COLCHICINE 0.6 MG: 0.6 TABLET, FILM COATED ORAL at 12:02

## 2018-02-10 RX ADMIN — LISINOPRIL 2.5 MG: 2.5 TABLET ORAL at 09:02

## 2018-02-10 RX ADMIN — FERROUS SULFATE TAB EC 325 MG (65 MG FE EQUIVALENT) 325 MG: 325 (65 FE) TABLET DELAYED RESPONSE at 10:02

## 2018-02-10 RX ADMIN — PHENYTOIN SODIUM 200 MG: 100 CAPSULE ORAL at 10:02

## 2018-02-10 RX ADMIN — PANTOPRAZOLE SODIUM 40 MG: 40 TABLET, DELAYED RELEASE ORAL at 03:02

## 2018-02-10 RX ADMIN — FERROUS SULFATE TAB EC 325 MG (65 MG FE EQUIVALENT) 325 MG: 325 (65 FE) TABLET DELAYED RESPONSE at 02:02

## 2018-02-10 RX ADMIN — BUMETANIDE 4 MG: 1 TABLET ORAL at 10:02

## 2018-02-10 RX ADMIN — PANTOPRAZOLE SODIUM 40 MG: 40 TABLET, DELAYED RELEASE ORAL at 05:02

## 2018-02-10 RX ADMIN — ATORVASTATIN CALCIUM 80 MG: 20 TABLET, FILM COATED ORAL at 10:02

## 2018-02-10 RX ADMIN — FERROUS SULFATE TAB EC 325 MG (65 MG FE EQUIVALENT) 325 MG: 325 (65 FE) TABLET DELAYED RESPONSE at 05:02

## 2018-02-10 RX ADMIN — DIGOXIN 125 MCG: 0.12 TABLET ORAL at 09:02

## 2018-02-10 RX ADMIN — TAMSULOSIN HYDROCHLORIDE 0.4 MG: 0.4 CAPSULE ORAL at 09:02

## 2018-02-10 RX ADMIN — PHENYTOIN SODIUM 200 MG: 100 CAPSULE ORAL at 09:02

## 2018-02-10 RX ADMIN — METOPROLOL SUCCINATE 200 MG: 100 TABLET, EXTENDED RELEASE ORAL at 09:02

## 2018-02-10 RX ADMIN — BUMETANIDE 4 MG: 1 TABLET ORAL at 09:02

## 2018-02-10 RX ADMIN — POTASSIUM BICARBONATE 50 MEQ: 25 TABLET, EFFERVESCENT ORAL at 02:02

## 2018-02-10 RX ADMIN — TIOTROPIUM BROMIDE 18 MCG: 18 CAPSULE ORAL; RESPIRATORY (INHALATION) at 12:02

## 2018-02-10 NOTE — ED PROVIDER NOTES
Encounter Date: 2/9/2018    SCRIBE #1 NOTE: I, Nataliia Maurice, am scribing for, and in the presence of,  Dr. Benitez. I have scribed the following portions of the note - the APC attestation and the EKG reading. Other sections scribed: CXR.       History     Chief Complaint   Patient presents with    Back Pain    Rectal Bleeding     reports dark tarry stools; recently discharged from [place] for GI bleed     Patient is a 72 year old male with PMHx of ischemic cardiomyopathy with AICD, hx of Bio-prost Aortic and Mitral valves, CHF, Afib, HTN, HLD, asthma, GERD, BPH, gout, seizures, and hx of CVA. He presents to the ED for rectal bleeding. Patient was recently discharged from [facility] on 02/05/18 for similar complaints. He reports having black tarry stool since yesterday. He reports having 3 bowel movements of black tarry stools. Reports associated generalized weakness. Currently on  mg. Denies hx of anticoagulation. Also, reports having low back pain since hospital discharge. Denies urinary/fecal incontinence, paresthesias, or lower extremity weakness. Denies recent falls. Denies head trauma or LOC. He denies fever,chills, nausea, vomiting, sob, chest pain, abd pain, dysuria, diarrhea, or constipation. He is a current everyday smoker and denies alcohol use. Reports walking with cane at home. Reports having home health once a week.     According to our records, patient received total of 9 units during his admission (1/31-2/5). H/H stable on day of discharge at 10.8/31.4. Patient underwent LOWER DEVICE-ASSISTED ENTEROSCOPY WITHOUT FLUOROSCOPY on 02/05/18 by Dr. Soto. Patient was found to have unrevealing lower DBE without blood or bleeding sources despite provocation with Pradaxa 150mg given 12 hours prior to endoscopy. Patient advised to continue Regular diet and Proceed with Octreotide LAR injections as outpatient.                           Review of patient's allergies indicates:   Allergen  Reactions    Coreg [carvedilol] Palpitations     Past Medical History:   Diagnosis Date    Asthma     Cardiac defibrillator in place     CHF (congestive heart failure)     Gout     Hypertension     Seizures     Stroke      Past Surgical History:   Procedure Laterality Date    CARDIAC SURGERY      year 2000    TONSILLECTOMY       History reviewed. No pertinent family history.  Social History   Substance Use Topics    Smoking status: Current Every Day Smoker     Years: 28.00     Types: Cigars    Smokeless tobacco: Not on file      Comment: pt smoke a cigar 2x weekly    Alcohol use No      Comment: socially     Review of Systems   Constitutional: Negative for fever.   HENT: Negative for sore throat.    Respiratory: Negative for shortness of breath.    Cardiovascular: Negative for chest pain.   Gastrointestinal: Positive for blood in stool. Negative for abdominal pain, nausea and vomiting.   Genitourinary: Negative for dysuria.   Musculoskeletal: Positive for back pain.   Skin: Negative for rash.   Neurological: Positive for weakness.   Hematological: Does not bruise/bleed easily.       Physical Exam     Initial Vitals [02/09/18 1824]   BP Pulse Resp Temp SpO2   111/68 100 18 97.3 °F (36.3 °C) 99 %      MAP       82.33         Physical Exam    Vitals reviewed.  Constitutional: He appears well-developed and well-nourished.   Patient resting comfortably in NAD on RA.   HENT:   Head: Normocephalic and atraumatic.   Nose: Nose normal.   Eyes: Conjunctivae and EOM are normal. Pupils are equal, round, and reactive to light.   Neck: Normal range of motion.   Cardiovascular: Normal rate and regular rhythm. Exam reveals no friction rub.    No murmur heard.The patient has a device (subcutaneous AICD) in the left chest.   Pulmonary/Chest: Breath sounds normal. No respiratory distress. He has no wheezes. He has no rales.   Abdominal: Soft. Bowel sounds are normal. He exhibits no distension. There is no tenderness.    Genitourinary: Rectal exam shows guaiac positive stool. Rectal exam shows no tenderness. Guaiac positive stool. : Acceptable.  Genitourinary Comments: NGHIA performed, patient tolerated exam well. Black tarry stool noted to gloved finger. Heme positive.    Musculoskeletal: Normal range of motion. He exhibits edema. He exhibits no tenderness.   Symmetric 3+ pitting edema of b/l lower extremities with chronic venous skin changes.   No midline spinal tenderness or paraspinal musculature tenderness.    Neurological: He is alert and oriented to person, place, and time. He has normal strength. No sensory deficit.   Skin: Skin is warm and dry. No erythema.   Psychiatric: He has a normal mood and affect. Thought content normal.         ED Course   Procedures  Labs Reviewed   CBC W/ AUTO DIFFERENTIAL - Abnormal; Notable for the following:        Result Value    RBC 3.45 (*)     Hemoglobin 9.5 (*)     Hematocrit 30.6 (*)     MCHC 31.0 (*)     RDW 15.8 (*)     Lymph # 0.4 (*)     Gran% 82.3 (*)     Lymph% 6.8 (*)     All other components within normal limits   COMPREHENSIVE METABOLIC PANEL - Abnormal; Notable for the following:     CO2 30 (*)     Albumin 2.5 (*)     Alkaline Phosphatase 355 (*)     AST 60 (*)     ALT 46 (*)     All other components within normal limits   B-TYPE NATRIURETIC PEPTIDE - Abnormal; Notable for the following:      (*)     All other components within normal limits   PROTIME-INR   HEMOGLOBIN   HEMATOCRIT   TYPE & SCREEN        Imaging Results          X-Ray Chest PA And Lateral (Final result)  Result time 02/09/18 20:39:22    Final result by Javi Arcos MD (02/09/18 20:39:22)                 Impression:         Cardiomegaly with mild basilar edema and minimal effusions.  AICD present.        Electronically signed by: JAVI ARCOS MD  Date:     02/09/18  Time:    20:39              Narrative:    Chest PA and Lateral    Indication:.    Comparison:January 10,  2018.    Findings:     Stable postop changes in the chest.  AICD lead appears unchanged.    Cardiomegaly with mild basilar edema and minimal effusions.    Heart and lungs unchanged when allowing for differences in technique and positioning.                                 Medical Decision Making:   History:   Old Medical Records: I decided to obtain old medical records.  Independently Interpreted Test(s):   I have ordered and independently interpreted X-rays - see prior notes.  I have ordered and independently interpreted EKG Reading(s) - see prior notes  Clinical Tests:   Lab Tests: Ordered  Radiological Study: Ordered  Medical Tests: Ordered       APC / Resident Notes:   Patient is a 72 year old male with PMHx of ischemic cardiomyopathy with AICD, hx of Bio-prost Aortic and Mitral valves, CHF, Afib, HTN, HLD, asthma, GERD, BPH, gout, seizures, and hx of CVA. He presents to the ED for rectal bleeding.    Will order labs and imaging. Will continue to monitor.     Differential diagnoses include, but are not limited to: GI bleed, symptomatic anemia, CHF exacerbation, pneumonia, or electrolyte imbalance.     No leukocytosis. H/H 9.5/30.6 from H/H 10.9/34.1 on 02/05/18. Type and screen pending. Elevated ALK PHOS 355. Elevated AST 60 and ALT 46. PT-INR 10.8-1.0. Elevated . CXR found to have Cardiomegaly with mild basilar edema and minimal effusions.  AICD present. Will order equivalent dosage of home bumex 4 mg, will order Lasix 160 mg IV. Will consult GI, awaiting recommendations. Appreciate GI consult. They recommend admitting patient to medicine. Will obtain consent for blood transfusion.   Will admit for observation.     I have discussed and reviewed with my supervising physician.           Scribe Attestation:   Scribe #1: I performed the above scribed service and the documentation accurately describes the services I performed. I attest to the accuracy of the note.    I have reviewed the case with my MOMO and  agree with the history, review of systems, physical exam, assessment and plan of care as documented by my advance practice clinician.  ROS as above and as addressed on MOMO documentation.   All other systems reviewed and are negative.    Labs and imaging studies reviewed and independently Interpreted.     PHYSICAL EXAM  Vital signs and nurse note reviewed.    GEN:  NAD, A & O X 3, atraumatic, well appearing, nontoxic  HEENT:   PERRLA, EOMI, nl conjunciva, no node/nodule, neck supple, no rigidity, moist membranes  CV:   2+ radial pulses, <2 sec cap refill, no obvious JVD,   CHEST/Resp: equal and bilateral chest rise, no obvious wheezing  ABD:     Soft, nondistended  EXT:   PASCUAL x 4, FROM, 3+ pedal edema  NEURO:  CN II-XII grossly intact, no obvious motor/sensory deficit  PSYCH:  no SI/HI, no anxiety, nl mood/affect  SKIN:  Warm, dry, intact, no rashes/lesions or masses      The results and physical exam findings were reviewed with the patient. Pt agrees with assessment, disposition and treatment plan and has no further questions or complaints at this time.    HUMBLE Benitez M.D. 11:01 PM 2/9/2018            ED Course      Clinical Impression:   The primary encounter diagnosis was Melena. A diagnosis of SOB (shortness of breath) was also pertinent to this visit.    Disposition:   Disposition: Placed in Observation  Condition: Stable                        Neha Barth PA-C  02/09/18 8246

## 2018-02-10 NOTE — CONSULTS
[facility]  Gastroenterology  Consult Note    Patient Name: Stefano Granger  MRN: [id number]  Admission Date: 2/9/2018  Hospital Length of Stay: 0 days  Code Status: Full Code   Attending Provider: Eryn Ramesh MD   Consulting Provider: Clarisa Love MD  Primary Care Physician: Primary Doctor No  Principal Problem:Melena    Inpatient consult to Gastroenterology  Consult performed by: CLARISA LOVE  Consult ordered by: HELENA RAMOS        Subjective:     HPI:  This is a 73 y/o male with hx of CHF s/p AICD, pulm HTN, Afib, seizures, aortic and mitral valve replacements who presents to [facility] for evaluation of ongoing melena and associated prolonged obscure overt GI bleeding. Having some melena at home, but without a BM in 1 day. Was told by his home health nurse to come to [facility] rather than [facility].  Pt has undergone multiple endoscopic evals with Dr. Steven Soto and [facility].   He was recently discharged from [place] after admission requiring total 9 units prbcs. Underwent provoked retrograde DBE (provoked with pradaxa) wihtout source of bleeding seen. Plan was follow up outpatient with octreotide LAR.      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    10/2013 [facility]   EGD with push enteroscopy complete. 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 One of these lesions was actively oozing   blood on initial inspection and one bled actively with initial   application of cautery. All 4 lesions were ablated using bipolar   cautery due to lack of availability of APC.     Multiple capsules, per notes, with AVMs throuhgout small bowel (no actual reports seen in our system / careeverywhere.          Past Medical History:   Diagnosis Date    Asthma     Cardiac defibrillator in place      CHF (congestive heart failure)     Gout     Hypertension     Seizures     Stroke        Past Surgical History:   Procedure Laterality Date    CARDIAC SURGERY      year 2000    TONSILLECTOMY         Review of patient's allergies indicates:   Allergen Reactions    Coreg [carvedilol] Palpitations     Family History     Problem Relation (Age of Onset)    No Known Problems Mother, Father, Maternal Grandmother, Maternal Grandfather, Paternal Grandmother, Paternal Grandfather        Social History Main Topics    Smoking status: Current Every Day Smoker     Years: 28.00     Types: Cigars    Smokeless tobacco: Not on file      Comment: pt smoke a cigar 2x weekly    Alcohol use No      Comment: socially    Drug use: Yes     Types: Marijuana      Comment: occassionally    Sexual activity: Not on file     Review of Systems   Constitutional: Positive for fatigue. Negative for chills and fever.   HENT: Negative for facial swelling, mouth sores and trouble swallowing.    Eyes: Negative for pain and redness.   Respiratory: Negative for cough and shortness of breath.    Cardiovascular: Negative for chest pain and leg swelling.   Gastrointestinal: Positive for blood in stool. Negative for abdominal pain.   Genitourinary: Negative for dysuria and hematuria.   Musculoskeletal: Negative for gait problem and neck stiffness.   Skin: Negative for color change, rash and wound.   Neurological: Negative for seizures and headaches.   Psychiatric/Behavioral: Negative for confusion and hallucinations.     Objective:     Vital Signs (Most Recent):  Temp: 98.1 °F (36.7 °C) (02/10/18 1345)  Pulse: 92 (02/10/18 1345)  Resp: 18 (02/10/18 1345)  BP: (!) 103/57 (02/10/18 1345)  SpO2: 98 % (02/10/18 1345) Vital Signs (24h Range):  Temp:  [97.3 °F (36.3 °C)-99.2 °F (37.3 °C)] 98.1 °F (36.7 °C)  Pulse:  [] 92  Resp:  [14-24] 18  SpO2:  [95 %-100 %] 98 %  BP: (103-145)/(56-68) 103/57     Weight: 74.3 kg (163 lb 12.8 oz) (02/10/18  0900)  Body mass index is 22.21 kg/m².      Intake/Output Summary (Last 24 hours) at 02/10/18 1441  Last data filed at 02/10/18 1330   Gross per 24 hour   Intake              242 ml   Output             2525 ml   Net            -2283 ml       Lines/Drains/Airways     Peripheral Intravenous Line                 Peripheral IV - Single Lumen 02/09/18 2002 Right Forearm less than 1 day         Peripheral IV - Single Lumen 02/09/18 2139 Right Wrist less than 1 day                Physical Exam   Constitutional: He is oriented to person, place, and time. He appears well-developed and well-nourished. No distress.   HENT:   Head: Normocephalic and atraumatic.   Eyes: Conjunctivae and EOM are normal.   Neck: Neck supple. No thyromegaly present.   Cardiovascular: Normal rate, regular rhythm and intact distal pulses.    Pulmonary/Chest: Effort normal and breath sounds normal. No respiratory distress.   Abdominal: Soft. He exhibits no distension. There is no tenderness.   Musculoskeletal: Normal range of motion. He exhibits no tenderness.   Neurological: He is alert and oriented to person, place, and time. No cranial nerve deficit.   Skin: Skin is warm and dry. No rash noted.   Psychiatric: He has a normal mood and affect. His behavior is normal.   Vitals reviewed.      Significant Labs:  Amylase: No results for input(s): AMYLASE in the last 48 hours.  Blood Culture: No results for input(s): LABBLOO in the last 48 hours.  CBC:   Recent Labs  Lab 02/09/18  2006 02/10/18  0034 02/10/18  0703   WBC 6.34  --  4.09   HGB 9.5* 8.2*  8.2* 7.8*   HCT 30.6* 26.7* 25.6*     --  241     CMP:   Recent Labs  Lab 02/10/18  0703   GLU 88   CALCIUM 8.7   ALBUMIN 2.3*   PROT 5.9*      K 3.2*   CO2 33*   CL 99   BUN 22   CREATININE 1.0   ALKPHOS 313*   ALT 41   AST 57*   BILITOT 0.4     Coagulation:   Recent Labs  Lab 02/09/18 2006   INR 1.0     CRP: No results for input(s): CRP in the last 48 hours.  ESR: No results for input(s):  SEDRATE in the last 48 hours.  H.Pylori Ab IgG: No results for input(s): HPYLORIIGG in the last 48 hours.  Lipase: No results for input(s): LIPASE in the last 48 hours.    Significant Imaging:  Imaging results within the past 24 hours have been reviewed.    Assessment/Plan:     Blood loss anemia    Long standing osbcure, overt GI bleeding.  Followed closely with Dr. Soto at [facility].  Reviewed his plans and notes.  Pt set up for octreotide LAR.    Hgb with slow downtrend, but not actively having melena overnight / this AM.    Recs:  Will elect to monitor closely, if develops overt GI bleeding and continued drop in Hgb, would elect for CT angio.  If remains stable, will likely discharge and have him follow up with Dr. Soto.    Plan discussed with primary team  Can advance diet today.            Thank you for your consult. I will follow-up with patient. Please contact us if you have any additional questions.    Navarro Love MD  Gastroenterology  [facility]

## 2018-02-10 NOTE — PT/OT/SLP EVAL
Occupational Therapy   Evaluation    Name: Stefano Granger  MRN: [id number]  Admitting Diagnosis:  Melena      Recommendations:     Discharge Recommendations: home with home health  Discharge Equipment Recommendations:   (HH therapist to assess needs for bathroom equipment)  Barriers to discharge:  None    History:     Occupational Profile:  Living Environment: Pt lives with a friend in a 1SH with 4 SURYA and R rail, has a tub/shower combo  Previous level of function: (I) in ADLs and uses SPC as needed for ambulation  Roles and Routines: friend  Equipment Owned:  cane, straight  Assistance upon Discharge: available as needed    Past Medical History:   Diagnosis Date    Asthma     Cardiac defibrillator in place     CHF (congestive heart failure)     Gout     Hypertension     Seizures     Stroke        Past Surgical History:   Procedure Laterality Date    CARDIAC SURGERY      year 2000    TONSILLECTOMY         Subjective     Chief Complaint: back pain  Patient/Family stated goals: get better pain control, fix GI issue, go home  Communicated with: RN prior to session.  Pain/Comfort:  · Pain Rating 1:  (did not rate, but c/o back pain)  · Pain Addressed 1: Cessation of Activity, Nurse notified    Patients cultural, spiritual, Anglican conflicts given the current situation: none stated    Objective:     Patient found with:  (none)    General Precautions: Standard, fall   Orthopedic Precautions:N/A   Braces: N/A     Occupational Performance:    Bed Mobility:    · NT, found and left UIC    Functional Mobility/Transfers:  · Patient completed Sit <> Stand Transfer with stand by assistance  with  no assistive device   · Patient completed Bed <> Chair Transfer using Stand Pivot technique with stand by assistance with no assistive device  · Functional Mobility: SBA with no AD, in the room, around the bed and back to the chair, ~20 feet total.    Activities of Daily Living:  · Feeding:  independence    · LB Dressing: stand  by assistance doff/donned socks    Cognitive/Visual Perceptual:  Cognitive/Psychosocial Skills:     -       Oriented to: Person, Place, Time and Situation   -       Follows Commands/attention:Follows multistep  commands  -       Communication: clear/fluent  -       Memory: No Deficits noted  -       Safety awareness/insight to disability: intact   -       Mood/Affect/Coping skills/emotional control: Appropriate to situation  Visual/Perceptual:      -Intact    Physical Exam:  Balance:    -       Good  Postural examination/scapula alignment:    -       slouched posture due to back pain  Dominant hand:    -       R  Upper Extremity Range of Motion:     -       Right Upper Extremity: WFL  -       Left Upper Extremity: WFL  Upper Extremity Strength:    -       Right Upper Extremity: WFL  -       Left Upper Extremity: WFL   Strength:    -       Right Upper Extremity: WNL  -       Left Upper Extremity: 2+/5  Fine Motor Coordination:    -       Impaired  Left hand, manipulation of objects 2/2 weakness  Gross motor coordination:   WFL    Patient left up in chair with all lines intact and call button in reach    AMPA 6 Click:  [facility] Total Score: 21    Treatment & Education:  Pt ed re OT role and POC. Pt performed strength and ROM testing while UIC. Pt able to doff/don socks with increased time due to back pain. Pt performed sit to stand t/f with SBA and no AD and ambulated 20 feet in the room. Pt sat in chair. Pt able to feed self.   Education:    Assessment:     Stefano Granger is a 72 y.o. male with a medical diagnosis of Melena.  He presents with the following performance deficits affecting function are weakness, impaired endurance, pain, impaired self care skills, impaired functional mobilty.  Pt participates well and is motivated to regain functional independence. Pt is safe to return home when medically stable, but would benefit from HH therapy to improve overal strength and balance, as well as posture, pain  "control, and independence in self care.    Rehab Prognosis:  Good; patient would benefit from acute skilled OT services to address these deficits and reach maximum level of function.         Clinical Decision Makin.  OT Low:  "Pt evaluation falls under low complexity for evaluation coding due to performance deficits noted in 1-3 areas as stated above and 0 co-morbities affecting current functional status. Data obtained from problem focused assessments. No modifications or assistance was required for completion of evaluation. Only brief occupational profile and history review completed."     Plan:     Patient to be seen 3 x/week to address the above listed problems via self-care/home management, therapeutic activities, therapeutic exercises  · Plan of Care Expires: 03/10/18  · Plan of Care Reviewed with: patient    This Plan of care has been discussed with the patient who was involved in its development and understands and is in agreement with the identified goals and treatment plan    GOALS:    Occupational Therapy Goals        Problem: Occupational Therapy Goal    Goal Priority Disciplines Outcome Interventions   Occupational Therapy Goal     OT, PT/OT Ongoing (interventions implemented as appropriate)    Description:  Goals to be met by: 18     Patient will increase functional independence with ADLs by performing:    UE Dressing with Modified [place].  LE Dressing with Modified [place].  Grooming while standing at sink with Modified [place].  Toileting from toilet with Modified [place] for hygiene and clothing management.                       Time Tracking:     OT Date of Treatment: 02/10/18  OT Start Time: 1250  OT Stop Time: 1305  OT Total Time (min): 15 min    Billable Minutes:Evaluation 15 minutes    OUMOU Elmore  2/10/2018  Pager: [phone]    "

## 2018-02-10 NOTE — ASSESSMENT & PLAN NOTE
Long standing osbcure, overt GI bleeding.  Followed closely with Dr. Soto at [facility].  Reviewed his plans and notes.  Pt set up for octreotide LAR.    Hgb with slow downtrend, but not actively having melena overnight / this AM.    Recs:  Will elect to monitor closely, if develops overt GI bleeding and continued drop in Hgb, would elect for CT angio.  If remains stable, will likely discharge and have him follow up with Dr. Soto.    Plan discussed with primary team  Can advance diet today.

## 2018-02-10 NOTE — HOSPITAL COURSE
Patient admitted to observation for evaluation of black stools. Hgb on admission 9.5, trended q8-->7.8. GI consulted, recommend transfusing as needed. If stable, can discharge tomorrow and follow up with GI doctor at [facility]. If acute drop observed, will get CT angiogram to localize bleeding. Patient given 1 unit of blood, Hgb stable at 9.4 for 24 hours. Patient stable for discharge with follow up with Dr. Soto this week.

## 2018-02-10 NOTE — ASSESSMENT & PLAN NOTE
- presents with AST 60, ALT 46, Alk Phos 355 (baseline 21-33 / 20-31 / 205-246)  - total bili 0.5  - repeat CMP and hepatitis panel pending   - monitor closely

## 2018-02-10 NOTE — ED NOTES
Patient is awake, alert, and acting age appropriate. Airway is clear and patient. No apparent distress noted at this time. Plan of care updated with patient and all questions addressed. All safety precautions in place. Will continue to monitor.

## 2018-02-10 NOTE — HPI
This is a 73 y/o male with hx of CHF s/p AICD, pulm HTN, Afib, seizures, aortic and mitral valve replacements who presents to [facility] for evaluation of ongoing melena and associated prolonged obscure overt GI bleeding. Having some melena at home, but without a BM in 1 day. Was told by his home health nurse to come to [facility] rather than [facility].  Pt has undergone multiple endoscopic evals with Dr. Steven Soto and [facility].   He was recently discharged from [place] after admission requiring total 9 units prbcs. Underwent provoked retrograde DBE (provoked with pradaxa) wihtout source of bleeding seen. Plan was follow up outpatient with octreotide LAR.      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    10/2013 [facility]   EGD with push enteroscopy complete. 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 One of these lesions was actively oozing   blood on initial inspection and one bled actively with initial   application of cautery. All 4 lesions were ablated using bipolar   cautery due to lack of availability of APC.     Multiple capsules, per notes, with AVMs throuhgout small bowel (no actual reports seen in our system / careeverywhere.

## 2018-02-10 NOTE — PLAN OF CARE
Problem: Occupational Therapy Goal  Goal: Occupational Therapy Goal  Goals to be met by: 2/25/18     Patient will increase functional independence with ADLs by performing:    UE Dressing with Modified [place].  LE Dressing with Modified [place].  Grooming while standing at sink with Modified [place].  Toileting from toilet with Modified [place] for hygiene and clothing management.     Outcome: Ongoing (interventions implemented as appropriate)  OT eval completed. The above goals are established to improve functional (I) and mobility.    OUMOU Elmore  2/10/2018  Pager: [phone]

## 2018-02-10 NOTE — ASSESSMENT & PLAN NOTE
Acute GI bleeding, Anemia due to blood loss   71yo M with history of ischemic cardiomyopathy with AICD, Bio-prost Aortic and Mitral valves, CHF, Afib, HTN, HLD, Asthma, GERD, BPH, Gout, Seizures, and CVA presenting with black tarry stools x 2 days.  Patient discharged from [facility] on 2/5/18 with same complaint, received 9 units of blood to stabilize H/H.    -Melena with chronic GI bleed etiology  -DBE at [facility] by Dr. Soto on 11/2017, 12/2017, 1/2018 no acute GI bleed found  -trend H/H Q 8 for now  -Denies starting Octreotide therapy at this time  -Continue PPI and ferrous sulfate   - avoid NSAID / ASA  -GI consulted, transfuse as needed; if stable can discharge and f/u with [facility], consider CTA if Hgb drops >1 point for acute bleed

## 2018-02-10 NOTE — ED TRIAGE NOTES
"Patient presents to ER for rectal bleeding. Patient states that he has been having rectal bleeding for 7 months after having colonoscopy. Patient states that bleeding occurs intermittently. Patient reports feeling fatigued and being unable to "exercise" like he normally would previously.  "

## 2018-02-10 NOTE — H&P
[facility] Hospital Medicine  History & Physical    Patient Name: Stefano Granger  MRN: [id number]  Admission Date: 2/9/2018  Attending Physician: Eryn Ramesh MD   Primary Care Provider: Primary Doctor [facility] Medicine Team: [facility]OS[facility] MED E Luther Mcdaniel NP     Patient information was obtained from patient and ER records.     Subjective:     Principal Problem:Melena    Chief Complaint:   Chief Complaint   Patient presents with    Back Pain    Rectal Bleeding     reports dark tarry stools; recently discharged from [place] for GI bleed        HPI: Mr. Granger is a 73yo AAM with history of ischemic cardiomyopathy with AICD, Bio-prost Aortic and Mitral valves, CHF, Afib, HTN, HLD, Asthma, GERD, BPH, Gout, Seizures and CVA presenting with black tarry stools x 2 days.  He was recently admitted 1/31/18 with symptomatic anemia (Hgb 4.3) and discharged on 2/5/18 from [facility]. He reportedly received 9 units of blood while at [facility] before being discharged.  His H/H as reported as stable at 10.8/31.4 at time of discharge. He appears to have had 3 admissions during the month of January for similar issues.  He denies any worsening or alleviating factors for black tarry stools. He also states having acute on chronic low back pain since discharge from [facility]. He reports bloody stool, intermittent diarrhea, and chills; denies fever, CP, SOB, weakness, nausea, vomiting, abdominal pain, urinary/bowel incontinence. He denies any family hx of similar findings.  He was prescribed octreotide at time of discharge, but reports not starting as of yet d/t availability.    Past Medical History:   Diagnosis Date    Asthma     Cardiac defibrillator in place     CHF (congestive heart failure)     Gout     Hypertension     Seizures     Stroke        Past Surgical History:   Procedure Laterality Date    CARDIAC SURGERY      year 2000    TONSILLECTOMY         Review of patient's allergies  "indicates:   Allergen Reactions    Coreg [carvedilol] Palpitations       No current facility-administered medications on file prior to encounter.      Current Outpatient Prescriptions on File Prior to Encounter   Medication Sig    albuterol 90 mcg/actuation inhaler Inhale 2 puffs into the lungs every 6 (six) hours as needed for Wheezing. Rescue    atorvastatin (LIPITOR) 80 MG tablet Take 1 tablet (80 mg total) by mouth every evening.    bumetanide (BUMEX) 2 MG tablet Take 2 tablets (4 mg total) by mouth 2 (two) times daily.    digoxin (LANOXIN) 125 mcg tablet Take 125 mcg by mouth every other day.    ferrous sulfate 325 (65 FE) MG EC tablet Take 1 tablet (325 mg total) by mouth 3 (three) times daily.    lisinopril (PRINIVIL,ZESTRIL) 2.5 MG tablet Take 2.5 mg by mouth once daily.    metOLazone (ZAROXOLYN) 5 MG tablet Take 1 tablet (5 mg total) by mouth every 48 hours.    metoprolol succinate (TOPROL-XL) 100 MG 24 hr tablet Take 3 tablets (300 mg total) by mouth once daily. (Patient taking differently: Take 200 mg by mouth 2 (two) times daily. )    needle, disp, 21 G 21 gauge x 1 1/2" Ndle 1 each by Misc.(Non-Drug; Combo Route) route every 30 days.    octreotide lar (SANDOSTATIN LAR) 20 mg injection Inject 20 mg into the muscle every 28 days.    pantoprazole (PROTONIX) 40 MG tablet Take 1 tablet (40 mg total) by mouth 2 (two) times daily before meals.    phenytoin (DILANTIN) 200 MG ER capsule Take 1 capsule (200 mg total) by mouth 2 (two) times daily.    sildenafil, antihypertensive, (REVATIO) 20 mg Tab Take 1 tablet (20 mg total) by mouth 3 (three) times daily.    syringe, disposable, 20 mL Syrg 1 each by Misc.(Non-Drug; Combo Route) route every 30 days.    tamsulosin (FLOMAX) 0.4 mg Cp24 Take 1 capsule (0.4 mg total) by mouth once daily.    tiotropium (SPIRIVA WITH HANDIHALER) 18 mcg inhalation capsule Inhale 1 capsule (18 mcg total) into the lungs once daily. Controller    travoprost (TRAVATAN Z) " 0.004 % Drop Place 1 drop into both eyes once daily.     Family History     None        Social History Main Topics    Smoking status: Current Every Day Smoker     Years: 28.00     Types: Cigars    Smokeless tobacco: Not on file      Comment: pt smoke a cigar 2x weekly    Alcohol use No      Comment: socially    Drug use: Yes     Types: Marijuana      Comment: occassionally    Sexual activity: Not on file     Review of Systems   Constitutional: Positive for chills. Negative for appetite change, fatigue and fever.   HENT: Negative for congestion, sore throat and trouble swallowing.    Respiratory: Negative for cough, shortness of breath and wheezing.    Cardiovascular: Positive for leg swelling. Negative for chest pain and palpitations.   Gastrointestinal: Positive for blood in stool and diarrhea. Negative for abdominal pain, constipation, nausea and vomiting.   Genitourinary: Negative for difficulty urinating, dysuria, frequency and hematuria.   Musculoskeletal: Positive for back pain. Negative for neck pain and neck stiffness.   Skin: Negative for rash.   Neurological: Negative for dizziness, speech difficulty, weakness, light-headedness and headaches.        Denies weakness at time of examination   Psychiatric/Behavioral: Negative for agitation and behavioral problems.     Objective:     Vital Signs (Most Recent):  Temp: 97.3 °F (36.3 °C) (02/09/18 1824)  Pulse: 66 (02/09/18 2116)  Resp: 15 (02/09/18 2116)  BP: (!) 129/59 (02/09/18 2116)  SpO2: 97 % (02/09/18 2116) Vital Signs (24h Range):  Temp:  [97.3 °F (36.3 °C)] 97.3 °F (36.3 °C)  Pulse:  [] 66  Resp:  [14-24] 15  SpO2:  [97 %-100 %] 97 %  BP: (111-145)/(59-68) 129/59     Weight: 74.8 kg (165 lb)  Body mass index is 22.38 kg/m².    Physical Exam   Constitutional: He is oriented to person, place, and time. He appears well-developed and well-nourished. No distress.   HENT:   Head: Normocephalic and atraumatic.   Eyes: EOM are normal. Pupils are equal,  round, and reactive to light.   Pale conjunctivae   Neck: Normal range of motion. Neck supple.   Cardiovascular: Normal rate, regular rhythm, normal heart sounds and intact distal pulses.  Exam reveals no gallop and no friction rub.    No murmur heard.  Subcutaneous AICD left chest   Pulmonary/Chest: Effort normal and breath sounds normal. He has no wheezes. He has no rales.   Abdominal: Soft. Bowel sounds are normal. There is no tenderness. There is no rebound.   Musculoskeletal:   Mild TTP to L spine paraspinals   Neurological: He is alert and oriented to person, place, and time.   Skin: Skin is warm and dry.   Bilateral LE 3+ pitting edema   Psychiatric: He has a normal mood and affect. His behavior is normal.         CRANIAL NERVES     CN III, IV, VI   Pupils are equal, round, and reactive to light.  Extraocular motions are normal.        Significant Labs:   A1C: No results for input(s): HGBA1C in the last 4320 hours.  CBC:   Recent Labs  Lab 02/09/18 2006   WBC 6.34   HGB 9.5*   HCT 30.6*        CMP:   Recent Labs  Lab 02/09/18 2006      K 3.9   CL 99   CO2 30*      BUN 23   CREATININE 1.2   CALCIUM 9.2   PROT 6.9   ALBUMIN 2.5*   BILITOT 0.5   ALKPHOS 355*   AST 60*   ALT 46*   ANIONGAP 10   EGFRNONAA >60.0     Cardiac Markers:   Recent Labs  Lab 02/09/18 2006   *     Magnesium: No results for input(s): MG in the last 48 hours.  TSH: No results for input(s): TSH in the last 4320 hours.  All pertinent labs within the past 24 hours have been reviewed.    Significant Imaging: I have reviewed all pertinent imaging results/findings within the past 24 hours.    Assessment/Plan:     * Melena    Acute GI bleeding, Anemia due to blood loss   71yo M with history of ischemic cardiomyopathy with AICD, Bio-prost Aortic and Mitral valves, CHF, Afib, HTN, HLD, Asthma, GERD, BPH, Gout, Seizures, and CVA presenting with black tarry stools x 2 days.  Patient discharged from [facility] on 2/5/18  with same complaint, received 9 units of blood to stabilize H/H.    -Melena with chronic GI bleed etiology  -DBE at [facility] by Dr. Soto on 11/2017, 12/2017, 1/2018 no acute GI bleed found  -trend H/H Q 8 for now  -GI consult pending  -Denies starting Octreotide therapy at this time  -Continue PPI and ferrous sulfate   - avoid NSAIDs  -Hold ASA  -Fall precaution  -PT/OT        Chronic combined systolic and diastolic heart failure    - received 160 mg furosemide IV in ED x 1  -  (stable), 3+ edema to BLE  -  CXR with stable cardiomegaly with mild basilar edema and minimal effusions  - resume home Bumex dosing  - strict I/O's  - NPO for now consider restricting fluids once cleared for diet  - no prior ECHO on file         Atrial fibrillation    - currently rate controlled with HR ranging between   - continue home medication regimen  - maintain on telemetry for now  - hold ASA         Elevated LFTs    - presents with AST 60, ALT 46, Alk Phos 355 (baseline 21-33 / 20-31 / 205-246)  - total bili 0.5  - repeat CMP and hepatitis panel pending   - monitor closely        Pulmonary hypertension    -Continue sildenafil as directed  - may want to consider obtaining ECHO inpatient vs outpatient - no prior on file         Protein calorie malnutrition    - albumin 2.5 with initial labs  - prealbumin pending   - nutrition consult placed         History of seizure    -Continue phenytoin as directed   -Seizure precautions        BPH (benign prostatic hyperplasia)    - resume tamsulosin as prescribed        Hyperlipidemia    -Continue home dosing of atorvastatin           VTE Risk Mitigation         Ordered     Medium Risk of VTE  Once      02/09/18 2224     Place sequential compression device  Until discontinued      02/09/18 2224     Reason for No Pharmacological VTE Prophylaxis  Once      02/09/18 2224     Place UZAIR hose  Until discontinued      02/09/18 2224             Luther Mcdaniel NP  Department of Hospital Medicine    [facility]

## 2018-02-10 NOTE — ASSESSMENT & PLAN NOTE
Acute GI bleeding, Anemia due to blood loss   71yo M with history of ischemic cardiomyopathy with AICD, Bio-prost Aortic and Mitral valves, CHF, Afib, HTN, HLD, Asthma, GERD, BPH, Gout, Seizures, and CVA presenting with black tarry stools x 2 days.  Patient discharged from [facility] on 2/5/18 with same complaint, received 9 units of blood to stabilize H/H.    -Melena with chronic GI bleed etiology  -DBE at [facility] by Dr. Soto on 11/2017, 12/2017, 1/2018 no acute GI bleed found  -trend H/H Q 8 for now  -GI consult pending  -Denies starting Octreotide therapy at this time  -Continue PPI and ferrous sulfate   - avoid NSAIDs  -Hold ASA  -Fall precaution  -PT/OT

## 2018-02-10 NOTE — ED NOTES
LOC: The patient is awake, alert, and oriented to place, time, situation. Affect is appropriated.  Speech is appropriate and clear.     APPEARANCE: Patient resting comfortably in no acute distress.  Patient is clean and well groomed.    SKIN: The skin is warm and dry; color consistent with ethnicity.  Patient has poor skin turgor and dry mucus membranes.  Skin intact; no breakdown or bruising noted.     MUSCULOSKELETAL: Patient moving upper and lower extremities without difficulty.  Patient reports generalized weakness and feelings of fatigue.    RESPIRATORY: Airway is open and patent. Lungs clear to auscultation in upper lobes with diminished breath sounds in bases. Respirations spontaneous, even, easy, and non-labored.  Patient has a normal effort and rate.  No accessory muscle use noted.     CARDIAC:  Patient is in Afib with controlled rate in the 60s. Patient with +3 pitting edema noted to BLE and LUE. Capillary refill < 3 seconds. No complaints of chest pain      ABDOMEN: Soft and non tender to palpation.  No distention noted. Bowel sounds present x 4.    NEUROLOGIC: PERRLA;  eyes open spontaneously.  Behavior appropriate to situation.  Follows commands; facial expression symmetrical; equal hand grasps bilaterally.  Purposeful motor response noted; normal sensation in all extremities.

## 2018-02-10 NOTE — PROGRESS NOTES
Spoke with SILVIA JEFFERSON dr. And notified him that pt's BP is low 98/57 , pt asymptomatic , no C/O SOB or chest pain . Pt still receiving the blood . MD order to watch the pt , no more order orders at this time , will keep monitoring .

## 2018-02-10 NOTE — PROGRESS NOTES
"    [facility]  Adult Nutrition  Consult Note    SUMMARY     Recommendations    Recommendation/Intervention:   1. Rec. Cardiac diet with texture per SLP when medically able.   2. RD to monitor  Goals: > 85% of EEN, EPN  Nutrition Goal Status: New     Reason for Assessment    Reason for Assessment: nurse/nurse practitioner consult  Diagnosis:  (melena)  Relevent Medical History: Gout, CHF, Stroke, HTN   Interdisciplinary Rounds: did not attend     General Information Comments: Pt admitted to ED with rectal bleeding. Pt states he has had rectal bleeding on and off for 7 months after his colonoscopy. Pt states he was consuming 100% of meals before NPO status. He follows a regular diet at home.  Pt states usual body wt is 170-175lbs, pt has lost 6 lbs in the last month.     Nutrition Discharge Planning: DC on Cardiac diet, texture per SLP.    Nutrition Prescription Ordered    Current Diet Order: NPO     Evaluation of Received Nutrients/Fluid Intake    % Intake of Estimated Energy Needs: Other:   % Meal Intake: NPO     Nutrition Risk Screen     Nutrition Risk Screen: no indicators present    Nutrition/Diet History    Patient Reported Diet/Restrictions/Preferences: general     Food Preferences: No cultural or Jain preferences     Labs/Tests/Procedures/Meds    Pertinent Labs Reviewed: reviewed  Pertinent Labs Comments: potassium 3.2, albumin 2.3  Pertinent Medications Reviewed: reviewed  Pertinent Medications Comments: statin, lisinopril, pantoprazole, tamulosin    Physical Findings    Overall Physical Appearance: weak     Skin: intact    Anthropometrics    Height: 6' 0.01" (182.9 cm)  Weight Method: Bed Scale  Weight: 74.3 kg (163 lb 12.8 oz)  Ideal Body Weight (IBW), Male: 178.06 lb     % Ideal Body Weight, Male (lb): 91.99 lb     BMI (Calculated): 22.3  BMI Grade: 18.5-24.9 - normal  Weight Loss: unintentional  Usual Body Weight (UBW), k kg  Weight Change Amount: 6 lb  % Usual Body Weight: " 96.7  % Weight Change From Usual Weight: -3.51 %    Estimated/Assessed Needs    Weight Used For Calorie Calculations: 74.3 kg (163 lb 12.8 oz)      Energy Calorie Requirements (kcal): 1891kcals  Energy Need Method: [place]-St Jeor (x1.25(PAL))  RMR ([place]-St. Jeor Equation): 1531.13     Weight Used For Protein Calculations: 74.3 kg (163 lb 12.8 oz)  Protein Requirements: 74-89gms/day (1.0-1.2g/kg)     Fluid Need Method: RDA Method     RDA Method (mL): 1891    Assessment and Plan    Nutrition Problem  Inadequate energy intake    Related to (etiology):   Physiological causes    Signs and Symptoms (as evidenced by):   NPO status    Interventions/Recommendations (treatment strategy):  See recs    Nutrition Diagnosis Status:   New    Monitor and Evaluation    Food and Nutrient Intake: food and beverage intake  Food and Nutrient Adminstration: diet order  Physical Activity and Function: nutrition-related ADLs and IADLs  Anthropometric Measurements: weight, weight change  Biochemical Data, Medical Tests and Procedures: electrolyte and renal panel, gastrointestinal profile, glucose/endocrine profile, inflammatory profile, lipid profile  Nutrition-Focused Physical Findings: overall appearance    Nutrition Risk    Level of Risk:  (1x/weekly)    Nutrition Follow-Up    RD Follow-up?: Yes

## 2018-02-10 NOTE — ASSESSMENT & PLAN NOTE
- presents with AST 60, ALT 46, Alk Phos 355 (baseline 21-33 / 20-31 / 205-246)  - total bili 0.5  - repeat CMP trending down, and hepatitis panel pending

## 2018-02-10 NOTE — ASSESSMENT & PLAN NOTE
-Continue sildenafil as directed  - may want to consider obtaining ECHO inpatient vs outpatient - no prior on file

## 2018-02-10 NOTE — PLAN OF CARE
Problem: Patient Care Overview  Goal: Plan of Care Review  Outcome: Ongoing (interventions implemented as appropriate)  Plan of care reviewed with pt . Pt is A,A, O x 4. Stable V/S. No c/O pain during the day . Pt is able to ambulate with cane in the room , pt was up in the chair during the day . Pt is free of falls and injuries . Pt completed 1 unit of blood successfully today . No BM during the day .

## 2018-02-10 NOTE — HPI
Mr. Granger is a 71yo AAM with history of ischemic cardiomyopathy with AICD, Bio-prost Aortic and Mitral valves, CHF, Afib, HTN, HLD, Asthma, GERD, BPH, Gout, Seizures and CVA presenting with black tarry stools x 2 days.  He was recently admitted 1/31/18 with symptomatic anemia (Hgb 4.3) and discharged on 2/5/18 from [facility]. He reportedly received 9 units of blood while at [facility] before being discharged.  His H/H as reported as stable at 10.8/31.4 at time of discharge. He appears to have had 3 admissions during the month of January for similar issues.  He denies any worsening or alleviating factors for black tarry stools. He also states having acute on chronic low back pain since discharge from [facility]. He reports bloody stool, intermittent diarrhea, and chills; denies fever, CP, SOB, weakness, nausea, vomiting, abdominal pain, urinary/bowel incontinence. He denies any family hx of similar findings.  He was prescribed octreotide at time of discharge, but reports not starting as of yet d/t availability.

## 2018-02-10 NOTE — SUBJECTIVE & OBJECTIVE
Interval History: No events overnight. Patient seen by GI, will transfuse as needed.    Review of Systems   Constitutional: Positive for chills. Negative for fatigue and fever.   Respiratory: Negative for cough, shortness of breath and wheezing.    Cardiovascular: Positive for leg swelling. Negative for chest pain and palpitations.   Gastrointestinal: Positive for blood in stool and diarrhea. Negative for abdominal pain, constipation, nausea and vomiting.   Genitourinary: Negative for dysuria, frequency and hematuria.   Musculoskeletal: Positive for back pain. Negative for neck pain and neck stiffness.   Neurological: Negative for dizziness, speech difficulty, weakness, light-headedness and headaches.        Denies weakness at time of examination     Objective:     Vital Signs (Most Recent):  Temp: 98.5 °F (36.9 °C) (02/10/18 1330)  Pulse: 64 (02/10/18 1330)  Resp: 20 (02/10/18 1330)  BP: (!) 130/58 (02/10/18 1330)  SpO2: 96 % (02/10/18 1330) Vital Signs (24h Range):  Temp:  [97.3 °F (36.3 °C)-99.2 °F (37.3 °C)] 98.5 °F (36.9 °C)  Pulse:  [] 64  Resp:  [14-24] 20  SpO2:  [95 %-100 %] 96 %  BP: (111-145)/(56-68) 130/58     Weight: 74.3 kg (163 lb 12.8 oz)  Body mass index is 22.21 kg/m².    Intake/Output Summary (Last 24 hours) at 02/10/18 1357  Last data filed at 02/10/18 1330   Gross per 24 hour   Intake              242 ml   Output             2525 ml   Net            -2283 ml      Physical Exam   Constitutional: He is oriented to person, place, and time. He appears well-developed and well-nourished. No distress.   Eyes:   Pale conjunctivae   Cardiovascular: Normal rate, regular rhythm, normal heart sounds and intact distal pulses.    Subcutaneous AICD left chest   Pulmonary/Chest: Effort normal and breath sounds normal. He has no wheezes.   Abdominal: Soft. Bowel sounds are normal. There is no tenderness.   Musculoskeletal: Normal range of motion. He exhibits edema (chronic, BLE). He exhibits no tenderness.    Mild TTP to L spine paraspinals   Neurological: He is alert and oriented to person, place, and time. No cranial nerve deficit.   Skin: Skin is warm and dry. No rash noted.   Bilateral LE 3+ pitting edema   Psychiatric: He has a normal mood and affect. His behavior is normal.       Significant Labs:   BMP:   Recent Labs  Lab 02/10/18  0703   GLU 88      K 3.2*   CL 99   CO2 33*   BUN 22   CREATININE 1.0   CALCIUM 8.7   MG 1.7     CBC:   Recent Labs  Lab 02/09/18  2006 02/10/18  0034 02/10/18  0703   WBC 6.34  --  4.09   HGB 9.5* 8.2*  8.2* 7.8*   HCT 30.6* 26.7* 25.6*     --  241     Magnesium:   Recent Labs  Lab 02/10/18  0703   MG 1.7     All pertinent labs within the past 24 hours have been reviewed.    Significant Imaging: I have reviewed all pertinent imaging results/findings within the past 24 hours.

## 2018-02-10 NOTE — ASSESSMENT & PLAN NOTE
- received 160 mg furosemide IV in ED x 1  -  (stable), 3+ edema to BLE  -  CXR with stable cardiomegaly with mild basilar edema and minimal effusions  - resume home Bumex dosing  - strict I/O's  - NPO for now consider restricting fluids once cleared for diet  - no prior ECHO on file

## 2018-02-10 NOTE — SUBJECTIVE & OBJECTIVE
Past Medical History:   Diagnosis Date    Asthma     Cardiac defibrillator in place     CHF (congestive heart failure)     Gout     Hypertension     Seizures     Stroke        Past Surgical History:   Procedure Laterality Date    CARDIAC SURGERY      year 2000    TONSILLECTOMY         Review of patient's allergies indicates:   Allergen Reactions    Coreg [carvedilol] Palpitations     Family History     Problem Relation (Age of Onset)    No Known Problems Mother, Father, Maternal Grandmother, Maternal Grandfather, Paternal Grandmother, Paternal Grandfather        Social History Main Topics    Smoking status: Current Every Day Smoker     Years: 28.00     Types: Cigars    Smokeless tobacco: Not on file      Comment: pt smoke a cigar 2x weekly    Alcohol use No      Comment: socially    Drug use: Yes     Types: Marijuana      Comment: occassionally    Sexual activity: Not on file     Review of Systems   Constitutional: Positive for fatigue. Negative for chills and fever.   HENT: Negative for facial swelling, mouth sores and trouble swallowing.    Eyes: Negative for pain and redness.   Respiratory: Negative for cough and shortness of breath.    Cardiovascular: Negative for chest pain and leg swelling.   Gastrointestinal: Positive for blood in stool. Negative for abdominal pain.   Genitourinary: Negative for dysuria and hematuria.   Musculoskeletal: Negative for gait problem and neck stiffness.   Skin: Negative for color change, rash and wound.   Neurological: Negative for seizures and headaches.   Psychiatric/Behavioral: Negative for confusion and hallucinations.     Objective:     Vital Signs (Most Recent):  Temp: 98.1 °F (36.7 °C) (02/10/18 1345)  Pulse: 92 (02/10/18 1345)  Resp: 18 (02/10/18 1345)  BP: (!) 103/57 (02/10/18 1345)  SpO2: 98 % (02/10/18 1345) Vital Signs (24h Range):  Temp:  [97.3 °F (36.3 °C)-99.2 °F (37.3 °C)] 98.1 °F (36.7 °C)  Pulse:  [] 92  Resp:  [14-24] 18  SpO2:  [95 %-100 %]  98 %  BP: (103-145)/(56-68) 103/57     Weight: 74.3 kg (163 lb 12.8 oz) (02/10/18 0900)  Body mass index is 22.21 kg/m².      Intake/Output Summary (Last 24 hours) at 02/10/18 1441  Last data filed at 02/10/18 1330   Gross per 24 hour   Intake              242 ml   Output             2525 ml   Net            -2283 ml       Lines/Drains/Airways     Peripheral Intravenous Line                 Peripheral IV - Single Lumen 02/09/18 2002 Right Forearm less than 1 day         Peripheral IV - Single Lumen 02/09/18 2139 Right Wrist less than 1 day                Physical Exam   Constitutional: He is oriented to person, place, and time. He appears well-developed and well-nourished. No distress.   HENT:   Head: Normocephalic and atraumatic.   Eyes: Conjunctivae and EOM are normal.   Neck: Neck supple. No thyromegaly present.   Cardiovascular: Normal rate, regular rhythm and intact distal pulses.    Pulmonary/Chest: Effort normal and breath sounds normal. No respiratory distress.   Abdominal: Soft. He exhibits no distension. There is no tenderness.   Musculoskeletal: Normal range of motion. He exhibits no tenderness.   Neurological: He is alert and oriented to person, place, and time. No cranial nerve deficit.   Skin: Skin is warm and dry. No rash noted.   Psychiatric: He has a normal mood and affect. His behavior is normal.   Vitals reviewed.      Significant Labs:  Amylase: No results for input(s): AMYLASE in the last 48 hours.  Blood Culture: No results for input(s): LABBLOO in the last 48 hours.  CBC:   Recent Labs  Lab 02/09/18  2006 02/10/18  0034 02/10/18  0703   WBC 6.34  --  4.09   HGB 9.5* 8.2*  8.2* 7.8*   HCT 30.6* 26.7* 25.6*     --  241     CMP:   Recent Labs  Lab 02/10/18  0703   GLU 88   CALCIUM 8.7   ALBUMIN 2.3*   PROT 5.9*      K 3.2*   CO2 33*   CL 99   BUN 22   CREATININE 1.0   ALKPHOS 313*   ALT 41   AST 57*   BILITOT 0.4     Coagulation:   Recent Labs  Lab 02/09/18 2006   INR 1.0     CRP:  No results for input(s): CRP in the last 48 hours.  ESR: No results for input(s): SEDRATE in the last 48 hours.  H.Pylori Ab IgG: No results for input(s): HPYLORIIGG in the last 48 hours.  Lipase: No results for input(s): LIPASE in the last 48 hours.    Significant Imaging:  Imaging results within the past 24 hours have been reviewed.

## 2018-02-10 NOTE — SUBJECTIVE & OBJECTIVE
"Past Medical History:   Diagnosis Date    Asthma     Cardiac defibrillator in place     CHF (congestive heart failure)     Gout     Hypertension     Seizures     Stroke        Past Surgical History:   Procedure Laterality Date    CARDIAC SURGERY      year 2000    TONSILLECTOMY         Review of patient's allergies indicates:   Allergen Reactions    Coreg [carvedilol] Palpitations       No current facility-administered medications on file prior to encounter.      Current Outpatient Prescriptions on File Prior to Encounter   Medication Sig    albuterol 90 mcg/actuation inhaler Inhale 2 puffs into the lungs every 6 (six) hours as needed for Wheezing. Rescue    atorvastatin (LIPITOR) 80 MG tablet Take 1 tablet (80 mg total) by mouth every evening.    bumetanide (BUMEX) 2 MG tablet Take 2 tablets (4 mg total) by mouth 2 (two) times daily.    digoxin (LANOXIN) 125 mcg tablet Take 125 mcg by mouth every other day.    ferrous sulfate 325 (65 FE) MG EC tablet Take 1 tablet (325 mg total) by mouth 3 (three) times daily.    lisinopril (PRINIVIL,ZESTRIL) 2.5 MG tablet Take 2.5 mg by mouth once daily.    metOLazone (ZAROXOLYN) 5 MG tablet Take 1 tablet (5 mg total) by mouth every 48 hours.    metoprolol succinate (TOPROL-XL) 100 MG 24 hr tablet Take 3 tablets (300 mg total) by mouth once daily. (Patient taking differently: Take 200 mg by mouth 2 (two) times daily. )    needle, disp, 21 G 21 gauge x 1 1/2" Ndle 1 each by Misc.(Non-Drug; Combo Route) route every 30 days.    octreotide lar (SANDOSTATIN LAR) 20 mg injection Inject 20 mg into the muscle every 28 days.    pantoprazole (PROTONIX) 40 MG tablet Take 1 tablet (40 mg total) by mouth 2 (two) times daily before meals.    phenytoin (DILANTIN) 200 MG ER capsule Take 1 capsule (200 mg total) by mouth 2 (two) times daily.    sildenafil, antihypertensive, (REVATIO) 20 mg Tab Take 1 tablet (20 mg total) by mouth 3 (three) times daily.    syringe, " disposable, 20 mL Syrg 1 each by Misc.(Non-Drug; Combo Route) route every 30 days.    tamsulosin (FLOMAX) 0.4 mg Cp24 Take 1 capsule (0.4 mg total) by mouth once daily.    tiotropium (SPIRIVA WITH HANDIHALER) 18 mcg inhalation capsule Inhale 1 capsule (18 mcg total) into the lungs once daily. Controller    travoprost (TRAVATAN Z) 0.004 % Drop Place 1 drop into both eyes once daily.     Family History     None        Social History Main Topics    Smoking status: Current Every Day Smoker     Years: 28.00     Types: Cigars    Smokeless tobacco: Not on file      Comment: pt smoke a cigar 2x weekly    Alcohol use No      Comment: socially    Drug use: Yes     Types: Marijuana      Comment: occassionally    Sexual activity: Not on file     Review of Systems   Constitutional: Positive for chills. Negative for appetite change, fatigue and fever.   HENT: Negative for congestion, sore throat and trouble swallowing.    Respiratory: Negative for cough, shortness of breath and wheezing.    Cardiovascular: Positive for leg swelling. Negative for chest pain and palpitations.   Gastrointestinal: Positive for blood in stool and diarrhea. Negative for abdominal pain, constipation, nausea and vomiting.   Genitourinary: Negative for difficulty urinating, dysuria, frequency and hematuria.   Musculoskeletal: Positive for back pain. Negative for neck pain and neck stiffness.   Skin: Negative for rash.   Neurological: Negative for dizziness, speech difficulty, weakness, light-headedness and headaches.        Denies weakness at time of examination   Psychiatric/Behavioral: Negative for agitation and behavioral problems.     Objective:     Vital Signs (Most Recent):  Temp: 97.3 °F (36.3 °C) (02/09/18 1824)  Pulse: 66 (02/09/18 2116)  Resp: 15 (02/09/18 2116)  BP: (!) 129/59 (02/09/18 2116)  SpO2: 97 % (02/09/18 2116) Vital Signs (24h Range):  Temp:  [97.3 °F (36.3 °C)] 97.3 °F (36.3 °C)  Pulse:  [] 66  Resp:  [14-24] 15  SpO2:   [97 %-100 %] 97 %  BP: (111-145)/(59-68) 129/59     Weight: 74.8 kg (165 lb)  Body mass index is 22.38 kg/m².    Physical Exam   Constitutional: He is oriented to person, place, and time. He appears well-developed and well-nourished. No distress.   HENT:   Head: Normocephalic and atraumatic.   Eyes: EOM are normal. Pupils are equal, round, and reactive to light.   Pale conjunctivae   Neck: Normal range of motion. Neck supple.   Cardiovascular: Normal rate, regular rhythm, normal heart sounds and intact distal pulses.  Exam reveals no gallop and no friction rub.    No murmur heard.  Subcutaneous AICD left chest   Pulmonary/Chest: Effort normal and breath sounds normal. He has no wheezes. He has no rales.   Abdominal: Soft. Bowel sounds are normal. There is no tenderness. There is no rebound.   Musculoskeletal:   Mild TTP to L spine paraspinals   Neurological: He is alert and oriented to person, place, and time.   Skin: Skin is warm and dry.   Bilateral LE 3+ pitting edema   Psychiatric: He has a normal mood and affect. His behavior is normal.         CRANIAL NERVES     CN III, IV, VI   Pupils are equal, round, and reactive to light.  Extraocular motions are normal.        Significant Labs:   A1C: No results for input(s): HGBA1C in the last 4320 hours.  CBC:   Recent Labs  Lab 02/09/18 2006   WBC 6.34   HGB 9.5*   HCT 30.6*        CMP:   Recent Labs  Lab 02/09/18 2006      K 3.9   CL 99   CO2 30*      BUN 23   CREATININE 1.2   CALCIUM 9.2   PROT 6.9   ALBUMIN 2.5*   BILITOT 0.5   ALKPHOS 355*   AST 60*   ALT 46*   ANIONGAP 10   EGFRNONAA >60.0     Cardiac Markers:   Recent Labs  Lab 02/09/18 2006   *     Magnesium: No results for input(s): MG in the last 48 hours.  TSH: No results for input(s): TSH in the last 4320 hours.  All pertinent labs within the past 24 hours have been reviewed.    Significant Imaging: I have reviewed all pertinent imaging results/findings within the past 24  hours.

## 2018-02-10 NOTE — ASSESSMENT & PLAN NOTE
- currently rate controlled with HR ranging between   - continue home medication regimen  - maintain on telemetry for now  - hold ASA

## 2018-02-10 NOTE — PROGRESS NOTES
[facility] Hospital Medicine  Progress Note    Patient Name: Stefano Granger  MRN: [id number]  Patient Class: OP- Observation   Admission Date: 2/9/2018  Length of Stay: 0 days  Attending Physician: Eryn Ramesh MD  Primary Care Provider: Primary [facility] Hospital Medicine Team: [facility] Andry Orellana PA-C    Subjective:     Principal Problem:Melena    HPI:  Mr. Granger is a 73yo AAM with history of ischemic cardiomyopathy with AICD, Bio-prost Aortic and Mitral valves, CHF, Afib, HTN, HLD, Asthma, GERD, BPH, Gout, Seizures and CVA presenting with black tarry stools x 2 days.  He was recently admitted 1/31/18 with symptomatic anemia (Hgb 4.3) and discharged on 2/5/18 from [facility]. He reportedly received 9 units of blood while at [facility] before being discharged.  His H/H as reported as stable at 10.8/31.4 at time of discharge. He appears to have had 3 admissions during the month of January for similar issues.  He denies any worsening or alleviating factors for black tarry stools. He also states having acute on chronic low back pain since discharge from [facility]. He reports bloody stool, intermittent diarrhea, and chills; denies fever, CP, SOB, weakness, nausea, vomiting, abdominal pain, urinary/bowel incontinence. He denies any family hx of similar findings.  He was prescribed octreotide at time of discharge, but reports not starting as of yet d/t availability.    Hospital Course:  Patient admitted to observation for evaluation of black stools. Hgb on admission 9.5, trended q8-->7.8. GI consulted, recommend transfusing as needed. If stable, can discharge tomorrow and follow up with GI doctor at [facility]. If acute drop observed, will get CT angiogram to localize bleeding.     Interval History: No events overnight. Patient seen by GI, will transfuse as needed.    Review of Systems   Constitutional: Positive for chills. Negative for fatigue and fever.    Respiratory: Negative for cough, shortness of breath and wheezing.    Cardiovascular: Positive for leg swelling. Negative for chest pain and palpitations.   Gastrointestinal: Positive for blood in stool and diarrhea. Negative for abdominal pain, constipation, nausea and vomiting.   Genitourinary: Negative for dysuria, frequency and hematuria.   Musculoskeletal: Positive for back pain. Negative for neck pain and neck stiffness.   Neurological: Negative for dizziness, speech difficulty, weakness, light-headedness and headaches.        Denies weakness at time of examination     Objective:     Vital Signs (Most Recent):  Temp: 98.5 °F (36.9 °C) (02/10/18 1330)  Pulse: 64 (02/10/18 1330)  Resp: 20 (02/10/18 1330)  BP: (!) 130/58 (02/10/18 1330)  SpO2: 96 % (02/10/18 1330) Vital Signs (24h Range):  Temp:  [97.3 °F (36.3 °C)-99.2 °F (37.3 °C)] 98.5 °F (36.9 °C)  Pulse:  [] 64  Resp:  [14-24] 20  SpO2:  [95 %-100 %] 96 %  BP: (111-145)/(56-68) 130/58     Weight: 74.3 kg (163 lb 12.8 oz)  Body mass index is 22.21 kg/m².    Intake/Output Summary (Last 24 hours) at 02/10/18 1357  Last data filed at 02/10/18 1330   Gross per 24 hour   Intake              242 ml   Output             2525 ml   Net            -2283 ml      Physical Exam   Constitutional: He is oriented to person, place, and time. He appears well-developed and well-nourished. No distress.   Eyes:   Pale conjunctivae   Cardiovascular: Normal rate, regular rhythm, normal heart sounds and intact distal pulses.    Subcutaneous AICD left chest   Pulmonary/Chest: Effort normal and breath sounds normal. He has no wheezes.   Abdominal: Soft. Bowel sounds are normal. There is no tenderness.   Musculoskeletal: Normal range of motion. He exhibits edema (chronic, BLE). He exhibits no tenderness.   Mild TTP to L spine paraspinals   Neurological: He is alert and oriented to person, place, and time. No cranial nerve deficit.   Skin: Skin is warm and dry. No rash noted.    Bilateral LE 3+ pitting edema   Psychiatric: He has a normal mood and affect. His behavior is normal.       Significant Labs:   BMP:   Recent Labs  Lab 02/10/18  0703   GLU 88      K 3.2*   CL 99   CO2 33*   BUN 22   CREATININE 1.0   CALCIUM 8.7   MG 1.7     CBC:   Recent Labs  Lab 02/09/18  2006 02/10/18  0034 02/10/18  0703   WBC 6.34  --  4.09   HGB 9.5* 8.2*  8.2* 7.8*   HCT 30.6* 26.7* 25.6*     --  241     Magnesium:   Recent Labs  Lab 02/10/18  0703   MG 1.7     All pertinent labs within the past 24 hours have been reviewed.    Significant Imaging: I have reviewed all pertinent imaging results/findings within the past 24 hours.    Assessment/Plan:      * Melena    Acute GI bleeding, Anemia due to blood loss   73yo M with history of ischemic cardiomyopathy with AICD, Bio-prost Aortic and Mitral valves, CHF, Afib, HTN, HLD, Asthma, GERD, BPH, Gout, Seizures, and CVA presenting with black tarry stools x 2 days.  Patient discharged from [facility] on 2/5/18 with same complaint, received 9 units of blood to stabilize H/H.    -Melena with chronic GI bleed etiology  -DBE at [facility] by Dr. Soto on 11/2017, 12/2017, 1/2018 no acute GI bleed found  -trend H/H Q 8 for now  -Denies starting Octreotide therapy at this time  -Continue PPI and ferrous sulfate   - avoid NSAID / ASA  -GI consulted, transfuse as needed; if stable can discharge and f/u with [facility], consider CTA if Hgb drops >1 point for acute bleed        Atrial fibrillation    - currently rate controlled with HR ranging between   - continue home medication regimen  - maintain on telemetry for now  - hold ASA         Chronic combined systolic and diastolic heart failure    - received 160 mg furosemide IV in ED x 1  -  (stable), 3+ edema to BLE  -  CXR with stable cardiomegaly with mild basilar edema and minimal effusions  - resume home Bumex dosing  - strict I/O's  - NPO for now consider restricting fluids once cleared for diet  -  no prior ECHO on file         Protein calorie malnutrition    - albumin 2.5 with initial labs  - prealbumin pending   - nutrition consult placed         Elevated LFTs    - presents with AST 60, ALT 46, Alk Phos 355 (baseline 21-33 / 20-31 / 205-246)  - total bili 0.5  - repeat CMP trending down, and hepatitis panel pending         Hyperlipidemia    -Continue home dosing of atorvastatin         History of seizure    -Continue phenytoin as directed   -Seizure precautions        BPH (benign prostatic hyperplasia)    - resume tamsulosin as prescribed        Pulmonary hypertension    -Continue sildenafil as directed  - may want to consider obtaining ECHO inpatient vs outpatient - no prior on file           VTE Risk Mitigation         Ordered     Medium Risk of VTE  Once      02/09/18 2224     Place sequential compression device  Until discontinued      02/09/18 2224     Reason for No Pharmacological VTE Prophylaxis  Once      02/09/18 2224     Place UZAIR hose  Until discontinued      02/09/18 2224              RAUL StrattonC  Department of Hospital Medicine   [facility]

## 2018-02-11 VITALS
TEMPERATURE: 99 F | DIASTOLIC BLOOD PRESSURE: 53 MMHG | HEART RATE: 70 BPM | HEIGHT: 72 IN | SYSTOLIC BLOOD PRESSURE: 106 MMHG | BODY MASS INDEX: 22.48 KG/M2 | OXYGEN SATURATION: 95 % | WEIGHT: 166 LBS | RESPIRATION RATE: 17 BRPM

## 2018-02-11 PROBLEM — R53.1 WEAKNESS: Status: ACTIVE | Noted: 2018-02-02

## 2018-02-11 LAB
ALBUMIN SERPL BCP-MCNC: 2.2 G/DL
ALP SERPL-CCNC: 334 U/L
ALT SERPL W/O P-5'-P-CCNC: 50 U/L
ANION GAP SERPL CALC-SCNC: 8 MMOL/L
AST SERPL-CCNC: 74 U/L
BASOPHILS # BLD AUTO: 0.04 K/UL
BASOPHILS # BLD AUTO: 0.04 K/UL
BASOPHILS NFR BLD: 0.7 %
BASOPHILS NFR BLD: 0.9 %
BILIRUB SERPL-MCNC: 0.5 MG/DL
BUN SERPL-MCNC: 25 MG/DL
CALCIUM SERPL-MCNC: 7.6 MG/DL
CHLORIDE SERPL-SCNC: 98 MMOL/L
CO2 SERPL-SCNC: 33 MMOL/L
CREAT SERPL-MCNC: 0.9 MG/DL
DIFFERENTIAL METHOD: ABNORMAL
DIFFERENTIAL METHOD: ABNORMAL
EOSINOPHIL # BLD AUTO: 0.1 K/UL
EOSINOPHIL # BLD AUTO: 0.1 K/UL
EOSINOPHIL NFR BLD: 2.3 %
EOSINOPHIL NFR BLD: 2.4 %
ERYTHROCYTE [DISTWIDTH] IN BLOOD BY AUTOMATED COUNT: 14.9 %
ERYTHROCYTE [DISTWIDTH] IN BLOOD BY AUTOMATED COUNT: 15 %
EST. GFR  (AFRICAN AMERICAN): >60 ML/MIN/1.73 M^2
EST. GFR  (NON AFRICAN AMERICAN): >60 ML/MIN/1.73 M^2
GLUCOSE SERPL-MCNC: 110 MG/DL
HCT VFR BLD AUTO: 27.8 %
HCT VFR BLD AUTO: 29.3 %
HCT VFR BLD AUTO: 29.4 %
HCT VFR BLD AUTO: 29.5 %
HGB BLD-MCNC: 9.3 G/DL
HGB BLD-MCNC: 9.4 G/DL
IMM GRANULOCYTES # BLD AUTO: 0.01 K/UL
IMM GRANULOCYTES # BLD AUTO: 0.02 K/UL
IMM GRANULOCYTES NFR BLD AUTO: 0.2 %
IMM GRANULOCYTES NFR BLD AUTO: 0.4 %
LYMPHOCYTES # BLD AUTO: 0.3 K/UL
LYMPHOCYTES # BLD AUTO: 0.5 K/UL
LYMPHOCYTES NFR BLD: 7 %
LYMPHOCYTES NFR BLD: 8.7 %
MAGNESIUM SERPL-MCNC: 1.6 MG/DL
MCH RBC QN AUTO: 27.2 PG
MCH RBC QN AUTO: 27.6 PG
MCHC RBC AUTO-ENTMCNC: 31.6 G/DL
MCHC RBC AUTO-ENTMCNC: 32.1 G/DL
MCV RBC AUTO: 85 FL
MCV RBC AUTO: 87 FL
MONOCYTES # BLD AUTO: 0.4 K/UL
MONOCYTES # BLD AUTO: 0.5 K/UL
MONOCYTES NFR BLD: 8.9 %
MONOCYTES NFR BLD: 9.4 %
NEUTROPHILS # BLD AUTO: 3.8 K/UL
NEUTROPHILS # BLD AUTO: 4.3 K/UL
NEUTROPHILS NFR BLD: 79.1 %
NEUTROPHILS NFR BLD: 80 %
NRBC BLD-RTO: 0 /100 WBC
NRBC BLD-RTO: 0 /100 WBC
PHOSPHATE SERPL-MCNC: 2.4 MG/DL
PLATELET # BLD AUTO: 236 K/UL
PLATELET # BLD AUTO: 263 K/UL
PMV BLD AUTO: 10.2 FL
PMV BLD AUTO: 10.4 FL
POTASSIUM SERPL-SCNC: 3.4 MMOL/L
PROT SERPL-MCNC: 5.8 G/DL
RBC # BLD AUTO: 3.37 M/UL
RBC # BLD AUTO: 3.46 M/UL
SODIUM SERPL-SCNC: 139 MMOL/L
WBC # BLD AUTO: 4.69 K/UL
WBC # BLD AUTO: 5.42 K/UL

## 2018-02-11 PROCEDURE — 25000003 PHARM REV CODE 250: Performed by: PHYSICIAN ASSISTANT

## 2018-02-11 PROCEDURE — 36415 COLL VENOUS BLD VENIPUNCTURE: CPT

## 2018-02-11 PROCEDURE — 85014 HEMATOCRIT: CPT | Mod: 91

## 2018-02-11 PROCEDURE — 84100 ASSAY OF PHOSPHORUS: CPT

## 2018-02-11 PROCEDURE — 25000003 PHARM REV CODE 250: Performed by: NURSE PRACTITIONER

## 2018-02-11 PROCEDURE — 97161 PT EVAL LOW COMPLEX 20 MIN: CPT

## 2018-02-11 PROCEDURE — 99217 PR OBSERVATION CARE DISCHARGE: CPT | Mod: ,,, | Performed by: PHYSICIAN ASSISTANT

## 2018-02-11 PROCEDURE — 83735 ASSAY OF MAGNESIUM: CPT

## 2018-02-11 PROCEDURE — G0378 HOSPITAL OBSERVATION PER HR: HCPCS

## 2018-02-11 PROCEDURE — G8979 MOBILITY GOAL STATUS: HCPCS | Mod: CJ

## 2018-02-11 PROCEDURE — 85025 COMPLETE CBC W/AUTO DIFF WBC: CPT | Mod: 91

## 2018-02-11 PROCEDURE — 80053 COMPREHEN METABOLIC PANEL: CPT

## 2018-02-11 PROCEDURE — 25000242 PHARM REV CODE 250 ALT 637 W/ HCPCS: Performed by: NURSE PRACTITIONER

## 2018-02-11 PROCEDURE — G8978 MOBILITY CURRENT STATUS: HCPCS | Mod: CK

## 2018-02-11 RX ORDER — HYDROCODONE BITARTRATE AND ACETAMINOPHEN 5; 325 MG/1; MG/1
1 TABLET ORAL EVERY 6 HOURS PRN
Status: DISCONTINUED | OUTPATIENT
Start: 2018-02-11 | End: 2018-02-11 | Stop reason: HOSPADM

## 2018-02-11 RX ADMIN — ACETAMINOPHEN 650 MG: 325 TABLET, FILM COATED ORAL at 08:02

## 2018-02-11 RX ADMIN — SILDENAFIL 20 MG: 20 TABLET ORAL at 05:02

## 2018-02-11 RX ADMIN — PHENYTOIN SODIUM 200 MG: 100 CAPSULE ORAL at 08:02

## 2018-02-11 RX ADMIN — TIOTROPIUM BROMIDE 18 MCG: 18 CAPSULE ORAL; RESPIRATORY (INHALATION) at 08:02

## 2018-02-11 RX ADMIN — HYDROCODONE BITARTRATE AND ACETAMINOPHEN 1 TABLET: 5; 325 TABLET ORAL at 10:02

## 2018-02-11 RX ADMIN — METOPROLOL SUCCINATE 200 MG: 100 TABLET, EXTENDED RELEASE ORAL at 08:02

## 2018-02-11 RX ADMIN — COLCHICINE 0.6 MG: 0.6 TABLET, FILM COATED ORAL at 08:02

## 2018-02-11 RX ADMIN — LISINOPRIL 2.5 MG: 2.5 TABLET ORAL at 08:02

## 2018-02-11 RX ADMIN — PANTOPRAZOLE SODIUM 40 MG: 40 TABLET, DELAYED RELEASE ORAL at 04:02

## 2018-02-11 RX ADMIN — FERROUS SULFATE TAB EC 325 MG (65 MG FE EQUIVALENT) 325 MG: 325 (65 FE) TABLET DELAYED RESPONSE at 05:02

## 2018-02-11 RX ADMIN — BUMETANIDE 4 MG: 1 TABLET ORAL at 08:02

## 2018-02-11 RX ADMIN — FERROUS SULFATE TAB EC 325 MG (65 MG FE EQUIVALENT) 325 MG: 325 (65 FE) TABLET DELAYED RESPONSE at 02:02

## 2018-02-11 RX ADMIN — PANTOPRAZOLE SODIUM 40 MG: 40 TABLET, DELAYED RELEASE ORAL at 05:02

## 2018-02-11 RX ADMIN — TAMSULOSIN HYDROCHLORIDE 0.4 MG: 0.4 CAPSULE ORAL at 08:02

## 2018-02-11 RX ADMIN — POTASSIUM BICARBONATE 50 MEQ: 25 TABLET, EFFERVESCENT ORAL at 10:02

## 2018-02-11 NOTE — PT/OT/SLP EVAL
"Physical Therapy Evaluation    Patient Name:  Stefano Granger   MRN:  [id number]    Recommendations:     Discharge Recommendations:  home with home health   Discharge Equipment Recommendations: none   Barriers to discharge: Decreased caregiver support    Assessment:     Stefano Granger is a 72 y.o. male admitted with a medical diagnosis of Melena.  He presents with the following impairments/functional limitations:  weakness, impaired endurance, impaired functional mobilty, pain, gait instability.    Rehab Prognosis:  good; patient would benefit from acute skilled PT services to address these deficits and reach maximum level of function.      Recent Surgery: * No surgery found *      Plan:     During this hospitalization, patient to be seen 3 x/week to address the above listed problems via gait training, therapeutic activities, therapeutic exercises  · Plan of Care Expires:  03/09/18   Plan of Care Reviewed with: patient    Subjective     Communicated with RN prior to session.  Patient found sitting in chair upon PT entry to room, agreeable to evaluation.      Chief Complaint: back pain  Patient comments/goals: "do you have any pain pills?"  Pain/Comfort:  · Pain Rating 1:  (pt did not rate however c/o back pain)    Patients cultural, spiritual, Jain conflicts given the current situation: none noted    Living Environment:  Pt lives in a 1 level house with 4 SURYA with 1 handrail and a roommate.  Prior to admission, patients level of function was mod I with RW or SPC.  Patient has the following equipment: cane, straight, walker, rolling.  DME owned (not currently used): none.  Upon discharge, patient will have assistance from roommate.    Objective:     Patient found with:   no equipment    General Precautions: Standard, fall   Orthopedic Precautions:N/A   Braces: N/A     Exams:  · Cognitive Exam:  Patient is oriented to Person, Place, Time and Situation and follows 100% of one-step commands   · Gross Motor " Coordination:  WFL  · Postural Exam:  Patient presented with the following abnormalities:    · -       Rounded shoulders  · -       Forward head  · RLE ROM: WFL  · RLE Strength: WFL  · LLE ROM: WFL  · LLE Strength: WFL    Functional Mobility:  · Transfers:     · Sit to Stand:  supervision with rolling walker  · Gait: ~60 feet with RW with SBA.     AM-PAC 6 CLICK MOBILITY  Total Score:18       Therapeutic Activities and Exercises:   Pt is educated on the importance of mobility and proper use of RW. Pt requires cues for more upright posture, however difficult to correct at this time d/t back pain.     Patient left up in chair with all lines intact, call button in reach and RN notified.    GOALS:    Physical Therapy Goals        Problem: Physical Therapy Goal    Goal Priority Disciplines Outcome Goal Variances Interventions   Physical Therapy Goal     PT/OT, PT Ongoing (interventions implemented as appropriate)     Description:  Goals to be met by: 18     Patient will increase functional independence with mobility by performin. Supine to sit with supervision  2. Sit to supine with supervision  3. Sit to stand transfer with Supervision  4. Gait  x 100 feet with Supervision using Rolling Walker.   5. Ascend/descend 4 stair with right Handrails Stand-by Assistance.                       History:     Past Medical History:   Diagnosis Date    Asthma     Cardiac defibrillator in place     CHF (congestive heart failure)     Gout     Hypertension     Seizures     Stroke        Past Surgical History:   Procedure Laterality Date    CARDIAC SURGERY      year     TONSILLECTOMY         Clinical Decision Making:     History  Co-morbidities and personal factors that may impact the plan of care Examination  Body Structures and Functions, activity limitations and participation restrictions that may impact the plan of care Clinical Presentation   Decision Making/ Complexity Score   Co-morbidities:   [] Time  since onset of injury / illness / exacerbation  [] Status of current condition  []Patient's cognitive status and safety concerns    [x] Multiple Medical Problems (see med hx)  Personal Factors:   [] Patient's age  [] Prior Level of function   [] Patient's home situation (environment and family support)  [] Patient's level of motivation  [] Expected progression of patient      HISTORY:(criteria)    [] 33394 - no personal factors/history    [] 65006 - has 1-2 personal factor/comorbidity     [] 79517 - has >3 personal factor/comorbidity     Body Regions:  [] Objective examination findings  [] Head     []  Neck  [] Trunk   [] Upper Extremity  [] Lower Extremity    Body Systems:  [] For communication ability, affect, cognition, language, and learning style: the assessment of the ability to make needs known, consciousness, orientation (person, place, and time), expected emotional /behavioral responses, and learning preferences (eg, learning barriers, education  needs)  [x] For the neuromuscular system: a general assessment of gross coordinated movement (eg, balance, gait, locomotion, transfers, and transitions) and motor function  (motor control and motor learning)  [] For the musculoskeletal system: the assessment of gross symmetry, gross range of motion, gross strength, height, and weight  [] For the integumentary system: the assessment of pliability(texture), presence of scar formation, skin color, and skin integrity  [] For cardiovascular/pulmonary system: the assessment of heart rate, respiratory rate, blood pressure, and edema     Activity limitations:    [] Patient's cognitive status and saf ety concerns          [x] Status of current condition      [] Weight bearing restriction  [] Cardiopulmunary Restriction    Participation Restrictions:   [] Goals and goal agreement with the patient     [] Rehab potential (prognosis) and probable outcome      Examination of Body System: (criteria)    [x] 44913 - addressing 1-2  elements    [] 99283 - addressing a total of 3 or more elements     [] 57654 -  Addressing a total of 4 or more elements         Clinical Presentation: (criteria)  Stable - 72744     On examination of body system using standardized tests and measures patient presents with 1-2 elements from any of the following: body structures and functions, activity limitations, and/or participation restrictions.  Leading to a clinical presentation that is considered stable and/or uncomplicated                              Clinical Decision Making  (Eval Complexity):  Low- 71044     Time Tracking:     PT Received On: 02/11/18  PT Start Time: 1338     PT Stop Time: 1358  PT Total Time (min): 20 min     Billable Minutes: Evaluation 20 mins      Gisele Hamilton, PT  02/11/2018

## 2018-02-11 NOTE — PLAN OF CARE
Problem: Physical Therapy Goal  Goal: Physical Therapy Goal  Goals to be met by: 18     Patient will increase functional independence with mobility by performin. Supine to sit with supervision  2. Sit to supine with supervision  3. Sit to stand transfer with Supervision  4. Gait  x 100 feet with Supervision using Rolling Walker.   5. Ascend/descend 4 stair with right Handrails Stand-by Assistance.     Outcome: Ongoing (interventions implemented as appropriate)  Goals established this date

## 2018-02-11 NOTE — TREATMENT PLAN
GI Follow-up Note    Pt seen examined.  Reports dark stool x 2    No Hgb drop overnight.    Vitals:    02/11/18 1245   BP: (!) 108/59   Pulse: 78   Resp: 17   Temp: 98.7 °F (37.1 °C)       Gen: NAD, pleasant  Abd: soft , NT ND    Chart reviewed.  H/H remains stable.    Plan:  We would favor discharge and pt to have follow up with Dr. Soto this week for further consideration of VCE and octreotide LAR      Please call with any additional concerns /questions    Navarro Love MD  Gastroenterology Fellow (PGY-V)  Pager: [phone]

## 2018-02-11 NOTE — PLAN OF CARE
Problem: Fall Risk (Adult)  Goal: Absence of Falls  Patient will demonstrate the desired outcomes by discharge/transition of care.   Outcome: Outcome(s) achieved Date Met: 02/11/18  Pt is free of falls and injuries. Fall precautions maintained .     Problem: Patient Care Overview  Goal: Plan of Care Review  Outcome: Outcome(s) achieved Date Met: 02/11/18  Plan of care reviewed with pt . Pt is A,A,O x 4 . Stable V/S. Pt C/O pain in his back 10/10 and PRN pain medication given and heat applied to the pt's back . Medications given as scheduled , sildenafil dose @ 1400 held per MD order for /53 . Pt is able to ambulate in the room using his cane . Pt's H&H today is 9.4 & 29.3 . Pt is ready for discharge to home now per MD order .

## 2018-02-11 NOTE — PROGRESS NOTES
Pt is ready for discharge per MD order . Pt is awake , alert and oriented x 4 . Stable V/S . Pt is free of falls and injuries . IV access removed and discharge papers given and reviewed with pt and pt verbalized  understanding . Pt is waiting for his daughter  to come and take him home .

## 2018-02-12 LAB
HAV IGM SERPL QL IA: NEGATIVE
HBV CORE IGM SERPL QL IA: NEGATIVE
HBV SURFACE AG SERPL QL IA: NEGATIVE
HCV AB SERPL QL IA: NEGATIVE

## 2018-02-12 NOTE — DISCHARGE SUMMARY
[facility] Hospital Medicine  Discharge Summary      Patient Name: Stefano Granger  MRN: [id number]  Admission Date: 2/9/2018  Hospital Length of Stay: 0 days  Discharge Date and Time: 2/11/2018  6:58 PM  Attending Physician: Twila att. providers found   Discharging Provider: Andry Orellana PA-C  Primary Care Provider: Primary Doctor Twila  [facility] Medicine Team: [facility] Andry Orellana PA-C    HPI:   Mr. Granger is a 71yo AAM with history of ischemic cardiomyopathy with AICD, Bio-prost Aortic and Mitral valves, CHF, Afib, HTN, HLD, Asthma, GERD, BPH, Gout, Seizures and CVA presenting with black tarry stools x 2 days.  He was recently admitted 1/31/18 with symptomatic anemia (Hgb 4.3) and discharged on 2/5/18 from [facility]. He reportedly received 9 units of blood while at [facility] before being discharged.  His H/H as reported as stable at 10.8/31.4 at time of discharge. He appears to have had 3 admissions during the month of January for similar issues.  He denies any worsening or alleviating factors for black tarry stools. He also states having acute on chronic low back pain since discharge from [facility]. He reports bloody stool, intermittent diarrhea, and chills; denies fever, CP, SOB, weakness, nausea, vomiting, abdominal pain, urinary/bowel incontinence. He denies any family hx of similar findings.  He was prescribed octreotide at time of discharge, but reports not starting as of yet d/t availability.    * No surgery found *      Hospital Course:   Patient admitted to observation for evaluation of black stools. Hgb on admission 9.5, trended q8-->7.8. GI consulted, recommend transfusing as needed. If stable, can discharge tomorrow and follow up with GI doctor at [facility]. If acute drop observed, will get CT angiogram to localize bleeding. Patient given 1 unit of blood, Hgb stable at 9.4 for 24 hours. Patient stable for discharge with follow up with Dr. Brittany oviedo  week.     Consults:   Consults         Status Ordering Provider     Inpatient consult to Gastroenterology  Once     Provider:  (Not yet assigned)    Completed CHRIS RED     Inpatient consult to Registered Dietitian/Nutritionist  Once     Provider:  (Not yet assigned)    Completed HELENA RAMOS          * Melena    Acute GI bleeding, Anemia due to blood loss   73yo M with history of ischemic cardiomyopathy with AICD, Bio-prost Aortic and Mitral valves, CHF, Afib, HTN, HLD, Asthma, GERD, BPH, Gout, Seizures, and CVA presenting with black tarry stools x 2 days.  Patient discharged from [facility] on 2/5/18 with same complaint, received 9 units of blood to stabilize H/H.    -Melena with chronic GI bleed etiology  -DBE at [facility] by Dr. Soto on 11/2017, 12/2017, 1/2018 no acute GI bleed found  -trend H/H Q 8 for now  -Denies starting Octreotide therapy at this time  -Continue PPI and ferrous sulfate   - avoid NSAID / ASA  -GI consulted, transfuse as needed; if stable can discharge and f/u with Brittany, consider CTA if Hgb drops >1 point for acute bleed  -Hgb stable at 9.4 for 24 hours, discharge and close follow up with Dr. Soto.        Atrial fibrillation    - currently rate controlled with HR ranging between   - continue home medication regimen  - maintain on telemetry for now  - hold ASA         Chronic combined systolic and diastolic heart failure    - received 160 mg furosemide IV in ED x 1  -  (stable), 3+ edema to BLE  -  CXR with stable cardiomegaly with mild basilar edema and minimal effusions  - resume home Bumex dosing  - strict I/O's  - NPO for now consider restricting fluids once cleared for diet  - no prior ECHO on file         Protein calorie malnutrition    - albumin 2.5 with initial labs  - prealbumin pending   - nutrition consult placed         Elevated LFTs    - presents with AST 60, ALT 46, Alk Phos 355 (baseline 21-33 / 20-31 / 205-246)  - total bili 0.5  - repeat CMP  trending down, and hepatitis panel pending         Hyperlipidemia    -Continue home dosing of atorvastatin         History of seizure    -Continue phenytoin as directed   -Seizure precautions        BPH (benign prostatic hyperplasia)    - resume tamsulosin as prescribed        Pulmonary hypertension    -Continue sildenafil as directed  - may want to consider obtaining ECHO inpatient vs outpatient - no prior on file           Final Active Diagnoses:    Diagnosis Date Noted POA    PRINCIPAL PROBLEM:  Melena [K92.1] 02/02/2018 Yes    Atrial fibrillation [I48.91] 01/31/2018 Yes    Chronic combined systolic and diastolic heart failure [I50.42] 01/31/2018 Yes    BPH (benign prostatic hyperplasia) [N40.0] 02/10/2018 Yes    Hyperlipidemia [E78.5] 02/10/2018 Yes    Elevated LFTs [R79.89] 02/10/2018 Yes    Protein calorie malnutrition [E46] 02/10/2018 Yes    History of seizure [Z87.898] 02/09/2018 Not Applicable    Blood loss anemia [D50.0] 02/02/2018 Yes    Acute GI bleeding [K92.2] 01/31/2018 Yes    Anemia due to blood loss [D50.0] 01/11/2018 Yes    Pulmonary hypertension [I27.20] 01/04/2018 Yes      Problems Resolved During this Admission:    Diagnosis Date Noted Date Resolved POA       Discharged Condition: good    Disposition: Home or Self Care    Follow Up:  Follow-up Information     Go to Ra Soto MD.    Specialty:  Gastroenterology  Why:  Follow up with Dr. Soto this week.  Contact information:  [address]  [place] [address]  [phone]                 Patient Instructions:     Diet Cardiac     Diet Cardiac     Activity as tolerated     Notify your health care provider if you experience any of the following:  increased confusion or weakness     Notify your health care provider if you experience any of the following:  severe persistent headache     Notify your health care provider if you experience any of the following:  persistent dizziness, light-headedness, or visual  disturbances     Activity as tolerated     Notify your health care provider if you experience any of the following:  increased confusion or weakness     Notify your health care provider if you experience any of the following:  persistent dizziness, light-headedness, or visual disturbances     Notify your health care provider if you experience any of the following:  severe persistent headache     Notify your health care provider if you experience any of the following:  severe uncontrolled pain     Notify your health care provider if you experience any of the following:  persistent nausea and vomiting or diarrhea         Significant Diagnostic Studies: Labs:   BMP:   Recent Labs  Lab 02/11/18 0327         K 3.4*   CL 98   CO2 33*   BUN 25*   CREATININE 0.9   CALCIUM 7.6*   MG 1.6    and CBC   Recent Labs  Lab 02/10/18  1813  02/11/18 0327 02/11/18  0936   WBC  --   --  4.69  --  5.42   HGB 9.3*  --  9.3*  --  9.4*   HCT 30.2*  < > 29.4*  < > 29.3*   PLT  --   --  236  --  263   < > = values in this interval not displayed.    Pending Diagnostic Studies:     Procedure Component Value Units Date/Time    Hepatitis panel, acute [[id number]] Collected:  02/10/18 0703    Order Status:  Sent Lab Status:  In process Updated:  02/10/18 0713    Specimen:  Blood from Blood          Medications:  Reconciled Home Medications:   Discharge Medication List as of 2/11/2018  2:50 PM      CONTINUE these medications which have NOT CHANGED    Details   albuterol 90 mcg/actuation inhaler Inhale 2 puffs into the lungs every 6 (six) hours as needed for Wheezing. Rescue, Starting Thu 1/4/2018, No Print      atorvastatin (LIPITOR) 80 MG tablet Take 1 tablet (80 mg total) by mouth every evening., Starting Thu 1/4/2018, Until Fri 1/4/2019, No Print      bumetanide (BUMEX) 2 MG tablet Take 2 tablets (4 mg total) by mouth 2 (two) times daily., Starting Thu 1/4/2018, Until Fri 1/4/2019, No Print      digoxin (LANOXIN) 125 mcg tablet  "Take 125 mcg by mouth every other day., Historical Med      ferrous sulfate 325 (65 FE) MG EC tablet Take 1 tablet (325 mg total) by mouth 3 (three) times daily., Starting Thu 1/4/2018, OTC      lisinopril (PRINIVIL,ZESTRIL) 2.5 MG tablet Take 2.5 mg by mouth once daily., Historical Med      metOLazone (ZAROXOLYN) 5 MG tablet Take 1 tablet (5 mg total) by mouth every 48 hours., Starting Fri 1/5/2018, Until Sat 1/5/2019, No Print      metoprolol succinate (TOPROL-XL) 100 MG 24 hr tablet Take 3 tablets (300 mg total) by mouth once daily., Starting Fri 1/5/2018, Until Sat 1/5/2019, No Print      needle, disp, 21 G 21 gauge x 1 1/2" Ndle 1 each by Misc.(Non-Drug; Combo Route) route every 30 days., Starting Thu 1/4/2018, Print      octreotide lar (SANDOSTATIN LAR) 20 mg injection Inject 20 mg into the muscle every 28 days., Starting Thu 1/4/2018, Until Fri 1/4/2019, Print      pantoprazole (PROTONIX) 40 MG tablet Take 1 tablet (40 mg total) by mouth 2 (two) times daily before meals., Starting Fri 1/12/2018, No Print      phenytoin (DILANTIN) 200 MG ER capsule Take 1 capsule (200 mg total) by mouth 2 (two) times daily., Starting Thu 1/4/2018, Until Fri 1/4/2019, No Print      sildenafil, antihypertensive, (REVATIO) 20 mg Tab Take 1 tablet (20 mg total) by mouth 3 (three) times daily., Starting Thu 1/4/2018, Until Fri 1/4/2019, No Print      syringe, disposable, 20 mL Syrg 1 each by Misc.(Non-Drug; Combo Route) route every 30 days., Starting Thu 1/4/2018, Print      tamsulosin (FLOMAX) 0.4 mg Cp24 Take 1 capsule (0.4 mg total) by mouth once daily., Starting Fri 1/5/2018, Until Sat 1/5/2019, No Print      tiotropium (SPIRIVA WITH HANDIHALER) 18 mcg inhalation capsule Inhale 1 capsule (18 mcg total) into the lungs once daily. Controller, Starting Fri 1/5/2018, Until Sat 1/5/2019, No Print      travoprost (TRAVATAN Z) 0.004 % Drop Place 1 drop into both eyes once daily., Starting Fri 1/5/2018, Until Sat 1/5/2019, No Print    "          Indwelling Lines/Drains at time of discharge:   Lines/Drains/Airways          No matching active lines, drains, or airways          Time spent on the discharge of patient: 36 minutes  Patient was seen and examined on the date of discharge and determined to be suitable for discharge.         Andry Orellana PA-C  Department of Hospital Medicine  [facility]

## 2018-02-12 NOTE — ASSESSMENT & PLAN NOTE
Acute GI bleeding, Anemia due to blood loss   71yo M with history of ischemic cardiomyopathy with AICD, Bio-prost Aortic and Mitral valves, CHF, Afib, HTN, HLD, Asthma, GERD, BPH, Gout, Seizures, and CVA presenting with black tarry stools x 2 days.  Patient discharged from [facility] on 2/5/18 with same complaint, received 9 units of blood to stabilize H/H.    -Melena with chronic GI bleed etiology  -DBE at [facility] by Dr. Soto on 11/2017, 12/2017, 1/2018 no acute GI bleed found  -trend H/H Q 8 for now  -Denies starting Octreotide therapy at this time  -Continue PPI and ferrous sulfate   - avoid NSAID / ASA  -GI consulted, transfuse as needed; if stable can discharge and f/u with Brittany, consider CTA if Hgb drops >1 point for acute bleed  -Hgb stable at 9.4 for 24 hours, discharge and close follow up with Dr. Soto.

## 2018-02-14 ENCOUNTER — OFFICE VISIT (OUTPATIENT)
Dept: NEUROLOGY | Facility: HOSPITAL | Age: 73
End: 2018-02-14
Attending: INTERNAL MEDICINE
Payer: MEDICARE

## 2018-02-14 ENCOUNTER — TELEPHONE (OUTPATIENT)
Dept: NEUROLOGY | Facility: HOSPITAL | Age: 73
End: 2018-02-14

## 2018-02-14 VITALS
SYSTOLIC BLOOD PRESSURE: 103 MMHG | TEMPERATURE: 100 F | HEIGHT: 72 IN | DIASTOLIC BLOOD PRESSURE: 67 MMHG | WEIGHT: 168.75 LBS | HEART RATE: 130 BPM | BODY MASS INDEX: 22.86 KG/M2

## 2018-02-14 DIAGNOSIS — D50.0 ANEMIA DUE TO CHRONIC BLOOD LOSS: Primary | ICD-10-CM

## 2018-02-14 PROCEDURE — 99213 OFFICE O/P EST LOW 20 MIN: CPT | Performed by: INTERNAL MEDICINE

## 2018-02-14 RX ORDER — METOPROLOL TARTRATE 100 MG/1
TABLET ORAL
COMMUNITY
Start: 2017-11-15 | End: 2018-06-13 | Stop reason: SDUPTHER

## 2018-02-14 RX ORDER — POTASSIUM CHLORIDE 750 MG/1
20 TABLET, EXTENDED RELEASE ORAL 2 TIMES DAILY
Refills: 0 | COMMUNITY
Start: 2017-11-08

## 2018-02-14 RX ORDER — METOPROLOL SUCCINATE 200 MG/1
200 TABLET, EXTENDED RELEASE ORAL
COMMUNITY
Start: 2015-01-14 | End: 2018-02-14

## 2018-02-14 RX ORDER — FUROSEMIDE 80 MG/1
80 TABLET ORAL
COMMUNITY
Start: 2015-02-11 | End: 2018-02-14

## 2018-02-14 RX ORDER — FERROUS SULFATE 325(65) MG
325 TABLET ORAL
COMMUNITY
Start: 2015-01-14 | End: 2018-02-14 | Stop reason: SDUPTHER

## 2018-02-14 RX ORDER — FINASTERIDE 5 MG/1
5 TABLET, FILM COATED ORAL
COMMUNITY
Start: 2015-01-14 | End: 2018-02-14

## 2018-02-14 RX ORDER — PANTOPRAZOLE SODIUM 20 MG/1
40 TABLET, DELAYED RELEASE ORAL DAILY
Status: ON HOLD | COMMUNITY
Start: 2015-01-14 | End: 2018-07-13

## 2018-02-14 RX ORDER — SIMVASTATIN 40 MG/1
40 TABLET, FILM COATED ORAL NIGHTLY
COMMUNITY
Start: 2015-01-14

## 2018-02-14 RX ORDER — PHENYTOIN SODIUM 100 MG/1
200 CAPSULE, EXTENDED RELEASE ORAL 2 TIMES DAILY
COMMUNITY
Start: 2014-08-06

## 2018-02-14 RX ORDER — AMIODARONE HYDROCHLORIDE 200 MG/1
200 TABLET ORAL
COMMUNITY
Start: 2015-01-14

## 2018-02-14 RX ORDER — TRAVOPROST OPHTHALMIC SOLUTION 0.04 MG/ML
1 SOLUTION OPHTHALMIC
Status: ON HOLD | COMMUNITY
Start: 2015-01-14 | End: 2018-02-22 | Stop reason: HOSPADM

## 2018-02-14 RX ORDER — CLINDAMYCIN HYDROCHLORIDE 150 MG/1
CAPSULE ORAL
Status: ON HOLD | COMMUNITY
Start: 2018-01-31 | End: 2018-02-22 | Stop reason: HOSPADM

## 2018-02-14 RX ORDER — SPIRONOLACTONE 25 MG/1
25 TABLET ORAL DAILY
COMMUNITY
Start: 2015-01-14

## 2018-02-14 RX ORDER — COLCHICINE 0.6 MG/1
0.6 TABLET ORAL 2 TIMES DAILY
COMMUNITY
Start: 2018-01-31

## 2018-02-14 RX ORDER — LISINOPRIL 20 MG/1
20 TABLET ORAL
COMMUNITY
Start: 2015-01-14 | End: 2018-06-13

## 2018-02-14 RX ORDER — WARFARIN SODIUM 5 MG/1
TABLET ORAL
COMMUNITY
Start: 2015-03-23 | End: 2018-02-14

## 2018-02-14 RX ORDER — POTASSIUM CHLORIDE 20 MEQ/1
TABLET, EXTENDED RELEASE ORAL
Refills: 0 | Status: ON HOLD | COMMUNITY
Start: 2017-12-14 | End: 2018-02-22 | Stop reason: HOSPADM

## 2018-02-14 NOTE — TELEPHONE ENCOUNTER
----- Message from María Gaviria LPN sent at 2/8/2018  1:52 PM CST -----  Octreotide 20mg monthly times 6.  DX-D50.0.    Thanks.

## 2018-02-14 NOTE — PROGRESS NOTES
[facility]    CC: GI bleeding    HPI 72 y.o. male here with a history of recurrent, severe, overt, obscure GI bleeding associated with severe symptomatic anemia.  He has had recurrent admissions between [facility] and here with severe anemia but no clear source of GI blood loss noted on multiple endoscopic evaluations including device assisted enteroscopy.  He presents today for follow up with no endorsement of active GI bleeding or any other complaints.    Past records reviewed.       Past Medical History:   Diagnosis Date    Asthma     Cardiac defibrillator in place     CHF (congestive heart failure)     Gout     Hypertension     Seizures     Stroke          Review of Systems  General ROS: negative for chills, fever or weight loss  Cardiovascular ROS: no chest pain or dyspnea on exertion  Gastrointestinal ROS: no abdominal pain, change in bowel habits, or black/ bloody stools    Physical Examination  /67   Pulse (!) 130   Temp 99.8 °F (37.7 °C) (Oral)   Ht 6' (1.829 m)   Wt 76.5 kg (168 lb 12.2 oz)   BMI 22.89 kg/m²   General appearance: alert, cooperative, no distress  HENT: Normocephalic, atraumatic, neck symmetrical, no nasal discharge   Lungs: clear to auscultation bilaterally, no dullness to percussion bilaterally  Heart: regular rate and rhythm without rub; no displacement of the PMI   Abdomen: soft, non-tender; bowel sounds normoactive; no organomegaly  Extremities: extremities symmetric; no clubbing, cyanosis, or edema  Neurologic: Alert and oriented X 3, normal strength, normal coordination and gait    Labs:  Lab Results   Component Value Date    WBC 5.42 02/11/2018    HGB 9.4 (L) 02/11/2018    HCT 29.3 (L) 02/11/2018    MCV 85 02/11/2018     02/11/2018         Imaging: no new imaging to review    Assessment:   72 year old male with recurrent overt obscure GI bleeding with suspected small bowel source with previous video capsule at [facility] clearly showing bright red blood in the  mid-distal ileum. Will plan to start sandostatin LAR and this has been arranged and he should receive a call from the infusion center here in [place].  He was instructed to return to [place] for any overt bleeding for emergent capsule vs. Enteroscopy.  He will likely require provocation in the future.     Plan:  1. Continue iron therapy  2. 20 mg IM sandostatin LAR monthly  3. Return to [place] for any overt bleeding or worsening anemia with plans for repeat video capsule as next step then repeat upper and/or lower DBE with potential provocative testing as needed     Ra Soto MD   [address] [place] [address], [address]   [place] [address] [phone]

## 2018-02-14 NOTE — PATIENT INSTRUCTIONS
Infusion Staff will contact with date and time of Octreotide injections    Contact clinic with additional bleeding issues

## 2018-02-20 ENCOUNTER — INFUSION (OUTPATIENT)
Dept: INFUSION THERAPY | Facility: HOSPITAL | Age: 73
DRG: 378 | End: 2018-02-20
Attending: INTERNAL MEDICINE
Payer: MEDICARE

## 2018-02-20 ENCOUNTER — HOSPITAL ENCOUNTER (INPATIENT)
Facility: HOSPITAL | Age: 73
LOS: 2 days | Discharge: HOME HEALTH | DRG: 378 | End: 2018-02-22
Attending: EMERGENCY MEDICINE | Admitting: FAMILY MEDICINE
Payer: MEDICARE

## 2018-02-20 VITALS
SYSTOLIC BLOOD PRESSURE: 105 MMHG | RESPIRATION RATE: 18 BRPM | TEMPERATURE: 99 F | DIASTOLIC BLOOD PRESSURE: 56 MMHG | HEART RATE: 135 BPM

## 2018-02-20 DIAGNOSIS — D64.9 ANEMIA, UNSPECIFIED TYPE: ICD-10-CM

## 2018-02-20 DIAGNOSIS — D50.9 IRON DEFICIENCY ANEMIA, UNSPECIFIED IRON DEFICIENCY ANEMIA TYPE: ICD-10-CM

## 2018-02-20 DIAGNOSIS — D50.0 BLOOD LOSS ANEMIA: Primary | ICD-10-CM

## 2018-02-20 DIAGNOSIS — K92.2 GASTROINTESTINAL HEMORRHAGE, UNSPECIFIED GASTROINTESTINAL HEMORRHAGE TYPE: Primary | ICD-10-CM

## 2018-02-20 DIAGNOSIS — I47.10 SVT (SUPRAVENTRICULAR TACHYCARDIA): ICD-10-CM

## 2018-02-20 DIAGNOSIS — I27.20 PULMONARY HYPERTENSION: ICD-10-CM

## 2018-02-20 DIAGNOSIS — I47.20 VENTRICULAR TACHYCARDIA: ICD-10-CM

## 2018-02-20 DIAGNOSIS — I50.42 CHRONIC COMBINED SYSTOLIC AND DIASTOLIC HEART FAILURE: ICD-10-CM

## 2018-02-20 LAB
ABO + RH BLD: NORMAL
ALBUMIN SERPL BCP-MCNC: 3.1 G/DL
ALP SERPL-CCNC: 305 U/L
ALT SERPL W/O P-5'-P-CCNC: 41 U/L
ANION GAP SERPL CALC-SCNC: 7 MMOL/L
ANISOCYTOSIS BLD QL SMEAR: SLIGHT
AST SERPL-CCNC: 28 U/L
BASOPHILS # BLD AUTO: 0.02 K/UL
BASOPHILS NFR BLD: 0.2 %
BILIRUB SERPL-MCNC: 0.3 MG/DL
BILIRUB UR QL STRIP: NEGATIVE
BLD GP AB SCN CELLS X3 SERPL QL: NORMAL
BUN SERPL-MCNC: 38 MG/DL
CALCIUM SERPL-MCNC: 8.4 MG/DL
CHLORIDE SERPL-SCNC: 100 MMOL/L
CLARITY UR: CLEAR
CO2 SERPL-SCNC: 29 MMOL/L
COLOR UR: YELLOW
CREAT SERPL-MCNC: 1.5 MG/DL
DACRYOCYTES BLD QL SMEAR: ABNORMAL
DIFFERENTIAL METHOD: ABNORMAL
EOSINOPHIL # BLD AUTO: 0.1 K/UL
EOSINOPHIL NFR BLD: 1.4 %
ERYTHROCYTE [DISTWIDTH] IN BLOOD BY AUTOMATED COUNT: 18.5 %
EST. GFR  (AFRICAN AMERICAN): 53 ML/MIN/1.73 M^2
EST. GFR  (NON AFRICAN AMERICAN): 46 ML/MIN/1.73 M^2
GLUCOSE SERPL-MCNC: 92 MG/DL
GLUCOSE UR QL STRIP: NEGATIVE
HCT VFR BLD AUTO: 19.5 %
HGB BLD-MCNC: 6 G/DL
HGB UR QL STRIP: NEGATIVE
HYPOCHROMIA BLD QL SMEAR: ABNORMAL
KETONES UR QL STRIP: NEGATIVE
LACTATE SERPL-SCNC: 2.3 MMOL/L
LEUKOCYTE ESTERASE UR QL STRIP: NEGATIVE
LYMPHOCYTES # BLD AUTO: 0.7 K/UL
LYMPHOCYTES NFR BLD: 7.9 %
MAGNESIUM SERPL-MCNC: 2.4 MG/DL
MCH RBC QN AUTO: 28.4 PG
MCHC RBC AUTO-ENTMCNC: 30.8 G/DL
MCV RBC AUTO: 92 FL
MONOCYTES # BLD AUTO: 0.9 K/UL
MONOCYTES NFR BLD: 11.1 %
NEUTROPHILS # BLD AUTO: 6.6 K/UL
NEUTROPHILS NFR BLD: 79 %
NITRITE UR QL STRIP: NEGATIVE
OVALOCYTES BLD QL SMEAR: ABNORMAL
PH UR STRIP: 5 [PH] (ref 5–8)
PLATELET # BLD AUTO: 480 K/UL
PLATELET BLD QL SMEAR: ABNORMAL
PMV BLD AUTO: 9.3 FL
POIKILOCYTOSIS BLD QL SMEAR: SLIGHT
POLYCHROMASIA BLD QL SMEAR: ABNORMAL
POTASSIUM SERPL-SCNC: 5.6 MMOL/L
PROT SERPL-MCNC: 6.3 G/DL
PROT UR QL STRIP: NEGATIVE
RBC # BLD AUTO: 2.11 M/UL
SODIUM SERPL-SCNC: 136 MMOL/L
SP GR UR STRIP: <=1.005 (ref 1–1.03)
TARGETS BLD QL SMEAR: ABNORMAL
TROPONIN I SERPL DL<=0.01 NG/ML-MCNC: 0.02 NG/ML
URN SPEC COLLECT METH UR: ABNORMAL
UROBILINOGEN UR STRIP-ACNC: NEGATIVE EU/DL
WBC # BLD AUTO: 8.37 K/UL

## 2018-02-20 PROCEDURE — 36430 TRANSFUSION BLD/BLD COMPNT: CPT

## 2018-02-20 PROCEDURE — 99285 EMERGENCY DEPT VISIT HI MDM: CPT | Mod: 25

## 2018-02-20 PROCEDURE — 86850 RBC ANTIBODY SCREEN: CPT

## 2018-02-20 PROCEDURE — 81003 URINALYSIS AUTO W/O SCOPE: CPT

## 2018-02-20 PROCEDURE — 80053 COMPREHEN METABOLIC PANEL: CPT

## 2018-02-20 PROCEDURE — 36415 COLL VENOUS BLD VENIPUNCTURE: CPT

## 2018-02-20 PROCEDURE — 96372 THER/PROPH/DIAG INJ SC/IM: CPT

## 2018-02-20 PROCEDURE — 83605 ASSAY OF LACTIC ACID: CPT

## 2018-02-20 PROCEDURE — P9021 RED BLOOD CELLS UNIT: HCPCS

## 2018-02-20 PROCEDURE — 84484 ASSAY OF TROPONIN QUANT: CPT

## 2018-02-20 PROCEDURE — 83735 ASSAY OF MAGNESIUM: CPT

## 2018-02-20 PROCEDURE — 85025 COMPLETE CBC W/AUTO DIFF WBC: CPT

## 2018-02-20 PROCEDURE — 11000001 HC ACUTE MED/SURG PRIVATE ROOM

## 2018-02-20 PROCEDURE — 25000003 PHARM REV CODE 250: Performed by: EMERGENCY MEDICINE

## 2018-02-20 PROCEDURE — 93005 ELECTROCARDIOGRAM TRACING: CPT

## 2018-02-20 PROCEDURE — 86920 COMPATIBILITY TEST SPIN: CPT

## 2018-02-20 PROCEDURE — 83036 HEMOGLOBIN GLYCOSYLATED A1C: CPT

## 2018-02-20 PROCEDURE — 63600175 PHARM REV CODE 636 W HCPCS: Mod: JG | Performed by: INTERNAL MEDICINE

## 2018-02-20 RX ORDER — IBUPROFEN 200 MG
16 TABLET ORAL
Status: DISCONTINUED | OUTPATIENT
Start: 2018-02-20 | End: 2018-02-22 | Stop reason: HOSPADM

## 2018-02-20 RX ORDER — GLUCAGON 1 MG
1 KIT INJECTION
Status: DISCONTINUED | OUTPATIENT
Start: 2018-02-20 | End: 2018-02-22 | Stop reason: HOSPADM

## 2018-02-20 RX ORDER — PHENYTOIN SODIUM 100 MG/1
200 CAPSULE, EXTENDED RELEASE ORAL DAILY
Status: DISCONTINUED | OUTPATIENT
Start: 2018-02-21 | End: 2018-02-22 | Stop reason: HOSPADM

## 2018-02-20 RX ORDER — ONDANSETRON 2 MG/ML
4 INJECTION INTRAMUSCULAR; INTRAVENOUS EVERY 8 HOURS PRN
Status: DISCONTINUED | OUTPATIENT
Start: 2018-02-20 | End: 2018-02-22 | Stop reason: HOSPADM

## 2018-02-20 RX ORDER — IBUPROFEN 200 MG
24 TABLET ORAL
Status: DISCONTINUED | OUTPATIENT
Start: 2018-02-20 | End: 2018-02-22 | Stop reason: HOSPADM

## 2018-02-20 RX ORDER — PANTOPRAZOLE SODIUM 40 MG/10ML
40 INJECTION, POWDER, LYOPHILIZED, FOR SOLUTION INTRAVENOUS 2 TIMES DAILY
Status: DISCONTINUED | OUTPATIENT
Start: 2018-02-20 | End: 2018-02-22 | Stop reason: HOSPADM

## 2018-02-20 RX ORDER — SODIUM CHLORIDE 0.9 % (FLUSH) 0.9 %
5 SYRINGE (ML) INJECTION
Status: DISCONTINUED | OUTPATIENT
Start: 2018-02-20 | End: 2018-02-22 | Stop reason: HOSPADM

## 2018-02-20 RX ORDER — SIMVASTATIN 40 MG/1
40 TABLET, FILM COATED ORAL NIGHTLY
Status: DISCONTINUED | OUTPATIENT
Start: 2018-02-20 | End: 2018-02-22 | Stop reason: HOSPADM

## 2018-02-20 RX ORDER — HYDROCODONE BITARTRATE AND ACETAMINOPHEN 500; 5 MG/1; MG/1
TABLET ORAL
Status: DISCONTINUED | OUTPATIENT
Start: 2018-02-20 | End: 2018-02-22 | Stop reason: HOSPADM

## 2018-02-20 RX ADMIN — OCTREOTIDE ACETATE 20 MG: KIT at 01:02

## 2018-02-20 RX ADMIN — SODIUM CHLORIDE 1000 ML: 0.9 INJECTION, SOLUTION INTRAVENOUS at 06:02

## 2018-02-20 NOTE — NURSING
Patient arrived for 1st Marc shot, C/O of fatigue, weakness, SOB.  VS in chart.  Dr Soto notified and Pt escorted to ER for further evaluation.  Pt tolerated Marc injection to Right buttock with no issues.

## 2018-02-20 NOTE — ED PROVIDER NOTES
Encounter Date: 2/20/2018    SCRIBE #1 NOTE: I, Ansley Moulton, am scribing for, and in the presence of,  Dr. Teague. I have scribed the entire note.       History     Chief Complaint   Patient presents with    Fatigue     Patient was brought in from [facility] center after receiving sandostatin injection for GI bleed that is beeing treated by Dr. Soto.  Patient drove himself to clinic, but became weak, fatigue and short of breath.  Was told by Dr. Soto to come in ED     Time patient was seen by the provider: 2:46 PM      The patient is a 72 y.o. male with co-morbidities including asthma, gout, CHF, CVA, HTN, and seizures who presents to the ED with a complaint of fatigue for the past 2 days. The patient went in for a scheduled Sandostatin injection at the GI doctor today, and reports generalized weakness, dizziness, and low BP. Concerned, Dr. Soto (his GI doctor) sent him here. The patient reports chronic GI bleed and dark blood in stool. He states he has had multiple blood transfusions over the past several months. He says he was scoped a few months ago. He denies N/V/D, hematochezia, fever, body aches, CP, or Abd pain. He reports no new medications.               Review of patient's allergies indicates:   Allergen Reactions    Coreg [carvedilol] Palpitations     Palpitations^     Past Medical History:   Diagnosis Date    Asthma     Cardiac defibrillator in place     CHF (congestive heart failure)     Gout     Hypertension     Seizures     Stroke      Past Surgical History:   Procedure Laterality Date    CARDIAC SURGERY      year 2000    TONSILLECTOMY       Family History   Problem Relation Age of Onset    No Known Problems Mother     No Known Problems Father     No Known Problems Maternal Grandmother     No Known Problems Maternal Grandfather     No Known Problems Paternal Grandmother     No Known Problems Paternal Grandfather      Social History   Substance Use Topics    Smoking  status: Current Every Day Smoker     Years: 28.00     Types: Cigars    Smokeless tobacco: Not on file      Comment: pt smoke a cigar 2x weekly    Alcohol use No      Comment: socially     Review of Systems   Constitutional: Positive for activity change and fatigue. Negative for appetite change, diaphoresis and fever.   HENT: Negative for congestion and sore throat.    Respiratory: Negative for chest tightness and shortness of breath.    Cardiovascular: Negative for chest pain, palpitations and leg swelling.   Gastrointestinal: Negative for abdominal pain, nausea and vomiting.   Endocrine: Negative for polydipsia and polyphagia.   Genitourinary: Negative for difficulty urinating, dysuria and hematuria.   Musculoskeletal: Negative for back pain.   Skin: Positive for pallor. Negative for rash.   Neurological: Positive for dizziness and weakness. Negative for syncope and numbness.   Hematological: Does not bruise/bleed easily.   Psychiatric/Behavioral: The patient is not nervous/anxious.    All other systems reviewed and are negative.      Physical Exam     Initial Vitals [02/20/18 1428]   BP Pulse Resp Temp SpO2   (!) 83/50 (!) 113 18 98.3 °F (36.8 °C) 98 %      MAP       61         Physical Exam    Nursing note and vitals reviewed.  Constitutional: He appears well-developed and well-nourished.   Pale     HENT:   Head: Normocephalic and atraumatic.   Eyes:   Pale conjunctiva    Neck: Normal range of motion. Neck supple.   Cardiovascular: Normal rate, regular rhythm, normal heart sounds and intact distal pulses. Exam reveals no gallop and no friction rub.    No murmur heard.  Pulmonary/Chest: Breath sounds normal. He has no wheezes. He has no rhonchi. He has no rales.   Abdominal: Soft. Bowel sounds are normal. He exhibits no distension. There is no tenderness.   Musculoskeletal: Normal range of motion. He exhibits edema.   Bilateral pitting edema up to the level of knees   Neurological: He is alert and oriented to  person, place, and time. He has normal strength.   Skin: Skin is warm and dry. No rash noted. No erythema. There is pallor.   Psychiatric: He has a normal mood and affect. His behavior is normal. Judgment and thought content normal.         ED Course   Critical Care  Date/Time: 2/20/2018 7:53 PM  Performed by: JENNY DENNIS  Authorized by: STACEY SWARTZ   Direct patient critical care time: 12 minutes  Additional history critical care time: 5 minutes  Ordering / reviewing critical care time: 12 minutes  Documentation critical care time: 12 minutes  Consulting other physicians critical care time: 3 minutes  Total critical care time (exclusive of procedural time) : 44 minutes  Critical care was time spent personally by me on the following activities: examination of patient, ordering and performing treatments and interventions, ordering and review of radiographic studies, ordering and review of laboratory studies and obtaining history from patient or surrogate.        Labs Reviewed   CBC W/ AUTO DIFFERENTIAL - Abnormal; Notable for the following:        Result Value    RBC 2.11 (*)     Hemoglobin 6.0 (*)     Hematocrit 19.5 (*)     MCHC 30.8 (*)     RDW 18.5 (*)     Platelets 480 (*)     Lymph # 0.7 (*)     Gran% 79.0 (*)     Lymph% 7.9 (*)     All other components within normal limits    Narrative:     HCT critical result(s) called and verbal readback obtained from   Cassidy Soler RN on 02/20/18 at 18:22 by Vira Hickey,   02/20/2018 18:22   COMPREHENSIVE METABOLIC PANEL - Abnormal; Notable for the following:     Potassium 5.6 (*)     BUN, Bld 38 (*)     Creatinine 1.5 (*)     Calcium 8.4 (*)     Albumin 3.1 (*)     Alkaline Phosphatase 305 (*)     Anion Gap 7 (*)     eGFR if  53 (*)     eGFR if non  46 (*)     All other components within normal limits   LACTIC ACID, PLASMA - Abnormal; Notable for the following:     Lactate (Lactic Acid) 2.3 (*)     All other  components within normal limits   TROPONIN I   MAGNESIUM   URINALYSIS   TYPE & SCREEN     Imaging Results    None         EKG Readings: (Independently Interpreted)   Rhythm: Sinus Tachycardia. Heart Rate: 112. T Waves Flipped: V5, V6, AVF and II. Clinical Impression: Sinus Tachycardia   No new EKG changes today compared to previous.           Medical Decision Making:   Initial Assessment:   Patient is a 72 y.o. male who presents to the ED with a complaint of fatigue since Sunday. Went in for a scheduled injection at the GI doctor today, and reports weakness and low BP. Reports chronic GI bleed and dark blood in stool. Has had multiple blood transfusions over the past several months. Was scoped a few months ago. Denies N/V/D, hematochezia, fever, body aches, CP, or Abd pain. No new medications.     Differential Diagnosis:   Acute blood loss anemia, chronic GI bleed, metabolic derangement, dehydration, infection process, sepsis, acute coronary syndrome  Clinical Tests:   Lab Tests: Ordered and Reviewed  ED Management:  Order labs and EKG.    Pt signed out to Dr. Fermin with all labs pending at 1720    1832  Discussed with [facility] resident to admit.                      Clinical Impression:     1. Anemia, unspecified type    2. Gastrointestinal hemorrhage, unspecified gastrointestinal hemorrhage type            I, Dr. Cindy Teague, personally performed the services described in this documentation. All medical record entries made by the scribe were at my direction and in my presence.  I have reviewed the chart and agree that the record reflects my personal performance and is accurate and complete. Cindy Teague MD.  5:24 PM 02/20/2018                               Matt Fermin MD  02/20/18 1838       Matt Fermin MD  02/20/18 1953

## 2018-02-21 LAB
ALBUMIN SERPL BCP-MCNC: 2.8 G/DL
ALP SERPL-CCNC: 255 U/L
ALT SERPL W/O P-5'-P-CCNC: 30 U/L
ANION GAP SERPL CALC-SCNC: 8 MMOL/L
AST SERPL-CCNC: 21 U/L
BASOPHILS # BLD AUTO: 0.02 K/UL
BASOPHILS NFR BLD: 0.2 %
BILIRUB SERPL-MCNC: 0.7 MG/DL
BUN SERPL-MCNC: 37 MG/DL
CALCIUM SERPL-MCNC: 7.8 MG/DL
CHLORIDE SERPL-SCNC: 99 MMOL/L
CO2 SERPL-SCNC: 26 MMOL/L
CREAT SERPL-MCNC: 1.4 MG/DL
DIFFERENTIAL METHOD: ABNORMAL
EOSINOPHIL # BLD AUTO: 0.1 K/UL
EOSINOPHIL NFR BLD: 1.4 %
ERYTHROCYTE [DISTWIDTH] IN BLOOD BY AUTOMATED COUNT: 17 %
EST. GFR  (AFRICAN AMERICAN): 58 ML/MIN/1.73 M^2
EST. GFR  (NON AFRICAN AMERICAN): 50 ML/MIN/1.73 M^2
ESTIMATED AVG GLUCOSE: 74 MG/DL
FERRITIN SERPL-MCNC: 60 NG/ML
GLUCOSE SERPL-MCNC: 91 MG/DL
HBA1C MFR BLD HPLC: 4.2 %
HCT VFR BLD AUTO: 23.2 %
HGB BLD-MCNC: 7 G/DL
INR PPP: 1.1
IRON SERPL-MCNC: 36 UG/DL
LYMPHOCYTES # BLD AUTO: 0.4 K/UL
LYMPHOCYTES NFR BLD: 4 %
MAGNESIUM SERPL-MCNC: 2.1 MG/DL
MCH RBC QN AUTO: 27.5 PG
MCHC RBC AUTO-ENTMCNC: 30.2 G/DL
MCV RBC AUTO: 91 FL
MONOCYTES # BLD AUTO: 0.9 K/UL
MONOCYTES NFR BLD: 8.9 %
NEUTROPHILS # BLD AUTO: 8.2 K/UL
NEUTROPHILS NFR BLD: 85.2 %
PHOSPHATE SERPL-MCNC: 3.9 MG/DL
PLATELET # BLD AUTO: 419 K/UL
PMV BLD AUTO: 9.5 FL
POTASSIUM SERPL-SCNC: 4.9 MMOL/L
PROT SERPL-MCNC: 5.7 G/DL
PROTHROMBIN TIME: 11.3 SEC
RBC # BLD AUTO: 2.55 M/UL
SATURATED IRON: 9 %
SODIUM SERPL-SCNC: 133 MMOL/L
TOTAL IRON BINDING CAPACITY: 398 UG/DL
TRANSFERRIN SERPL-MCNC: 269 MG/DL
WBC # BLD AUTO: 9.62 K/UL

## 2018-02-21 PROCEDURE — C9113 INJ PANTOPRAZOLE SODIUM, VIA: HCPCS | Performed by: FAMILY MEDICINE

## 2018-02-21 PROCEDURE — 91110 GI TRC IMG INTRAL ESOPH-ILE: CPT | Performed by: INTERNAL MEDICINE

## 2018-02-21 PROCEDURE — 80053 COMPREHEN METABOLIC PANEL: CPT

## 2018-02-21 PROCEDURE — 36415 COLL VENOUS BLD VENIPUNCTURE: CPT

## 2018-02-21 PROCEDURE — 63600175 PHARM REV CODE 636 W HCPCS: Performed by: INTERNAL MEDICINE

## 2018-02-21 PROCEDURE — 85025 COMPLETE CBC W/AUTO DIFF WBC: CPT

## 2018-02-21 PROCEDURE — 83540 ASSAY OF IRON: CPT

## 2018-02-21 PROCEDURE — 85610 PROTHROMBIN TIME: CPT

## 2018-02-21 PROCEDURE — 36430 TRANSFUSION BLD/BLD COMPNT: CPT

## 2018-02-21 PROCEDURE — 82728 ASSAY OF FERRITIN: CPT

## 2018-02-21 PROCEDURE — 25000003 PHARM REV CODE 250: Performed by: FAMILY MEDICINE

## 2018-02-21 PROCEDURE — 63600175 PHARM REV CODE 636 W HCPCS: Performed by: FAMILY MEDICINE

## 2018-02-21 PROCEDURE — 83735 ASSAY OF MAGNESIUM: CPT

## 2018-02-21 PROCEDURE — 94761 N-INVAS EAR/PLS OXIMETRY MLT: CPT

## 2018-02-21 PROCEDURE — 11000001 HC ACUTE MED/SURG PRIVATE ROOM

## 2018-02-21 PROCEDURE — 84100 ASSAY OF PHOSPHORUS: CPT

## 2018-02-21 RX ORDER — PHENYTOIN SODIUM 100 MG/1
200 CAPSULE, EXTENDED RELEASE ORAL ONCE
Status: COMPLETED | OUTPATIENT
Start: 2018-02-21 | End: 2018-02-21

## 2018-02-21 RX ORDER — METOCLOPRAMIDE HYDROCHLORIDE 5 MG/ML
10 INJECTION INTRAMUSCULAR; INTRAVENOUS ONCE
Status: DISCONTINUED | OUTPATIENT
Start: 2018-02-21 | End: 2018-02-21

## 2018-02-21 RX ORDER — METOCLOPRAMIDE HYDROCHLORIDE 5 MG/ML
10 INJECTION INTRAMUSCULAR; INTRAVENOUS ONCE
Status: COMPLETED | OUTPATIENT
Start: 2018-02-21 | End: 2018-02-21

## 2018-02-21 RX ADMIN — PHENYTOIN SODIUM 200 MG: 100 CAPSULE, EXTENDED RELEASE ORAL at 09:02

## 2018-02-21 RX ADMIN — METOCLOPRAMIDE 10 MG: 5 INJECTION, SOLUTION INTRAMUSCULAR; INTRAVENOUS at 03:02

## 2018-02-21 RX ADMIN — PANTOPRAZOLE SODIUM 40 MG: 40 INJECTION, POWDER, FOR SOLUTION INTRAVENOUS at 09:02

## 2018-02-21 RX ADMIN — SIMVASTATIN 40 MG: 40 TABLET, FILM COATED ORAL at 12:02

## 2018-02-21 RX ADMIN — PHENYTOIN SODIUM 200 MG: 100 CAPSULE, EXTENDED RELEASE ORAL at 04:02

## 2018-02-21 RX ADMIN — SIMVASTATIN 40 MG: 40 TABLET, FILM COATED ORAL at 08:02

## 2018-02-21 RX ADMIN — PANTOPRAZOLE SODIUM 40 MG: 40 INJECTION, POWDER, FOR SOLUTION INTRAVENOUS at 08:02

## 2018-02-21 NOTE — PLAN OF CARE
Patient States floyd he follow up @ [facility] . Patient of Dr Soto. States he was sent after doctors appointment due to weakness.      Future Appointments  Date Time Provider Department Center   3/20/2018 2:00 PM CHAIR 07 [facility]MO [facility]           02/21/18 1921   Discharge Assessment   Assessment Type Final Discharge Note   Confirmed/corrected address and phone number on facesheet? Yes   Assessment information obtained from? Patient   Communicated expected length of stay with patient/caregiver yes   Prior to hospitilization cognitive status: Alert/Oriented   Prior to hospitalization functional status: Independent;Assistive Equipment   Current cognitive status: Alert/Oriented   Current Functional Status: Independent;Assistive Equipment   Facility Arrived From: Dr Soto office   Lives With alone  (has room mate)   Able to Return to Prior Arrangements yes   Is patient able to care for self after discharge? Yes   Patient's perception of discharge disposition home or selfcare;home health   Readmission Within The Last 30 Days other (see comments)   Patient currently being followed by outpatient case management? No   Patient currently receives any other outside agency services? No   Equipment Currently Used at Home cane, straight   Do you have any problems affording any of your prescribed medications? No   Is the patient taking medications as prescribed? yes   Does the patient have transportation home? Yes   Transportation Available family or friend will provide  (daughter available to  patient upon discharge)   Does the patient receive services at the [facility]? No   Discharge Plan A Home Health;Home   Discharge Plan B Home;Home Health   Patient/Family In Agreement With Plan yes

## 2018-02-21 NOTE — PLAN OF CARE
02/21/18 1713   Readmission Questionnaire   At the time of your discharge, did someone talk to you about what your health problems were? Yes   At the time of discharge, did someone talk to you about what to watch out for regarding worsening of your health problem? Yes   At the time of discharge, did someone talk to you about what to do if you experienced worsening of your health problem? Yes   At the time of discharge, did someone talk to you about which medication to take when you left the hospital and which ones to stop taking? Yes   At the time of discharge, did someone talk to you about when and where to follow up with a doctor after you left the hospital? Yes   What do you believe caused you to be sick enough to be re-admitted? Doctors office sent me   How often do you need to have someone help you when you read instructions, pamphlets, or other written material from your doctor or pharmacy? Sometimes   Do you have problems taking your medications as prescribed? No   Do you have problems obtaining/receiving your medications? No   Does the patient have transportation to healthcare appointments? Yes   Lives With spouse   Living Arrangements house   Does the patient have family/friends to help with healtcare needs after discharge? yes   Who are your caregiver(s) and their phone number(s)? n/a   Does your caregiver provide all the help you need? Yes  (n/a)   Are you currently feeling confused? No   Are you currently having problems thinking? No   Have you felt down, depressed, or hopeless? 0   Have you felt little interest or pleasure in doing things? 0   In the last 7 days, my sleep quality was: good

## 2018-02-21 NOTE — PROGRESS NOTES
.Pharmacy New Medication Education    Patient accepted medication education.    Pharmacy educated patient on name and purpose of medications and possible side effects, using the teach-back method.     Current Inpatient Medication Orders   0.9% NaCl infusion (for blood administration)   dextrose 50% injection 12.5 g   dextrose 50% injection 25 g   glucagon (human recombinant) injection 1 mg   glucose chewable tablet 16 g   glucose chewable tablet 24 g   ondansetron injection 4 mg   pantoprazole injection 40 mg   phenytoin (DILANTIN) ER capsule 200 mg   simvastatin tablet 40 mg   sodium chloride 0.9% flush 5 mL       Learners of pharmacy medication education included:  Patient    Patient +/- learner response:  Verbalized Understanding, Teachback

## 2018-02-21 NOTE — ED NOTES
Notified Zander GARCIA of pt BLE edema and gave ok to bolus IVF, notified of low h & H and K levels. No new orders given. Will continue to monitor pt. Zander GARCIA gave ok for juice and ice chips PO intake.

## 2018-02-21 NOTE — ED NOTES
Pt tolerating blood transfusion. NAD noted. Pt eating dinner per admitting team ok to eat prior to 0000. Will continue to monitor pt.

## 2018-02-21 NOTE — CONSULTS
[facility]    CC: GI bleeding    HPI 72 y.o. male with recurrent, intermittent, severe GI blood loss thought secondary to a distal small bowel source.  He has undergone VCE demonstrating overt GI blood loss, but no source was found on lower DBE.  He was recently started on Sandostatin LAR monthly and was at the infusion center for his injection when he was found to be short of breath and subsequently brought to the ED and was found to have a hemoglobin/hematocrit of 6/19.  No other new complaints a this time and he has since been transfused with appropriate response.  He reports his stools are now normal in appearance.     Past records reviewed.       Past Medical History:   Diagnosis Date    Asthma     Cardiac defibrillator in place     CHF (congestive heart failure)     Gout     Hypertension     Seizures     Stroke          Review of Systems  General ROS: negative for chills, fever or weight loss  Cardiovascular ROS: no chest pain or dyspnea on exertion  Gastrointestinal ROS: no abdominal pain, change in bowel habits, or black/ bloody stools    Physical Examination  BP (!) 105/55 (Patient Position: Lying)   Pulse 77   Temp 96.1 °F (35.6 °C) (Oral)   Resp 19   Ht 6' (1.829 m)   Wt 78.2 kg (172 lb 6.4 oz)   SpO2 98%   BMI 23.38 kg/m²   General appearance: alert, cooperative, no distress  HENT: Normocephalic, atraumatic, neck symmetrical, no nasal discharge   Lungs: clear to auscultation bilaterally, no dullness to percussion bilaterally  Heart: regular rate and rhythm without rub; no displacement of the PMI   Abdomen: soft, non-tender; bowel sounds normoactive; no organomegaly  Extremities: extremities symmetric; no clubbing, cyanosis, or edema  Neurologic: Alert and oriented X 3, normal strength, normal coordination and gait    Labs:  Lab Results   Component Value Date    WBC 9.62 02/21/2018    HGB 7.0 (L) 02/21/2018    HCT 23.2 (L) 02/21/2018    MCV 91 02/21/2018     (H) 02/21/2018          Imaging: CTA 1/11 results and imaging reviewed.     1. No evidence of active contrast extravasation or pseudoaneurysm  2. Right greater than left bilateral pleural effusions    Assessment:   72 yoM with prolonged course of obscure GI bleed suspected small bowel etiology.    Plan:  1. VCE today with 10 mg IV Reglan prior to pill ingestion  2. Plan on device assisted enteroscopy based on results.   3. Continue to monitor H/H and transfuse for Hb <8 with Lasix administration prior to transfusion

## 2018-02-21 NOTE — H&P
[facility]  History & Physical    Subjective:      Chief Complaint/Reason for Admission: GI Bleed    Stefano Granger is a 72 y.o. male with a history of hypertensionm, CHF, seizures, CVA, and GI bleed with no clear evidence of source on multiple recent EGDs at [facility] and here who presented to GI clinic to see Dr bain and was instructed to f/u at ED due to concern for anemia. The patient states the past 2 days he has noted dark melanotic stool and fatigue. The patient was found to have a hemoglobin of 6 in the ED. Otherwise the patient has not other complaints.    2 Units PRBC started in the ED and on evaluation the patient endorsed improved symptoms and requesting a diet. Pt aware of possible endoscopic procedures tomorrow and will be placed NPO.    Past Medical History:   Diagnosis Date    Asthma     Cardiac defibrillator in place     CHF (congestive heart failure)     Gout     Hypertension     Seizures     Stroke      Past Surgical History:   Procedure Laterality Date    CARDIAC SURGERY      year 2000    TONSILLECTOMY       Family History   Problem Relation Age of Onset    No Known Problems Mother     No Known Problems Father     No Known Problems Maternal Grandmother     No Known Problems Maternal Grandfather     No Known Problems Paternal Grandmother     No Known Problems Paternal Grandfather      Social History   Substance Use Topics    Smoking status: Current Every Day Smoker     Years: 28.00     Types: Cigars    Smokeless tobacco: Not on file      Comment: pt smoke a cigar 2x weekly    Alcohol use No      Comment: socially         (Not in a hospital admission)  Review of patient's allergies indicates:   Allergen Reactions    Coreg [carvedilol] Palpitations     Palpitations^        Review of Systems   Constitutional: Positive for malaise/fatigue. Negative for chills and fever.   HENT: Negative for sore throat.    Eyes: Negative for blurred vision.   Respiratory: Negative  for shortness of breath.    Cardiovascular: Negative for chest pain.   Gastrointestinal: Positive for blood in stool and melena. Negative for abdominal pain, nausea and vomiting.   Genitourinary: Negative for dysuria.   Musculoskeletal: Negative for back pain.   Skin: Negative for rash.   Neurological: Negative for focal weakness.   Psychiatric/Behavioral: Negative for depression.       Objective:      Vital Signs (Most Recent)  Temp: 97 °F (36.1 °C) (02/20/18 2141)  Pulse: 81 (02/20/18 2141)  Resp: 18 (02/20/18 2141)  BP: (!) 111/50 (02/20/18 2141)  SpO2: 100 % (02/20/18 2141)    Vital Signs Range (Last 24H):  Temp:  [96.1 °F (35.6 °C)-98.6 °F (37 °C)]   Pulse:  []   Resp:  [18]   BP: ()/(49-58)   SpO2:  [98 %-100 %]     Physical Exam   Constitutional: He is oriented to person, place, and time. He appears well-developed and well-nourished.   HENT:   Head: Normocephalic and atraumatic.   Eyes: EOM are normal. Pupils are equal, round, and reactive to light.   Neck: Normal range of motion. Neck supple.   Cardiovascular: Normal rate, regular rhythm, normal heart sounds and intact distal pulses.    Pulmonary/Chest: Effort normal and breath sounds normal.   Abdominal: Soft. He exhibits no distension.   Musculoskeletal: Normal range of motion.   Neurological: He is alert and oriented to person, place, and time.   Skin: Skin is warm and dry.   Psychiatric: He has a normal mood and affect. His behavior is normal. Judgment and thought content normal.   Nursing note and vitals reviewed.      Assessment:      Pt is a 72 year old male with a history of GI bleed with negative endoscopic workup who is admitted for symptomatic anemia.    Plan:      1. GI bleed  -hemoglobin of 6, will transfuse for goal of 7-8  - q4h H/H  - protonix IV  - f/u GI in AM for possible capsule enteroscopy    2. CALLIE  - bump in Cr form 0.9 to 1.5  - will continue to monitor and anticipate improvement with fluids  - UA pending    3.  Hypertension  - holding BP meds at this time due to hypotension on admit  - on lisonopril, meotprolol, adlactone at home    4. CHF  - documented history of CHF  - no echo on file, on zaroxolyn 5mg every 48 hours and bumex BID, holding due to hypotension on admit  - on heart meds    5. Seizure d/o  - on phenytoin, restarted on admit    6. COPD  - on spiriva and albuterol  - stable    ppx - protonix and SCD    dipso - f/u GI, monitor H/H    Kennedy Murphy MD  PGY-3 FM  10:39 PM

## 2018-02-21 NOTE — PLAN OF CARE
Problem: Patient Care Overview  Goal: Plan of Care Review  Outcome: Ongoing (interventions implemented as appropriate)  Pt in NAD. V/s stable. AAOx4. Scheduled medications given per MAR. PIV saline locked dressing CDI. Pt denies pain and n/v. Pt received 2 units of blood tolerated well. NPO since 12 am. Pt using bedside urinal. Continuous cardiac monitoring in place NSR HR: 70-80s. Encouraged to call for assistance verbalized understanding. Safety maintained bed in low locked position, SR up X2, bed alarm on, and call bell in reach. Will continue to monitor.

## 2018-02-22 VITALS
OXYGEN SATURATION: 97 % | TEMPERATURE: 98 F | BODY MASS INDEX: 23.35 KG/M2 | RESPIRATION RATE: 20 BRPM | SYSTOLIC BLOOD PRESSURE: 129 MMHG | HEART RATE: 110 BPM | HEIGHT: 72 IN | DIASTOLIC BLOOD PRESSURE: 54 MMHG | WEIGHT: 172.38 LBS

## 2018-02-22 LAB
ALBUMIN SERPL BCP-MCNC: 2.9 G/DL
ALP SERPL-CCNC: 282 U/L
ALT SERPL W/O P-5'-P-CCNC: 32 U/L
ANION GAP SERPL CALC-SCNC: 7 MMOL/L
AST SERPL-CCNC: 30 U/L
BASOPHILS # BLD AUTO: 0.01 K/UL
BASOPHILS NFR BLD: 0.2 %
BILIRUB SERPL-MCNC: 0.5 MG/DL
BUN SERPL-MCNC: 24 MG/DL
CALCIUM SERPL-MCNC: 8.4 MG/DL
CHLORIDE SERPL-SCNC: 100 MMOL/L
CO2 SERPL-SCNC: 28 MMOL/L
CREAT SERPL-MCNC: 1 MG/DL
DIFFERENTIAL METHOD: ABNORMAL
EOSINOPHIL # BLD AUTO: 0.2 K/UL
EOSINOPHIL NFR BLD: 3.2 %
ERYTHROCYTE [DISTWIDTH] IN BLOOD BY AUTOMATED COUNT: 17 %
EST. GFR  (AFRICAN AMERICAN): >60 ML/MIN/1.73 M^2
EST. GFR  (NON AFRICAN AMERICAN): >60 ML/MIN/1.73 M^2
GLUCOSE SERPL-MCNC: 94 MG/DL
HCT VFR BLD AUTO: 23.5 %
HGB BLD-MCNC: 7.2 G/DL
LYMPHOCYTES # BLD AUTO: 0.4 K/UL
LYMPHOCYTES NFR BLD: 7.5 %
MAGNESIUM SERPL-MCNC: 2.4 MG/DL
MCH RBC QN AUTO: 27.6 PG
MCHC RBC AUTO-ENTMCNC: 30.6 G/DL
MCV RBC AUTO: 90 FL
MONOCYTES # BLD AUTO: 0.8 K/UL
MONOCYTES NFR BLD: 13.8 %
NEUTROPHILS # BLD AUTO: 4.3 K/UL
NEUTROPHILS NFR BLD: 74.9 %
PHOSPHATE SERPL-MCNC: 3.5 MG/DL
PLATELET # BLD AUTO: 382 K/UL
PMV BLD AUTO: 9 FL
POTASSIUM SERPL-SCNC: 4.8 MMOL/L
PROT SERPL-MCNC: 5.8 G/DL
RBC # BLD AUTO: 2.61 M/UL
SODIUM SERPL-SCNC: 135 MMOL/L
WBC # BLD AUTO: 5.71 K/UL

## 2018-02-22 PROCEDURE — 63600175 PHARM REV CODE 636 W HCPCS: Performed by: FAMILY MEDICINE

## 2018-02-22 PROCEDURE — 36415 COLL VENOUS BLD VENIPUNCTURE: CPT

## 2018-02-22 PROCEDURE — 84100 ASSAY OF PHOSPHORUS: CPT

## 2018-02-22 PROCEDURE — 25000003 PHARM REV CODE 250: Performed by: FAMILY MEDICINE

## 2018-02-22 PROCEDURE — C9113 INJ PANTOPRAZOLE SODIUM, VIA: HCPCS | Performed by: FAMILY MEDICINE

## 2018-02-22 PROCEDURE — 83735 ASSAY OF MAGNESIUM: CPT

## 2018-02-22 PROCEDURE — 94761 N-INVAS EAR/PLS OXIMETRY MLT: CPT

## 2018-02-22 PROCEDURE — 85025 COMPLETE CBC W/AUTO DIFF WBC: CPT

## 2018-02-22 PROCEDURE — 0DJ07ZZ INSPECTION OF UPPER INTESTINAL TRACT, VIA NATURAL OR ARTIFICIAL OPENING: ICD-10-PCS | Performed by: INTERNAL MEDICINE

## 2018-02-22 PROCEDURE — 80053 COMPREHEN METABOLIC PANEL: CPT

## 2018-02-22 RX ORDER — METOPROLOL TARTRATE 50 MG/1
50 TABLET ORAL 2 TIMES DAILY
Status: DISCONTINUED | OUTPATIENT
Start: 2018-02-22 | End: 2018-02-22 | Stop reason: HOSPADM

## 2018-02-22 RX ADMIN — PHENYTOIN SODIUM 200 MG: 100 CAPSULE, EXTENDED RELEASE ORAL at 08:02

## 2018-02-22 RX ADMIN — PANTOPRAZOLE SODIUM 40 MG: 40 INJECTION, POWDER, FOR SOLUTION INTRAVENOUS at 08:02

## 2018-02-22 RX ADMIN — METOPROLOL TARTRATE 50 MG: 50 TABLET ORAL at 08:02

## 2018-02-22 NOTE — DISCHARGE SUMMARY
Discharge Summary      Admit Date: 2/20/2018    Discharge Date and Time: 02/22/2018    Attending Physician: Dr. Henderson     Discharge Physician: Michael Rosenberg    Principal Diagnoses: Anemia  The primary encounter diagnosis was Gastrointestinal hemorrhage, unspecified gastrointestinal hemorrhage type. Diagnoses of Anemia, unspecified type and Iron deficiency anemia, unspecified iron deficiency anemia type were also pertinent to this visit.    Discharged Condition: stable    Hospital Course: Stefano Granger is a 72 y.o. male with pmh  has a past medical history of Asthma; Cardiac defibrillator in place; CHF (congestive heart failure); Gout; Hypertension; Seizures; and Stroke. who presented with Anemia.     Patient presented to ED with dark melanotic stools and fatigue x2 days having had a thorough work-up in the past at [facility].  He was found to have Hgb 6 in ED. He was given 2 units of blood in ED with goal of 7-8 and admitted for GI bleed and CALLIE. He was started protonix and IV fluids.     Held HTN meds due to hypotension on admission. Hospital day 1, patient had a non-bloody BM and H/H remained stable. Capsule enteroscopy started. Hospital day 2, Hgb 7.2. Cr 1.0, CALLIE resolved. VCE showed no active bleeding, with possible single angiectasia. GI recommended octreotide LAR injections outpatient.     Discharge to home with octreotide and follow up with Dr. Soto in clinic for possible repeat endoscopy.    Pt was without chest pain, urinating well, tolerating PO, ambulating and without pain through his stay.  He was to follow-up with his PCP and Dr. Soto in a short interval.    Consults: IP CONSULT TO ANESTHESIOLOGY, GI    Significant Diagnostic Studies: labs, VCE    Treatments: hydration, bowel rest, VCE with no needed intervention, transfusion    Disposition: Home or Self Care    Patient Instructions:   Current Discharge Medication List      CONTINUE these medications which have NOT CHANGED    Details   albuterol 90  "mcg/actuation inhaler Inhale 2 puffs into the lungs every 6 (six) hours as needed for Wheezing. Rescue      amiodarone (PACERONE) 200 MG Tab Take 200 mg by mouth.      bumetanide (BUMEX) 2 MG tablet Take 2 tablets (4 mg total) by mouth 2 (two) times daily.  Qty: 120 tablet, Refills: 11      colchicine 0.6 mg tablet       digoxin (LANOXIN) 125 mcg tablet Take 125 mcg by mouth every other day.      ferrous sulfate 325 (65 FE) MG EC tablet Take 1 tablet (325 mg total) by mouth 3 (three) times daily.  Refills: 0      !! lisinopril (PRINIVIL,ZESTRIL) 2.5 MG tablet Take 2.5 mg by mouth once daily.      !! lisinopril (PRINIVIL,ZESTRIL) 20 MG tablet Take 20 mg by mouth.      metOLazone (ZAROXOLYN) 5 MG tablet Take 1 tablet (5 mg total) by mouth every 48 hours.  Qty: 15 tablet, Refills: 11      metoprolol succinate (TOPROL-XL) 100 MG 24 hr tablet Take 3 tablets (300 mg total) by mouth once daily.  Qty: 90 tablet, Refills: 11      metoprolol tartrate (LOPRESSOR) 100 MG tablet       needle, disp, 21 G 21 gauge x 1 1/2" Ndle 1 each by Misc.(Non-Drug; Combo Route) route every 30 days.  Qty: 12 each, Refills: 0      octreotide lar (SANDOSTATIN LAR) 20 mg injection Inject 20 mg into the muscle every 28 days.  Qty: 1 kit, Refills: 11      pantoprazole (PROTONIX) 20 MG tablet Take 40 mg by mouth.      phenytoin (DILANTIN) 100 MG ER capsule Take 200 mg by mouth.      potassium chloride (KLOR-CON) 10 MEQ TbSR Refills: 0      sildenafil, antihypertensive, (REVATIO) 20 mg Tab Take 1 tablet (20 mg total) by mouth 3 (three) times daily.  Qty: 90 tablet, Refills: 11      simvastatin (ZOCOR) 40 MG tablet Take 40 mg by mouth.      spironolactone (ALDACTONE) 25 MG tablet Take 25 mg by mouth.      syringe, disposable, 20 mL Syrg 1 each by Misc.(Non-Drug; Combo Route) route every 30 days.  Qty: 12 each, Refills: 0      tamsulosin (FLOMAX) 0.4 mg Cp24 Take 1 capsule (0.4 mg total) by mouth once daily.  Qty: 30 capsule, Refills: 11    "   tiotropium (SPIRIVA WITH HANDIHALER) 18 mcg inhalation capsule Inhale 1 capsule (18 mcg total) into the lungs once daily. Controller  Refills: 0      travoprost (TRAVATAN Z) 0.004 % Drop Place 1 drop into both eyes once daily.      vacuum erection device system Kit Take as directed      ZOSTAVAX, PF, 19,400 unit/0.65 mL injection        !! - Potential duplicate medications found. Please discuss with provider.      STOP taking these medications       atorvastatin (LIPITOR) 80 MG tablet Comments:   Reason for Stopping:         clindamycin (CLEOCIN) 150 MG capsule Comments:   Reason for Stopping:         potassium chloride SA (K-DUR,KLOR-CON) 20 MEQ tablet Comments:   Reason for Stopping:                 Discharge Procedure Orders  Ambulatory referral to Gastroenterology   Referral Priority: Routine Referral Type: Consultation   Referral Reason: Specialty Services Required    Requested Specialty: Gastroenterology    Number of Visits Requested: 1      Diet Cardiac     Activity as tolerated     More than 30 minutes spent on discharge.    Michael Rosenberg  02/22/2018  2:06 PM

## 2018-02-22 NOTE — PROGRESS NOTES
Progress Note  [facility]  Admit Date: 2/20/2018   LOS: 1 day   SUBJECTIVE:   Follow-up For: GI bleed    Patient seen and examined this AM. AF. VSS. Bowel movement this AM non-bloody. Wants to eat.    ROS   No CP, SOB, N/V, D/C, no dysuria.    OBJECTIVE:   Vital Signs (Most Recent)  Temp: 97.9 °F (36.6 °C) (02/21/18 1947)  Pulse: 83 (02/21/18 1947)  Resp: 16 (02/21/18 1947)  BP: (!) 110/56 (02/21/18 1947)  SpO2: 98 % (02/21/18 1934)    I & O (Last 24H):  Intake/Output Summary (Last 24 hours) at 02/21/18 2126  Last data filed at 02/21/18 1630   Gross per 24 hour   Intake             1000 ml   Output             1325 ml   Net             -325 ml     Wt Readings from Last 3 Encounters:   02/21/18 78.2 kg (172 lb 6.4 oz)   02/14/18 76.5 kg (168 lb 12.2 oz)   02/11/18 75.3 kg (166 lb 0.1 oz)       Current Diet Order   Procedures    Diet clear liquid        Physical Exam   Constitutional: He is oriented to person, place, and time and well-developed, well-nourished, and in no distress. No distress.   thin   HENT:   Head: Normocephalic.   Eyes: Conjunctivae are normal.   Neck: Normal range of motion.   Cardiovascular: Normal rate and regular rhythm.    Murmur heard.  Pulmonary/Chest: Effort normal and breath sounds normal. No respiratory distress. He has no wheezes. He has no rales.   Abdominal: Soft. Bowel sounds are normal. He exhibits no distension. There is no tenderness. There is no rebound.   Musculoskeletal: Normal range of motion. He exhibits no edema.   Neurological: He is alert and oriented to person, place, and time.   Skin: Skin is warm and dry. He is not diaphoretic. No erythema.   Psychiatric: Affect and judgment normal.   Nursing note and vitals reviewed.    Laboratory Data:  CBC    Recent Labs  Lab 02/20/18  1650 02/21/18  0558   WBC 8.37 9.62   RBC 2.11* 2.55*   HGB 6.0* 7.0*   HCT 19.5* 23.2*   * 419*   MCV 92 91   MCH 28.4 27.5   MCHC 30.8* 30.2*     CMP    Recent Labs  Lab 02/20/18  1654  02/21/18  0558   CALCIUM 8.4* 7.8*   PROT 6.3 5.7*    133*   K 5.6* 4.9   CO2 29 26    99   BUN 38* 37*   CREATININE 1.5* 1.4   ALKPHOS 305* 255*   ALT 41 30   AST 28 21   BILITOT 0.3 0.7     POCT-Glucose  No results found for: POCTGLUCOSE  COAGS    Recent Labs  Lab 02/21/18  0558   INR 1.1     UA  No results for input(s): COLORU, CLARITYU, SPECGRAV, PHUR, PROTEINUA, GLUCOSEU, BLOODU, WBCU, RBCU, BACTERIA, MUCUS in the last 24 hours.    Invalid input(s):  BILIRUBINCON  MICRO  Microbiology Results (last 7 days)     ** No results found for the last 168 hours. **          ASSESSMENT/PLAN:   Stefano Granger is a 72 y.o. male    1. GI bleed  -hemoglobin of 6, will transfuse for goal of 7-8  - protonix IV  - f/u GI for possible capsule enteroscopy today     2. CALLIE  - bump in Cr, now resolved  - will continue to monitor and anticipate improvement with fluids  - UA neg     3. Hypertension  - holding BP meds at this time due to hypotension on admit  - on lisonopril, meotprolol, adlactone at home     4. CHF  - documented history of CHF  - no echo on file, on zaroxolyn 5mg every 48 hours and bumex BID, holding due to hypotension on admit  - on heart meds     5. Seizure d/o  - on phenytoin, restarted on admit     6. COPD  - on spiriva and albuterol  - stable     ppx - protonix and SCD     dipso - f/u GI, monitor H/H    2/21/2018 Michael Rosenberg MD  9:26 PM

## 2018-02-22 NOTE — PROGRESS NOTES
Pt complaining of SOB, stating that he needs his metoprolol and spiriva. Pulse ox 99% on RA, hr 91.

## 2018-02-22 NOTE — PROGRESS NOTES
Pt given discharge instructions and indicated understanding verbally. Pt stated that his ride does not get off til 5pm.

## 2018-02-22 NOTE — PROGRESS NOTES
[facility]    CC: GI bleeding    HPI 72 y.o. male with recurrent, intermittent, severe GI blood loss thought secondary to a distal small bowel source.  He has undergone VCE demonstrating overt GI blood loss, but no source was found on lower DBE.  He was recently started on Sandostatin LAR monthly and was at the infusion center for his injection when he was found to be short of breath and subsequently brought to the ED and was found to have a hemoglobin/hematocrit of 6/19.     Interval history: transfused overnight with appropriate response in hgb. VCE completed. Patient complaining of hunger and wondering why he isn't allowed to eat. Also feeling short of breath which he says is happens when he doesn't take his metoprolol. Denies chest pain, abdominal pain, bleeding.       Past Medical History:   Diagnosis Date    Asthma     Cardiac defibrillator in place     CHF (congestive heart failure)     Gout     Hypertension     Seizures     Stroke          Review of Systems  General ROS: negative for chills, fever or weight loss  Cardiovascular ROS: no chest pain  Gastrointestinal ROS: no abdominal pain, change in bowel habits, or black/ bloody stools    Physical Examination  /73 (Patient Position: Sitting)   Pulse 91   Temp 97.6 °F (36.4 °C) (Oral)   Resp 18   Ht 6' (1.829 m)   Wt 78.2 kg (172 lb 6.4 oz)   SpO2 97%   BMI 23.38 kg/m²   General appearance: alert, cooperative, no distress  HENT: Normocephalic, atraumatic, neck symmetrical, no nasal discharge   Lungs: clear to auscultation bilaterally, no dullness to percussion bilaterally  Heart: regular rate and rhythm without rub; no displacement of the PMI   Abdomen: soft, non-tender; bowel sounds normoactive; no organomegaly  Extremities: extremities symmetric; no clubbing, cyanosis, or edema  Neurologic: Alert and oriented X 3, normal strength, normal coordination and gait    Labs:  Hgb 7.2  Hct: 1204  Platelet 382    Assessment: 72 yoM with  prolonged course of obscure GI bleed suspected small bowel etiology now s/p transfusion and repeat VCE    Plan:  - Monitor H/H and transfuse for hgb <8 with Lasix administration prior to transfusion  - VCE complete, will follow up and plan for possible repeat DBE which would likey be done later today vs 2/23    Chelly Abraham,   [facility]  [facility]

## 2018-02-22 NOTE — PHYSICIAN QUERY
"PT Name: Stefano Granger  MR #: [id number]     Physician Query Form - Documentation Clarification      Debbie Day RN, CCDS  Contact Info: [phone] [email or web address]      This form is a permanent document in the medical record.     Query Date: February 22, 2018    By submitting this query, we are merely seeking further clarification of documentation. Please utilize your independent clinical judgment when addressing the question(s) below.    The Medical record reflects the following:    Supporting Clinical Findings Location in Medical Record   Pulm HTN  -no issues, held sildenafil due to hypotension initially  -resumed at discharge    Paroxysmal AFIB    Hypovolemic shock (resolved) 2/2 to Acute blood loss Anemia 2/2 to Recurrent GI bleed    Chronic combined systolic and diastolic heart failure    SVT   DC Summary   HTN       H&P                                                                            Doctor, Please specify diagnosis or diagnoses associated with above clinical findings.     Please further specify "pulmonary HTN".    Provider Use Only    (  ) Primary Pulmonary HTN    ( X ) Pulmonary HTN Due to CHF    (  ) Secondary Pulmonary HTN due to - specify: ______________________    (  ) Pulmonary HTN, unspecified    (  ) Pulmonary HTN, due to other - specify: __________________    (  ) Other: _________________________                                                                                                           [  ] Clinically undetermined            "

## 2018-02-22 NOTE — PHYSICIAN QUERY
PT Name: Stefano Granger  MR #: [id number]     Physician Query Form - Documentation Clarification      Debbie Day RN, CCDS  Contact Info: [phone] [email or web address]      This form is a permanent document in the medical record.     Query Date: February 22, 2018    By submitting this query, we are merely seeking further clarification of documentation. Please utilize your independent clinical judgment when addressing the question(s) below.    The Medical record reflects the following:    Supporting Clinical Findings Location in Medical Record   Iron Deficency  GI Hemorrhage  Paroxysmal AFib  Chronic combined systolic and diastolic heart failure  Acute GIB  Prerenal Azotemia  SVT    Hypovolemic shock (resolved) 2/2 to Acute blood loss Anemia 2/2 to Recurrent GI bleed  Seizure Disorder  BPH   DC Summary      2/1/2018 19:06 2/1/2018 23:55 2/2/2018 04:01 2/20/2018 16:50   Troponin I 0.037 (H) 0.039 (H) 0.041 (H) 0.017        Lab   Intermediate Troponin  -trop =0.030 --> 0.039 --> 0.041   -EKG with no acute ST segment changes, likely secondary to demand given elevated HRs   -No acute events recorded on telemetry.   Dc summary   Transfuse PRBC, consider transfusing to higher hgb given NSTEMI. hgb >8   Gi pn 2/3   EKG's and enzymes negative for acute ischemia IM 2/5     Accelerated Junctional rhythm with retrograde conduction with occasional   Premature ventricular complexes  Incomplete left bundle branch block   ekg 2/1   Accelerated Junctional rhythm  Rightward axis  Incomplete left bundle branch block  ST and T wave abnormality, consider inferolateral ischemia  Abnormal ECG  When compared with ECG of 01-FEB-2018 23:15   ekg 2/2   Atrial fibrillation  Nonspecific intraventricular conduction delay  ST and T wave abnormality, consider inferior ischemia  ST and T wave abnormality, consider anterolateral ischemia  Abnormal ECG  When compared with ECG of 02-FEB-2018 03:42, ekg 2/9                                                                             Doctor, Please specify diagnosis or diagnoses associated with above clinical findings.    Provider Use Only    (  ) Demand Ischemia ruled in, present on admit = yes  (  ) Demand Ischemia ruled in, not present on admit    (  ) NSTEMI ruled in, present on admit = yes  (  ) NSTEMI ruled in, not present on admit    (  ) No Demand Ischemia or NSTEMI    (  X) Other diagnosis - specify: ___NSTEMI Type II ______________                                                                                                           [  ] Clinically undetermined

## 2018-02-22 NOTE — PHYSICIAN QUERY
"PT Name: Stefano Granger  MR #: [id number]     Physician Query Form - Etiology of Condition Clarification      Debbie Day RN, CCDS  Contact Info: [phone] [email or web address]      This form is a permanent document in the medical record.     Query Date: February 22, 2018    By submitting this query, we are merely seeking further clarification of documentation.  Please utilize your independent clinical judgment when addressing the question(s) below.     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    received 5 units pRBC total since admit IM pn 2/2    Iron def anemia  Hypovolemic shock 2/2 to Acute blood loss Anemia 2/2 to Recurrent GI bleed H&P    Hypotension 78/44  blod in stool   Dark black stool on rectal exam   Pale mucous membranes   Pale conjunctiva  Mild tachycardia to 105 bpm  ED MD Note    Hypovolemic shock (resolved) 2/2 to Acute blood loss Anemia 2/2 to Recurrent GI bleed  - Hx of recurrent melena, h/o AVM, h/o multiple blood transfusions  - GI consulted, appreciate recs: transfuse as needed, began octreotide ggt, DBE performed with Dr. Soto (2/5) with no source of active bleeding identified.   DC Summary     Please document your best medical opinion regarding the etiology of  "melena"  for which the primary focus of treatment is/was directed.      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                                                                                "

## 2018-02-22 NOTE — PROGRESS NOTES
.Pharmacy New Medication Education    Patient accepted medication education.    Pharmacy educated patient on name and purpose of medications and possible side effects, using the teach-back method.     Current Inpatient Medication Orders   0.9% NaCl infusion (for blood administration)   dextrose 50% injection 12.5 g   dextrose 50% injection 25 g   glucagon (human recombinant) injection 1 mg   glucose chewable tablet 16 g   glucose chewable tablet 24 g   metoprolol tartrate (LOPRESSOR) tablet 50 mg   ondansetron injection 4 mg   pantoprazole injection 40 mg   phenytoin (DILANTIN) ER capsule 200 mg   simvastatin tablet 40 mg   sodium chloride 0.9% flush 5 mL       Learners of pharmacy medication education included:  Patient    Patient +/- learner response:  Verbalized Understanding, Teachback

## 2018-02-22 NOTE — PLAN OF CARE
Patient to d/c today, HH arranged for patient. [facility] has accepted patient and will visit patient tomorrow. Pt has no DME needs.    Daughter will  patient after work .      Future Appointments  Date Time Provider Department Center   3/20/2018 2:00 PM CHAIR [facility]           02/22/18 1551   Final Note   Assessment Type Discharge Planning Assessment   Discharge Disposition Home-Health   What phone number can be called within the next 1-3 days to see how you are doing after discharge? [phone]   Hospital Follow Up  Appt(s) scheduled? Yes   Discharge plans and expectations educations in teach back method with documentation complete? Yes   Right Care Referral Info   Post Acute Recommendation Home-care

## 2018-02-22 NOTE — PROGRESS NOTES
Progress Note    Admit Date: 2/20/2018   LOS: 2 days     SUBJECTIVE:     MADDY, patient with pill vest attached, undergoing study. Reports he feels slightly SOB and feels this way when he isn't taking his metoprolol (300xl at home).     Scheduled Meds:   metoprolol tartrate  50 mg Oral BID    pantoprazole  40 mg Intravenous BID    phenytoin  200 mg Oral Daily    simvastatin  40 mg Oral QHS     Continuous Infusions:  PRN Meds:sodium chloride, dextrose 50%, dextrose 50%, glucagon (human recombinant), glucose, glucose, ondansetron, sodium chloride 0.9%    Review of patient's allergies indicates:   Allergen Reactions    Coreg [carvedilol] Palpitations     Palpitations^     Review of Systems:  Constitutional: no fever or chills  Eyes: no visual changes  Respiratory: no cough or shortness of breath  Cardiovascular: no chest pain   Genitourinary: +urination     OBJECTIVE:     Vital Signs (Most Recent)  Temp: 97.6 °F (36.4 °C) (02/22/18 0750)  Pulse: 91 (02/22/18 0758)  Resp: 18 (02/22/18 0750)  BP: 109/73 (02/22/18 0750)  SpO2: 100 % (02/22/18 0407)    Vital Signs Range (Last 24H):  Temp:  [96 °F (35.6 °C)-98.3 °F (36.8 °C)]   Pulse:  [77-91]   Resp:  [16-22]   BP: (108-118)/(56-73)   SpO2:  [98 %-100 %]     I & O (Last 24H):  Intake/Output Summary (Last 24 hours) at 02/22/18 0824  Last data filed at 02/22/18 0814   Gross per 24 hour   Intake                0 ml   Output             2350 ml   Net            -2350 ml     Physical Exam:  General: well developed, well nourished, resting comfortably   Eyes: conjunctivae/corneas clear.   Lungs:  clear to auscultation bilaterally and normal respiratory effort  Cardiovascular: Heart: regular rate and rhythm, S1, S2, systolic murmur  Abdomen/Rectal: Abdomen: soft, non-tender non-distented; bowel sounds normal; no masses,  no organomegaly.    Musculoskeletal: eleazar hose in place, +1 pitting edema.     Laboratory:  CBC:   Recent Labs  Lab 02/22/18  0448   WBC 5.71   RBC 2.61*    HGB 7.2*   HCT 23.5*   *   MCV 90   MCH 27.6   MCHC 30.6*     CMP:   Recent Labs  Lab 02/22/18  0448   GLU 94   CALCIUM 8.4*   ALBUMIN 2.9*   PROT 5.8*   *   K 4.8   CO2 28      BUN 24*   CREATININE 1.0   ALKPHOS 282*   ALT 32   AST 30   BILITOT 0.5     Coagulation:   Recent Labs  Lab 02/21/18  0558   LABPROT 11.3   INR 1.1     Diagnostic Results:  No new images.     ASSESSMENT/PLAN:     Stefano Granger is a 72 y.o. male     1. GI bleed  -hemoglobin of 6, will transfuse for goal of 7-8  - Patient has received about 15u pRBC's since January   - protonix IV  - Hb 7.2 this am, 7.0 yesterday   - pill study overnight, F/U GI recs  - f/u DBE today vs. tomorrow     2. CALLIE  - bump in Cr, now resolved  - Cr 1.0 today   - will continue to monitor and anticipate improvement with fluids  - UA neg     3. Hypertension  - holding BP meds at this time due to hypotension on admit  - on lisonopril, meotprolol, adlactone at home  - Started metoprolol at 100mg day (short acting in case decreased BP). Will need to restart 300mg XL soon.      4. CHF  - documented history of CHF  - no echo on file, on zaroxolyn 5mg every 48 hours and bumex BID, holding due to hypotension on admit  - monitor fluid status closely and consider restarting home diuretics once BP improves.   - on heart meds     5. Seizure d/o  - on phenytoin, restarted on admit     6. COPD  - on spiriva and albuterol  - stable     ppx - protonix and SCD     dipso - f/u GI read of capsule study, f/u DBE today or tomorrow. Restart home meds as necessary, patient with significant cardiac history. Short acting metoprolol in case it needs to be held, but consider restarting home dose and monitor fluid status closely.      2/22/2018 8:27 AM Florencio Lugo M.D.

## 2018-02-22 NOTE — PLAN OF CARE
Problem: Patient Care Overview  Goal: Plan of Care Review  Outcome: Ongoing (interventions implemented as appropriate)  Pt in NAD. V/s stable. AAOx4. Scheduled medications given per MAR. PIV saline locked dressing CDI. Pt denies pain and n/v.  tolerated well. Monitoring vest in place light still flashing blue. Pt using bedside urinal. Continuous cardiac monitoring in place NSR HR: 70-80s. Encouraged to call for assistance verbalized understanding. Safety maintained bed in low locked position, SR up X2, bed alarm on, and call bell in reach. Will continue to monitor.

## 2018-02-22 NOTE — PROGRESS NOTES
Pt requesting for regular food tray. Spoke with Dr. Del Rosario from GI and he stated that he would review his capsule findings and put in a diet if needed.

## 2018-02-22 NOTE — PLAN OF CARE
VCE reviewed- no active bleeding. In the distal ileum there is a flat red lesion that is hard to characterize, but might represent a single angiectasia.     Plan:  - Continue Octreotide LAR injections outpatient  - Advance diet and discharge   - Will see in clinic with Dr. Soto to discuss possibly repeating endoscopy if he continues to bleed

## 2018-02-22 NOTE — PROGRESS NOTES
Upon rounding with night nurse, noticed that blue light on vest was not flashing. Notified Endoscopy.

## 2018-02-23 DIAGNOSIS — R53.83 FATIGUE: Primary | ICD-10-CM

## 2018-02-24 LAB
BLD PROD TYP BPU: NORMAL
BLD PROD TYP BPU: NORMAL
BLOOD UNIT EXPIRATION DATE: NORMAL
BLOOD UNIT EXPIRATION DATE: NORMAL
BLOOD UNIT TYPE CODE: 5100
BLOOD UNIT TYPE CODE: 5100
BLOOD UNIT TYPE: NORMAL
BLOOD UNIT TYPE: NORMAL
CODING SYSTEM: NORMAL
CODING SYSTEM: NORMAL
DISPENSE STATUS: NORMAL
DISPENSE STATUS: NORMAL
TRANS ERYTHROCYTES VOL PATIENT: NORMAL ML
TRANS ERYTHROCYTES VOL PATIENT: NORMAL ML

## 2018-02-26 ENCOUNTER — TELEPHONE (OUTPATIENT)
Dept: NEUROLOGY | Facility: HOSPITAL | Age: 73
End: 2018-02-26

## 2018-02-26 NOTE — TELEPHONE ENCOUNTER
Vesta notified documents not received as of this afternoon.   Vesta confirmed fax number and will be contacted upon receipt of documents.

## 2018-02-26 NOTE — TELEPHONE ENCOUNTER
----- Message from Janet Fulton sent at 2/26/2018 12:43 PM CST -----  Contact: Vesta  GI:  Vesta from home health called to make sure we got orders that were faxed for this patient.  She can be reached at [phone].  Thank you  abc

## 2018-02-27 NOTE — PHYSICIAN QUERY
PT Name: Stefano Granger  MR #: [id number]    Physician Query Form - Emergency Medicine Diagnosis Clarification     CDS/: Haley MCKEON,RN,CDI              Contact information:[phone]  This form is a permanent document in the medical record.     Query Date: February 27, 2018    By submitting this query, we are merely seeking further clarification of documentation.  Please utilize your independent clinical judgment when addressing the question(s) below.      The Medical record contains the following:     Diagnosis Supporting Clinical Information Location in Medical Record           Acute Blood Loss  Anemia Acute blood loss anemia  Gastrointestinal hemorrhage, unspecified gastrointestinal hemorrhage type        Hgb=6.0------>7.0------>7.2  Hct= 19.5---->23.2----->23.5      Transfuse RBC 2 units          72 y.o. male with recurrent, intermittent, severe GI blood loss thought secondary to a distal small bowel source 02/20/18 @245pm  ED Provider  By Matt Fermin MD      02/20--------->02/22 Labs      02/20/18 Blood Transfusion Record          02/21/18 @1204pm Gastroenterology Consults by Luther Montana MD     Do you agree with the Emergency Medicine diagnosis of ____Acute Blood Loss Anemia_______________________________?    [ x ] Yes    [  ] No    [  ] Clinically Undetermined    Please document in your progress notes daily for the duration of treatment until resolved and include in your discharge summary.

## 2018-02-27 NOTE — PHYSICIAN QUERY
PT Name: Stefano Granger  MR #: [id number]     Physician Query Form - Etiology of Condition Clarification      CDS/: Haley MCKEON, RN,CDI              Contact information:[phone]  This form is a permanent document in the medical record.     Query Date: February 27, 2018    By submitting this query, we are merely seeking further clarification of documentation.  Please utilize your independent clinical judgment when addressing the question(s) below.     The medical record contains the following:    Findings Supporting Clinical Information Location in Medical Record         Gastrointestinal Hemorrhage   72 y.o. male with recurrent, intermittent, severe GI blood loss thought secondary to a distal small bowel source.                 Images of the esophagus, stomach and small bowel were obtained from        the swallowed capsule and reviewed.       The first gastric image was captured at 0-hours 2-minutes.       The first duodenal image was captured at 10-hours 0-minutes.       The first cecal image was captured at 2-hours 48-minutes.       The entire examined stomach was normal.       A dense classic angioectasia and with no stigmata of bleeding was        found in the ileum at 2-hours 36-minutes.    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 Bleeding likely                         coming from the observed dense angioectasia in the                         distal ileum. He is reponding well to the first                         Octreotide LAR injection without overt bleeding and                         now p/w asymptomatic anemia.   02/21/18 @1204pm Gastroenterology  Consults by Luther Montana MD                              02/22/18 Video Capsule Endoscopy       Please document your best medical opinion regarding the etiology of _Gastrointestinal Hemorrhage________________ for which the primary focus of treatment is/was directed.        What is the cause(s) of the Gastrointestinal Hemorrhage?    1. Angioectasia in the distal ileum:      __x___ Yes     ______No        _____Other Please Specify ___________________________________                       [    ]  Clinically Undetermined    Please document in your progress notes daily for the duration of treatment, until resolved, and include in your discharge summary.

## 2018-02-27 NOTE — PHYSICIAN QUERY
"PT Name: Stefano Granger  MR #: [id number]    Physician Query Form - Heart  Condition Clarification     CDS/: Haley MCKEON,RN,CDI            Contact information:[phone]  This form is a permanent document in the medical record.     Query Date: February 27, 2018    By submitting this query, we are merely seeking further clarification of documentation. Please utilize your independent clinical judgment when addressing the question(s) below.    The medical record contains the following   Indicators     Supporting Clinical Findings Location in Medical Record    BNP      EF      Radiology findings      Echo Results      "Ascites" documented     x "SOB" or "DYSON" documented Reports he feels slightly SOB and feels this way when he isn't taking his metoprolol (300xl at home).  02/22/18 Hospital Medicine Progress Note by Florencio Lugo MD    "Hypoxia" documented     x Heart Failure documented  The patient is a 72 y.o. male with co-morbidities including asthma, gout, CHF  PMI:CHF (congestive heart failure)      02/20/18 ED Provider Note by Matt Fermin MD   x "Edema" documented He exhibits edema.   Bilateral pitting edema up to the level of knees   02/20/18 ED Provider Note by Matt Fermin MD     x Diuretics/Meds         Treatment: metoprolol tartrate (LOPRESSOR) tablet 50 mg  Dose: 50 mg  Freq: 2 times daily Route: Oral  Start: 02/22/18 0730 End: 02/22/18 2157 02/19--------------->02/22  MAR Report   x Other:  CHF  - documented history of CHF  - no echo on file, on zaroxolyn 5mg every 48 hours and bumex BID, holding due to hypotension on admit  - monitor fluid status closely and consider restarting home diuretics once BP improves.   - on heart meds 02/22/18 Hospital Medicine Progress Note by Florencio Lugo MD       Provider, please specify diagnosis or diagnoses associated with above clinical findings.                         [ x ] Chronic Systolic Heart Failure (EF < " 40)*    [  ] Chronic Diastolic Heart Failure (EF > 40)*    [  ] Chronic Combined Systolic and Diastolic Heart Failure    [  ] Other Type of Heart Failure (please specify type): _________________________    [  ] Clinically Undetermined            *American Heart Association                                                                                                          Please document in your progress notes daily for the duration of treatment until resolved and include in your discharge summary.

## 2018-03-09 NOTE — PT/OT/SLP DISCHARGE
Physical Therapy Discharge Summary    Name: Stefano Granger  MRN: [id number]   Principal Problem: Melena     Patient Discharged from acute Physical Therapy on 18.  Please refer to prior PT noted date on 18 for functional status.     Assessment:     Patient was discharged unexpectedly.  Information required to complete an accurate discharge summary is unknown.  Refer to therapy initial evaluation and last progress note for initial and most recent functional status and goal achievement.  Recommendations made may be found in medical record.    Objective:     GOALS:    Physical Therapy Goals        Problem: Physical Therapy Goal    Goal Priority Disciplines Outcome Goal Variances Interventions   Physical Therapy Goal     PT/OT, PT Ongoing (interventions implemented as appropriate)     Description:  Goals to be met by: 18     Patient will increase functional independence with mobility by performin. Supine to sit with supervision  2. Sit to supine with supervision  3. Sit to stand transfer with Supervision  4. Gait  x 100 feet with Supervision using Rolling Walker.   5. Ascend/descend 4 stair with right Handrails Stand-by Assistance.                       Reasons for Discontinuation of Therapy Services  Transfer to alternate level of care.      Plan:     Patient Discharged to: home.    Gisele Hamilton, PT  3/8/2018

## 2018-03-16 NOTE — PLAN OF CARE
Last ov 01/18/18  Left message on voicemail is it ventolin pump that need to be refilled?   Patient arrived to unit via stretcher. AAOX3. VSS. Denies any pain. Telemetry monitor applied.

## 2018-03-19 ENCOUNTER — TELEPHONE (OUTPATIENT)
Dept: NEUROLOGY | Facility: HOSPITAL | Age: 73
End: 2018-03-19

## 2018-03-19 ENCOUNTER — INFUSION (OUTPATIENT)
Dept: INFUSION THERAPY | Facility: HOSPITAL | Age: 73
End: 2018-03-19
Attending: INTERNAL MEDICINE
Payer: MEDICARE

## 2018-03-19 VITALS — HEIGHT: 72 IN | BODY MASS INDEX: 23.35 KG/M2 | WEIGHT: 172.38 LBS

## 2018-03-19 DIAGNOSIS — D50.0 BLOOD LOSS ANEMIA: Primary | ICD-10-CM

## 2018-03-19 PROCEDURE — 96372 THER/PROPH/DIAG INJ SC/IM: CPT

## 2018-03-19 PROCEDURE — 63600175 PHARM REV CODE 636 W HCPCS: Mod: JG | Performed by: INTERNAL MEDICINE

## 2018-03-19 RX ADMIN — OCTREOTIDE ACETATE 20 MG: KIT at 02:03

## 2018-03-19 NOTE — TELEPHONE ENCOUNTER
----- Message from Tanya Carnes sent at 3/19/2018  8:43 AM CDT -----  Contact: Kennedy Bender RN with [facility] called to report a  missed visit on Friday March 16 .Kennedy can be reached at [phone].

## 2018-03-19 NOTE — TELEPHONE ENCOUNTER
Per Kennedy with [facility], pt seen on Tuesday of last week.  Attempt made on Thursday and Friday at 12pm, pt was not home.  On Tuesday, 3/13/18, pt was doing fine, better than last couple of weeks.  Kennedy instructed to continue visits and contact clinic if pt is not home on next home visit.  Kennedy acknowledged understanding.

## 2018-03-21 ENCOUNTER — TELEPHONE (OUTPATIENT)
Dept: NEUROLOGY | Facility: HOSPITAL | Age: 73
End: 2018-03-21

## 2018-03-21 NOTE — TELEPHONE ENCOUNTER
----- Message from Janet Fulton sent at 3/21/2018 11:25 AM CDT -----  Contact: Vesta from [facility]  GI:  Vesta from [facility] called about orders that were faxed back - since they are the initial orders from the hospital she needs Dr. Soto signature.  She can be reached at [phone].  Thank you  abc

## 2018-04-04 ENCOUNTER — TELEPHONE (OUTPATIENT)
Dept: NEUROLOGY | Facility: HOSPITAL | Age: 73
End: 2018-04-04

## 2018-04-04 NOTE — TELEPHONE ENCOUNTER
Vesta notified Dr. Soto recommended HH orders should be signed by PCP.  Pt had previous hospital stays at [facility].  Vesta to clarify with Clinical Manager and contact this nurse with recommendations.

## 2018-04-04 NOTE — TELEPHONE ENCOUNTER
----- Message from Mindy Blackwell sent at 4/3/2018  2:31 PM CDT -----  Contact: Vesta Lemons from [facility] called regarding orders to be signed. She was looking to see where we are at with the signed document. Call back number is [phone]

## 2018-04-19 ENCOUNTER — INFUSION (OUTPATIENT)
Dept: INFUSION THERAPY | Facility: HOSPITAL | Age: 73
End: 2018-04-19
Attending: INTERNAL MEDICINE
Payer: MEDICARE

## 2018-04-19 VITALS — WEIGHT: 172.38 LBS | HEIGHT: 72 IN | BODY MASS INDEX: 23.35 KG/M2

## 2018-04-19 DIAGNOSIS — D50.0 BLOOD LOSS ANEMIA: Primary | ICD-10-CM

## 2018-04-19 PROCEDURE — 63600175 PHARM REV CODE 636 W HCPCS: Mod: JG | Performed by: INTERNAL MEDICINE

## 2018-04-19 PROCEDURE — 96372 THER/PROPH/DIAG INJ SC/IM: CPT

## 2018-04-19 RX ADMIN — OCTREOTIDE ACETATE 20 MG: KIT at 12:04

## 2018-05-17 ENCOUNTER — INFUSION (OUTPATIENT)
Dept: INFUSION THERAPY | Facility: HOSPITAL | Age: 73
End: 2018-05-17
Attending: INTERNAL MEDICINE
Payer: MEDICARE

## 2018-05-17 VITALS — WEIGHT: 172.38 LBS | HEIGHT: 72 IN | BODY MASS INDEX: 23.35 KG/M2

## 2018-05-17 DIAGNOSIS — D50.0 BLOOD LOSS ANEMIA: Primary | ICD-10-CM

## 2018-05-17 PROCEDURE — 96372 THER/PROPH/DIAG INJ SC/IM: CPT

## 2018-05-17 PROCEDURE — 63600175 PHARM REV CODE 636 W HCPCS: Mod: JG | Performed by: INTERNAL MEDICINE

## 2018-05-17 RX ADMIN — OCTREOTIDE ACETATE 20 MG: KIT at 01:05

## 2018-05-17 NOTE — NURSING
Tolerated Lanreotide injection well. Discharged home with next appointment scheduled.  Pt's is wearing compression stockings to BLE however his ankles appear swollen with 2+ edema,. He reports being oit of his Lasix for 2 days and he has an appt with his cardiologist at [facility] next week. Instructed pt to call his cardiologist and tell them about his ankles and ask for some Lasix to get him to his appt and/or if they can see his sooner. He verbalized understanding and assured me he will call.

## 2018-06-08 ENCOUNTER — TELEPHONE (OUTPATIENT)
Dept: NEUROLOGY | Facility: HOSPITAL | Age: 73
End: 2018-06-08

## 2018-06-08 NOTE — TELEPHONE ENCOUNTER
Pt notified of clinic appt on Wednesday, June 13, 2018 at 930am.  Pt repeated date and time correctly.

## 2018-06-13 ENCOUNTER — OFFICE VISIT (OUTPATIENT)
Dept: NEUROLOGY | Facility: HOSPITAL | Age: 73
End: 2018-06-13
Attending: INTERNAL MEDICINE
Payer: MEDICARE

## 2018-06-13 ENCOUNTER — INFUSION (OUTPATIENT)
Dept: INFUSION THERAPY | Facility: HOSPITAL | Age: 73
End: 2018-06-13
Attending: INTERNAL MEDICINE
Payer: MEDICARE

## 2018-06-13 VITALS — HEIGHT: 72 IN | WEIGHT: 157.19 LBS | BODY MASS INDEX: 21.29 KG/M2

## 2018-06-13 VITALS
SYSTOLIC BLOOD PRESSURE: 118 MMHG | WEIGHT: 157.19 LBS | RESPIRATION RATE: 20 BRPM | DIASTOLIC BLOOD PRESSURE: 64 MMHG | HEART RATE: 82 BPM | BODY MASS INDEX: 21.29 KG/M2 | HEIGHT: 72 IN | TEMPERATURE: 98 F

## 2018-06-13 DIAGNOSIS — D50.0 BLOOD LOSS ANEMIA: Primary | ICD-10-CM

## 2018-06-13 DIAGNOSIS — D50.0 ANEMIA DUE TO CHRONIC BLOOD LOSS: Primary | ICD-10-CM

## 2018-06-13 PROCEDURE — 99215 OFFICE O/P EST HI 40 MIN: CPT | Mod: 25 | Performed by: INTERNAL MEDICINE

## 2018-06-13 PROCEDURE — 63600175 PHARM REV CODE 636 W HCPCS: Mod: JG | Performed by: INTERNAL MEDICINE

## 2018-06-13 PROCEDURE — 96372 THER/PROPH/DIAG INJ SC/IM: CPT

## 2018-06-13 RX ORDER — ASPIRIN 325 MG
325 TABLET ORAL DAILY
Status: ON HOLD | COMMUNITY
End: 2018-07-13

## 2018-06-13 RX ADMIN — OCTREOTIDE ACETATE 20 MG: KIT at 11:06

## 2018-06-13 NOTE — PROGRESS NOTES
[facility]    CC: anemia     HPI 73 y.o. male with a history of recurrent, severe, anemia due to GI bleeding associated with SOB.  He has had recurrent admissions between [facility] and here with severe anemia but no clear source of GI blood loss noted on multiple endoscopic evaluations including device assisted enteroscopy.  VCE 2/2018 revealed BRB in the distal ileum.    He presents today for follow up from inpatient stay on 6/4/2018 for symptomatic anemia/weakness with Hgb 6.1 that improved to 8.1 after 2 units pRBCs.  His previous transfusion was 1/2018.  He is receiving monthly octreotide injections.    He still reports chronic melena not associated with abd pain, N/V, or weight changes.  He has 2 BMs a day each time with melena.  He denies change in the caliber or consistency of stools.  He denies weight loss, CP, or SOB other than the weakness when he needed the blood transfusion.      Past Medical History:   Diagnosis Date    Asthma     Cardiac defibrillator in place     CHF (congestive heart failure)     GI bleeding     Gout     Hypertension     Seizures     Stroke        Review of Systems  General ROS: negative for chills, fever or weight loss  Cardiovascular ROS: no chest pain or dyspnea on exertion  Gastrointestinal ROS: no abdominal pain, change in bowel habits, +black stools, no bloody stools    Physical Examination  /64   Pulse 82   Temp 98 °F (36.7 °C) (Oral)   Resp 20   Ht 6' (1.829 m)   Wt 71.3 kg (157 lb 3 oz)   BMI 21.32 kg/m²   General appearance: alert, cooperative, no distress  HENT: Normocephalic, atraumatic, neck symmetrical, no nasal discharge   Lungs: clear to auscultation bilaterally, no dullness to percussion bilaterally  Heart: regular rate and rhythm without rub; no displacement of the PMI   Abdomen: soft, non-tender; bowel sounds normoactive; no organomegaly  Extremities: extremities symmetric; no clubbing, cyanosis, or edema  Neurologic: Alert and oriented X 3,  normal strength, normal coordination and gait    Labs:  Hgb 6.1 --> 8.1 6/4/2018 at [facility]    Imaging:  none    Assessment:   72 year old male with recurrent severe anemia likely due to intestinal angioectasias with a demonstrated dense angioectasia associated with active bleeding in the mid-distal ileum. His anemia has improved with Octreotide injections such that he is getting PRBC every 6 months instead of every 2 weeks     Plan:  - Continue monthly Octreotide injections  - Discussed risk and benefits of repeat scope. Given improvement with medical management, will defer repeat lower DBE with Plavix provocation unless he fails medical management  - Counseled to call and return to [place] for any overt bleeding or worsening anemia     Ra Soto MD   [address] [place] [address], [address]   [place] [address] [phone]

## 2018-07-11 ENCOUNTER — TELEPHONE (OUTPATIENT)
Dept: INFUSION THERAPY | Facility: HOSPITAL | Age: 73
End: 2018-07-11

## 2018-07-11 ENCOUNTER — HOSPITAL ENCOUNTER (INPATIENT)
Facility: HOSPITAL | Age: 73
LOS: 2 days | Discharge: HOME | DRG: 378 | End: 2018-07-13
Attending: EMERGENCY MEDICINE | Admitting: FAMILY MEDICINE
Payer: MEDICARE

## 2018-07-11 DIAGNOSIS — J44.9 CHRONIC OBSTRUCTIVE PULMONARY DISEASE, UNSPECIFIED COPD TYPE: ICD-10-CM

## 2018-07-11 DIAGNOSIS — D50.9 IRON DEFICIENCY ANEMIA, UNSPECIFIED IRON DEFICIENCY ANEMIA TYPE: ICD-10-CM

## 2018-07-11 DIAGNOSIS — I50.42 CHRONIC COMBINED SYSTOLIC AND DIASTOLIC HEART FAILURE: ICD-10-CM

## 2018-07-11 DIAGNOSIS — K92.2 GASTROINTESTINAL HEMORRHAGE, UNSPECIFIED GASTROINTESTINAL HEMORRHAGE TYPE: ICD-10-CM

## 2018-07-11 DIAGNOSIS — K92.1 MELENA: ICD-10-CM

## 2018-07-11 DIAGNOSIS — R53.1 WEAKNESS: ICD-10-CM

## 2018-07-11 DIAGNOSIS — I48.91 ATRIAL FIBRILLATION, UNSPECIFIED TYPE: ICD-10-CM

## 2018-07-11 DIAGNOSIS — R56.9 SEIZURE: ICD-10-CM

## 2018-07-11 DIAGNOSIS — K92.2 GI BLEED: ICD-10-CM

## 2018-07-11 DIAGNOSIS — D64.9 SYMPTOMATIC ANEMIA: Primary | ICD-10-CM

## 2018-07-11 LAB
ABO + RH BLD: NORMAL
ALBUMIN SERPL BCP-MCNC: 3 G/DL
ALP SERPL-CCNC: 328 U/L
ALT SERPL W/O P-5'-P-CCNC: 45 U/L
ANION GAP SERPL CALC-SCNC: 12 MMOL/L
ANISOCYTOSIS BLD QL SMEAR: SLIGHT
APTT BLDCRRT: 21 SEC
AST SERPL-CCNC: 57 U/L
BASOPHILS # BLD AUTO: 0.02 K/UL
BASOPHILS NFR BLD: 0.4 %
BILIRUB SERPL-MCNC: 0.3 MG/DL
BLD GP AB SCN CELLS X3 SERPL QL: NORMAL
BNP SERPL-MCNC: 265 PG/ML
BUN SERPL-MCNC: 45 MG/DL
CALCIUM SERPL-MCNC: 7.7 MG/DL
CHLORIDE SERPL-SCNC: 98 MMOL/L
CO2 SERPL-SCNC: 26 MMOL/L
CREAT SERPL-MCNC: 1.2 MG/DL
DIFFERENTIAL METHOD: ABNORMAL
EOSINOPHIL # BLD AUTO: 0.1 K/UL
EOSINOPHIL NFR BLD: 1.2 %
ERYTHROCYTE [DISTWIDTH] IN BLOOD BY AUTOMATED COUNT: 16.8 %
EST. GFR  (AFRICAN AMERICAN): >60 ML/MIN/1.73 M^2
EST. GFR  (NON AFRICAN AMERICAN): 60 ML/MIN/1.73 M^2
GLUCOSE SERPL-MCNC: 88 MG/DL
HCT VFR BLD AUTO: 20 %
HGB BLD-MCNC: 5.4 G/DL
HYPOCHROMIA BLD QL SMEAR: ABNORMAL
INR PPP: 1.1
LYMPHOCYTES # BLD AUTO: 0.5 K/UL
LYMPHOCYTES NFR BLD: 9.3 %
MCH RBC QN AUTO: 24 PG
MCHC RBC AUTO-ENTMCNC: 27 G/DL
MCV RBC AUTO: 89 FL
MONOCYTES # BLD AUTO: 0.7 K/UL
MONOCYTES NFR BLD: 12.8 %
NEUTROPHILS # BLD AUTO: 3.9 K/UL
NEUTROPHILS NFR BLD: 76.1 %
OB PNL STL: POSITIVE
OVALOCYTES BLD QL SMEAR: ABNORMAL
PLATELET # BLD AUTO: 320 K/UL
PLATELET BLD QL SMEAR: ABNORMAL
PMV BLD AUTO: 10.6 FL
POIKILOCYTOSIS BLD QL SMEAR: SLIGHT
POLYCHROMASIA BLD QL SMEAR: ABNORMAL
POTASSIUM SERPL-SCNC: 3.9 MMOL/L
PROT SERPL-MCNC: 6 G/DL
PROTHROMBIN TIME: 11.2 SEC
RBC # BLD AUTO: 2.25 M/UL
SODIUM SERPL-SCNC: 136 MMOL/L
TARGETS BLD QL SMEAR: ABNORMAL
TROPONIN I SERPL DL<=0.01 NG/ML-MCNC: 0.04 NG/ML
WBC # BLD AUTO: 5.06 K/UL

## 2018-07-11 PROCEDURE — 11000001 HC ACUTE MED/SURG PRIVATE ROOM

## 2018-07-11 PROCEDURE — 99285 EMERGENCY DEPT VISIT HI MDM: CPT | Mod: 25

## 2018-07-11 PROCEDURE — 85025 COMPLETE CBC W/AUTO DIFF WBC: CPT

## 2018-07-11 PROCEDURE — 93005 ELECTROCARDIOGRAM TRACING: CPT

## 2018-07-11 PROCEDURE — 25000003 PHARM REV CODE 250: Performed by: EMERGENCY MEDICINE

## 2018-07-11 PROCEDURE — 85610 PROTHROMBIN TIME: CPT

## 2018-07-11 PROCEDURE — 83880 ASSAY OF NATRIURETIC PEPTIDE: CPT

## 2018-07-11 PROCEDURE — 86920 COMPATIBILITY TEST SPIN: CPT

## 2018-07-11 PROCEDURE — 86850 RBC ANTIBODY SCREEN: CPT

## 2018-07-11 PROCEDURE — 82272 OCCULT BLD FECES 1-3 TESTS: CPT

## 2018-07-11 PROCEDURE — 84484 ASSAY OF TROPONIN QUANT: CPT

## 2018-07-11 PROCEDURE — 85730 THROMBOPLASTIN TIME PARTIAL: CPT

## 2018-07-11 PROCEDURE — 96374 THER/PROPH/DIAG INJ IV PUSH: CPT

## 2018-07-11 PROCEDURE — 36430 TRANSFUSION BLD/BLD COMPNT: CPT

## 2018-07-11 PROCEDURE — 25000003 PHARM REV CODE 250: Performed by: STUDENT IN AN ORGANIZED HEALTH CARE EDUCATION/TRAINING PROGRAM

## 2018-07-11 PROCEDURE — 80053 COMPREHEN METABOLIC PANEL: CPT

## 2018-07-11 PROCEDURE — C9113 INJ PANTOPRAZOLE SODIUM, VIA: HCPCS | Performed by: EMERGENCY MEDICINE

## 2018-07-11 PROCEDURE — P9016 RBC LEUKOCYTES REDUCED: HCPCS

## 2018-07-11 PROCEDURE — 96376 TX/PRO/DX INJ SAME DRUG ADON: CPT

## 2018-07-11 PROCEDURE — 63600175 PHARM REV CODE 636 W HCPCS: Performed by: EMERGENCY MEDICINE

## 2018-07-11 RX ORDER — AMIODARONE HYDROCHLORIDE 200 MG/1
200 TABLET ORAL DAILY
Status: DISCONTINUED | OUTPATIENT
Start: 2018-07-12 | End: 2018-07-13 | Stop reason: HOSPADM

## 2018-07-11 RX ORDER — ACETAMINOPHEN 325 MG/1
650 TABLET ORAL
Status: COMPLETED | OUTPATIENT
Start: 2018-07-11 | End: 2018-07-12

## 2018-07-11 RX ORDER — HYDROCODONE BITARTRATE AND ACETAMINOPHEN 500; 5 MG/1; MG/1
TABLET ORAL
Status: DISCONTINUED | OUTPATIENT
Start: 2018-07-11 | End: 2018-07-13 | Stop reason: HOSPADM

## 2018-07-11 RX ORDER — BUMETANIDE 1 MG/1
4 TABLET ORAL 2 TIMES DAILY
Status: DISCONTINUED | OUTPATIENT
Start: 2018-07-11 | End: 2018-07-13 | Stop reason: HOSPADM

## 2018-07-11 RX ORDER — SPIRONOLACTONE 25 MG/1
25 TABLET ORAL DAILY
Status: DISCONTINUED | OUTPATIENT
Start: 2018-07-12 | End: 2018-07-12

## 2018-07-11 RX ORDER — ALBUTEROL SULFATE 90 UG/1
2 AEROSOL, METERED RESPIRATORY (INHALATION) EVERY 6 HOURS PRN
Status: DISCONTINUED | OUTPATIENT
Start: 2018-07-11 | End: 2018-07-13 | Stop reason: HOSPADM

## 2018-07-11 RX ORDER — SIMVASTATIN 20 MG/1
40 TABLET, FILM COATED ORAL NIGHTLY
Status: DISCONTINUED | OUTPATIENT
Start: 2018-07-11 | End: 2018-07-13 | Stop reason: HOSPADM

## 2018-07-11 RX ORDER — TIOTROPIUM BROMIDE 18 UG/1
1 CAPSULE ORAL; RESPIRATORY (INHALATION) DAILY
Status: DISCONTINUED | OUTPATIENT
Start: 2018-07-12 | End: 2018-07-13 | Stop reason: HOSPADM

## 2018-07-11 RX ORDER — SODIUM CHLORIDE 0.9 % (FLUSH) 0.9 %
3 SYRINGE (ML) INJECTION
Status: DISCONTINUED | OUTPATIENT
Start: 2018-07-11 | End: 2018-07-13 | Stop reason: HOSPADM

## 2018-07-11 RX ORDER — COLCHICINE 0.6 MG/1
0.6 TABLET ORAL 2 TIMES DAILY
Status: DISCONTINUED | OUTPATIENT
Start: 2018-07-11 | End: 2018-07-13 | Stop reason: HOSPADM

## 2018-07-11 RX ORDER — DIGOXIN 125 MCG
125 TABLET ORAL EVERY OTHER DAY
Status: DISCONTINUED | OUTPATIENT
Start: 2018-07-12 | End: 2018-07-13 | Stop reason: HOSPADM

## 2018-07-11 RX ORDER — TRAVOPROST OPHTHALMIC SOLUTION 0.04 MG/ML
1 SOLUTION OPHTHALMIC DAILY
Status: DISCONTINUED | OUTPATIENT
Start: 2018-07-12 | End: 2018-07-13 | Stop reason: HOSPADM

## 2018-07-11 RX ORDER — PHENYTOIN SODIUM 100 MG/1
200 CAPSULE, EXTENDED RELEASE ORAL 2 TIMES DAILY
Status: DISCONTINUED | OUTPATIENT
Start: 2018-07-11 | End: 2018-07-13 | Stop reason: HOSPADM

## 2018-07-11 RX ORDER — FERROUS SULFATE 325(65) MG
325 TABLET, DELAYED RELEASE (ENTERIC COATED) ORAL 3 TIMES DAILY
Status: DISCONTINUED | OUTPATIENT
Start: 2018-07-12 | End: 2018-07-13 | Stop reason: HOSPADM

## 2018-07-11 RX ORDER — PANTOPRAZOLE SODIUM 40 MG/10ML
80 INJECTION, POWDER, LYOPHILIZED, FOR SOLUTION INTRAVENOUS
Status: COMPLETED | OUTPATIENT
Start: 2018-07-11 | End: 2018-07-11

## 2018-07-11 RX ADMIN — SIMVASTATIN 40 MG: 20 TABLET, FILM COATED ORAL at 11:07

## 2018-07-11 RX ADMIN — DEXTROSE 8 MG/HR: 50 INJECTION, SOLUTION INTRAVENOUS at 08:07

## 2018-07-11 RX ADMIN — PANTOPRAZOLE SODIUM 80 MG: 40 INJECTION, POWDER, LYOPHILIZED, FOR SOLUTION INTRAVENOUS at 07:07

## 2018-07-11 RX ADMIN — BUMETANIDE 4 MG: 1 TABLET ORAL at 11:07

## 2018-07-11 RX ADMIN — COLCHICINE 0.6 MG: 0.6 TABLET, FILM COATED ORAL at 11:07

## 2018-07-11 RX ADMIN — PHENYTOIN SODIUM 200 MG: 100 CAPSULE ORAL at 11:07

## 2018-07-11 NOTE — TELEPHONE ENCOUNTER
Spoke with pt regarding missed appt for his Sandostatin injection.  He states he is about to go to the ER because of low blood pressure.  He is waiting on his daughter to come pick him up and bring him.  He states his MD is aware of this.  He states he will call when he can come receive his injection.

## 2018-07-11 NOTE — ED PROVIDER NOTES
Encounter Date: 7/11/2018    SCRIBE #1 NOTE: I, Matheus Bonilla, am scribing for, and in the presence of,  Dr. Orozco. I have scribed the entire note.       History     Chief Complaint   Patient presents with    Hypotension     Hx of GIB, pt of Dr Soto, reports black stools, weakness.     72 y/o male with a history of severe reccurent anemia likely due to intestinal angioectasias associated with active bleeding to mid and distal ileum requiring multiple recent admissions for transfusions and outpatient octreotide injections on a monthly basis who presents to the emergency department due to hypotension for the past 4-5 days. Pt admits to light-headedness, melena, seeing stars, and generalized weakness. Denies any chest pain, SOB, headache, dysuria, hematuria, or abd pain. His goal hemoglobin is 7-8 on prior GI bleed admissions. Dr. Soto is his gastroenterologist.      The history is provided by the patient.     Review of patient's allergies indicates:   Allergen Reactions    Coreg [carvedilol] Palpitations     Palpitations^     Past Medical History:   Diagnosis Date    Asthma     Cardiac defibrillator in place     CHF (congestive heart failure)     GI bleeding     Gout     Hypertension     Seizures     Stroke      Past Surgical History:   Procedure Laterality Date    CARDIAC SURGERY      year 2000    TONSILLECTOMY       Family History   Problem Relation Age of Onset    No Known Problems Mother     No Known Problems Father     No Known Problems Maternal Grandmother     No Known Problems Maternal Grandfather     No Known Problems Paternal Grandmother     No Known Problems Paternal Grandfather      Social History   Substance Use Topics    Smoking status: Current Every Day Smoker     Years: 28.00     Types: Cigars    Smokeless tobacco: Not on file      Comment: pt smoke a cigar 2x weekly    Alcohol use No      Comment: socially     Review of Systems   Constitutional: Negative for fever.   Eyes:         Seeing stars.   Respiratory: Negative for shortness of breath.    Cardiovascular: Negative for chest pain.   Gastrointestinal: Negative for abdominal pain.        Melena.   Genitourinary: Negative for dysuria and hematuria.   Neurological: Positive for weakness (generalized) and light-headedness. Negative for headaches.   All other systems reviewed and are negative.      Physical Exam     Initial Vitals [07/11/18 1829]   BP Pulse Resp Temp SpO2   (!) 93/55 (!) 136 18 98.5 °F (36.9 °C) 100 %      MAP       --         Physical Exam    Nursing note and vitals reviewed.  Constitutional: He appears well-developed and well-nourished. He is not diaphoretic. No distress.   Awake. Alert and oriented.   HENT:   Head: Normocephalic and atraumatic.   Nose: Nose normal.   Mouth/Throat: Oropharynx is clear and moist.   Eyes: Conjunctivae and EOM are normal. Pupils are equal, round, and reactive to light.   Neck: Normal range of motion. Neck supple.   Cardiovascular: Regular rhythm, normal heart sounds and intact distal pulses. Exam reveals no gallop and no friction rub.    No murmur heard.  Tachycardic.   Pulmonary/Chest: No respiratory distress. He has no wheezes. He has no rhonchi. He has rales (mild).   Sternotomy scar. Pacemaker/defibrillator to left anterior chest wall. Decreased breath sounds at the right base.   Abdominal: Soft. He exhibits no distension. There is no tenderness. There is no rebound and no guarding.   Musculoskeletal: Normal range of motion. He exhibits edema. He exhibits no tenderness.   1+ LE edema bilaterally.   Neurological: He is alert and oriented to person, place, and time. He has normal strength.   Neurologically intact on exam.   Skin: Skin is warm and dry. Capillary refill takes less than 2 seconds. No pallor.   Psychiatric: Thought content normal.   Not altered.         ED Course   Procedures  Labs Reviewed   CBC W/ AUTO DIFFERENTIAL   COMPREHENSIVE METABOLIC PANEL   PROTIME-INR   APTT    TROPONIN I   B-TYPE NATRIURETIC PEPTIDE   TYPE & SCREEN     EKG Readings: (Independently Interpreted)   EKG: Rate: 79 bpm. Atrial fibrillation. Rightward axis. T wave inversions in inferior and lateral leads that could be from ischemia or digoxin. When compared to prior EKG it appears to be unchanged. No ST elevations or depressions.       Imaging Results    None          Medical Decision Making:   Clinical Tests:   Lab Tests: Ordered and Reviewed  Radiological Study: Ordered and Reviewed  Medical Tests: Ordered and Reviewed  Patient has significant anemia.  He will need a blood transfusion.  He has mild pulmonary edema on his chest x-ray therefore I did not give him any fluids for mild hypotension here.  Repeat exam shows blood pressure 107/42 in the room as of 8:42 p.m..  Heart rate in the 70s at baseline atrial fibrillation.  I will admit the patient for transfusion with diuresis to Family Medicine.                      Clinical Impression:     1. Symptomatic anemia    2. GI bleed    3. Atrial fibrillation, unspecified type                  I, Dr. Luther Orozco personally performed the services described in this documentation. All medical record entries made by the scribe were at my direction and in my presence.  I have reviewed the chart and agree that the record reflects my personal performance and is accurate and complete. Luther Orozco MD.  9:03 PM 07/11/2018    DISCLAIMER: This note was prepared with Dragon NaturallySpeaking voice recognition transcription software. Garbled syntax, mangled pronouns, and other bizarre constructions may be attributed to that software system                  Luther Orozco MD  07/11/18 4091

## 2018-07-12 LAB
ALBUMIN SERPL BCP-MCNC: 2.8 G/DL
ALP SERPL-CCNC: 300 U/L
ALT SERPL W/O P-5'-P-CCNC: 43 U/L
AMMONIA PLAS-SCNC: 44 UMOL/L
AMPHET+METHAMPHET UR QL: NEGATIVE
ANION GAP SERPL CALC-SCNC: 7 MMOL/L
APTT BLDCRRT: 27.3 SEC
AST SERPL-CCNC: 50 U/L
BARBITURATES UR QL SCN>200 NG/ML: NEGATIVE
BASOPHILS # BLD AUTO: 0.01 K/UL
BASOPHILS NFR BLD: 0.2 %
BENZODIAZ UR QL SCN>200 NG/ML: NEGATIVE
BILIRUB SERPL-MCNC: 0.6 MG/DL
BLD PROD TYP BPU: NORMAL
BLD PROD TYP BPU: NORMAL
BLOOD UNIT EXPIRATION DATE: NORMAL
BLOOD UNIT EXPIRATION DATE: NORMAL
BLOOD UNIT TYPE CODE: 5100
BLOOD UNIT TYPE CODE: 5100
BLOOD UNIT TYPE: NORMAL
BLOOD UNIT TYPE: NORMAL
BUN SERPL-MCNC: 40 MG/DL
BZE UR QL SCN: NEGATIVE
CALCIUM SERPL-MCNC: 8.1 MG/DL
CANNABINOIDS UR QL SCN: NEGATIVE
CHLORIDE SERPL-SCNC: 98 MMOL/L
CO2 SERPL-SCNC: 32 MMOL/L
CODING SYSTEM: NORMAL
CODING SYSTEM: NORMAL
CREAT SERPL-MCNC: 1.2 MG/DL
CREAT UR-MCNC: 28.6 MG/DL
DIFFERENTIAL METHOD: ABNORMAL
DISPENSE STATUS: NORMAL
DISPENSE STATUS: NORMAL
EOSINOPHIL # BLD AUTO: 0.1 K/UL
EOSINOPHIL NFR BLD: 1.6 %
ERYTHROCYTE [DISTWIDTH] IN BLOOD BY AUTOMATED COUNT: 16.9 %
EST. GFR  (AFRICAN AMERICAN): >60 ML/MIN/1.73 M^2
EST. GFR  (NON AFRICAN AMERICAN): 60 ML/MIN/1.73 M^2
FERRITIN SERPL-MCNC: 35 NG/ML
GLUCOSE SERPL-MCNC: 135 MG/DL
HCT VFR BLD AUTO: 22.8 %
HCT VFR BLD AUTO: 25.7 %
HGB BLD-MCNC: 6.7 G/DL
HGB BLD-MCNC: 7.7 G/DL
INR PPP: 1.1
IRON SERPL-MCNC: 13 UG/DL
LYMPHOCYTES # BLD AUTO: 0.4 K/UL
LYMPHOCYTES NFR BLD: 8.1 %
MCH RBC QN AUTO: 25.7 PG
MCHC RBC AUTO-ENTMCNC: 29.4 G/DL
MCV RBC AUTO: 87 FL
METHADONE UR QL SCN>300 NG/ML: NEGATIVE
MONOCYTES # BLD AUTO: 0.6 K/UL
MONOCYTES NFR BLD: 11.7 %
NEUTROPHILS # BLD AUTO: 3.9 K/UL
NEUTROPHILS NFR BLD: 78 %
NUM UNITS TRANS PACKED RBC: NORMAL
NUM UNITS TRANS PACKED RBC: NORMAL
OPIATES UR QL SCN: NEGATIVE
PCP UR QL SCN>25 NG/ML: NEGATIVE
PLATELET # BLD AUTO: 237 K/UL
PMV BLD AUTO: 9.4 FL
POTASSIUM SERPL-SCNC: 3.6 MMOL/L
PROT SERPL-MCNC: 5.7 G/DL
PROTHROMBIN TIME: 11.6 SEC
RBC # BLD AUTO: 2.61 M/UL
SATURATED IRON: 3 %
SODIUM SERPL-SCNC: 137 MMOL/L
TOTAL IRON BINDING CAPACITY: 426 UG/DL
TOXICOLOGY INFORMATION: NORMAL
TRANSFERRIN SERPL-MCNC: 288 MG/DL
WBC # BLD AUTO: 4.89 K/UL

## 2018-07-12 PROCEDURE — 36430 TRANSFUSION BLD/BLD COMPNT: CPT

## 2018-07-12 PROCEDURE — 85018 HEMOGLOBIN: CPT | Mod: 91

## 2018-07-12 PROCEDURE — 82728 ASSAY OF FERRITIN: CPT

## 2018-07-12 PROCEDURE — 94640 AIRWAY INHALATION TREATMENT: CPT

## 2018-07-12 PROCEDURE — 85025 COMPLETE CBC W/AUTO DIFF WBC: CPT

## 2018-07-12 PROCEDURE — 82140 ASSAY OF AMMONIA: CPT

## 2018-07-12 PROCEDURE — 94761 N-INVAS EAR/PLS OXIMETRY MLT: CPT

## 2018-07-12 PROCEDURE — 25000242 PHARM REV CODE 250 ALT 637 W/ HCPCS: Performed by: STUDENT IN AN ORGANIZED HEALTH CARE EDUCATION/TRAINING PROGRAM

## 2018-07-12 PROCEDURE — 85730 THROMBOPLASTIN TIME PARTIAL: CPT

## 2018-07-12 PROCEDURE — 85610 PROTHROMBIN TIME: CPT

## 2018-07-12 PROCEDURE — 80053 COMPREHEN METABOLIC PANEL: CPT

## 2018-07-12 PROCEDURE — 85018 HEMOGLOBIN: CPT

## 2018-07-12 PROCEDURE — 93005 ELECTROCARDIOGRAM TRACING: CPT

## 2018-07-12 PROCEDURE — 25000003 PHARM REV CODE 250: Performed by: EMERGENCY MEDICINE

## 2018-07-12 PROCEDURE — 85014 HEMATOCRIT: CPT

## 2018-07-12 PROCEDURE — 63600175 PHARM REV CODE 636 W HCPCS: Performed by: EMERGENCY MEDICINE

## 2018-07-12 PROCEDURE — 83540 ASSAY OF IRON: CPT

## 2018-07-12 PROCEDURE — P9016 RBC LEUKOCYTES REDUCED: HCPCS

## 2018-07-12 PROCEDURE — 25000003 PHARM REV CODE 250: Performed by: STUDENT IN AN ORGANIZED HEALTH CARE EDUCATION/TRAINING PROGRAM

## 2018-07-12 PROCEDURE — 85014 HEMATOCRIT: CPT | Mod: 91

## 2018-07-12 PROCEDURE — 36415 COLL VENOUS BLD VENIPUNCTURE: CPT

## 2018-07-12 PROCEDURE — 80307 DRUG TEST PRSMV CHEM ANLYZR: CPT

## 2018-07-12 PROCEDURE — 21400001 HC TELEMETRY ROOM

## 2018-07-12 PROCEDURE — C9113 INJ PANTOPRAZOLE SODIUM, VIA: HCPCS | Performed by: EMERGENCY MEDICINE

## 2018-07-12 RX ORDER — HYDROCODONE BITARTRATE AND ACETAMINOPHEN 500; 5 MG/1; MG/1
TABLET ORAL
Status: DISCONTINUED | OUTPATIENT
Start: 2018-07-12 | End: 2018-07-13 | Stop reason: HOSPADM

## 2018-07-12 RX ORDER — SPIRONOLACTONE 25 MG/1
25 TABLET ORAL DAILY
Status: DISCONTINUED | OUTPATIENT
Start: 2018-07-13 | End: 2018-07-13 | Stop reason: HOSPADM

## 2018-07-12 RX ADMIN — DEXTROSE 8 MG/HR: 50 INJECTION, SOLUTION INTRAVENOUS at 04:07

## 2018-07-12 RX ADMIN — FERROUS SULFATE TAB EC 325 MG (65 MG FE EQUIVALENT) 325 MG: 325 (65 FE) TABLET DELAYED RESPONSE at 08:07

## 2018-07-12 RX ADMIN — PHENYTOIN SODIUM 200 MG: 100 CAPSULE ORAL at 08:07

## 2018-07-12 RX ADMIN — BUMETANIDE 4 MG: 1 TABLET ORAL at 08:07

## 2018-07-12 RX ADMIN — ACETAMINOPHEN 650 MG: 325 TABLET, FILM COATED ORAL at 05:07

## 2018-07-12 RX ADMIN — COLCHICINE 0.6 MG: 0.6 TABLET, FILM COATED ORAL at 08:07

## 2018-07-12 RX ADMIN — DEXTROSE 8 MG/HR: 50 INJECTION, SOLUTION INTRAVENOUS at 10:07

## 2018-07-12 RX ADMIN — AMIODARONE HYDROCHLORIDE 200 MG: 200 TABLET ORAL at 08:07

## 2018-07-12 RX ADMIN — FERROUS SULFATE TAB EC 325 MG (65 MG FE EQUIVALENT) 325 MG: 325 (65 FE) TABLET DELAYED RESPONSE at 02:07

## 2018-07-12 RX ADMIN — DIGOXIN 125 MCG: 0.12 TABLET ORAL at 08:07

## 2018-07-12 RX ADMIN — SIMVASTATIN 40 MG: 20 TABLET, FILM COATED ORAL at 08:07

## 2018-07-12 RX ADMIN — DEXTROSE 8 MG/HR: 50 INJECTION, SOLUTION INTRAVENOUS at 07:07

## 2018-07-12 RX ADMIN — TIOTROPIUM BROMIDE 18 MCG: 18 CAPSULE ORAL; RESPIRATORY (INHALATION) at 07:07

## 2018-07-12 NOTE — PLAN OF CARE
Report called to ICU nurse. Pt verbalized understanding of transfer. Protonix infusing continuously. Pt transferred via bed to room 258 with chart, meds and telemetry.

## 2018-07-12 NOTE — PROGRESS NOTES
Progress Note  U [facility]  Admit Date: 7/11/2018   LOS: 1 day   SUBJECTIVE:   Follow-up For:Symptomatic anemia    Patient seen and examined this AM. Patient received 2 units PRBC in ED overnight with 1 other unit given in the morning. Patient feeling much improved after first transfusion. Denies lightheadedness but continues to endorse mild weakness.    Review of Systems   Constitutional: Positive for malaise/fatigue. Negative for chills, fever and weight loss.   Respiratory: Negative for cough, hemoptysis, sputum production, shortness of breath and wheezing.    Cardiovascular: Negative for chest pain, palpitations, orthopnea, claudication and leg swelling.   Gastrointestinal: Negative for abdominal pain, blood in stool, constipation, diarrhea, melena, nausea and vomiting.   Genitourinary: Negative for flank pain.   Musculoskeletal: Negative for falls, myalgias and neck pain.   Skin: Negative for itching and rash.   Neurological: Positive for weakness. Negative for dizziness, tingling and headaches.       OBJECTIVE:   Vital Signs (Most Recent)  Temp: 97.6 °F (36.4 °C) (07/12/18 0740)  Pulse: 61 (Simultaneous filing. User may not have seen previous data.) (07/12/18 0740)  Resp: 16 (Simultaneous filing. User may not have seen previous data.) (07/12/18 0740)  BP: (!) 114/53 (07/12/18 0740)  SpO2: 99 % (07/12/18 0740)    I & O (Last 24H):  Intake/Output Summary (Last 24 hours) at 07/12/18 0907  Last data filed at 07/12/18 0520   Gross per 24 hour   Intake           621.83 ml   Output              850 ml   Net          -228.17 ml     Wt Readings from Last 3 Encounters:   07/11/18 67.1 kg (147 lb 14.9 oz)   06/13/18 71.3 kg (157 lb 3 oz)   06/13/18 71.3 kg (157 lb 3 oz)       Current Diet Order   Procedures    Diet NPO Except for: Medication, Ice Chips     Order Specific Question:   Except for     Answer:   Medication     Order Specific Question:   Except for     Answer:   Ice Chips        Physical Exam    Constitutional: He is oriented to person, place, and time and well-developed, well-nourished, and in no distress.   HENT:   Head: Normocephalic and atraumatic.   Eyes: Conjunctivae and EOM are normal. Pupils are equal, round, and reactive to light. Right eye exhibits no discharge. Left eye exhibits no discharge. No scleral icterus.   Neck: Normal range of motion. Neck supple. No JVD present. No thyromegaly present.   Cardiovascular: Normal rate, regular rhythm, normal heart sounds and intact distal pulses.  Exam reveals no gallop and no friction rub.    No murmur heard.  Pulmonary/Chest: Effort normal and breath sounds normal. No respiratory distress. He has no wheezes. He has no rales. He exhibits no tenderness.   Abdominal: Soft. Bowel sounds are normal. He exhibits no distension and no mass. There is no tenderness. There is no rebound and no guarding.   Neurological: He is alert and oriented to person, place, and time.   Skin: Skin is warm and dry. No rash noted. No erythema. No pallor.   Psychiatric: Affect and judgment normal.       Laboratory Data:  CBC    Recent Labs  Lab 07/11/18 2024 07/12/18  0610   WBC 5.06 4.89   RBC 2.25* 2.61*   HGB 5.4* 6.7*   HCT 20.0* 22.8*    237   MCV 89 87   MCH 24.0* 25.7*   MCHC 27.0* 29.4*     CMP    Recent Labs  Lab 07/11/18 1906 07/12/18  0610   CALCIUM 7.7* 8.1*   PROT 6.0 5.7*    137   K 3.9 3.6   CO2 26 32*   CL 98 98   BUN 45* 40*   CREATININE 1.2 1.2   ALKPHOS 328* 300*   ALT 45* 43   AST 57* 50*   BILITOT 0.3 0.6     POCT-Glucose  No results found for: POCTGLUCOSE  COAGS    Recent Labs  Lab 07/11/18 1906 07/12/18  0610   INR 1.1 1.1   APTT 21.0 27.3     UA  No results for input(s): COLORU, CLARITYU, SPECGRAV, PHUR, PROTEINUA, GLUCOSEU, BLOODU, WBCU, RBCU, BACTERIA, MUCUS in the last 24 hours.    Invalid input(s):  BILIRUBINCON  MICRO  Microbiology Results (last 7 days)     ** No results found for the last 168 hours. **        LIPID PANEL  No results  found for: CHOL  No results found for: HDL  No results found for: LDLCALC  No results found for: TRIG  No results found for: CHOLHDL    Diagnostic Results:  Imaging in last 24 hours: reviewed    ASSESSMENT/PLAN:   Stefano Granger is a 73 y.o. male    GI Bleed  H/H 5.4/20--->6.7/22.8  2 units in ED  Protonix 80mg IV bolus x 1 then 8mg/hr gtt  Intravascular resuscitation/support with IVFs and pRBCs as needed.  Serial H/H's transfuse as needed.  Discontinued all ASA, NSAIDs and Heparin products  Maintain IV access with 2 large bore IVs  Keep NPO   Plan for EGD  Type and screen, PT, and PTT  GI consulted   will hold DVT prophylaxis in the setting of bleeding  monitor CBC q6 hour  FOBT positive  check Iron studies, folate, Vit B12, retic count, LDH, haptoglobin, TSH   Step up to ICU for closer monitoring      CV   A fib   Restart BB   Amiodarone continue  Digoxin continue        CHF  Patient not given fluid in ED due to pulm edema   If pt blood pressure decreases more we will give fluids   BB restarted  Digoxin continue  Bumetanide continue      HTN  141/61 this AM  Restart BB     Respiratory   COPD  on spiriva and albuterol  stable       Neuro    Seizure disorder  Continue phenytoin      PPx: SCD  Diet: NPO      7/12/2018 Evan Blandon MD  9:07 AM

## 2018-07-12 NOTE — PLAN OF CARE
Pt to be transferred to ICU per primary team d/t high risk of bleeding. Charge nurse More notified. Pt verbalized understanding of transfer. Awaiting bed.

## 2018-07-12 NOTE — PLAN OF CARE
Problem: Patient Care Overview  Goal: Plan of Care Review  Pt is AAOx4. RR even and unlabored. Pt sats 100% on RA. No complaints of pain or SOB. All VSS. Patient received 1 unit of PRBC. GI seen patient this am and no procedures scheduled. Pt able to eat. Plan of care discussed with patient and patient is to be stepped down from ICU. Will cont to monitor.

## 2018-07-12 NOTE — NURSING TRANSFER
Nursing Transfer Note      7/12/2018     Transfer to 258 from 430     Transfer via belongings    Transfer with cardiac monitor and hospital bed    Transported by Sherri RN and Yana RN     Medicines sent: Cold Crate    Chart send with patient: yes    Notified: Pt notified family, pt has cell phone in bed with him      Upon arrival to floor: Pt ambulated to bed without difficulty on RA, denies any SOB, lightheadness, CP or weakness. Pt states he feels much better than when he arrived yesterday. Pt oriented to ICU, placed on cardiac monitor, reviewed fall prevention precautions with pt.

## 2018-07-12 NOTE — PLAN OF CARE
Pt reports he lives with a roommate and is independent with all ADLs including driving. Pt informed Tn his daughter can provide assistance and transportation home when discharged. Pt has used [facility] in past but not current.  Pt given D/C brochure and card and encouraged to call for any needs.     Pt reports PCP at [facility] and follow with Dr. Soto for GI    Tn to continue to follow.       07/12/18 1118   Discharge Assessment   Assessment Type Discharge Planning Assessment   Confirmed/corrected address and phone number on facesheet? Yes   Assessment information obtained from? Patient   Expected Length of Stay (days) 2   Communicated expected length of stay with patient/caregiver yes   Prior to hospitilization cognitive status: Alert/Oriented   Prior to hospitalization functional status: Independent;Assistive Equipment   Current cognitive status: Alert/Oriented   Current Functional Status: Needs Assistance   Lives With friend(s)   Able to Return to Prior Arrangements yes   Is patient able to care for self after discharge? Yes   Who are your caregiver(s) and their phone number(s)? Phoebe Cannon (daughter) [phone]   Patient's perception of discharge disposition home or selfcare   Readmission Within The Last 30 Days no previous admission in last 30 days   Patient currently being followed by outpatient case management? No   Patient currently receives any other outside agency services? No   Equipment Currently Used at Home cane, straight   Do you have any problems affording any of your prescribed medications? No   Is the patient taking medications as prescribed? yes   Does the patient have transportation home? Yes  (daughter)   Transportation Available family or friend will provide;car   Does the patient receive services at the [facility]? No   Discharge Plan A Home   Discharge Plan B Home;Home Health   Patient/Family In Agreement With Plan yes

## 2018-07-12 NOTE — CONSULTS
U Gastroenterology    CC: Melena     HPI 73 y.o. male with a previous medical history of chronic GI bleeding, chronic iron deficiency anemia, atrial fibrillation not on anticoagulation, and ischemic heart disease with defibrillator who presents with a 3 day history of melena. He reports 2 episodes a day with his last bowel movement yesterday morning. He denies any bright red blood. He also denies any abdominal pain, hematemesis, or coffee ground emesis.   Patient has been previously seen by Dr Soto for chronic GI bleeding. He had VCE in 2/2018 that showed an angioectasia in the mid-distal ileum. He has been on octreotide injections since February 2018.     Past Medical History:   Diagnosis Date    Asthma     Cardiac defibrillator in place     CHF (congestive heart failure)     GI bleeding     Gout     Hypertension     Seizures     Stroke          Past Surgical History:   Procedure Laterality Date    CARDIAC SURGERY      year 2000    TONSILLECTOMY         Social History  Social History   Substance Use Topics    Smoking status: Current Every Day Smoker     Years: 28.00     Types: Cigars    Smokeless tobacco: Never Used      Comment: pt smoke a cigar 2x weekly    Alcohol use No      Comment: socially         Family History   Problem Relation Age of Onset    No Known Problems Mother     No Known Problems Father     No Known Problems Maternal Grandmother     No Known Problems Maternal Grandfather     No Known Problems Paternal Grandmother     No Known Problems Paternal Grandfather        Review of Systems  General ROS: positive for fatigue, negative for chills, fever or weight loss  Psychological ROS: negative for hallucination, depression or suicidal ideation  Ophthalmic ROS: negative for blurry vision, photophobia or eye pain  ENT ROS: negative for epistaxis, sore throat or rhinorrhea  Respiratory ROS: no cough, shortness of breath, or wheezing  Cardiovascular ROS: no chest pain or dyspnea on  exertion  Gastrointestinal ROS: positive for black stools, no abdominal pain  Genito-Urinary ROS: no dysuria, trouble voiding, or hematuria  Musculoskeletal ROS: negative for gait disturbance or muscular weakness  Neurological ROS: positive for lightheadedness,  no syncope or seizures; no ataxia  Dermatological ROS: negative for pruritis, rash and jaundice    Physical Examination  BP (!) 130/57   Pulse 66   Temp 97.4 °F (36.3 °C) (Oral)   Resp 14   Ht 6' (1.829 m)   Wt 67.7 kg (149 lb 4 oz)   SpO2 100%   BMI 20.24 kg/m²   General appearance: alert, cooperative, no distress  HENT: Normocephalic, atraumatic, neck symmetrical, no nasal discharge   Eyes: conjunctivae/corneas clear, EOM's intact  Lungs: clear to auscultation bilaterally, no dullness to percussion bilaterally  Heart: regular rate and rhythm without rub; no displacement of the PMI   Abdomen: soft, non-tender; bowel sounds normoactive; no organomegaly  Extremities: extremities symmetric; no clubbing, cyanosis, or edema  Integument: Skin color, texture, turgor normal; no rashes; hair distrubution normal  Neurologic: Alert and oriented X 3, normal strength  Psychiatric: no pressured speech; normal affect; no evidence of impaired cognition     Labs:  H/H 6.7/22.8  Iron 13 Ferritin 35 TIBC 426  INR 1.1     Imaging:  CXR reviewed independently   Previous medical records reviewed     Assessment:   HPI 73 y.o. male with a previous medical history of chronic GI bleeding, chronic iron deficiency anemia, atrial fibrillation not on anticoagulation, and ischemic heart disease with defibrillator who presents with a 3 day history of melena.     Plan:   -GI bleeding most likely attributed to known angioectasia.   -Patient with multiple VCEs and balloon enteroscopes.   -No plan for endoscopy/enteroscopy at this time as patient had multiple previous procedures with no bleed found and patient's symptoms improved with Octreotide initiation. He previously required blood  transfusions every 2 weeks but now only requires them every 6 months.   -Recommend supportive care  -OK for diet  -Transfuse for hemoglobin <8  -Patient due to next Octreotide LR injection, however cannot be done inpatient, recommend outpatient injection in infusion center after discharge    Anh Irving  PGY-1 Categorical Medicine  Gastroenterology

## 2018-07-12 NOTE — NURSING TRANSFER
Nursing Transfer Note      7/12/2018     Transfer To: room 430    Transfer via wheelchair    Transfer with cardiac monitoring    Transported by Janet Jean RN and transporter    Medicines sent: Spiriva and Travoprost eye drops    Chart send with patient: Yes    Notified: pt to notify family.     Patient reassessed at: 07/12/2018  1640    Upon arrival to floor: cardiac monitor applied, patient oriented to room, call bell in reach and bed in lowest position

## 2018-07-12 NOTE — H&P
History & Physical  Family Medicine  Subjective    Chief Complaint   Patient presents with    Hypotension     Hx of GIB, pt of Dr Soto, reports black stools, weakness.       History of Present Illness:  73 y.o. male with a PMH of recurrent, severe, anemia due to GI bleeding likely due to angioectasias, A fib, HTN, Cardiac defibrillator in place; CHF (congestive heart failure); Gout;  Seizures; and Stroke presents with diffuse weakness and light headedness that started yesterday which prompted him to go to the ED. The patient has had melena for the past 5 days with no bright red blood in his stool. He denied vomiting blood or black and notes that he sees Dr. Soto for his anemia. He denied abdominal pain but notes feeling severely fatigued. He has had multiple endoscopic evaluations including device assisted enteroscopy without a source of the bleed found.  VCE 2/2018 revealed BRB in the distal ileum. The patient sees Dr. Soto who reports that his bleeds are likely due to intestinal angioectasias. The patient had a dense angioectasia associated with active bleeding in the mid-distal ileum. His anemia improved with Octreotide injections that was started. The patient was recieving PRBC every 6 months instead of every 2 weeks due to these injections.        Past Medical History:   Diagnosis Date    Asthma     Cardiac defibrillator in place     CHF (congestive heart failure)     GI bleeding     Gout     Hypertension     Seizures     Stroke        Past Surgical History:   Procedure Laterality Date    CARDIAC SURGERY      year 2000    TONSILLECTOMY         Family History   Problem Relation Age of Onset    No Known Problems Mother     No Known Problems Father     No Known Problems Maternal Grandmother     No Known Problems Maternal Grandfather     No Known Problems Paternal Grandmother     No Known Problems Paternal Grandfather        Social History     Social History    Marital status: Single      "Spouse name: N/A    Number of children: N/A    Years of education: N/A     Social History Main Topics    Smoking status: Current Every Day Smoker     Years: 28.00     Types: Cigars    Smokeless tobacco: None      Comment: pt smoke a cigar 2x weekly    Alcohol use No      Comment: socially    Drug use: Yes     Types: Marijuana      Comment: occassionally    Sexual activity: Not Asked     Other Topics Concern    None     Social History Narrative    None       Current Facility-Administered Medications   Medication Dose Route Frequency Provider Last Rate Last Dose    0.9%  NaCl infusion (for blood administration)   Intravenous Q24H PRN Luther Orozco MD        pantoprazole 40 mg in dextrose 5 % 100 mL infusion (ready to mix system)  8 mg/hr Intravenous Continuous Luther Orozco MD 20 mL/hr at 07/11/18 2030 8 mg/hr at 07/11/18 2030     Current Outpatient Prescriptions   Medication Sig Dispense Refill    albuterol 90 mcg/actuation inhaler Inhale 2 puffs into the lungs every 6 (six) hours as needed for Wheezing. Rescue      amiodarone (PACERONE) 200 MG Tab Take 200 mg by mouth.      aspirin 325 MG tablet Take 325 mg by mouth once daily.      bumetanide (BUMEX) 2 MG tablet Take 2 tablets (4 mg total) by mouth 2 (two) times daily. 120 tablet 11    colchicine 0.6 mg tablet Take 0.6 mg by mouth 2 (two) times daily.       digoxin (LANOXIN) 125 mcg tablet Take 125 mcg by mouth every other day.      ferrous sulfate 325 (65 FE) MG EC tablet Take 1 tablet (325 mg total) by mouth 3 (three) times daily.  0    metOLazone (ZAROXOLYN) 5 MG tablet Take 1 tablet (5 mg total) by mouth every 48 hours. 15 tablet 11    metoprolol succinate (TOPROL-XL) 100 MG 24 hr tablet Take 3 tablets (300 mg total) by mouth once daily. (Patient taking differently: Take 200 mg by mouth 2 (two) times daily. ) 90 tablet 11    needle, disp, 21 G 21 gauge x 1 1/2" Ndle 1 each by Misc.(Non-Drug; Combo Route) route every 30 days. 12 each 0 "    octreotide lar (SANDOSTATIN LAR) 20 mg injection Inject 20 mg into the muscle every 28 days. 1 kit 11    pantoprazole (PROTONIX) 20 MG tablet Take 40 mg by mouth once daily.       phenytoin (DILANTIN) 100 MG ER capsule Take 200 mg by mouth 2 (two) times daily.       potassium chloride (KLOR-CON) 10 MEQ TbSR Take 20 mEq by mouth 2 (two) times daily.   0    sildenafil, antihypertensive, (REVATIO) 20 mg Tab Take 1 tablet (20 mg total) by mouth 3 (three) times daily. 90 tablet 11    simvastatin (ZOCOR) 40 MG tablet Take 40 mg by mouth every evening.       spironolactone (ALDACTONE) 25 MG tablet Take 25 mg by mouth once daily.       tamsulosin (FLOMAX) 0.4 mg Cp24 Take 1 capsule (0.4 mg total) by mouth once daily. 30 capsule 11    tiotropium (SPIRIVA WITH HANDIHALER) 18 mcg inhalation capsule Inhale 1 capsule (18 mcg total) into the lungs once daily. Controller  0    travoprost (TRAVATAN Z) 0.004 % Drop Place 1 drop into both eyes once daily.      vacuum erection device system Kit Take as directed      ZOSTAVAX, PF, 19,400 unit/0.65 mL injection          Review of patient's allergies indicates:   Allergen Reactions    Coreg [carvedilol] Palpitations     Palpitations^       Review of Systems (Negative unless checked off)    Constitutional:  [] fever, [] weight loss, [x]  weakness, [x] fatigue  HENT:   [] headache, [] vision changes, [] neck pain  Respiratory: [] cough, [] pleuritic chest pain, [] shortness of breath  Cardiovascular:  [] chest pain, [] palpitations, [] pressure  Gastrointestinal: [x] melena [] nausea, [] vomiting, [] abdominal pain, [] diarrhea, [] constipation  Genitourinary:  [] dysuria, [] frequency  Musculoskeletal: [] leg swelling, [] calf tenderness  Skin:  []  Rash, [] bleeding  Neurological:  [x] dizziness, [] focal weakness  [] numbness,  [] syncope  Psychiatric/Behavioral:  []  substance abuse      Objective    Vital Signs (Most Recent):    Temp:  [98.3 °F (36.8 °C)-98.5 °F (36.9  °C)]   Pulse:  []   Resp:  [11-20]   BP: (91-97)/(39-55)   SpO2:  [100 %]     Physical Exam:    Constitutional: Thin, weak appearing, malnourished.    No distress.   HENT:   Head: Normocephalic and atraumatic.   Eyes: Conjunctivae and EOM are normal. No scleral icterus.   Neck: Normal range of motion.   Cardiovascular: Tachy and intact distal pulses.    Pulmonary/Chest: Scar. Pacemaker in chest. Decreased breath sounds bilateral .  No respiratory distress.   Abdominal: Soft. Patient  exhibits no distension. There is no tenderness.   Musculoskeletal: Normal range of motion. Patient  exhibits no edema or tenderness.   Neurological: Patient  is alert and oriented to person, place, and time.   Skin: No rash noted. Patient is not diaphoretic. No pallor.   Psychiatric: Patient  has a normal mood and affect. Patient's behavior is normal.  Rectal: no bright red blood noted or masses appreciated.      Laboratory:    Most Recent Data:  CBC:   Lab Results   Component Value Date    WBC 5.06 07/11/2018    HGB 5.4 (LL) 07/11/2018    HCT 20.0 (L) 07/11/2018     07/11/2018    MCV 89 07/11/2018    RDW 16.8 (H) 07/11/2018     BMP:   Lab Results   Component Value Date     07/11/2018    K 3.9 07/11/2018    CL 98 07/11/2018    CO2 26 07/11/2018    BUN 45 (H) 07/11/2018    GLU 88 07/11/2018    CALCIUM 7.7 (L) 07/11/2018    MG 2.4 02/22/2018    PHOS 3.5 02/22/2018     LFTs:   Lab Results   Component Value Date    PROT 6.0 07/11/2018    ALBUMIN 3.0 (L) 07/11/2018    BILITOT 0.3 07/11/2018    AST 57 (H) 07/11/2018    ALKPHOS 328 (H) 07/11/2018    ALT 45 (H) 07/11/2018     Coags:   Lab Results   Component Value Date    INR 1.1 07/11/2018     FLP: No results found for: CHOL, HDL, LDLCALC, TRIG, CHOLHDL  DM:   Lab Results   Component Value Date    HGBA1C 4.2 02/20/2018    HGBA1C 4.9 02/10/2018    CREATININE 1.2 07/11/2018     HgbA1c:   Lab Results   Component Value Date    HGBA1C 4.2 02/20/2018     Thyroid:   Lab Results    Component Value Date    TSH 0.652 02/10/2018     Anemia:   Lab Results   Component Value Date    IRON 36 (L) 02/21/2018    TIBC 398 02/21/2018    FERRITIN 60 02/21/2018     Cardiac:   Lab Results   Component Value Date    TROPONINI 0.044 (H) 07/11/2018     (H) 07/11/2018     Trended Lab Data:    Recent Labs  Lab 07/11/18 1906 07/11/18 2024   WBC  --  5.06   HGB  --  5.4*   HCT  --  20.0*   PLT  --  320   MCV  --  89   RDW  --  16.8*     --    K 3.9  --    CL 98  --    CO2 26  --    BUN 45*  --    GLU 88  --    PROT 6.0  --    ALBUMIN 3.0*  --    BILITOT 0.3  --    AST 57*  --    ALKPHOS 328*  --    ALT 45*  --      Trended Cardiac Data:    Recent Labs  Lab 07/11/18 1906 07/11/18 2024   TROPONINI 0.044*  --    BNP  --  265*       No results found for: EF    No results found for this visit on 07/11/18.    Microbiology Results (last 7 days)     ** No results found for the last 168 hours. **          Urinalysis:   Lab Results   Component Value Date    COLORU Yellow 02/20/2018    SPECGRAV <=1.005 (A) 02/20/2018    NITRITE Negative 02/20/2018    KETONESU Negative 02/20/2018    UROBILINOGEN Negative 02/20/2018     Radiology:   X-Ray Chest AP Portable   Final Result      Cardiomegaly with minimal increased attenuation of the pulmonary parenchyma.  The findings most likely represent early CHF.         Electronically signed by: Andrea Richards MD   Date:    07/11/2018   Time:    19:35          Assessment/Plan    Neuro    Seizure disorder  Continue phenytoin    CV    A fib   BB held due to low bp   Amiodarone continue  Digoxin continue   Monitor and add BB back on when bp stable    CHF  Patient not given fluid in ED due to pulm edema   If pt blood pressure decreases more we will give fluids   BB held  Digoxin continue  Bumetanide continue     HTN  Pt is hypotensive  BP meds held     Respiratory    COPD  on spiriva and albuterol  stable    FEN-GI:    GI bleed  Lab Results   Component Value Date    OCCULTBLOOD  Positive (A) 07/11/2018     H/H 5.4/20  Will get 2 units in ED  Protonix 80mg IV bolus x 1 then 8mg/hr gtt  Intravascular resuscitation/support with IVFs and pRBCs as needed.  Serial H/H's transfuse as needed.  Discontinue all ASA, NSAIDs and Heparin products  Maintain IV access with 2 large bore IVs  Keep NPO   Plan for EGD  Type and screen, PT, and PTT  GI consulted   will hold DVT chemoprophylaxis   monitor CBC q6 hour   check Iron studies, ferritin, folate, Vit B12, retic count, LDH, haptoglobin, FOBT, TSH   Octreotide continue home med    PPx: SCD  Diet: NPO      Kendall Patel MD  07/11/2018

## 2018-07-12 NOTE — ED NOTES
Pt presents to ed with c/o weakness, hypotension, and dark stool. Reports  That these symptoms started this morning and have progressed throughout the day. Has history of a-fib and history of GI bleed. At present, pt is aaox4, neurologically intact. Denies any pain at this time. Breath sounds slightly diminished in left lower lobe.

## 2018-07-13 ENCOUNTER — TELEPHONE (OUTPATIENT)
Dept: NEUROLOGY | Facility: HOSPITAL | Age: 73
End: 2018-07-13

## 2018-07-13 VITALS
HEIGHT: 72 IN | TEMPERATURE: 100 F | HEART RATE: 81 BPM | WEIGHT: 149.25 LBS | OXYGEN SATURATION: 100 % | SYSTOLIC BLOOD PRESSURE: 110 MMHG | RESPIRATION RATE: 12 BRPM | DIASTOLIC BLOOD PRESSURE: 56 MMHG | BODY MASS INDEX: 20.22 KG/M2

## 2018-07-13 LAB
ALBUMIN SERPL BCP-MCNC: 2.8 G/DL
ALP SERPL-CCNC: 315 U/L
ALT SERPL W/O P-5'-P-CCNC: 47 U/L
ANION GAP SERPL CALC-SCNC: 8 MMOL/L
APTT BLDCRRT: 25.2 SEC
AST SERPL-CCNC: 61 U/L
BASOPHILS # BLD AUTO: 0.01 K/UL
BASOPHILS NFR BLD: 0.2 %
BILIRUB SERPL-MCNC: 0.6 MG/DL
BUN SERPL-MCNC: 29 MG/DL
CALCIUM SERPL-MCNC: 8 MG/DL
CHLORIDE SERPL-SCNC: 98 MMOL/L
CO2 SERPL-SCNC: 31 MMOL/L
CREAT SERPL-MCNC: 1 MG/DL
DIFFERENTIAL METHOD: ABNORMAL
EOSINOPHIL # BLD AUTO: 0.1 K/UL
EOSINOPHIL NFR BLD: 1.1 %
ERYTHROCYTE [DISTWIDTH] IN BLOOD BY AUTOMATED COUNT: 16.9 %
EST. GFR  (AFRICAN AMERICAN): >60 ML/MIN/1.73 M^2
EST. GFR  (NON AFRICAN AMERICAN): >60 ML/MIN/1.73 M^2
GLUCOSE SERPL-MCNC: 116 MG/DL
HCT VFR BLD AUTO: 28.1 %
HCT VFR BLD AUTO: 29.3 %
HCT VFR BLD AUTO: 29.7 %
HCT VFR BLD AUTO: 31 %
HGB BLD-MCNC: 8.4 G/DL
HGB BLD-MCNC: 8.8 G/DL
HGB BLD-MCNC: 8.8 G/DL
HGB BLD-MCNC: 9.4 G/DL
INR PPP: 1.1
LYMPHOCYTES # BLD AUTO: 0.6 K/UL
LYMPHOCYTES NFR BLD: 8.6 %
MCH RBC QN AUTO: 25.7 PG
MCHC RBC AUTO-ENTMCNC: 30 G/DL
MCV RBC AUTO: 85 FL
MONOCYTES # BLD AUTO: 0.2 K/UL
MONOCYTES NFR BLD: 3.6 %
NEUTROPHILS # BLD AUTO: 5.6 K/UL
NEUTROPHILS NFR BLD: 86.5 %
PLATELET # BLD AUTO: 235 K/UL
PMV BLD AUTO: 9.5 FL
POTASSIUM SERPL-SCNC: 3.5 MMOL/L
PROT SERPL-MCNC: 5.9 G/DL
PROTHROMBIN TIME: 11.1 SEC
RBC # BLD AUTO: 3.43 M/UL
SODIUM SERPL-SCNC: 137 MMOL/L
WBC # BLD AUTO: 6.42 K/UL

## 2018-07-13 PROCEDURE — 36415 COLL VENOUS BLD VENIPUNCTURE: CPT

## 2018-07-13 PROCEDURE — 80053 COMPREHEN METABOLIC PANEL: CPT

## 2018-07-13 PROCEDURE — 94640 AIRWAY INHALATION TREATMENT: CPT

## 2018-07-13 PROCEDURE — 85730 THROMBOPLASTIN TIME PARTIAL: CPT

## 2018-07-13 PROCEDURE — 25000003 PHARM REV CODE 250: Performed by: FAMILY MEDICINE

## 2018-07-13 PROCEDURE — 85018 HEMOGLOBIN: CPT

## 2018-07-13 PROCEDURE — 25000242 PHARM REV CODE 250 ALT 637 W/ HCPCS: Performed by: STUDENT IN AN ORGANIZED HEALTH CARE EDUCATION/TRAINING PROGRAM

## 2018-07-13 PROCEDURE — 94761 N-INVAS EAR/PLS OXIMETRY MLT: CPT

## 2018-07-13 PROCEDURE — 25000003 PHARM REV CODE 250: Performed by: STUDENT IN AN ORGANIZED HEALTH CARE EDUCATION/TRAINING PROGRAM

## 2018-07-13 PROCEDURE — 85025 COMPLETE CBC W/AUTO DIFF WBC: CPT

## 2018-07-13 PROCEDURE — 85610 PROTHROMBIN TIME: CPT

## 2018-07-13 PROCEDURE — 85014 HEMATOCRIT: CPT | Mod: 91

## 2018-07-13 RX ORDER — PHENYTOIN SODIUM 100 MG/1
200 CAPSULE, EXTENDED RELEASE ORAL 2 TIMES DAILY
Qty: 120 CAPSULE | Refills: 1 | Status: ON HOLD | OUTPATIENT
Start: 2018-07-13 | End: 2018-08-24 | Stop reason: CLARIF

## 2018-07-13 RX ORDER — ACETAMINOPHEN 325 MG/1
650 TABLET ORAL ONCE
Status: COMPLETED | OUTPATIENT
Start: 2018-07-13 | End: 2018-07-13

## 2018-07-13 RX ORDER — METOLAZONE 5 MG/1
5 TABLET ORAL
Qty: 30 TABLET | Refills: 1 | Status: SHIPPED | OUTPATIENT
Start: 2018-07-13 | End: 2018-07-13

## 2018-07-13 RX ORDER — SPIRONOLACTONE 25 MG/1
25 TABLET ORAL DAILY
Qty: 30 TABLET | Refills: 1 | Status: ON HOLD | OUTPATIENT
Start: 2018-07-14 | End: 2018-08-24 | Stop reason: CLARIF

## 2018-07-13 RX ORDER — FERROUS SULFATE 325(65) MG
325 TABLET ORAL 3 TIMES DAILY
Qty: 30 TABLET | Refills: 1 | Status: ON HOLD | OUTPATIENT
Start: 2018-07-13 | End: 2018-08-24 | Stop reason: CLARIF

## 2018-07-13 RX ORDER — COLCHICINE 0.6 MG/1
0.6 TABLET ORAL 2 TIMES DAILY
Qty: 60 TABLET | Refills: 1 | Status: SHIPPED | OUTPATIENT
Start: 2018-07-13 | End: 2018-07-13

## 2018-07-13 RX ORDER — BUMETANIDE 2 MG/1
4 TABLET ORAL 2 TIMES DAILY
Qty: 120 TABLET | Refills: 1 | Status: SHIPPED | OUTPATIENT
Start: 2018-07-13 | End: 2018-07-13

## 2018-07-13 RX ORDER — SIMVASTATIN 40 MG/1
40 TABLET, FILM COATED ORAL NIGHTLY
Qty: 30 TABLET | Refills: 1 | Status: ON HOLD | OUTPATIENT
Start: 2018-07-13 | End: 2018-08-24 | Stop reason: CLARIF

## 2018-07-13 RX ORDER — TIOTROPIUM BROMIDE 18 UG/1
1 CAPSULE ORAL; RESPIRATORY (INHALATION) DAILY
Qty: 30 CAPSULE | Refills: 11 | Status: SHIPPED | OUTPATIENT
Start: 2018-07-14 | End: 2019-07-14

## 2018-07-13 RX ORDER — METOLAZONE 5 MG/1
5 TABLET ORAL
Qty: 30 TABLET | Refills: 1 | Status: SHIPPED | OUTPATIENT
Start: 2018-07-13 | End: 2019-07-13

## 2018-07-13 RX ORDER — ASPIRIN 325 MG
325 TABLET ORAL DAILY
Qty: 100 TABLET | Refills: 0 | Status: SHIPPED | OUTPATIENT
Start: 2018-07-13 | End: 2018-08-21

## 2018-07-13 RX ORDER — ALBUTEROL SULFATE 90 UG/1
2 AEROSOL, METERED RESPIRATORY (INHALATION) EVERY 6 HOURS PRN
Qty: 18 G | Refills: 1 | Status: SHIPPED | OUTPATIENT
Start: 2018-07-13 | End: 2018-07-13

## 2018-07-13 RX ORDER — DIGOXIN 125 MCG
125 TABLET ORAL EVERY OTHER DAY
Qty: 30 TABLET | Refills: 1 | Status: ON HOLD | OUTPATIENT
Start: 2018-07-14 | End: 2018-08-24 | Stop reason: CLARIF

## 2018-07-13 RX ORDER — DIGOXIN 125 MCG
125 TABLET ORAL EVERY OTHER DAY
Qty: 30 TABLET | Refills: 1 | Status: SHIPPED | OUTPATIENT
Start: 2018-07-14 | End: 2018-07-13

## 2018-07-13 RX ORDER — AMIODARONE HYDROCHLORIDE 200 MG/1
200 TABLET ORAL DAILY
Qty: 30 TABLET | Refills: 1 | Status: ON HOLD | OUTPATIENT
Start: 2018-07-14 | End: 2018-08-24 | Stop reason: CLARIF

## 2018-07-13 RX ORDER — SIMVASTATIN 40 MG/1
40 TABLET, FILM COATED ORAL NIGHTLY
Qty: 30 TABLET | Refills: 1 | Status: SHIPPED | OUTPATIENT
Start: 2018-07-13 | End: 2018-07-13

## 2018-07-13 RX ORDER — PANTOPRAZOLE SODIUM 20 MG/1
40 TABLET, DELAYED RELEASE ORAL DAILY
Qty: 30 TABLET | Refills: 1 | Status: SHIPPED | OUTPATIENT
Start: 2018-07-13 | End: 2018-07-13

## 2018-07-13 RX ORDER — BUMETANIDE 2 MG/1
4 TABLET ORAL 2 TIMES DAILY
Qty: 120 TABLET | Refills: 1 | Status: ON HOLD | OUTPATIENT
Start: 2018-07-13 | End: 2018-08-24 | Stop reason: CLARIF

## 2018-07-13 RX ORDER — ALBUTEROL SULFATE 90 UG/1
2 AEROSOL, METERED RESPIRATORY (INHALATION) EVERY 6 HOURS PRN
Qty: 8.5 G | Refills: 1 | Status: ON HOLD | OUTPATIENT
Start: 2018-07-13 | End: 2018-08-24 | Stop reason: CLARIF

## 2018-07-13 RX ORDER — PANTOPRAZOLE SODIUM 20 MG/1
40 TABLET, DELAYED RELEASE ORAL DAILY
Qty: 30 TABLET | Refills: 1 | Status: SHIPPED | OUTPATIENT
Start: 2018-07-13

## 2018-07-13 RX ORDER — AMIODARONE HYDROCHLORIDE 200 MG/1
200 TABLET ORAL DAILY
Qty: 30 TABLET | Refills: 1 | Status: SHIPPED | OUTPATIENT
Start: 2018-07-14 | End: 2018-07-13

## 2018-07-13 RX ORDER — FERROUS SULFATE 325(65) MG
325 TABLET, DELAYED RELEASE (ENTERIC COATED) ORAL 3 TIMES DAILY
Qty: 30 TABLET | Refills: 1 | Status: SHIPPED | OUTPATIENT
Start: 2018-07-13 | End: 2018-07-13

## 2018-07-13 RX ORDER — COLCHICINE 0.6 MG/1
0.6 TABLET ORAL 2 TIMES DAILY
Qty: 60 TABLET | Refills: 1 | Status: ON HOLD | OUTPATIENT
Start: 2018-07-13 | End: 2018-08-24 | Stop reason: CLARIF

## 2018-07-13 RX ADMIN — FERROUS SULFATE TAB EC 325 MG (65 MG FE EQUIVALENT) 325 MG: 325 (65 FE) TABLET DELAYED RESPONSE at 02:07

## 2018-07-13 RX ADMIN — AMIODARONE HYDROCHLORIDE 200 MG: 200 TABLET ORAL at 08:07

## 2018-07-13 RX ADMIN — TRAVOPROST 1 DROP: 0.04 SOLUTION/ DROPS OPHTHALMIC at 08:07

## 2018-07-13 RX ADMIN — PHENYTOIN SODIUM 200 MG: 100 CAPSULE ORAL at 08:07

## 2018-07-13 RX ADMIN — COLCHICINE 0.6 MG: 0.6 TABLET, FILM COATED ORAL at 08:07

## 2018-07-13 RX ADMIN — SPIRONOLACTONE 25 MG: 25 TABLET, FILM COATED ORAL at 08:07

## 2018-07-13 RX ADMIN — ACETAMINOPHEN 650 MG: 325 TABLET, FILM COATED ORAL at 01:07

## 2018-07-13 RX ADMIN — TIOTROPIUM BROMIDE 18 MCG: 18 CAPSULE ORAL; RESPIRATORY (INHALATION) at 09:07

## 2018-07-13 RX ADMIN — FERROUS SULFATE TAB EC 325 MG (65 MG FE EQUIVALENT) 325 MG: 325 (65 FE) TABLET DELAYED RESPONSE at 08:07

## 2018-07-13 RX ADMIN — BUMETANIDE 4 MG: 1 TABLET ORAL at 08:07

## 2018-07-13 NOTE — DISCHARGE INSTRUCTIONS
Anemia (English) View Edit Remove  Anemia, Iron-Deficiency (Adult) (English) View Edit Remove  Atrial Fibrillation, Discharge Instructions for (English) View Edit Remove

## 2018-07-13 NOTE — TELEPHONE ENCOUNTER
Hospital follow up scheduled with pt on Wednesday, August 1, 2018, at 1030am.  Pt repeated date and time correctly.

## 2018-07-13 NOTE — PLAN OF CARE
07/13/18 1446   Final Note   Assessment Type Final Discharge Note   Discharge Disposition Home   Hospital Follow Up  Appt(s) scheduled? Yes   Discharge plans and expectations educations in teach back method with documentation complete? Yes   Right Care Referral Info   Post Acute Recommendation No Care     Patient has followup with [facility] PCP- spoke to Dr. Soto office- they will contact patient with followup appt.    Vy Ramos, RN, CCM, CMSRN  RN Transition Navigator  [phone]

## 2018-07-13 NOTE — TELEPHONE ENCOUNTER
----- Message from Janet Fulton sent at 7/13/2018 12:34 PM CDT -----  Contact: Vy sommer/ Case Management  Patient is getting discharged today - there is not availability for Dr. Soto during the requested time so I was unable to schedule a follow up.      Also he will need an appointment for his next infusion appointment.    Patient can be reached at [phone].  Thanks,  Janet

## 2018-07-13 NOTE — PLAN OF CARE
Pt is being discharged. Discharge teaching was reviewed with patient and family. Pt verbalized understanding. Refer to clinical references for pt education. IV removed, tip intact, pt tolerated well. Tele monitor. Daughter notified. Awaiting transport.

## 2018-07-13 NOTE — PROGRESS NOTES
Progress Note  [facility]  Admit Date: 7/11/2018   LOS: 2 days   SUBJECTIVE:   Follow-up For:Symptomatic anemia    GI didn't scope patient. Stepped down to floor, transfusing 1 more unit per GI recs.  Received 1 U pRBC yesterday evening. Most recent H/H  9.4/31. F/U GI    Review of Systems   Constitutional: Positive for malaise/fatigue. Negative for chills, fever and weight loss.   Respiratory: Negative for cough, hemoptysis, sputum production, shortness of breath and wheezing.    Cardiovascular: Negative for chest pain, palpitations, orthopnea, claudication and leg swelling.   Gastrointestinal: Negative for abdominal pain, blood in stool, constipation, diarrhea, melena, nausea and vomiting.   Genitourinary: Negative for flank pain.   Musculoskeletal: Negative for falls, myalgias and neck pain.   Skin: Negative for itching and rash.   Neurological: Positive for weakness. Negative for dizziness, tingling and headaches.       OBJECTIVE:   Vital Signs (Most Recent)  Temp: 96.4 °F (35.8 °C) (07/13/18 0710)  Pulse: 78 (07/13/18 0710)  Resp: 14 (07/13/18 0710)  BP: (!) 109/55 (07/13/18 0710)  SpO2: 99 % (07/13/18 0420)    I & O (Last 24H):    Intake/Output Summary (Last 24 hours) at 07/13/18 0904  Last data filed at 07/13/18 0855   Gross per 24 hour   Intake             1021 ml   Output             3650 ml   Net            -2629 ml     Wt Readings from Last 3 Encounters:   07/12/18 67.7 kg (149 lb 4 oz)   06/13/18 71.3 kg (157 lb 3 oz)   06/13/18 71.3 kg (157 lb 3 oz)       Current Diet Order   Procedures    Diet Cardiac        Physical Exam   Constitutional: He is oriented to person, place, and time and well-developed, well-nourished, and in no distress.   HENT:   Head: Normocephalic and atraumatic.   Eyes: Conjunctivae and EOM are normal. Pupils are equal, round, and reactive to light. Right eye exhibits no discharge. Left eye exhibits no discharge. No scleral icterus.   Neck: Normal range of motion. Neck  supple. No JVD present. No thyromegaly present.   Cardiovascular: Normal rate, regular rhythm, normal heart sounds and intact distal pulses.  Exam reveals no gallop and no friction rub.    No murmur heard.  Pulmonary/Chest: Effort normal and breath sounds normal. No respiratory distress. He has no wheezes. He has no rales. He exhibits no tenderness.   Abdominal: Soft. Bowel sounds are normal. He exhibits no distension and no mass. There is no tenderness. There is no rebound and no guarding.   Neurological: He is alert and oriented to person, place, and time.   Skin: Skin is warm and dry. No rash noted. No erythema. No pallor.   Psychiatric: Affect and judgment normal.       Laboratory Data:  CBC    Recent Labs  Lab 07/11/18 2024 07/12/18 0610 07/12/18  2337 07/13/18  0157 07/13/18  0803   WBC 5.06 4.89  --   --  6.42  --    RBC 2.25* 2.61*  --   --  3.43*  --    HGB 5.4* 6.7*  < > 8.4* 8.8*  8.8* 9.4*   HCT 20.0* 22.8*  < > 28.1* 29.7*  29.3* 31.0*    237  --   --  235  --    MCV 89 87  --   --  85  --    MCH 24.0* 25.7*  --   --  25.7*  --    MCHC 27.0* 29.4*  --   --  30.0*  --    < > = values in this interval not displayed.  CMP    Recent Labs  Lab 07/11/18 1906 07/12/18 0610 07/13/18  0157   CALCIUM 7.7* 8.1* 8.0*   PROT 6.0 5.7* 5.9*    137 137   K 3.9 3.6 3.5   CO2 26 32* 31*   CL 98 98 98   BUN 45* 40* 29*   CREATININE 1.2 1.2 1.0   ALKPHOS 328* 300* 315*   ALT 45* 43 47*   AST 57* 50* 61*   BILITOT 0.3 0.6 0.6     POCT-Glucose  No results found for: POCTGLUCOSE  COAGS    Recent Labs  Lab 07/11/18 1906 07/12/18  0610 07/13/18  0157   INR 1.1 1.1 1.1   APTT 21.0 27.3 25.2     UA  No results for input(s): COLORU, CLARITYU, SPECGRAV, PHUR, PROTEINUA, GLUCOSEU, BLOODU, WBCU, RBCU, BACTERIA, MUCUS in the last 24 hours.    Invalid input(s):  BILIRUBINCON  MICRO  Microbiology Results (last 7 days)     ** No results found for the last 168 hours. **        LIPID PANEL  No results found for:  CHOL  No results found for: HDL  No results found for: LDLCALC  No results found for: TRIG  No results found for: CHOLHDL    Diagnostic Results:  Imaging in last 24 hours: reviewed    ASSESSMENT/PLAN:   Stefano Granger is a 73 y.o. male    GI Bleed   -Received 1 U pRBC yesterday evening. Most recent H/H  9.4/31. F/U GI    Per GI:  -GI bleeding most likely attributed to known angioectasia.   -Patient with multiple VCEs and balloon enteroscopes.   -No plan for endoscopy/enteroscopy at this time as patient had multiple previous procedures with no bleed found and patient's symptoms improved with Octreotide initiation. He previously required blood transfusions every 2 weeks but now only requires them every 6 months.   -Transfuse for hemoglobin <8  -Patient due to next Octreotide LR injection, however cannot be done inpatient, recommend outpatient injection in infusion center after discharge      CV   A fib   Restart BB   Amiodarone continue  Digoxin continue        CHF  Patient not given fluid in ED due to pulm edema   If pt blood pressure decreases more we will give fluids   BB restarted  Digoxin continue  Bumetanide continue      HTN  continue to monitor     Respiratory   COPD  on spiriva and albuterol  stable       Neuro    Seizure disorder  Continue phenytoin      PPx: SCD  Diet: NPO      7/13/2018 Vincent Ardon MD  9:07 AM

## 2018-07-13 NOTE — PLAN OF CARE
Problem: Patient Care Overview  Goal: Plan of Care Review  Outcome: Ongoing (interventions implemented as appropriate)  Pt's SpO2 98% on room air. No adverse reactions to MDI. No respiratory distress noted. Continue to monitor SpO2.

## 2018-07-13 NOTE — PROGRESS NOTES
[facility]    CC: Melena      HPI 73 y.o. male with a previous medical history of chronic GI bleeding, chronic iron deficiency anemia, atrial fibrillation not on anticoagulation, and ischemic heart disease with defibrillator who presents with a 3 day history of melena. He reports 2 episodes a day with his last bowel movement yesterday morning. He denies any bright red blood. He also denies any abdominal pain, hematemesis, or coffee ground emesis.   Patient has been previously seen by Dr Soto for chronic GI bleeding. He had VCE in 2/2018 that showed an angioectasia in the mid-distal ileum. He has been on octreotide injections since February 2018.     Interval Hx:  Patient states he feels well, no events overnight. 1x episode of dark stool overnight. H/H stable after PRBC transfusion.    Past Medical History:   Diagnosis Date    Asthma     Cardiac defibrillator in place     CHF (congestive heart failure)     GI bleeding     Gout     Hypertension     Seizures     Stroke          Review of Systems  General ROS: negative for chills, fever or weight loss  Cardiovascular ROS: no chest pain or dyspnea on exertion  Gastrointestinal ROS: no abdominal pain, change in bowel habits, Positive black stools    Physical Examination  /66 (BP Location: Left arm, Patient Position: Sitting)   Pulse 67   Temp 97.5 °F (36.4 °C) (Oral)   Resp 16   Ht 6' (1.829 m)   Wt 67.7 kg (149 lb 4 oz)   SpO2 100%   BMI 20.24 kg/m²   General appearance: alert, cooperative, no distress  HENT: Normocephalic, atraumatic, neck symmetrical, no nasal discharge   Lungs: clear to auscultation bilaterally, no dullness to percussion bilaterally  Heart: regular rate and rhythm without rub; no displacement of the PMI   Abdomen: soft, non-tender; bowel sounds normoactive; no organomegaly  Extremities: extremities symmetric; no clubbing, cyanosis, or edema  Neurologic: Alert and oriented X 3, normal strength, no focal  deficits    Labs:  H/H 9.4/31      Imaging:  CXR reviewed independently   Previous medical records reviewed     Assessment:   73 y.o. male with a previous medical history of chronic GI bleeding, chronic iron deficiency anemia, currently on Octreotide therapy who presents with a 3 day history of melena. Patient with know to [facility], with angiectasias seen on multiple VCE's, however multiple Balloon Enteroscopy procedures have been negative for any source of bleed. Patient previously requiring multiple PRBC transfusions every 1-2wks. Currently, requirement for transfusions has improved to about 1 transfusion every 6mo after initiating Octreotide therapy.     Plan:   -No plan for endoscopy/enteroscopy at this time as patient had multiple previous procedures with no bleed found and patient's symptoms improved with Octreotide initiation.  -Patient due to get next Octreotide LR injection, however cannot be done inpatient, recommend outpatient injection in infusion center after discharge  - OK for discharge today as H/H stable after PRBC transfusion.    Obdulio Jensen MD  [facility]  [facility]

## 2018-07-13 NOTE — PLAN OF CARE
Problem: Patient Care Overview  Goal: Plan of Care Review  Outcome: Ongoing (interventions implemented as appropriate)  Plan of care reviewed with patient. Patient voiced understanding. NSR on monitor. No acute distress noted. Side rails x 2, bed in lowest position, call bell within reach . Maintain bed alarm for patient safety. Currently infusing 1 PRBC. Vital signs stable. Pt tolerating it well. Report given to Dennise DE SOUZA. Pt on cardiac diet. No acute distress noted.

## 2018-07-13 NOTE — PLAN OF CARE
Problem: Anemia (Adult)  Intervention: Facilitate Safe Activity  Plan of care reviewed with patient. Verbalized understanding.1 unit PRBC given during shift. No distress noted. Will continue to monitor. On telemetry, no red alarms or ectopy.Afib will continue to monitor.Bed alarm active, bed in lowest position, call light within reach. Patient instructed to use call light for assistance. Verbalized understanding. Bed rails up x2.

## 2018-07-15 LAB
BLD PROD TYP BPU: NORMAL
BLOOD UNIT EXPIRATION DATE: NORMAL
BLOOD UNIT TYPE CODE: 5100
BLOOD UNIT TYPE: NORMAL
CODING SYSTEM: NORMAL
DISPENSE STATUS: NORMAL
NUM UNITS TRANS PACKED RBC: NORMAL

## 2018-07-16 ENCOUNTER — INFUSION (OUTPATIENT)
Dept: INFUSION THERAPY | Facility: HOSPITAL | Age: 73
End: 2018-07-16
Attending: INTERNAL MEDICINE
Payer: MEDICARE

## 2018-07-16 VITALS — WEIGHT: 149.25 LBS | BODY MASS INDEX: 20.22 KG/M2 | HEIGHT: 72 IN

## 2018-07-16 DIAGNOSIS — D50.0 BLOOD LOSS ANEMIA: Primary | ICD-10-CM

## 2018-07-16 PROCEDURE — 63600175 PHARM REV CODE 636 W HCPCS: Mod: JG | Performed by: INTERNAL MEDICINE

## 2018-07-16 PROCEDURE — 96372 THER/PROPH/DIAG INJ SC/IM: CPT

## 2018-07-16 RX ADMIN — OCTREOTIDE ACETATE 20 MG: KIT at 02:07

## 2018-07-17 NOTE — PHYSICIAN QUERY
PT Name: Stefano Granger  MR #: [id number]    Physician Query Form - Atrial Fibrillation Specificity     CDS/: Kisha Keys RN               Contact information:[email or web address]     This form is a permanent document in the medical record.     Query Date: July 17, 2018    By submitting this query, we are merely seeking further clarification of documentation. Please utilize your independent clinical judgment when addressing the question(s) below.    The medical record contains the following:   Indicators     Supporting Clinical Findings Location in Medical Record   x Atrial Fibrillation A fib   BB held due to low bp   Amiodarone continue  Digoxin continue   Monitor and add BB back on when bp stable H&P   x EKG results Atrial fibrillation. Compared to EGD 2/10/18: Afib is new EKG 12 lead 7/11    Medication      Treatment     x Other Pacemaker/defibrillator to left anterior chest wall. BP 93/55.  ED provider note       Provider, please further specify the Atrial Fibrillation diagnosis.    [x  ] Chronic  [  ] Paroxysmal  [  ] Persistent  [x  ] Other (please specify): __inplanted defibrillator  __________________________  [  ] Clinically Undetermined    Please document in your progress notes daily for the duration of treatment until resolved, and include in your discharge summary.

## 2018-07-17 NOTE — PHYSICIAN QUERY
"PT Name: Stefano Granger  MR #: [id number]    Physician Query Form - Heart  Condition Clarification     CDS/: Kisha Keys RN              Contact information:[email or web address]  This form is a permanent document in the medical record.     Query Date: July 17, 2018    By submitting this query, we are merely seeking further clarification of documentation. Please utilize your independent clinical judgment when addressing the question(s) below.    The medical record contains the following   Indicators     Supporting Clinical Findings Location in Medical Record   x BNP  Labs 7/11    EF     x Radiology findings Cardiomegaly with minimal increased attenuation of the pulmonary parenchyma.  The findings most likely represent early CHF. CXR 7/11    Echo Results      "Ascites" documented      "SOB" or "DYSON" documented      "Hypoxia" documented     x Heart Failure documented CHF  Patient not given fluid in ED due to pulm edema   If pt blood pressure decreases more we will give fluids   BB held  Digoxin continue  Bumetanide continue  H&P    "Edema" documented      Diuretics/Meds      Treatment:      Other:      Heart failure (HF) can be acute, chronic or both. It is generally further specificed as systolic, diastolic, or combined. Lastly, it is important to identify an underlying etiology if known or suspected.     Common clues to acute exacerbation:  Rapidly progressive symptoms (w/in 2 weeks of presentation), using IV diuretics to treat, using supplemental O2, pulmonary edema on Xray, MI w/in 4 weeks, and/or BNP >500    Systolic Heart Failure: is defined as chart documentation of a left ventricular ejection fraction (LVEF) less than 40%     Diastolic Heart Failure: is defined as a left ventricular ejection fraction (LVEF) greater than 40%   +      Evidence of diastolic dysfunction on echocardiography OR    Right heart catheterization wedge pressure above 12 mm Hg OR    Left heart " catheterization left ventricular end diastolic pressure 18 mm Hg or above.    References: *American Heart Association    The clinical guidelines noted below are only system guidelines, and do not replace the providers clinical judgment.     Provider, please specify the diagnosis associated with above clinical findings  [   ] Acute Systolic Heart Failure - New diagnosis.  EF < 40%  and acute HF symptoms documented  [   ] Acute on Chronic Systolic Heart Failure- Pre-existing systolic HF diagnosis.  EF < 40%  and acute HF symptoms documented  [   ] Chronic Systolic Heart Failure - Pre-existing systolic HF diagnosis.  EF < 40%  without  acute HF symptoms documented  [   ] Acute Diastolic Heart Failure - New diagnosis.  EF > 40%  and acute HF symptoms documented  [   ] Acute on Chronic Diastolic Heart Failure -    Pre-existing diastoic HF diagnosis.  EF > 40%  and acute HF symptoms documented                                 [   ] Chronic Diastolic Heart Failure - Pre-existing diastolic HF diagnosis.  EF > 40%  without  acute HF symptoms documented  [   ] Acute Combined Systolic and Diastolic Heart Failure   [   ] Acute on Chronic Combined Systolic and Diastolic Heart Failure                   [x   ] Chronic Combined Systolic and Diastolic Heart Failure  [   ] Other Type of Heart Failure (please specify type): _________________________  [   ] Heart Failure Ruled Out  [   ] Other (please specify): ___________________________________  [   ] Clinically Undetermined                          Please document in your progress notes daily for the duration of treatment until resolved and include in your discharge summary.

## 2018-07-18 ENCOUNTER — TELEPHONE (OUTPATIENT)
Dept: PHARMACY | Facility: CLINIC | Age: 73
End: 2018-07-18

## 2018-07-18 NOTE — TELEPHONE ENCOUNTER
DOCUMENTATION ONLY:    Sandostatin LAR 20mg PA DENIED on 7/16/18 because plan requires patient to have tried preferred formulary alternative:  Somatuline Depot.    Routing provider to advise.  Appeal window: 60 days.

## 2018-07-20 NOTE — DISCHARGE SUMMARY
Discharge Summary      Admit Date: 7/11/2018    Discharge Date and Time:7/13/2018     Attending Physician: Twila att. providers found     Discharge Physician: Vincent Ardon    Principal Diagnoses: Symptomatic anemia  The primary encounter diagnosis was Symptomatic anemia. Diagnoses of GI bleed, Atrial fibrillation, unspecified type, Chronic combined systolic and diastolic heart failure, Chronic obstructive pulmonary disease, unspecified COPD type, Gastrointestinal hemorrhage, unspecified gastrointestinal hemorrhage type, Iron deficiency anemia, unspecified iron deficiency anemia type, Melena, Seizure, and Weakness were also pertinent to this visit.    Discharged Condition: stable    Hospital Course: Stefano Granger is a 73 y.o. male with pmh  has a past medical history of Asthma; Cardiac defibrillator in place; CHF (congestive heart failure); GI bleeding; Gout; Hypertension; Seizures; and Stroke. who presented with Symptomatic anemia. The following is the hospital course problems:    73 y.o. male with a PMH of recurrent, severe, anemia due to GI bleeding likely due to angioectasias, A fib, HTN, Cardiac defibrillator in place; CHF (congestive heart failure); Gout;  Seizures; and Stroke presents with diffuse weakness and light headedness that started yesterday which prompted him to go to the ED. The patient has had melena for the past 5 days with no bright red blood in his stool. He denied vomiting blood or black and notes that he sees Dr. Soto for his anemia. He denied abdominal pain but notes feeling severely fatigued. He has had multiple endoscopic evaluations including device assisted enteroscopy without a source of the bleed found.  VCE 2/2018 revealed BRB in the distal ileum. The patient sees Dr. Soto who reports that his bleeds are likely due to intestinal angioectasias. The patient had a dense angioectasia associated with active bleeding in the mid-distal ileum. His anemia improved with Octreotide injections  that was started. The patient was recieving PRBC every 6 months instead of every 2 weeks due to these injections.  Patient admitted and transfused 1 unite.  H/H improved to 9.4/31.  GI consulted and recommended no plan for endoscopy/enteroscopy at this time as patient had multiple previous procedures with no bleed found and patient's symptoms improved with Octreotide initiation.  Patient due to get next Octreotide LR injection, however cannot be done inpatient.   Patient will receive outpatient injection in infusion center after discharge.  Patient improved clinically and was deemed suitable for discharge.      Consults: IP CONSULT TO SOCIAL WORK/CASE MANAGEMENT    Significant Diagnostic Studies:   Laboratory Data:  CBC CMP     Recent Labs  Lab 07/12/18  2337 07/13/18  0157 07/13/18  0803   WBC  --  6.42  --    RBC  --  3.43*  --    HGB 8.4* 8.8*  8.8* 9.4*   HCT 28.1* 29.7*  29.3* 31.0*   PLT  --  235  --    MCV  --  85  --    MCH  --  25.7*  --    MCHC  --  30.0*  --       Recent Labs  Lab 07/13/18 0157   CALCIUM 8.0*   PROT 5.9*      K 3.5   CO2 31*   CL 98   BUN 29*   CREATININE 1.0   ALKPHOS 315*   ALT 47*   AST 61*   BILITOT 0.6        POCT-Glucose  No results found for: POCTGLUCOSE  CARDIAC MARKERS  No results for input(s): CKMB in the last 168 hours.    Invalid input(s): TROPI, CK, CKMBP    COAGS    Recent Labs  Lab 07/13/18 0157   INR 1.1   APTT 25.2     UA  No results for input(s): COLORU, CLARITYU, SPECGRAV, PHUR, PROTEINUA, GLUCOSEU, BLOODU, WBCU, RBCU, BACTERIA, MUCUS in the last 24 hours.    Invalid input(s):  BILIRUBINCON  MICRO  Microbiology Results (last 7 days)     ** No results found for the last 168 hours. **        LIPID PANEL  No results found for: CHOL  No results found for: HDL  No results found for: LDLCALC  Treatments:     Disposition: Home or Self Care    Patient Instructions:   Discharge Medication List as of 7/13/2018  1:24 PM      CONTINUE these medications which have CHANGED     Details   aspirin 325 MG tablet Take 1 tablet (325 mg total) by mouth once daily., Starting Fri 7/13/2018, Normal      !! albuterol 90 mcg/actuation inhaler Inhale 2 puffs into the lungs every 6 (six) hours as needed for Wheezing. Rescue, Starting Fri 7/13/2018, Normal      !! amiodarone (PACERONE) 200 MG Tab Take 1 tablet (200 mg total) by mouth once daily., Starting Sat 7/14/2018, Until Sun 7/14/2019, Normal      !! bumetanide (BUMEX) 2 MG tablet Take 2 tablets (4 mg total) by mouth 2 (two) times daily., Starting Fri 7/13/2018, Until Sat 7/13/2019, Normal      !! colchicine (COLCRYS) 0.6 mg tablet Take 1 tablet (0.6 mg total) by mouth 2 (two) times daily., Starting Fri 7/13/2018, Until Sat 7/13/2019, Normal      !! digoxin (LANOXIN) 125 mcg tablet Take 1 tablet (125 mcg total) by mouth every other day., Starting Sat 7/14/2018, Until Sun 7/14/2019, Normal      !! ferrous sulfate 325 (65 FE) MG EC tablet Take 1 tablet (325 mg total) by mouth 3 (three) times daily., Starting Fri 7/13/2018, Normal      metOLazone (ZAROXOLYN) 5 MG tablet Take 1 tablet (5 mg total) by mouth every 48 hours., Starting Fri 7/13/2018, Until Sat 7/13/2019, Normal      octreotide lar (SANDOSTATIN LAR) 20 mg injection Inject 20 mg into the muscle every 28 days., Starting Fri 7/13/2018, Until Sat 7/13/2019, Normal      pantoprazole (PROTONIX) 20 MG tablet Take 2 tablets (40 mg total) by mouth once daily., Starting Fri 7/13/2018, Normal      !! simvastatin (ZOCOR) 40 MG tablet Take 1 tablet (40 mg total) by mouth every evening., Starting Fri 7/13/2018, Until Sat 7/13/2019, Normal       !! - Potential duplicate medications found. Please discuss with provider.      CONTINUE these medications which have NOT CHANGED    Details   !! albuterol 90 mcg/actuation inhaler Inhale 2 puffs into the lungs every 6 (six) hours as needed for Wheezing. Rescue, Starting Thu 1/4/2018, No Print      !! amiodarone (PACERONE) 200 MG Tab Take 200 mg by mouth.,  "Starting Wed 1/14/2015, Historical Med      !! bumetanide (BUMEX) 2 MG tablet Take 2 tablets (4 mg total) by mouth 2 (two) times daily., Starting Thu 1/4/2018, Until Fri 1/4/2019, No Print      !! colchicine 0.6 mg tablet Take 0.6 mg by mouth 2 (two) times daily. , Starting Wed 1/31/2018, Historical Med      !! digoxin (LANOXIN) 125 mcg tablet Take 125 mcg by mouth every other day., Historical Med      !! ferrous sulfate 325 (65 FE) MG EC tablet Take 1 tablet (325 mg total) by mouth 3 (three) times daily., Starting Thu 1/4/2018, OTC      metoprolol succinate (TOPROL-XL) 100 MG 24 hr tablet Take 3 tablets (300 mg total) by mouth once daily., Starting Fri 1/5/2018, Until Sat 1/5/2019, No Print      needle, disp, 21 G 21 gauge x 1 1/2" Ndle 1 each by Misc.(Non-Drug; Combo Route) route every 30 days., Starting Thu 1/4/2018, Print      phenytoin (DILANTIN) 100 MG ER capsule Take 200 mg by mouth 2 (two) times daily. , Starting Wed 8/6/2014, Historical Med      potassium chloride (KLOR-CON) 10 MEQ TbSR Take 20 mEq by mouth 2 (two) times daily. , Starting Wed 11/8/2017, Historical Med      sildenafil, antihypertensive, (REVATIO) 20 mg Tab Take 1 tablet (20 mg total) by mouth 3 (three) times daily., Starting Thu 1/4/2018, Until Fri 1/4/2019, No Print      !! simvastatin (ZOCOR) 40 MG tablet Take 40 mg by mouth every evening. , Starting Wed 1/14/2015, Historical Med      spironolactone (ALDACTONE) 25 MG tablet Take 25 mg by mouth once daily. , Starting Wed 1/14/2015, Historical Med      tamsulosin (FLOMAX) 0.4 mg Cp24 Take 1 capsule (0.4 mg total) by mouth once daily., Starting Fri 1/5/2018, Until Sat 1/5/2019, No Print      tiotropium (SPIRIVA WITH HANDIHALER) 18 mcg inhalation capsule Inhale 1 capsule (18 mcg total) into the lungs once daily. Controller, Starting Fri 1/5/2018, Until Sat 1/5/2019, No Print      travoprost (TRAVATAN Z) 0.004 % Drop Place 1 drop into both eyes once daily., Starting Fri 1/5/2018, Until Sat " 1/5/2019, No Print      vacuum erection device system Kit Take as directed, Historical Med      ZOSTAVAX, PF, 19,400 unit/0.65 mL injection Starting Wed 1/31/2018, Historical Med       !! - Potential duplicate medications found. Please discuss with provider.            Discharge Procedure Orders  Diet Adult Regular     Notify your health care provider if you experience any of the following:  temperature >100.4     Notify your health care provider if you experience any of the following:  persistent nausea and vomiting or diarrhea     Notify your health care provider if you experience any of the following:  severe uncontrolled pain     Notify your health care provider if you experience any of the following:  redness, tenderness, or signs of infection (pain, swelling, redness, odor or green/yellow discharge around incision site)     Notify your health care provider if you experience any of the following:  difficulty breathing or increased cough     Notify your health care provider if you experience any of the following:  severe persistent headache     Notify your health care provider if you experience any of the following:  worsening rash     Notify your health care provider if you experience any of the following:  persistent dizziness, light-headedness, or visual disturbances     Notify your health care provider if you experience any of the following:  increased confusion or weakness     Notify your health care provider if you experience any of the following:     Activity as tolerated         Follow-up Information     Ra Soto MD On 7/20/2018.    Specialty:  Gastroenterology  Why:  follow up  Contact information:  [address]  [place] [address]  [phone]                   Vincent Ardon  07/19/2018  9:33 PM

## 2018-08-14 ENCOUNTER — INFUSION (OUTPATIENT)
Dept: INFUSION THERAPY | Facility: HOSPITAL | Age: 73
End: 2018-08-14
Attending: INTERNAL MEDICINE
Payer: MEDICARE

## 2018-08-14 VITALS — HEIGHT: 72 IN | BODY MASS INDEX: 20.22 KG/M2 | WEIGHT: 149.25 LBS

## 2018-08-14 DIAGNOSIS — D50.0 ANEMIA DUE TO BLOOD LOSS: ICD-10-CM

## 2018-08-14 DIAGNOSIS — D50.0 BLOOD LOSS ANEMIA: Primary | ICD-10-CM

## 2018-08-14 PROCEDURE — 96372 THER/PROPH/DIAG INJ SC/IM: CPT

## 2018-08-14 PROCEDURE — 63600175 PHARM REV CODE 636 W HCPCS: Mod: JG | Performed by: INTERNAL MEDICINE

## 2018-08-14 RX ADMIN — OCTREOTIDE ACETATE 20 MG: KIT at 03:08

## 2018-08-21 ENCOUNTER — HOSPITAL ENCOUNTER (INPATIENT)
Facility: HOSPITAL | Age: 73
LOS: 3 days | Discharge: HOME | DRG: 378 | End: 2018-08-24
Attending: EMERGENCY MEDICINE | Admitting: HOSPITALIST
Payer: MEDICARE

## 2018-08-21 DIAGNOSIS — K92.2 ACUTE LOWER GI BLEEDING: Primary | ICD-10-CM

## 2018-08-21 DIAGNOSIS — I50.42 CHRONIC COMBINED SYSTOLIC AND DIASTOLIC HEART FAILURE: Chronic | ICD-10-CM

## 2018-08-21 DIAGNOSIS — K55.20 ANGIODYSPLASIA OF SMALL INTESTINE: Chronic | ICD-10-CM

## 2018-08-21 DIAGNOSIS — D64.9 SYMPTOMATIC ANEMIA: ICD-10-CM

## 2018-08-21 DIAGNOSIS — D62 ACUTE BLOOD LOSS ANEMIA: ICD-10-CM

## 2018-08-21 DIAGNOSIS — R53.1 WEAK: ICD-10-CM

## 2018-08-21 DIAGNOSIS — Z01.818 PREOP EXAMINATION: ICD-10-CM

## 2018-08-21 LAB
ABO + RH BLD: NORMAL
ALBUMIN SERPL BCP-MCNC: 3.3 G/DL
ALP SERPL-CCNC: 333 U/L
ALT SERPL W/O P-5'-P-CCNC: 44 U/L
ANION GAP SERPL CALC-SCNC: 12 MMOL/L
ANISOCYTOSIS BLD QL SMEAR: SLIGHT
AST SERPL-CCNC: 65 U/L
BASOPHILS # BLD AUTO: 0.03 K/UL
BASOPHILS NFR BLD: 0.4 %
BILIRUB SERPL-MCNC: 0.4 MG/DL
BLD GP AB SCN CELLS X3 SERPL QL: NORMAL
BUN SERPL-MCNC: 36 MG/DL
CALCIUM SERPL-MCNC: 8.5 MG/DL
CHLORIDE SERPL-SCNC: 97 MMOL/L
CO2 SERPL-SCNC: 27 MMOL/L
CREAT SERPL-MCNC: 1.4 MG/DL
DAT IGG-SP REAG RBC-IMP: NORMAL
DIFFERENTIAL METHOD: ABNORMAL
EOSINOPHIL # BLD AUTO: 0.1 K/UL
EOSINOPHIL NFR BLD: 1.6 %
ERYTHROCYTE [DISTWIDTH] IN BLOOD BY AUTOMATED COUNT: 16.2 %
EST. GFR  (AFRICAN AMERICAN): 57 ML/MIN/1.73 M^2
EST. GFR  (NON AFRICAN AMERICAN): 49 ML/MIN/1.73 M^2
GLUCOSE SERPL-MCNC: 119 MG/DL
HCT VFR BLD AUTO: 21.3 %
HGB BLD-MCNC: 5.9 G/DL
INR PPP: 1.1
LIPASE SERPL-CCNC: 31 U/L
LYMPHOCYTES # BLD AUTO: 0.7 K/UL
LYMPHOCYTES NFR BLD: 8.7 %
MCH RBC QN AUTO: 26.5 PG
MCHC RBC AUTO-ENTMCNC: 27.7 G/DL
MCV RBC AUTO: 96 FL
MONOCYTES # BLD AUTO: 0.9 K/UL
MONOCYTES NFR BLD: 12 %
NEUTROPHILS # BLD AUTO: 5.9 K/UL
NEUTROPHILS NFR BLD: 77 %
OB PNL STL: POSITIVE
OVALOCYTES BLD QL SMEAR: ABNORMAL
PLATELET # BLD AUTO: 350 K/UL
PLATELET BLD QL SMEAR: ABNORMAL
PMV BLD AUTO: 10.9 FL
POIKILOCYTOSIS BLD QL SMEAR: SLIGHT
POLYCHROMASIA BLD QL SMEAR: ABNORMAL
POTASSIUM SERPL-SCNC: 4.8 MMOL/L
PROT SERPL-MCNC: 6.6 G/DL
PROTHROMBIN TIME: 11.7 SEC
RBC # BLD AUTO: 2.23 M/UL
SODIUM SERPL-SCNC: 136 MMOL/L
TARGETS BLD QL SMEAR: ABNORMAL
WBC # BLD AUTO: 7.59 K/UL

## 2018-08-21 PROCEDURE — 85610 PROTHROMBIN TIME: CPT

## 2018-08-21 PROCEDURE — 96361 HYDRATE IV INFUSION ADD-ON: CPT

## 2018-08-21 PROCEDURE — 25000003 PHARM REV CODE 250: Performed by: HOSPITALIST

## 2018-08-21 PROCEDURE — 86870 RBC ANTIBODY IDENTIFICATION: CPT

## 2018-08-21 PROCEDURE — 86922 COMPATIBILITY TEST ANTIGLOB: CPT

## 2018-08-21 PROCEDURE — 86880 COOMBS TEST DIRECT: CPT

## 2018-08-21 PROCEDURE — 25000003 PHARM REV CODE 250: Performed by: EMERGENCY MEDICINE

## 2018-08-21 PROCEDURE — 99285 EMERGENCY DEPT VISIT HI MDM: CPT | Mod: 25

## 2018-08-21 PROCEDURE — 96360 HYDRATION IV INFUSION INIT: CPT

## 2018-08-21 PROCEDURE — 85025 COMPLETE CBC W/AUTO DIFF WBC: CPT

## 2018-08-21 PROCEDURE — 80053 COMPREHEN METABOLIC PANEL: CPT

## 2018-08-21 PROCEDURE — 86905 BLOOD TYPING RBC ANTIGENS: CPT | Mod: 91

## 2018-08-21 PROCEDURE — 86901 BLOOD TYPING SEROLOGIC RH(D): CPT

## 2018-08-21 PROCEDURE — 11000001 HC ACUTE MED/SURG PRIVATE ROOM

## 2018-08-21 PROCEDURE — 83690 ASSAY OF LIPASE: CPT

## 2018-08-21 PROCEDURE — 82272 OCCULT BLD FECES 1-3 TESTS: CPT

## 2018-08-21 RX ORDER — FERROUS SULFATE 325(65) MG
325 TABLET, DELAYED RELEASE (ENTERIC COATED) ORAL 3 TIMES DAILY
Status: DISCONTINUED | OUTPATIENT
Start: 2018-08-22 | End: 2018-08-22

## 2018-08-21 RX ORDER — SODIUM CHLORIDE 9 MG/ML
INJECTION, SOLUTION INTRAVENOUS CONTINUOUS
Status: DISCONTINUED | OUTPATIENT
Start: 2018-08-21 | End: 2018-08-23

## 2018-08-21 RX ORDER — TIOTROPIUM BROMIDE 18 UG/1
1 CAPSULE ORAL; RESPIRATORY (INHALATION) DAILY
Status: DISCONTINUED | OUTPATIENT
Start: 2018-08-22 | End: 2018-08-24 | Stop reason: HOSPADM

## 2018-08-21 RX ORDER — ONDANSETRON 2 MG/ML
4 INJECTION INTRAMUSCULAR; INTRAVENOUS EVERY 8 HOURS PRN
Status: DISCONTINUED | OUTPATIENT
Start: 2018-08-21 | End: 2018-08-24 | Stop reason: HOSPADM

## 2018-08-21 RX ORDER — SODIUM CHLORIDE 9 MG/ML
INJECTION, SOLUTION INTRAVENOUS CONTINUOUS
Status: DISCONTINUED | OUTPATIENT
Start: 2018-08-21 | End: 2018-08-21

## 2018-08-21 RX ORDER — SODIUM CHLORIDE 0.9 % (FLUSH) 0.9 %
3 SYRINGE (ML) INJECTION EVERY 8 HOURS
Status: DISCONTINUED | OUTPATIENT
Start: 2018-08-21 | End: 2018-08-24 | Stop reason: HOSPADM

## 2018-08-21 RX ORDER — TAMSULOSIN HYDROCHLORIDE 0.4 MG/1
0.4 CAPSULE ORAL DAILY
Status: DISCONTINUED | OUTPATIENT
Start: 2018-08-22 | End: 2018-08-24 | Stop reason: HOSPADM

## 2018-08-21 RX ORDER — PANTOPRAZOLE SODIUM 40 MG/1
40 TABLET, DELAYED RELEASE ORAL DAILY
Status: DISCONTINUED | OUTPATIENT
Start: 2018-08-22 | End: 2018-08-24 | Stop reason: HOSPADM

## 2018-08-21 RX ORDER — ACETAMINOPHEN 325 MG/1
650 TABLET ORAL EVERY 4 HOURS PRN
Status: DISCONTINUED | OUTPATIENT
Start: 2018-08-21 | End: 2018-08-24 | Stop reason: HOSPADM

## 2018-08-21 RX ORDER — PHENYTOIN SODIUM 100 MG/1
200 CAPSULE, EXTENDED RELEASE ORAL 2 TIMES DAILY
Status: DISCONTINUED | OUTPATIENT
Start: 2018-08-22 | End: 2018-08-24 | Stop reason: HOSPADM

## 2018-08-21 RX ORDER — HYDROCODONE BITARTRATE AND ACETAMINOPHEN 500; 5 MG/1; MG/1
TABLET ORAL
Status: DISCONTINUED | OUTPATIENT
Start: 2018-08-21 | End: 2018-08-24 | Stop reason: HOSPADM

## 2018-08-21 RX ORDER — ONDANSETRON 8 MG/1
8 TABLET, ORALLY DISINTEGRATING ORAL EVERY 8 HOURS PRN
Status: DISCONTINUED | OUTPATIENT
Start: 2018-08-21 | End: 2018-08-24 | Stop reason: HOSPADM

## 2018-08-21 RX ORDER — COLCHICINE 0.6 MG/1
0.6 TABLET, FILM COATED ORAL 2 TIMES DAILY
Status: DISCONTINUED | OUTPATIENT
Start: 2018-08-22 | End: 2018-08-24 | Stop reason: HOSPADM

## 2018-08-21 RX ORDER — HYDROCODONE BITARTRATE AND ACETAMINOPHEN 5; 325 MG/1; MG/1
1 TABLET ORAL EVERY 6 HOURS PRN
Status: DISCONTINUED | OUTPATIENT
Start: 2018-08-21 | End: 2018-08-24 | Stop reason: HOSPADM

## 2018-08-21 RX ORDER — SIMVASTATIN 40 MG/1
40 TABLET, FILM COATED ORAL NIGHTLY
Status: DISCONTINUED | OUTPATIENT
Start: 2018-08-22 | End: 2018-08-24 | Stop reason: HOSPADM

## 2018-08-21 RX ORDER — DIGOXIN 125 MCG
125 TABLET ORAL EVERY OTHER DAY
Status: DISCONTINUED | OUTPATIENT
Start: 2018-08-22 | End: 2018-08-22

## 2018-08-21 RX ORDER — METOPROLOL SUCCINATE 50 MG/1
200 TABLET, EXTENDED RELEASE ORAL 2 TIMES DAILY
Status: DISCONTINUED | OUTPATIENT
Start: 2018-08-21 | End: 2018-08-22

## 2018-08-21 RX ORDER — AMIODARONE HYDROCHLORIDE 200 MG/1
200 TABLET ORAL DAILY
Status: DISCONTINUED | OUTPATIENT
Start: 2018-08-22 | End: 2018-08-24 | Stop reason: HOSPADM

## 2018-08-21 RX ORDER — SODIUM CHLORIDE 0.9 % (FLUSH) 0.9 %
3 SYRINGE (ML) INJECTION
Status: DISCONTINUED | OUTPATIENT
Start: 2018-08-21 | End: 2018-08-24 | Stop reason: HOSPADM

## 2018-08-21 RX ADMIN — SODIUM CHLORIDE 1000 ML: 0.9 INJECTION, SOLUTION INTRAVENOUS at 08:08

## 2018-08-21 RX ADMIN — SODIUM CHLORIDE: 0.9 INJECTION, SOLUTION INTRAVENOUS at 10:08

## 2018-08-21 RX ADMIN — SODIUM CHLORIDE: 0.9 INJECTION, SOLUTION INTRAVENOUS at 11:08

## 2018-08-22 PROBLEM — R53.1 WEAKNESS: Status: RESOLVED | Noted: 2018-02-02 | Resolved: 2018-08-22

## 2018-08-22 PROBLEM — R56.9 SEIZURE: Status: RESOLVED | Noted: 2018-07-11 | Resolved: 2018-08-22

## 2018-08-22 PROBLEM — R00.0 INCREASED HEART RATE: Status: RESOLVED | Noted: 2018-02-02 | Resolved: 2018-08-22

## 2018-08-22 PROBLEM — J44.9 COPD (CHRONIC OBSTRUCTIVE PULMONARY DISEASE): Chronic | Status: ACTIVE | Noted: 2018-07-11

## 2018-08-22 PROBLEM — I48.0 PAROXYSMAL ATRIAL FIBRILLATION: Chronic | Status: ACTIVE | Noted: 2018-02-04

## 2018-08-22 PROBLEM — Z87.898 HISTORY OF SEIZURE: Chronic | Status: ACTIVE | Noted: 2018-02-09

## 2018-08-22 PROBLEM — R79.89 ELEVATED TROPONIN: Status: RESOLVED | Noted: 2018-02-02 | Resolved: 2018-08-22

## 2018-08-22 PROBLEM — N40.0 BPH (BENIGN PROSTATIC HYPERPLASIA): Chronic | Status: ACTIVE | Noted: 2018-02-10

## 2018-08-22 PROBLEM — I50.42 CHRONIC COMBINED SYSTOLIC AND DIASTOLIC HEART FAILURE: Chronic | Status: ACTIVE | Noted: 2018-01-31

## 2018-08-22 PROBLEM — F17.210 CIGARETTE SMOKER: Chronic | Status: ACTIVE | Noted: 2018-08-22

## 2018-08-22 PROBLEM — K92.2 ACUTE GI BLEEDING: Status: RESOLVED | Noted: 2018-01-31 | Resolved: 2018-08-22

## 2018-08-22 PROBLEM — I27.20 PULMONARY HYPERTENSION: Chronic | Status: ACTIVE | Noted: 2018-01-04

## 2018-08-22 PROBLEM — R79.89 ELEVATED LFTS: Status: RESOLVED | Noted: 2018-02-10 | Resolved: 2018-08-22

## 2018-08-22 PROBLEM — K92.2 GI BLEED: Status: RESOLVED | Noted: 2018-01-02 | Resolved: 2018-08-22

## 2018-08-22 LAB
ANION GAP SERPL CALC-SCNC: 14 MMOL/L
ANISOCYTOSIS BLD QL SMEAR: SLIGHT
BASOPHILS # BLD AUTO: 0.01 K/UL
BASOPHILS NFR BLD: 0 %
BLD PROD TYP BPU: NORMAL
BLD PROD TYP BPU: NORMAL
BLOOD GROUP ANTIBODIES SERPL: NORMAL
BLOOD UNIT EXPIRATION DATE: NORMAL
BLOOD UNIT EXPIRATION DATE: NORMAL
BLOOD UNIT TYPE CODE: 5100
BLOOD UNIT TYPE CODE: 5100
BLOOD UNIT TYPE: NORMAL
BLOOD UNIT TYPE: NORMAL
BUN SERPL-MCNC: 46 MG/DL
CALCIUM SERPL-MCNC: 8.4 MG/DL
CHLORIDE SERPL-SCNC: 98 MMOL/L
CO2 SERPL-SCNC: 20 MMOL/L
CODING SYSTEM: NORMAL
CODING SYSTEM: NORMAL
CREAT SERPL-MCNC: 1.7 MG/DL
DIFFERENTIAL METHOD: ABNORMAL
DISPENSE STATUS: NORMAL
DISPENSE STATUS: NORMAL
EOSINOPHIL # BLD AUTO: 0 K/UL
EOSINOPHIL NFR BLD: 0 %
ERYTHROCYTE [DISTWIDTH] IN BLOOD BY AUTOMATED COUNT: 15.3 %
EST. GFR  (AFRICAN AMERICAN): 45 ML/MIN/1.73 M^2
EST. GFR  (NON AFRICAN AMERICAN): 39 ML/MIN/1.73 M^2
GLUCOSE SERPL-MCNC: 121 MG/DL
HCT VFR BLD AUTO: 20.4 %
HCT VFR BLD AUTO: 25.4 %
HGB BLD-MCNC: 5.7 G/DL
HGB BLD-MCNC: 7.3 G/DL
HYPOCHROMIA BLD QL SMEAR: ABNORMAL
LYMPHOCYTES # BLD AUTO: 2.1 K/UL
LYMPHOCYTES NFR BLD: 7.8 %
MCH RBC QN AUTO: 27 PG
MCHC RBC AUTO-ENTMCNC: 28.7 G/DL
MCV RBC AUTO: 94 FL
MONOCYTES # BLD AUTO: 1.1 K/UL
MONOCYTES NFR BLD: 4.1 %
NEUTROPHILS # BLD AUTO: 23.7 K/UL
NEUTROPHILS NFR BLD: 88.1 %
OVALOCYTES BLD QL SMEAR: ABNORMAL
PLATELET # BLD AUTO: 313 K/UL
PLATELET BLD QL SMEAR: ABNORMAL
PMV BLD AUTO: 10.3 FL
POIKILOCYTOSIS BLD QL SMEAR: SLIGHT
POLYCHROMASIA BLD QL SMEAR: ABNORMAL
POTASSIUM SERPL-SCNC: 5.9 MMOL/L
RBC # BLD AUTO: 2.7 M/UL
SODIUM SERPL-SCNC: 132 MMOL/L
TARGETS BLD QL SMEAR: ABNORMAL
TRANS ERYTHROCYTES VOL PATIENT: NORMAL ML
TRANS ERYTHROCYTES VOL PATIENT: NORMAL ML
WBC # BLD AUTO: 27.06 K/UL

## 2018-08-22 PROCEDURE — 85014 HEMATOCRIT: CPT

## 2018-08-22 PROCEDURE — 85018 HEMOGLOBIN: CPT

## 2018-08-22 PROCEDURE — 25000003 PHARM REV CODE 250: Performed by: EMERGENCY MEDICINE

## 2018-08-22 PROCEDURE — 94761 N-INVAS EAR/PLS OXIMETRY MLT: CPT

## 2018-08-22 PROCEDURE — 25000242 PHARM REV CODE 250 ALT 637 W/ HCPCS: Performed by: HOSPITALIST

## 2018-08-22 PROCEDURE — 25000003 PHARM REV CODE 250: Performed by: PHYSICIAN ASSISTANT

## 2018-08-22 PROCEDURE — P9021 RED BLOOD CELLS UNIT: HCPCS

## 2018-08-22 PROCEDURE — 94640 AIRWAY INHALATION TREATMENT: CPT

## 2018-08-22 PROCEDURE — 11000001 HC ACUTE MED/SURG PRIVATE ROOM

## 2018-08-22 PROCEDURE — 25000003 PHARM REV CODE 250: Performed by: INTERNAL MEDICINE

## 2018-08-22 PROCEDURE — 36415 COLL VENOUS BLD VENIPUNCTURE: CPT

## 2018-08-22 PROCEDURE — 25000003 PHARM REV CODE 250: Performed by: HOSPITALIST

## 2018-08-22 PROCEDURE — A4216 STERILE WATER/SALINE, 10 ML: HCPCS | Performed by: EMERGENCY MEDICINE

## 2018-08-22 PROCEDURE — 85025 COMPLETE CBC W/AUTO DIFF WBC: CPT

## 2018-08-22 PROCEDURE — 36430 TRANSFUSION BLD/BLD COMPNT: CPT

## 2018-08-22 PROCEDURE — 80048 BASIC METABOLIC PNL TOTAL CA: CPT

## 2018-08-22 PROCEDURE — 97802 MEDICAL NUTRITION INDIV IN: CPT

## 2018-08-22 RX ORDER — METOPROLOL SUCCINATE 50 MG/1
200 TABLET, EXTENDED RELEASE ORAL 2 TIMES DAILY
Status: DISCONTINUED | OUTPATIENT
Start: 2018-08-22 | End: 2018-08-24 | Stop reason: HOSPADM

## 2018-08-22 RX ORDER — METOPROLOL SUCCINATE 50 MG/1
200 TABLET, EXTENDED RELEASE ORAL 2 TIMES DAILY
Status: DISCONTINUED | OUTPATIENT
Start: 2018-08-22 | End: 2018-08-22

## 2018-08-22 RX ORDER — DIGOXIN 125 MCG
125 TABLET ORAL EVERY OTHER DAY
Status: DISCONTINUED | OUTPATIENT
Start: 2018-08-22 | End: 2018-08-22

## 2018-08-22 RX ORDER — DIGOXIN 125 MCG
125 TABLET ORAL EVERY OTHER DAY
Status: DISCONTINUED | OUTPATIENT
Start: 2018-08-23 | End: 2018-08-24 | Stop reason: HOSPADM

## 2018-08-22 RX ORDER — POLYETHYLENE GLYCOL 3350, SODIUM SULFATE ANHYDROUS, SODIUM BICARBONATE, SODIUM CHLORIDE, POTASSIUM CHLORIDE 236; 22.74; 6.74; 5.86; 2.97 G/4L; G/4L; G/4L; G/4L; G/4L
4000 POWDER, FOR SOLUTION ORAL ONCE
Status: COMPLETED | OUTPATIENT
Start: 2018-08-22 | End: 2018-08-22

## 2018-08-22 RX ORDER — METOLAZONE 2.5 MG/1
5 TABLET ORAL
Status: DISCONTINUED | OUTPATIENT
Start: 2018-08-23 | End: 2018-08-23

## 2018-08-22 RX ORDER — BUMETANIDE 1 MG/1
4 TABLET ORAL 2 TIMES DAILY
Status: DISCONTINUED | OUTPATIENT
Start: 2018-08-22 | End: 2018-08-23

## 2018-08-22 RX ORDER — SPIRONOLACTONE 25 MG/1
25 TABLET ORAL DAILY
Status: DISCONTINUED | OUTPATIENT
Start: 2018-08-22 | End: 2018-08-23

## 2018-08-22 RX ADMIN — SIMVASTATIN 40 MG: 40 TABLET, FILM COATED ORAL at 09:08

## 2018-08-22 RX ADMIN — Medication 3 ML: at 09:08

## 2018-08-22 RX ADMIN — COLCHICINE 0.6 MG: 0.6 TABLET, FILM COATED ORAL at 09:08

## 2018-08-22 RX ADMIN — AMIODARONE HYDROCHLORIDE 200 MG: 200 TABLET ORAL at 07:08

## 2018-08-22 RX ADMIN — Medication 3 ML: at 05:08

## 2018-08-22 RX ADMIN — TIOTROPIUM BROMIDE 18 MCG: 18 CAPSULE ORAL; RESPIRATORY (INHALATION) at 12:08

## 2018-08-22 RX ADMIN — METOPROLOL SUCCINATE 200 MG: 50 TABLET, EXTENDED RELEASE ORAL at 08:08

## 2018-08-22 RX ADMIN — PHENYTOIN SODIUM 200 MG: 100 CAPSULE ORAL at 08:08

## 2018-08-22 RX ADMIN — COLCHICINE 0.6 MG: 0.6 TABLET, FILM COATED ORAL at 08:08

## 2018-08-22 RX ADMIN — Medication 3 ML: at 03:08

## 2018-08-22 RX ADMIN — PANTOPRAZOLE SODIUM 40 MG: 40 TABLET, DELAYED RELEASE ORAL at 08:08

## 2018-08-22 RX ADMIN — SODIUM CHLORIDE: 0.9 INJECTION, SOLUTION INTRAVENOUS at 11:08

## 2018-08-22 RX ADMIN — BUMETANIDE 4 MG: 1 TABLET ORAL at 09:08

## 2018-08-22 RX ADMIN — DIGOXIN 125 MCG: 125 TABLET ORAL at 07:08

## 2018-08-22 RX ADMIN — ONDANSETRON 8 MG: 8 TABLET, ORALLY DISINTEGRATING ORAL at 05:08

## 2018-08-22 RX ADMIN — FERROUS SULFATE TAB EC 325 MG (65 MG FE EQUIVALENT) 325 MG: 325 (65 FE) TABLET DELAYED RESPONSE at 08:08

## 2018-08-22 RX ADMIN — FERROUS SULFATE TAB EC 325 MG (65 MG FE EQUIVALENT) 325 MG: 325 (65 FE) TABLET DELAYED RESPONSE at 03:08

## 2018-08-22 RX ADMIN — POLYETHYLENE GLYCOL 3350, SODIUM SULFATE ANHYDROUS, SODIUM BICARBONATE, SODIUM CHLORIDE, POTASSIUM CHLORIDE 4000 ML: 236; 22.74; 6.74; 5.86; 2.97 POWDER, FOR SOLUTION ORAL at 07:08

## 2018-08-22 RX ADMIN — METOPROLOL SUCCINATE 200 MG: 50 TABLET, EXTENDED RELEASE ORAL at 09:08

## 2018-08-22 RX ADMIN — PHENYTOIN SODIUM 200 MG: 100 CAPSULE ORAL at 09:08

## 2018-08-22 RX ADMIN — METOPROLOL SUCCINATE 200 MG: 50 TABLET, EXTENDED RELEASE ORAL at 12:08

## 2018-08-22 RX ADMIN — TAMSULOSIN HYDROCHLORIDE 0.4 MG: 0.4 CAPSULE ORAL at 08:08

## 2018-08-22 NOTE — PROGRESS NOTES
[facility]  Adult Nutrition  Progress Note    SUMMARY       Recommendations    Recommendation/Intervention:   1. Initiate Clear Liquid diet when medically acceptable.    Goals:   Diet will be started within 24-48 hours  Nutrition Goal Status: new  Communication of RD Recs: reviewed with RN(Yahaira)    Reason for Assessment  Reason for Assessment: identified at risk by screening criteria([facility])  Diagnosis: (GI bleed)  Relevant Medical History: gout, seizures, stroke, HTN, COPD, angiodysplasia,of small intestine, CAD, BPH, CABG  General Information Comments: Pt NPO 2/2 GI bleed.   Nutrition Discharge Planning: d/c diet to be determined    Nutrition Risk Screen  Nutrition Risk Screen: no indicators present    Nutrition/Diet History  Food Preferences: no Yazidism or cultural food prefs identified  Do you have any cultural, spiritual, Yazidism conflicts, given your current situation?: Yarsanism  Factors Affecting Nutritional Intake: altered gastrointestinal function, NPO    Anthropometrics  Temp: 98.2 °F (36.8 °C)  Height Method: Stated  Height: 6' (182.9 cm)  Height (inches): 72 in  Weight Method: Bed Scale  Weight: 67.8 kg (149 lb 7.6 oz)  Weight (lb): 149.47 lb  Ideal Body Weight (IBW), Male: 178 lb  % Ideal Body Weight, Male (lb): 83.97 lb  BMI (Calculated): 20.3  BMI Grade: 18.5-24.9 - normal     Lab/Procedures/Meds  Pertinent Labs Reviewed: reviewed  Pertinent Labs Comments: Na 132L, K 5.9H, BUN 46H, Cra 1.7H, Glu 121H, Ca 8.4L, Alb 3.3L  Pertinent Medications Reviewed: reviewed  Pertinent Medications Comments: ferrous sulfaate, pantoprazole    Physical Findings/Assessment  Tubes: (none)  Oral/Mouth Cavity: (unable to assess)  Skin: (Cuauhtemoc 21-intact)    Estimated/Assessed Needs  Weight Used For Calorie Calculations: 67.8 kg (149 lb 7.6 oz)  Energy Calorie Requirements (kcal): 1921-3756 (30-35 kcal/kg)  Energy Need Method: Kcal/kg  Protein Requirements: 68-81g (1.0-1/2g/kg)  Weight Used For Protein  Calculations: 67.8 kg (149 lb 7.6 oz)  Fluid Need Method: RDA Method  RDA Method (mL): 2034     Nutrition Prescription Ordered  Current Diet Order: NPO    Evaluation of Received Nutrient/Fluid Intake  Energy Calories Required: not meeting needs  Protein Required: not meeting needs  Fluid Required: not meeting needs  % Intake of Estimated Energy Needs: Other: NPO  % Meal Intake: NPO    Nutrition Risk  Level of Risk/Frequency of Follow-up: (2xweekly)     Assessment and Plan    Acute lower GI bleeding    Contributing Nutrition Diagnosis  Altered GI Function    Related to (etiology):   GI bleed    Signs and Symptoms (as evidenced by):   NPO    Interventions/Recommendations (treatment strategy):  See recs    Nutrition Diagnosis Status:   New             Monitor and Evaluation  Food and Nutrient Intake: food and beverage intake  Food and Nutrient Adminstration: diet order  Physical Activity and Function: nutrition-related ADLs and IADLs  Anthropometric Measurements: weight  Biochemical Data, Medical Tests and Procedures: electrolyte and renal panel  Nutrition-Focused Physical Findings: overall appearance     Nutrition Follow-Up  RD Follow-up?: Yes

## 2018-08-22 NOTE — ED PROVIDER NOTES
Encounter Date: 8/21/2018    SCRIBE #1 NOTE: I, Matheus Bonilla, am scribing for, and in the presence of,  Dr. Rae Bautista. I have scribed the entire note.       History     Chief Complaint   Patient presents with    Weakness     generalized x2 days; accompanied by intermittent chest pain x2 days; denies chest pain at this time    Melena     states has had tarry stools s/p colonoscopy with dr bain; unsure of date; poor historian    Tachycardia     Stefano Granger is a 73 y.o. male who  has a past medical history of Asthma, Cardiac defibrillator in place, CHF (congestive heart failure), GI bleeding, Gout, Hypertension, Seizures, and Stroke.    The patient presents to the ED due to increased weakness that began 3 days ago. Patient reports history of GI bleeding and reports had a recent colonoscopy with Dr. Bain. He denies any vomiting or abdominal pain. States he has been taking shots to stop GI bleeding that were caused after removing polyps from colon.       The history is provided by the patient.     Review of patient's allergies indicates:   Allergen Reactions    Coreg [carvedilol] Palpitations     Palpitations^     Past Medical History:   Diagnosis Date    Asthma     Cardiac defibrillator in place     CHF (congestive heart failure)     GI bleeding     Gout     Hypertension     Seizures     Stroke      Past Surgical History:   Procedure Laterality Date    CARDIAC SURGERY      year 2000    TONSILLECTOMY       Family History   Problem Relation Age of Onset    No Known Problems Mother     No Known Problems Father     No Known Problems Maternal Grandmother     No Known Problems Maternal Grandfather     No Known Problems Paternal Grandmother     No Known Problems Paternal Grandfather      Social History     Tobacco Use    Smoking status: Current Every Day Smoker     Years: 28.00     Types: Cigars    Smokeless tobacco: Never Used    Tobacco comment: pt smoke a cigar 2x weekly   Substance Use  Topics    Alcohol use: No     Comment: socially    Drug use: Yes     Types: Marijuana     Comment: occassionally     Review of Systems   Constitutional: Negative for chills and fever.   HENT: Negative for congestion, ear pain, rhinorrhea and sore throat.    Respiratory: Negative for cough, shortness of breath and wheezing.    Cardiovascular: Negative for chest pain and palpitations.   Gastrointestinal: Positive for blood in stool. Negative for abdominal pain, diarrhea, nausea and vomiting.   Genitourinary: Negative for dysuria and hematuria.   Musculoskeletal: Negative for back pain, myalgias and neck pain.   Skin: Negative for rash.   Neurological: Positive for weakness. Negative for dizziness, light-headedness and headaches.   Psychiatric/Behavioral: Negative for confusion.   All other systems reviewed and are negative.      Physical Exam     Initial Vitals [08/21/18 1943]   BP Pulse Resp Temp SpO2   99/64 (!) 149 18 98.6 °F (37 °C) 96 %      MAP       --         Physical Exam    Nursing note and vitals reviewed.  Constitutional: He appears well-developed and well-nourished. No distress.   HENT:   Head: Normocephalic and atraumatic.   Mouth/Throat: Oropharynx is clear and moist.   Eyes: Conjunctivae and EOM are normal. Pupils are equal, round, and reactive to light.   Neck: Normal range of motion. Neck supple.   Cardiovascular: Intact distal pulses.   Atrial fibrillation with RVR.   Pulmonary/Chest: Breath sounds normal. No respiratory distress. He has no wheezes. He has no rhonchi. He has no rales.   Abdominal: Soft. Bowel sounds are normal. He exhibits no distension. There is no tenderness.   Genitourinary:   Genitourinary Comments: Stool is melanotic.   Musculoskeletal: Normal range of motion. He exhibits no edema or tenderness.   Neurological: He is alert and oriented to person, place, and time. He has normal strength.   Skin: Skin is warm and dry.   Psychiatric: Thought content normal.         ED Course    Critical Care  Date/Time: 8/21/2018 9:14 PM  Performed by: Rae Bautista MD  Authorized by: Rae Bautista MD   Total critical care time (exclusive of procedural time) : 30 minutes  Critical care time was exclusive of separately billable procedures and treating other patients.  Critical care was necessary to treat or prevent imminent or life-threatening deterioration of the following conditions: circulatory failure.  Critical care was time spent personally by me on the following activities: development of treatment plan with patient or surrogate, discussions with primary provider, examination of patient, ordering and performing treatments and interventions, re-evaluation of patient's condition, pulse oximetry, ordering and review of laboratory studies, obtaining history from patient or surrogate, evaluation of patient's response to treatment and discussions with consultants.        Labs Reviewed   CBC W/ AUTO DIFFERENTIAL - Abnormal; Notable for the following components:       Result Value    RBC 2.23 (*)     Hemoglobin 5.9 (*)     Hematocrit 21.3 (*)     MCH 26.5 (*)     MCHC 27.7 (*)     RDW 16.2 (*)     Lymph # 0.7 (*)     Gran% 77.0 (*)     Lymph% 8.7 (*)     All other components within normal limits    Narrative:        HGB critical result(s) called and verbal readback obtained from   Lizeth Loza RN, 08/21/2018 20:39   COMPREHENSIVE METABOLIC PANEL - Abnormal; Notable for the following components:    Glucose 119 (*)     BUN, Bld 36 (*)     Calcium 8.5 (*)     Albumin 3.3 (*)     Alkaline Phosphatase 333 (*)     AST 65 (*)     eGFR if  57 (*)     eGFR if non  49 (*)     All other components within normal limits   OCCULT BLOOD X 1, STOOL - Abnormal; Notable for the following components:    Occult Blood Positive (*)     All other components within normal limits   LIPASE   PROTIME-INR   TYPE & SCREEN   PREPARE RBC SOFT     EKG Readings: (Independently Interpreted)   EKG: Atrial  fibrillation with RVR. Frequent PVCs. Incomplete LBBB. No change when compared with prior EKG.       Imaging Results    None          Medical Decision Making:   Clinical Tests:   Lab Tests: Ordered and Reviewed  Medical Tests: Ordered and Reviewed  ED Management:  8:45 PM   Case discussed with [facility]; Dr. Cosme, who agrees to transfuse and admit patient overnight. Consulted Dr. Soto.  2114:  Case discussed with Dr. medhat rosa.  He will admit the patient to telemetry.                      Clinical Impression:     1. Acute lower GI bleeding    2. Weak    3. Acute blood loss anemia              I, Rae Bautista, personally performed the services described in this documentation. All medical record entries made by the scribe were at my direction and in my presence.  I have reviewed the chart and agree that the record reflects my personal performance and is accurate and complete. Rae Bautista M.D. 9:15 PM08/21/2018                 Rae Bautista MD  08/21/18 2114

## 2018-08-22 NOTE — PLAN OF CARE
Problem: Patient Care Overview  Goal: Plan of Care Review  Outcome: Ongoing (interventions implemented as appropriate)   Nurse went over plan of care with patient, questions encouraged. He has 2 dark tarry bowel movements over night. The first unit of blood is currently infusing; normal saline also infusing at 50cc/hr. He's A.fib on the monitor with HR 110s-140s sustaining. Patient has also had frequent v-tach runs throughout the night. Dr. Marie was made aware of these events and ordered morning amiodarone and digoxin to be given earlier. Patient refuses bed alarm, nurse explained fall risks to patient and notified charge nurse. Report given to day nurse.

## 2018-08-22 NOTE — ED NOTES
Pt her e/co dark tarry stools x 1 week. Last 2 days he has had inc in weakness.  Skin is warm/dry/normal coloring for ethnicity. Pt is AAOx4,clear speech,calm/cooperative. Pt with no edema. Denies edema. abd is soft and nontender. Pt has had GI bleeding in past as well as heart valve and CAD surgery.

## 2018-08-22 NOTE — HPI
Stefano Granger is a 73 y.o. black man with hypertension, coronary artery disease status post coronary artery bypass graft in 2004, history of bioprosthetic aortic and mitral valve replacement in 2004, chronic systolic and diastolic congestive heart failure, mitral valve stenosis, pulmonary hypertension, atrial fibrillation, automatic implantable cardioverter-defibrillator, cigarette smoking with chronic obstructive pulmonary disease, seizure disorder, chronic blood loss anemia due to small bowel angiodysplasias, benign prostatic hyperplasia, gout, hyperlipidemia.  He lives in [facility].  He gets his medical care at [facility].  His primary care physician is [facility] Internal Medicine resident Dr. Jimmy Andrew.  His cardiologist is [facility] Cardiology fellow Dr. Rosi Montejo.  His angiodysplasia bleeding is followed by gastroenterologist Dr. Ra Soto at [facility].  He gets outpatient octreotide injections for it.   He was hospitalized at [facility] from 7/11/18 to 7/13/18 for recurrent anemia due to active gastrointestinal bleeding.  Hemoglobin and hematocrit were 5.4 and 20.  He was transfused 4 units of pRBCs.  Hemoglobin and hematocrit increased to 9.4 and 31.  Gastroenterology was consulted and Dr. Soto deferred endoscopy at the time because Mr. Granger responded better to octreotide injections than endoscopic ablation.   He presented to [facility] Emergency Department on 8/21/18 with 3 days of worsening weakness due to recurrent anemia due to active melena that never stopped after his last hospitalization.  Hemoglobin and hematocrit were 5.7 and 20.4.  He was transfused 2 units of pRBCs.  Hemoglobin and hematocrit increased to 7.3 and 25.4.  He was admitted to [facility].  Gastroenterology was consulted.

## 2018-08-22 NOTE — ASSESSMENT & PLAN NOTE
Contributing Nutrition Diagnosis  Altered GI Function    Related to (etiology):   GI bleed    Signs and Symptoms (as evidenced by):   NPO    Interventions/Recommendations (treatment strategy):  See recs    Nutrition Diagnosis Status:   New

## 2018-08-22 NOTE — CONSULTS
[facility]    CC: symptomatic anemia    HPI 73 y.o. male with h/o hypertension, coronary artery disease status post coronary artery bypass graft in 2004, history of bioprosthetic aortic and mitral valve replacement in 2004, chronic systolic and diastolic congestive heart failure, mitral valve stenosis, pulmonary hypertension, atrial fibrillation, automatic implantable cardioverter-defibrillator, cigarette smoking with chronic obstructive pulmonary disease, seizure disorder, chronic blood loss anemia due to small bowel angiodysplasias, benign prostatic hyperplasia, gout, hyperlipidemia who is presenting with severe symptomatic microcytic anemia 2/2 chronic GI bleed. Patient is well known to the GI service for angioectasias with most recent one noted to be in distal ileum. Endoscopy was deferred then as it is believed he responds better to sandostatin injections and conservative therapy as opposed to APC. He currently takes oral iron and sandostatin injections, which he states intially caused him to decrease transfusion requirement to every other week.         Past Medical History    has a past medical history of Angiodysplasia of small intestine, Angiodysplasia of small intestine, except duodenum with bleeding, Atrial fibrillation, BPH (benign prostatic hyperplasia), Cardiac defibrillator in place, Chronic combined systolic and diastolic congestive heart failure, COPD (chronic obstructive pulmonary disease), Coronary artery disease, Gout, Hypertension, Mitral stenosis, Pulmonary hypertension, Seizures, and Stroke.      Review of Systems  General ROS: negative for - chills, fever or weight loss  Cardiovascular ROS: no chest pain or dyspnea on exertion  Pulmonary ROS: baseline dyspnea, no wheezing  Gastrointestinal ROS: no abdominal pain, black tarry stools, no BRBPR    Physical Examination  BP (!) 102/58   Pulse 79   Temp 98 °F (36.7 °C)   Resp 16   Ht 6' (1.829 m)   Wt 67.8 kg (149 lb 7.6 oz)   SpO2  100%   BMI 20.27 kg/m²   General appearance: alert, cooperative, no distress  HENT: Normocephalic, atraumatic, neck symmetrical, no nasal discharge   Lungs: clear to auscultation in all fields, symmetric chest wall expansion bilaterally, no wheeze, rale, or rhonchi  Heart: normal rate, regular rhythm with holosystolic murmur; palpable peripheral pulsesI pacer  In place in left upper chest wall  Abdomen: soft, non-tender; bowel sounds normoactive; no organomegaly  Extremities: extremities symmetric; no clubbing, cyanosis, or edema  Neurologic: Alert and oriented X 3, normal strength, normal coordination    Labs:  Hb 5.9-->7.3  MCV 94  Plt 313  t bili 0.4  BUN/Cr 46/1.7    Imaging:  No new    Assessment:   72 yo with h/o angioectasia presenting with symptomatic anemia and black stools. Patient currently receiving sandostatin and iron supplements outpatient along with questionable every other week transfusions. Most recent VCE with distal small bowel bleed and plan was to do retrograde DBE and tx. On admission patient had low Hb, was tachycardic and BP were soft. He has been given IVF, 2U pRBC and remained hemodynamically stable since then.     Plan:  -CLD today, NPO at midnight  -will place order for prep  - retrograde DBE tomorrow     Zackery Correa, DO  [facility]  Gastroenterology Service

## 2018-08-22 NOTE — ASSESSMENT & PLAN NOTE
Iron deficiency anemia due to chronic blood loss  Melena  Angiodysplasia of small intestine  Acute lower GI bleeding  Transfused 2 units pRBCs.  Gastroenterology consulted for recommendations to treat bleeding source.  Gets outpatient octreotide injections.

## 2018-08-22 NOTE — ASSESSMENT & PLAN NOTE
Status post aortic valve and mitral valve replacement  Pulmonary hypertension  Takes bumetanide, metolazone, spironolactone, potassium.  Continue.

## 2018-08-22 NOTE — NURSING
Patient had an asymptomatic run of v-tach BP 98/55 . Dr. Marie notified. No new orders at this time.

## 2018-08-22 NOTE — SUBJECTIVE & OBJECTIVE
Past Medical History:   Diagnosis Date    Angiodysplasia of small intestine     Angiodysplasia of small intestine, except duodenum with bleeding     Atrial fibrillation     BPH (benign prostatic hyperplasia)     Cardiac defibrillator in place     Chronic combined systolic and diastolic congestive heart failure     COPD (chronic obstructive pulmonary disease)     Coronary artery disease     Gout     Hypertension     Mitral stenosis     Pulmonary hypertension     Seizures     Stroke        Past Surgical History:   Procedure Laterality Date    COLONOSCOPY      CORONARY ARTERY BYPASS GRAFT  2000    TISSUE AORTIC AND MITRAL VALVE REPLACEMENT  2000    TONSILLECTOMY         Review of patient's allergies indicates:   Allergen Reactions    Coreg [carvedilol] Palpitations     Palpitations^       No current facility-administered medications on file prior to encounter.      Current Outpatient Medications on File Prior to Encounter   Medication Sig    albuterol 90 mcg/actuation inhaler Inhale 2 puffs into the lungs every 6 (six) hours as needed for Wheezing. Rescue    bumetanide (BUMEX) 2 MG tablet Take 2 tablets (4 mg total) by mouth 2 (two) times daily.    colchicine 0.6 mg tablet Take 0.6 mg by mouth 2 (two) times daily.     digoxin (LANOXIN) 125 mcg tablet Take 125 mcg by mouth every other day.    ferrous sulfate 325 (65 FE) MG EC tablet Take 1 tablet (325 mg total) by mouth 3 (three) times daily.    metOLazone (ZAROXOLYN) 5 MG tablet Take 1 tablet (5 mg total) by mouth every 48 hours.    metoprolol succinate (TOPROL-XL) 100 MG 24 hr tablet Take 3 tablets (300 mg total) by mouth once daily. (Patient taking differently: Take 200 mg by mouth 2 (two) times daily. )    octreotide lar (SANDOSTATIN LAR) 20 mg injection Inject 20 mg into the muscle every 28 days.    pantoprazole (PROTONIX) 20 MG tablet Take 2 tablets (40 mg total) by mouth once daily.    phenytoin (DILANTIN) 100 MG ER capsule Take 200  "mg by mouth 2 (two) times daily.     potassium chloride (KLOR-CON) 10 MEQ TbSR Take 20 mEq by mouth 2 (two) times daily.     sildenafil, antihypertensive, (REVATIO) 20 mg Tab Take 1 tablet (20 mg total) by mouth 3 (three) times daily.    simvastatin (ZOCOR) 40 MG tablet Take 40 mg by mouth every evening.     spironolactone (ALDACTONE) 25 MG tablet Take 25 mg by mouth once daily.     tamsulosin (FLOMAX) 0.4 mg Cp24 Take 1 capsule (0.4 mg total) by mouth once daily.    tiotropium (SPIRIVA WITH HANDIHALER) 18 mcg inhalation capsule Inhale 1 capsule (18 mcg total) into the lungs once daily. Controller    travoprost (TRAVATAN Z) 0.004 % Drop Place 1 drop into both eyes once daily.    albuterol 90 mcg/actuation inhaler Inhale 2 puffs into the lungs every 6 (six) hours as needed for Wheezing. Rescue    amiodarone (PACERONE) 200 MG Tab Take 200 mg by mouth.    amiodarone (PACERONE) 200 MG Tab Take 1 tablet (200 mg total) by mouth once daily.    bumetanide (BUMEX) 2 MG tablet Take 2 tablets (4 mg total) by mouth 2 (two) times daily.    colchicine (COLCRYS) 0.6 mg tablet Take 1 tablet (0.6 mg total) by mouth 2 (two) times daily.    digoxin (LANOXIN) 125 mcg tablet Take 1 tablet (125 mcg total) by mouth every other day.    ferrous sulfate 325 mg (65 mg iron) Tab tablet Take 1 tablet (325 mg total) by mouth 3 (three) times daily.    needle, disp, 21 G 21 gauge x 1 1/2" Ndle 1 each by Misc.(Non-Drug; Combo Route) route every 30 days.    phenytoin (DILANTIN) 100 MG ER capsule Take 2 capsules (200 mg total) by mouth 2 (two) times daily.    simvastatin (ZOCOR) 40 MG tablet Take 1 tablet (40 mg total) by mouth every evening.    spironolactone (ALDACTONE) 25 MG tablet Take 1 tablet (25 mg total) by mouth once daily.    tiotropium (SPIRIVA WITH HANDIHALER) 18 mcg inhalation capsule Inhale 1 capsule (18 mcg total) into the lungs once daily. Controller    vacuum erection device system Kit Take as directed    " ZOSTAVAX, PF, 19,400 unit/0.65 mL injection      Family History     Problem Relation (Age of Onset)    No Known Problems Mother, Father, Maternal Grandmother, Maternal Grandfather, Paternal Grandmother, Paternal Grandfather        Tobacco Use    Smoking status: Current Every Day Smoker     Years: 28.00     Types: Cigars    Smokeless tobacco: Never Used    Tobacco comment: pt smoke a cigar 2x weekly   Substance and Sexual Activity    Alcohol use: No     Comment: socially    Drug use: Yes     Types: Marijuana     Comment: occassionally    Sexual activity: Not on file     Review of Systems   Constitutional: Positive for fatigue. Negative for chills and fever.   HENT: Negative for trouble swallowing and voice change.    Eyes: Negative for pain and redness.   Respiratory: Negative for cough and shortness of breath.    Cardiovascular: Negative for chest pain and leg swelling.   Gastrointestinal: Positive for blood in stool. Negative for vomiting.   Genitourinary: Negative for difficulty urinating and dysuria.   Musculoskeletal: Negative for neck pain and neck stiffness.   Skin: Negative for rash and wound.   Neurological: Negative for seizures and syncope.     Objective:     Vital Signs (Most Recent):  Temp: 98.9 °F (37.2 °C) (08/22/18 1059)  Pulse: (!) 131 (08/22/18 1059)  Resp: 18 (08/22/18 1059)  BP: (!) 82/52 (08/22/18 1059)  SpO2: 96 % (08/22/18 0800) Vital Signs (24h Range):  Temp:  [96.8 °F (36 °C)-99.1 °F (37.3 °C)] 98.9 °F (37.2 °C)  Pulse:  [] 131  Resp:  [12-20] 18  SpO2:  [96 %-100 %] 96 %  BP: ()/(47-64) 82/52     Weight: 67.8 kg (149 lb 8 oz)  Body mass index is 20.28 kg/m².    Physical Exam   Constitutional: He is oriented to person, place, and time. He appears well-developed. No distress.   HENT:   Head: Normocephalic and atraumatic.   Eyes: Conjunctivae are normal. No scleral icterus.   Neck: Neck supple. No tracheal deviation present.   Cardiovascular: Regular rhythm. Tachycardia  present.   Murmur heard.   Diastolic murmur is present with a grade of 4/6.  Pulmonary/Chest: Effort normal. No respiratory distress.   Abdominal: Soft. He exhibits no distension. There is no tenderness. There is no guarding.   Musculoskeletal: He exhibits no edema or tenderness.   Neurological: He is alert and oriented to person, place, and time.   Skin: Skin is warm and dry.   Psychiatric: He has a normal mood and affect.   Nursing note and vitals reviewed.          Significant Labs:   CBC:   Recent Labs   Lab  08/21/18 2013 08/22/18   0020  08/22/18   0835   WBC  7.59   --   27.06*   HGB  5.9*  5.7*  7.3*   HCT  21.3*  20.4*  25.4*   PLT  350   --   313     All pertinent labs within the past 24 hours have been reviewed.    Significant Imaging: I have reviewed all pertinent imaging results/findings within the past 24 hours.

## 2018-08-22 NOTE — PLAN OF CARE
Pt reports he lives with a roommate and is independent with all adls including driving. Pt informed Tn he has used [facility] in past but none currently. Pt stated his daughter can provide assistance and transportation as needed.  Per H&P-  His primary care physician is [facility] Internal Medicine resident Dr. Jimmy Andrew.  His cardiologist is [facility] Cardiology fellow Dr. Rosi Montejo.  His angiodysplasia bleeding is followed by gastroenterologist Dr. Ra Soto at [facility].  He gets outpatient octreotide injections for it.    TN gave pt D/C folder, brochure, business card and instructed to call for any needs.       08/22/18 3295   Discharge Assessment   Assessment Type Discharge Planning Assessment   Confirmed/corrected address and phone number on facesheet? Yes   Assessment information obtained from? Patient   Expected Length of Stay (days) 2   Communicated expected length of stay with patient/caregiver yes   Prior to hospitilization cognitive status: Alert/Oriented   Prior to hospitalization functional status: Independent   Current cognitive status: Alert/Oriented   Current Functional Status: Independent   Lives With friend(s)   Able to Return to Prior Arrangements yes   Is patient able to care for self after discharge? Yes   Who are your caregiver(s) and their phone number(s)? Phoebe Cannon (daughter) [phone]   Patient's perception of discharge disposition home or selfcare   Readmission Within The Last 30 Days no previous admission in last 30 days   Patient currently being followed by outpatient case management? No   Patient currently receives any other outside agency services? No   Equipment Currently Used at Home cane, straight   Do you have any problems affording any of your prescribed medications? No   Is the patient taking medications as prescribed? yes   Does the patient have transportation home? Yes  (daughter)   Transportation Available car;family or friend will  provide   Does the patient receive services at the [facility]? No   Discharge Plan A Home   Discharge Plan B Home;Home Health   Patient/Family In Agreement With Plan yes

## 2018-08-22 NOTE — H&P
[facility] Medicine  History & Physical    Patient Name: Stefano Granger  MRN: [id number]  Admission Date: 8/21/2018  Attending Physician: Mesfin Cruz MD   Primary Care Provider: Primary Doctor No         Patient information was obtained from patient, past medical records and ER records.     Subjective:     Principal Problem:Symptomatic anemia    Chief Complaint:   Chief Complaint   Patient presents with    Weakness     generalized x2 days; accompanied by intermittent chest pain x2 days; denies chest pain at this time    Melena     states has had tarry stools s/p colonoscopy with dr bain; unsure of date; poor historian    Tachycardia        HPI: Stefano Granger is a 73 y.o. black man with hypertension, coronary artery disease status post coronary artery bypass graft in 2004, history of bioprosthetic aortic and mitral valve replacement in 2004, chronic systolic and diastolic congestive heart failure, mitral valve stenosis, pulmonary hypertension, atrial fibrillation, automatic implantable cardioverter-defibrillator, cigarette smoking with chronic obstructive pulmonary disease, seizure disorder, chronic blood loss anemia due to small bowel angiodysplasias, benign prostatic hyperplasia, gout, hyperlipidemia.  He lives in [facility].  He gets [facility] medical care at [facility].  His primary care physician is [facility] Medicine resident Dr. Jimmy Andrew.  His cardiologist is [facility] Card[facility] fellow Dr. Rosi Montejo.  His angiodysplasia bleeding is followed by gastroenterologist Dr. Ra Bain at [facility].  He gets outpatient octreotide injections for it.   He was hospitalized at [facility] from 7/11/18 to 7/13/18 for recurrent anemia due to active gastrointestinal bleeding.  Hemoglobin and hematocrit were 5.4 and 20.  He was transfused 4 units of pRBCs.  Hemoglobin and hematocrit increased to  9.4 and 31.  Gastroenterology was consulted and Dr. Soto deferred endoscopy at the time because Mr. Granger responded better to octreotide injections than endoscopic ablation.   He presented to [facility] Emergency Department on 8/21/18 with 3 days of worsening weakness due to recurrent anemia due to active melena that never stopped after his last hospitalization.  Hemoglobin and hematocrit were 5.7 and 20.4.  He was transfused 2 units of pRBCs.  Hemoglobin and hematocrit increased to 7.3 and 25.4.  He was admitted to [facility].  Gastroenterology was consulted.    Past Medical History:   Diagnosis Date    Angiodysplasia of small intestine     Angiodysplasia of small intestine, except duodenum with bleeding     Atrial fibrillation     BPH (benign prostatic hyperplasia)     Cardiac defibrillator in place     Chronic combined systolic and diastolic congestive heart failure     COPD (chronic obstructive pulmonary disease)     Coronary artery disease     Gout     Hypertension     Mitral stenosis     Pulmonary hypertension     Seizures     Stroke        Past Surgical History:   Procedure Laterality Date    COLONOSCOPY      CORONARY ARTERY BYPASS GRAFT  2000    TISSUE AORTIC AND MITRAL VALVE REPLACEMENT  2000    TONSILLECTOMY         Review of patient's allergies indicates:   Allergen Reactions    Coreg [carvedilol] Palpitations     Palpitations^       No current facility-administered medications on file prior to encounter.      Current Outpatient Medications on File Prior to Encounter   Medication Sig    albuterol 90 mcg/actuation inhaler Inhale 2 puffs into the lungs every 6 (six) hours as needed for Wheezing. Rescue    bumetanide (BUMEX) 2 MG tablet Take 2 tablets (4 mg total) by mouth 2 (two) times daily.    colchicine 0.6 mg tablet Take 0.6 mg by mouth 2 (two) times daily.     digoxin (LANOXIN) 125 mcg tablet Take 125 mcg by mouth every other day.     ferrous sulfate 325 (65 FE) MG EC tablet Take 1 tablet (325 mg total) by mouth 3 (three) times daily.    metOLazone (ZAROXOLYN) 5 MG tablet Take 1 tablet (5 mg total) by mouth every 48 hours.    metoprolol succinate (TOPROL-XL) 100 MG 24 hr tablet Take 3 tablets (300 mg total) by mouth once daily. (Patient taking differently: Take 200 mg by mouth 2 (two) times daily. )    octreotide lar (SANDOSTATIN LAR) 20 mg injection Inject 20 mg into the muscle every 28 days.    pantoprazole (PROTONIX) 20 MG tablet Take 2 tablets (40 mg total) by mouth once daily.    phenytoin (DILANTIN) 100 MG ER capsule Take 200 mg by mouth 2 (two) times daily.     potassium chloride (KLOR-CON) 10 MEQ TbSR Take 20 mEq by mouth 2 (two) times daily.     sildenafil, antihypertensive, (REVATIO) 20 mg Tab Take 1 tablet (20 mg total) by mouth 3 (three) times daily.    simvastatin (ZOCOR) 40 MG tablet Take 40 mg by mouth every evening.     spironolactone (ALDACTONE) 25 MG tablet Take 25 mg by mouth once daily.     tamsulosin (FLOMAX) 0.4 mg Cp24 Take 1 capsule (0.4 mg total) by mouth once daily.    tiotropium (SPIRIVA WITH HANDIHALER) 18 mcg inhalation capsule Inhale 1 capsule (18 mcg total) into the lungs once daily. Controller    travoprost (TRAVATAN Z) 0.004 % Drop Place 1 drop into both eyes once daily.    albuterol 90 mcg/actuation inhaler Inhale 2 puffs into the lungs every 6 (six) hours as needed for Wheezing. Rescue    amiodarone (PACERONE) 200 MG Tab Take 200 mg by mouth.    amiodarone (PACERONE) 200 MG Tab Take 1 tablet (200 mg total) by mouth once daily.    bumetanide (BUMEX) 2 MG tablet Take 2 tablets (4 mg total) by mouth 2 (two) times daily.    colchicine (COLCRYS) 0.6 mg tablet Take 1 tablet (0.6 mg total) by mouth 2 (two) times daily.    digoxin (LANOXIN) 125 mcg tablet Take 1 tablet (125 mcg total) by mouth every other day.    ferrous sulfate 325 mg (65 mg iron) Tab tablet Take 1 tablet (325 mg total) by mouth 3  "(three) times daily.    needle, disp, 21 G 21 gauge x 1 1/2" Ndle 1 each by Misc.(Non-Drug; Combo Route) route every 30 days.    phenytoin (DILANTIN) 100 MG ER capsule Take 2 capsules (200 mg total) by mouth 2 (two) times daily.    simvastatin (ZOCOR) 40 MG tablet Take 1 tablet (40 mg total) by mouth every evening.    spironolactone (ALDACTONE) 25 MG tablet Take 1 tablet (25 mg total) by mouth once daily.    tiotropium (SPIRIVA WITH HANDIHALER) 18 mcg inhalation capsule Inhale 1 capsule (18 mcg total) into the lungs once daily. Controller    vacuum erection device system Kit Take as directed    ZOSTAVAX, PF, 19,400 unit/0.65 mL injection      Family History     Problem Relation (Age of Onset)    No Known Problems Mother, Father, Maternal Grandmother, Maternal Grandfather, Paternal Grandmother, Paternal Grandfather        Tobacco Use    Smoking status: Current Every Day Smoker     Years: 28.00     Types: Cigars    Smokeless tobacco: Never Used    Tobacco comment: pt smoke a cigar 2x weekly   Substance and Sexual Activity    Alcohol use: No     Comment: socially    Drug use: Yes     Types: Marijuana     Comment: occassionally    Sexual activity: Not on file     Review of Systems   Constitutional: Positive for fatigue. Negative for chills and fever.   HENT: Negative for trouble swallowing and voice change.    Eyes: Negative for pain and redness.   Respiratory: Negative for cough and shortness of breath.    Cardiovascular: Negative for chest pain and leg swelling.   Gastrointestinal: Positive for blood in stool. Negative for vomiting.   Genitourinary: Negative for difficulty urinating and dysuria.   Musculoskeletal: Negative for neck pain and neck stiffness.   Skin: Negative for rash and wound.   Neurological: Negative for seizures and syncope.     Objective:     Vital Signs (Most Recent):  Temp: 98.9 °F (37.2 °C) (08/22/18 1059)  Pulse: (!) 131 (08/22/18 1059)  Resp: 18 (08/22/18 1059)  BP: (!) 82/52 " (08/22/18 1059)  SpO2: 96 % (08/22/18 0800) Vital Signs (24h Range):  Temp:  [96.8 °F (36 °C)-99.1 °F (37.3 °C)] 98.9 °F (37.2 °C)  Pulse:  [] 131  Resp:  [12-20] 18  SpO2:  [96 %-100 %] 96 %  BP: ()/(47-64) 82/52     Weight: 67.8 kg (149 lb 8 oz)  Body mass index is 20.28 kg/m².    Physical Exam   Constitutional: He is oriented to person, place, and time. He appears well-developed. No distress.   HENT:   Head: Normocephalic and atraumatic.   Eyes: Conjunctivae are normal. No scleral icterus.   Neck: Neck supple. No tracheal deviation present.   Cardiovascular: Regular rhythm. Tachycardia present.   Murmur heard.   Diastolic murmur is present with a grade of 4/6.  Pulmonary/Chest: Effort normal. No respiratory distress.   Abdominal: Soft. He exhibits no distension. There is no tenderness. There is no guarding.   Musculoskeletal: He exhibits no edema or tenderness.   Neurological: He is alert and oriented to person, place, and time.   Skin: Skin is warm and dry.   Psychiatric: He has a normal mood and affect.   Nursing note and vitals reviewed.          Significant Labs:   CBC:   Recent Labs   Lab  08/21/18 2013 08/22/18   0020  08/22/18   0835   WBC  7.59   --   27.06*   HGB  5.9*  5.7*  7.3*   HCT  21.3*  20.4*  25.4*   PLT  350   --   313     All pertinent labs within the past 24 hours have been reviewed.    Significant Imaging: I have reviewed all pertinent imaging results/findings within the past 24 hours.    Assessment/Plan:     * Symptomatic anemia    Iron deficiency anemia due to chronic blood loss  Melena  Angiodysplasia of small intestine  Acute lower GI bleeding  Transfused 2 units pRBCs.  Gastroenterology consulted for recommendations to treat bleeding source.  Gets outpatient octreotide injections.          COPD (chronic obstructive pulmonary disease)    Takes albuterol, tiotropium.  Continue.          Hyperlipidemia    Takes simvastatin.  Continue.          History of seizure    Takes  phenytoin.  Continue.          BPH (benign prostatic hyperplasia)    Takes tamsulosin.  Continue.          Paroxysmal atrial fibrillation    Takes amiodarone, digoxin, metoprolol.  Continue.          Chronic combined systolic and diastolic heart failure    Status post aortic valve and mitral valve replacement  Pulmonary hypertension  Takes bumetanide, metolazone, spironolactone, potassium.  Continue.            VTE Risk Mitigation (From admission, onward)        Ordered     IP VTE HIGH RISK PATIENT  Once      08/21/18 2258     Place UZAIR hose  Until discontinued      08/21/18 2258     Place sequential compression device  Until discontinued      08/21/18 2258             Mesfin Cruz MD  Department of Hospital Medicine   [facility]

## 2018-08-22 NOTE — NURSING
Patient's HR sustaining 130s-140s. He's asymptomatic /56. Dr. Marie paged. Awaiting return phone call. Charge nurse Mine called the blood bank to check the status of blood. Blood bank staff say's patient has rare antibodies and they are unsure of when it will be ready.

## 2018-08-22 NOTE — PLAN OF CARE
Problem: Patient Care Overview (Adult)  Goal: Interdisciplinary Rounds/Family Conf  Outcome: Ongoing (interventions implemented as appropriate)  Recommendation/Intervention:   1. Initiate Clear Liquid diet when medically acceptable.    Goals:   Diet will be started within 24-48 hours  Nutrition Goal Status: new  Communication of RD Recs: reviewed with RN(Yahaira)

## 2018-08-22 NOTE — PLAN OF CARE
Problem: Patient Care Overview  Goal: Plan of Care Review  Outcome: Ongoing (interventions implemented as appropriate)  Plan of care reviewed with patient. Voices understanding. AFIB on monitor with -130's. 2 unit PRBC administered today. NS infusing at 50cc/hr. Clear liquids until midnight then NPO. No acute distress noted. Side rails x3, bed low, call bell within reach. Maintain bed alarm for patient safety. Will continue to monitor.

## 2018-08-23 ENCOUNTER — ANESTHESIA EVENT (OUTPATIENT)
Dept: ENDOSCOPY | Facility: HOSPITAL | Age: 73
DRG: 378 | End: 2018-08-23
Payer: MEDICARE

## 2018-08-23 ENCOUNTER — ANESTHESIA (OUTPATIENT)
Dept: ENDOSCOPY | Facility: HOSPITAL | Age: 73
DRG: 378 | End: 2018-08-23
Payer: MEDICARE

## 2018-08-23 PROBLEM — D64.9 SYMPTOMATIC ANEMIA: Status: RESOLVED | Noted: 2018-01-02 | Resolved: 2018-08-23

## 2018-08-23 PROBLEM — K92.1 MELENA: Status: RESOLVED | Noted: 2018-02-02 | Resolved: 2018-08-23

## 2018-08-23 LAB
ANION GAP SERPL CALC-SCNC: 10 MMOL/L
BASOPHILS # BLD AUTO: 0.01 K/UL
BASOPHILS NFR BLD: 0.1 %
BNP SERPL-MCNC: 509 PG/ML
BUN SERPL-MCNC: 52 MG/DL
CALCIUM SERPL-MCNC: 8.3 MG/DL
CHLORIDE SERPL-SCNC: 96 MMOL/L
CO2 SERPL-SCNC: 29 MMOL/L
CREAT SERPL-MCNC: 1.9 MG/DL
DIFFERENTIAL METHOD: ABNORMAL
EOSINOPHIL # BLD AUTO: 0.1 K/UL
EOSINOPHIL NFR BLD: 1.4 %
ERYTHROCYTE [DISTWIDTH] IN BLOOD BY AUTOMATED COUNT: 15.2 %
EST. GFR  (AFRICAN AMERICAN): 40 ML/MIN/1.73 M^2
EST. GFR  (NON AFRICAN AMERICAN): 34 ML/MIN/1.73 M^2
GLUCOSE SERPL-MCNC: 142 MG/DL
HCT VFR BLD AUTO: 27 %
HGB BLD-MCNC: 8 G/DL
LYMPHOCYTES # BLD AUTO: 0.5 K/UL
LYMPHOCYTES NFR BLD: 5.8 %
MAGNESIUM SERPL-MCNC: 1.9 MG/DL
MCH RBC QN AUTO: 27 PG
MCHC RBC AUTO-ENTMCNC: 29.6 G/DL
MCV RBC AUTO: 91 FL
MONOCYTES # BLD AUTO: 1 K/UL
MONOCYTES NFR BLD: 12.6 %
NEUTROPHILS # BLD AUTO: 6.5 K/UL
NEUTROPHILS NFR BLD: 79.7 %
PHOSPHATE SERPL-MCNC: 3.5 MG/DL
PLATELET # BLD AUTO: 270 K/UL
PMV BLD AUTO: 9.7 FL
POTASSIUM SERPL-SCNC: 4.3 MMOL/L
RBC # BLD AUTO: 2.96 M/UL
SODIUM SERPL-SCNC: 135 MMOL/L
WBC # BLD AUTO: 8.1 K/UL

## 2018-08-23 PROCEDURE — 25000003 PHARM REV CODE 250: Performed by: NURSE ANESTHETIST, CERTIFIED REGISTERED

## 2018-08-23 PROCEDURE — 27202087 HC PROBE, APC: Performed by: INTERNAL MEDICINE

## 2018-08-23 PROCEDURE — 80048 BASIC METABOLIC PNL TOTAL CA: CPT

## 2018-08-23 PROCEDURE — 25000242 PHARM REV CODE 250 ALT 637 W/ HCPCS: Performed by: HOSPITALIST

## 2018-08-23 PROCEDURE — 63600175 PHARM REV CODE 636 W HCPCS: Performed by: NURSE ANESTHETIST, CERTIFIED REGISTERED

## 2018-08-23 PROCEDURE — 93005 ELECTROCARDIOGRAM TRACING: CPT

## 2018-08-23 PROCEDURE — 25000003 PHARM REV CODE 250: Performed by: HOSPITALIST

## 2018-08-23 PROCEDURE — 37000008 HC ANESTHESIA 1ST 15 MINUTES: Performed by: INTERNAL MEDICINE

## 2018-08-23 PROCEDURE — 36415 COLL VENOUS BLD VENIPUNCTURE: CPT

## 2018-08-23 PROCEDURE — 25000003 PHARM REV CODE 250: Performed by: EMERGENCY MEDICINE

## 2018-08-23 PROCEDURE — 0W3P8ZZ CONTROL BLEEDING IN GASTROINTESTINAL TRACT, VIA NATURAL OR ARTIFICIAL OPENING ENDOSCOPIC: ICD-10-PCS | Performed by: INTERNAL MEDICINE

## 2018-08-23 PROCEDURE — 83880 ASSAY OF NATRIURETIC PEPTIDE: CPT

## 2018-08-23 PROCEDURE — 11000001 HC ACUTE MED/SURG PRIVATE ROOM

## 2018-08-23 PROCEDURE — 83735 ASSAY OF MAGNESIUM: CPT

## 2018-08-23 PROCEDURE — A4216 STERILE WATER/SALINE, 10 ML: HCPCS | Performed by: EMERGENCY MEDICINE

## 2018-08-23 PROCEDURE — 45382 COLONOSCOPY W/CONTROL BLEED: CPT | Performed by: INTERNAL MEDICINE

## 2018-08-23 PROCEDURE — 84100 ASSAY OF PHOSPHORUS: CPT

## 2018-08-23 PROCEDURE — 85025 COMPLETE CBC W/AUTO DIFF WBC: CPT

## 2018-08-23 PROCEDURE — 25000003 PHARM REV CODE 250: Performed by: INTERNAL MEDICINE

## 2018-08-23 PROCEDURE — 37000009 HC ANESTHESIA EA ADD 15 MINS: Performed by: INTERNAL MEDICINE

## 2018-08-23 PROCEDURE — 94761 N-INVAS EAR/PLS OXIMETRY MLT: CPT

## 2018-08-23 PROCEDURE — 27201030 HC OVERTUBE: Performed by: INTERNAL MEDICINE

## 2018-08-23 PROCEDURE — S0171 BUMETANIDE 0.5 MG: HCPCS | Performed by: HOSPITALIST

## 2018-08-23 PROCEDURE — 94640 AIRWAY INHALATION TREATMENT: CPT

## 2018-08-23 RX ORDER — DEXTROSE MONOHYDRATE 50 MG/ML
INJECTION, SOLUTION INTRAVENOUS CONTINUOUS
Status: DISCONTINUED | OUTPATIENT
Start: 2018-08-23 | End: 2018-08-23

## 2018-08-23 RX ORDER — BUMETANIDE 0.25 MG/ML
2 INJECTION INTRAMUSCULAR; INTRAVENOUS ONCE
Status: COMPLETED | OUTPATIENT
Start: 2018-08-23 | End: 2018-08-23

## 2018-08-23 RX ORDER — PROPOFOL 10 MG/ML
VIAL (ML) INTRAVENOUS CONTINUOUS PRN
Status: DISCONTINUED | OUTPATIENT
Start: 2018-08-23 | End: 2018-08-23

## 2018-08-23 RX ORDER — LIDOCAINE HCL/PF 100 MG/5ML
SYRINGE (ML) INTRAVENOUS
Status: DISCONTINUED | OUTPATIENT
Start: 2018-08-23 | End: 2018-08-23

## 2018-08-23 RX ORDER — ETOMIDATE 2 MG/ML
INJECTION INTRAVENOUS
Status: DISCONTINUED | OUTPATIENT
Start: 2018-08-23 | End: 2018-08-23

## 2018-08-23 RX ORDER — BUMETANIDE 1 MG/1
4 TABLET ORAL 2 TIMES DAILY
Status: DISCONTINUED | OUTPATIENT
Start: 2018-08-23 | End: 2018-08-24

## 2018-08-23 RX ORDER — SODIUM CHLORIDE 9 MG/ML
INJECTION, SOLUTION INTRAVENOUS CONTINUOUS PRN
Status: DISCONTINUED | OUTPATIENT
Start: 2018-08-23 | End: 2018-08-23

## 2018-08-23 RX ORDER — PHENYLEPHRINE HYDROCHLORIDE 10 MG/ML
INJECTION INTRAVENOUS
Status: DISCONTINUED | OUTPATIENT
Start: 2018-08-23 | End: 2018-08-23

## 2018-08-23 RX ORDER — BUMETANIDE 1 MG/1
4 TABLET ORAL 2 TIMES DAILY
Status: DISCONTINUED | OUTPATIENT
Start: 2018-08-23 | End: 2018-08-23

## 2018-08-23 RX ORDER — METOLAZONE 5 MG/1
5 TABLET ORAL
Status: DISCONTINUED | OUTPATIENT
Start: 2018-08-23 | End: 2018-08-23

## 2018-08-23 RX ADMIN — Medication 3 ML: at 08:08

## 2018-08-23 RX ADMIN — ETOMIDATE 8 MG: 2 INJECTION, SOLUTION INTRAVENOUS at 02:08

## 2018-08-23 RX ADMIN — DEXTROSE: 5 SOLUTION INTRAVENOUS at 10:08

## 2018-08-23 RX ADMIN — TIOTROPIUM BROMIDE 18 MCG: 18 CAPSULE ORAL; RESPIRATORY (INHALATION) at 08:08

## 2018-08-23 RX ADMIN — PANTOPRAZOLE SODIUM 40 MG: 40 TABLET, DELAYED RELEASE ORAL at 08:08

## 2018-08-23 RX ADMIN — Medication 3 ML: at 05:08

## 2018-08-23 RX ADMIN — METOPROLOL SUCCINATE 200 MG: 50 TABLET, EXTENDED RELEASE ORAL at 08:08

## 2018-08-23 RX ADMIN — PHENYLEPHRINE HYDROCHLORIDE 100 MCG: 10 INJECTION INTRAVENOUS at 02:08

## 2018-08-23 RX ADMIN — TAMSULOSIN HYDROCHLORIDE 0.4 MG: 0.4 CAPSULE ORAL at 08:08

## 2018-08-23 RX ADMIN — BUMETANIDE 4 MG: 1 TABLET ORAL at 05:08

## 2018-08-23 RX ADMIN — PROPOFOL 150 MCG/KG/MIN: 10 INJECTION, EMULSION INTRAVENOUS at 02:08

## 2018-08-23 RX ADMIN — DIGOXIN 125 MCG: 125 TABLET ORAL at 08:08

## 2018-08-23 RX ADMIN — COLCHICINE 0.6 MG: 0.6 TABLET, FILM COATED ORAL at 08:08

## 2018-08-23 RX ADMIN — BUMETANIDE 2 MG: 0.25 INJECTION INTRAMUSCULAR; INTRAVENOUS at 07:08

## 2018-08-23 RX ADMIN — METOLAZONE 5 MG: 2.5 TABLET ORAL at 10:08

## 2018-08-23 RX ADMIN — SIMVASTATIN 40 MG: 40 TABLET, FILM COATED ORAL at 08:08

## 2018-08-23 RX ADMIN — SODIUM CHLORIDE: 0.9 INJECTION, SOLUTION INTRAVENOUS at 02:08

## 2018-08-23 RX ADMIN — PHENYTOIN SODIUM 200 MG: 100 CAPSULE ORAL at 08:08

## 2018-08-23 RX ADMIN — AMIODARONE HYDROCHLORIDE 200 MG: 200 TABLET ORAL at 08:08

## 2018-08-23 RX ADMIN — BUMETANIDE 4 MG: 1 TABLET ORAL at 09:08

## 2018-08-23 RX ADMIN — LIDOCAINE HYDROCHLORIDE 60 MG: 20 INJECTION, SOLUTION INTRAVENOUS at 02:08

## 2018-08-23 NOTE — NURSING
Patient back from endo at 1540, denies any pain or discomfort, no s/s of bleeding noted, stable at this time.

## 2018-08-23 NOTE — PHYSICIAN QUERY
PT Name: Stefano Granger  MR #: [id number]     Physician Query Form - Etiology of Condition Clarification      CDS/: Haley MCKEON,RN,CDI            Contact information:[phone]  This form is a permanent document in the medical record.     Query Date: August 23, 2018    By submitting this query, we are merely seeking further clarification of documentation.  Please utilize your independent clinical judgment when addressing the question(s) below.     The medical record contains the following:    Findings Supporting Clinical Information Location in Medical Record            Melena   Symptomatic anemia  Iron deficiency anemia due to chronic blood loss  Melena  Angiodysplasia of small intestine  Acute lower GI bleeding  Transfused 2 units pRBCs.  Gastroenterology    consulted for recommendations to treat bleeding    source.  Gets outpatient octreotide injections.      Findings:  A single angioectasia with bleeding was found in the  cecum. Coagulation for hemostasis using argon  plasma at 1 liter/minute and 20 santoyo was successful.  The distal ileum contained a single small angioectasia.  Coagulation for hemostasis using argon plasma at 1   liter/minute and 25 santoyo was successful.  Impression:   - Two small angioectasias ablated in the cecum (1)     and distal ileum (1)  - 73yrM male with recurrent severe anemia likely     due to intestinal angioectasias with a demonstrated     dense angioectasia associated with active bleeding     in the mid-distal ileum by video capsule. His     anemia had improved with Octreotide injections such      that he is getting PRBC every 6 months instead of      every 2 weeks but his anemia has been worse     recently               08/22/18 [facility] Medicine H&P                        08/23/18 Lower Device-Assisted Enteroscopy      Without Fluoroscopy            Please document your best medical opinion regarding the etiology of ___Melena______________ for which the primary  focus of treatment is/was directed.     Please Check All That Apply:            __x____ Angioectasia of the Ileum with hemorrhage      __x____ Angioectasia of the Cecum with hemorrhage      ______ Other Please Specify_________________________________                  [    ]  Clinically Undetermined    Please document in your progress notes daily for the duration of treatment, until resolved, and include in your discharge summary.

## 2018-08-23 NOTE — OR NURSING
Patient transferred to endoscopy dept for purpose of Lower DBE with Dr. Soto. AAO x3, denies pain or discomfort at present. History and lab values reviewed. Prep completed and NPO status appropriate for exam. Patient in agreement to proceed with procedure as planned. No family members present. Vital signs recorded and comfort measures provided.

## 2018-08-23 NOTE — HOSPITAL COURSE
Hemoglobin and hematocrit remained stable acutely, due to slow bleeding.  Dr. Soto performed lower double balloon enteroscopy finding two angioectasias in the cecum and distal ileum, treated with argon plasma coagulation.  He developed azotemia, with intake/output documentation showing he was more than 5 liters positive in 2 days.  He had apparently taken in 4 liters by mouth in one day for unknown reasons.  BNP was high at 509.  He was given IV bumetanide in addition to his home diuretics.  He urinated 2.6 liters overnight and was given another dose of IV bumetanide the next morning before discharge.

## 2018-08-23 NOTE — PLAN OF CARE
Problem: Patient Care Overview  Goal: Plan of Care Review  Outcome: Ongoing (interventions implemented as appropriate)  Patient remained in bed with bed alarm on, yellow safety band and nonskid socks. Ambulated to bathroom with stand-by assistance. Patient given bowel prep to drink at 2000. Kept NPO after midnight. NS going at 50 ml/hr. Patient denied any pain for duration of the shift. Lung sounds remained clear upon ascultation. Afib on telemetry. Vital signs remained within parameters with SBP on the lower end. Patient pulled out RH IV. Will continue to monitor.

## 2018-08-23 NOTE — ANESTHESIA PREPROCEDURE EVALUATION
08/23/2018  Stefano Granger is a 73 y.o., male presents for  DBE under MAC.  72 year old M w/ hx of HIV, CHF s/p aortic, and mitral valve replacements, AICD placement with last event <1yr ago, HTN, seizures, recurrent lower GI bleed scheduled for DBE 2/5/2018. Patient describes >4METS stating he can walk two flights of stairs w/o stopping easily. He denies exertional CP or SOB, just chronic fatigue.    Prev anes   2/5/18: lower dbe under MAC > prop 100mcg/kg no issues  1/3/18: upper dbe under GA > prop 120/etom 20 >  No issues    Anesthesia Evaluation    I have reviewed the Patient Summary Reports.    I have reviewed the Nursing Notes.   I have reviewed the Medications.     Review of Systems  Anesthesia Hx:  No problems with previous Anesthesia Denies Hx of Anesthetic complications  History of prior surgery of interest to airway management or planning: Previous anesthesia: General, MAC Tonsillectomy, AICD placement, colonoscopy with general anesthesia.  Denies Family Hx of Anesthesia complications.   Denies Personal Hx of Anesthesia complications.   Social:  Alcohol Use, Smoker    Hematology/Oncology:         -- Anemia:   Cardiovascular:   Exercise tolerance: good Pacemaker Hypertension  Denies Angina. CHF     ECG has been reviewed.    Pulmonary:   Asthma    Renal/:  Renal/ Normal     Hepatic/GI:  Hepatic/GI Normal    Neurological:   CVA Seizures      Lab Results   Component Value Date    WBC 8.10 08/23/2018    HGB 8.0 (L) 08/23/2018    HCT 27.0 (L) 08/23/2018    MCV 91 08/23/2018     08/23/2018     BMP  Lab Results   Component Value Date     (L) 08/23/2018    K 4.3 08/23/2018    CL 96 08/23/2018    CO2 29 08/23/2018    BUN 52 (H) 08/23/2018    CREATININE 1.9 (H) 08/23/2018    CALCIUM 8.3 (L) 08/23/2018    ANIONGAP 10 08/23/2018    ESTGFRAFRICA 40 (A) 08/23/2018    EGFRNONAA 34 (A) 08/23/2018        Last EKG 1/11/2018:  Accelerated Junctional rhythm  Nonspecific intraventricular conduction delay  T wave abnormality, consider inferolateral ischemia  Prolonged QTAbn     Combined H F s/p AICD  -cont lasix, all other meds that will effect his BP are held  -echo from [facility] showed EF 45-50 (12/2017)  -AICD placement evaluated via CXR  -we are continuing his flomax as wellormal ECG        Physical Exam  General:  Well nourished    Airway/Jaw/Neck:  Airway Findings: Mouth Opening: Normal     Dental:  Dental Findings:    Chest/Lungs:  Chest/Lungs Findings: Clear to auscultation, Normal Respiratory Rate         Mental Status:  Mental Status Findings:  Cooperative, Alert and Oriented         Anesthesia Plan  Type of Anesthesia, risks & benefits discussed:  Anesthesia Type:  MAC  Patient's Preference:   Intra-op Monitoring Plan: standard ASA monitors  Intra-op Monitoring Plan Comments:   Post Op Pain Control Plan: multimodal analgesia  Post Op Pain Control Plan Comments:   Induction:   IV  Beta Blocker:         Informed Consent: Patient understands risks and agrees with Anesthesia plan.  Questions answered. Anesthesia consent signed with patient.  ASA Score: 4     Day of Surgery Review of History & Physical:  There are no significant changes.          Ready For Surgery From Anesthesia Perspective.

## 2018-08-23 NOTE — [employer] PATIENT INSTRUCTIONS
Discharge Summary/Instructions after an Endoscopic Procedure  Patient Name: Stefano Granger  Patient MRN: [id number]  Patient YOB: 1945 Thursday, August 23, 2018  Ra Soto MD  RESTRICTIONS:  During your procedure today, you received medications for sedation.  These   medications may affect your judgment, balance and coordination.  Therefore,   for 24 hours, you have the following restrictions:   - DO NOT drive a car, operate machinery, make legal/financial decisions,   sign important papers or drink alcohol.    ACTIVITY:  Today: no heavy lifting, straining or running due to procedural   sedation/anesthesia.  The following day: return to full activity including work.  DIET:  Eat and drink normally unless instructed otherwise.     TREATMENT FOR COMMON SIDE EFFECTS:  - Mild abdominal pain, nausea, belching, bloating or excessive gas:  rest,   eat lightly and use a heating pad.  - Sore Throat: treat with throat lozenges and/or gargle with warm salt   water.  - Because air was used during the procedure, expelling large amounts of air   from your rectum or belching is normal.  - If a bowel prep was taken, you may not have a bowel movement for 1-3 days.    This is normal.  SYMPTOMS TO WATCH FOR AND REPORT TO YOUR PHYSICIAN:  1. Abdominal pain or bloating, other than gas cramps.  2. Chest pain.  3. Back pain.  4. Signs of infection such as: chills or fever occurring within 24 hours   after the procedure.  5. Rectal bleeding, which would show as bright red, maroon, or black stools.   (A tablespoon of blood from the rectum is not serious, especially if   hemorrhoids are present.)  6. Vomiting.  7. Weakness or dizziness.  GO DIRECTLY TO THE NEAREST EMERGENCY ROOM IF YOU HAVE ANY OF THE FOLLOWING:      Difficulty breathing              Chills and/or fever over 101 F   Persistent vomiting and/or vomiting blood   Severe abdominal pain   Severe chest pain   Black, tarry stools   Bleeding- more than one  tablespoon   Any other symptom or condition that you feel may need urgent attention  Your doctor recommends these additional instructions:  If any biopsies were taken, your doctors clinic will contact you in 1 to 2   weeks with any results.  Regular diet today and likely discharge to home tomorrow   If anemia fails to improve, I will recommend a trial of thalidomide 50mg bid   x 3 months as next step   Continue monthly Octreotide injections  For questions, problems or results please call your physician - Ra Soto MD at Work:  [phone].  EMERGENCY PHONE NUMBER: [phone],  LAB RESULTS: [phone]  IF A COMPLICATION OR EMERGENCY SITUATION ARISES AND YOU ARE UNABLE TO REACH   YOUR PHYSICIAN - GO DIRECTLY TO THE EMERGENCY ROOM.  MD Ra Hsieh MD  8/23/2018 3:10:59 PM  This report has been verified and signed electronically.  [employer]

## 2018-08-23 NOTE — PROGRESS NOTES
[facility] Hospital Medicine  Progress Note    Patient Name: Stefano Granger  MRN: [id number]  Patient Class: IP- Inpatient   Admission Date: 8/21/2018  Length of Stay: 2 days  Attending Physician: Mesfin Cruz MD  Primary Care Provider: [facility]        Subjective:     Principal Problem:Symptomatic anemia    HPI:  Stefano Granger is a 73 y.o. black man with hypertension, coronary artery disease status post coronary artery bypass graft in 2004, history of bioprosthetic aortic and mitral valve replacement in 2004, chronic systolic and diastolic congestive heart failure, mitral valve stenosis, pulmonary hypertension, atrial fibrillation, automatic implantable cardioverter-defibrillator, cigarette smoking with chronic obstructive pulmonary disease, seizure disorder, chronic blood loss anemia due to small bowel angiodysplasias, benign prostatic hyperplasia, gout, hyperlipidemia.  He lives in [facility] [place].  He gets his medical care at [facility].  His primary care physician is [facility] resident Dr. Jimmy Andrew.  His cardiologist is [facility] fellow Dr. Rosi Montejo.  His angiodysplasia bleeding is followed by gastroenterologist Dr. Ra Soto at [facility].  He gets outpatient octreotide injections for it.   He was hospitalized at [facility] from 7/11/18 to 7/13/18 for recurrent anemia due to active gastrointestinal bleeding.  Hemoglobin and hematocrit were 5.4 and 20.  He was transfused 4 units of pRBCs.  Hemoglobin and hematocrit increased to 9.4 and 31.  Gastroenterology was consulted and Dr. Soto deferred endoscopy at the time because Mr. Granger responded better to octreotide injections than endoscopic ablation.   He presented to [facility] Emergency Department on 8/21/18 with 3 days of worsening weakness due to recurrent anemia due to  active melena that never stopped after his last hospitalization.  Hemoglobin and hematocrit were 5.7 and 20.4.  He was transfused 2 units of pRBCs.  Hemoglobin and hematocrit increased to 7.3 and 25.4.  He was admitted to [facility].  Gastroenterology was consulted.    Hospital Course:  Hemoglobin and hematocrit remained stable acutely, due to slow bleeding.  Dr. Soto performed lower double balloon enteroscopy finding two angioectasias in the cecum and distal ileum, treated with argon plasma coagulation.  He developed azotemia, with intake/output documentation showing he was more than 5 liters positive in 2 days.    Interval History: Feels like he could be dehydrated.  Says he drank a lot.  Nursing documentation shows he took 4 liters by mouth yesterday, so azotemia may be cardiorenal syndrome due to hypervolemia rather than hypovolemia.  Will check BNP.    Review of Systems   Constitutional: Negative for chills and fever.   Respiratory: Negative for cough and shortness of breath.      Objective:     Vital Signs (Most Recent):  Temp: 97.9 °F (36.6 °C) (08/23/18 1506)  Pulse: 72 (08/23/18 1506)  Resp: 16 (08/23/18 1506)  BP: (!) 96/52 (08/23/18 1506)  SpO2: 98 % (08/23/18 1506) Vital Signs (24h Range):  Temp:  [97 °F (36.1 °C)-98 °F (36.7 °C)] 97.9 °F (36.6 °C)  Pulse:  [66-90] 72  Resp:  [14-20] 16  SpO2:  [96 %-100 %] 98 %  BP: ()/(52-66) 96/52     Weight: 67.8 kg (149 lb 7.6 oz)  Body mass index is 20.27 kg/m².    Intake/Output Summary (Last 24 hours) at 8/23/2018 1550  Last data filed at 8/23/2018 1453  Gross per 24 hour   Intake 5900 ml   Output 500 ml   Net 5400 ml      Physical Exam   Constitutional: He is oriented to person, place, and time. He appears well-developed. No distress.   Pulmonary/Chest: Effort normal. No respiratory distress. He has rales.   Musculoskeletal: He exhibits edema.   Neurological: He is alert and oriented to person, place, and time.   Psychiatric: He has a  normal mood and affect.   Nursing note and vitals reviewed.      Significant Labs:   CBC:   Recent Labs   Lab  08/21/18 2013 08/22/18   0020  08/22/18   0835  08/23/18   0936   WBC  7.59   --   27.06*  8.10   HGB  5.9*  5.7*  7.3*  8.0*   HCT  21.3*  20.4*  25.4*  27.0*   PLT  350   --   313  270     CMP:   Recent Labs   Lab  08/21/18 2013 08/22/18   0835  08/23/18   0936   NA  136  132*  135*   K  4.8  5.9*  4.3   CL  97  98  96   CO2  27  20*  29   GLU  119*  121*  142*   BUN  36*  46*  52*   CREATININE  1.4  1.7*  1.9*   CALCIUM  8.5*  8.4*  8.3*   PROT  6.6   --    --    ALBUMIN  3.3*   --    --    BILITOT  0.4   --    --    ALKPHOS  333*   --    --    AST  65*   --    --    ALT  44   --    --    ANIONGAP  12  14  10   EGFRNONAA  49*  39*  34*       Significant Imaging: I have reviewed all pertinent imaging results/findings within the past 24 hours.   LOWER DEVICE-ASSISTED ENTEROSCOPY WITHOUT FLUOROSCOPY 8/23/18:  Findings:       A single angioectasia with bleeding was found in the cecum.        Coagulation for hemostasis using argon plasma at 1 liter/minute and 20 santoyo was successful.       The distal ileum contained a single small angioectasia. Coagulation for hemostasis using argon plasma at 1 liter/minute and 25 santoyo was successful.  Impression:   - Two small angioectasias ablated in the cecum (1) and distal ileum (1)                        - 73yrM male with recurrent severe anemia likely due to intestinal angioectasias with a demonstrated dense angioectasia associated with active bleeding in the mid-distal ileum by video capsule. His anemia had improved with Octreotide injections such that he is getting PRBC every 6 months instead of every 2 weeks but his anemia has been worse recently  Recommendation:       Regular diet today and likely discharge to home tomorrow.  If anemia fails to improve, I will recommend a trial of thalidomide 50mg bid x 3 months as next step.  Continue monthly Octreotide  injections.    Assessment/Plan:      Acute lower GI bleeding    Angiodysplasia of small intestine  Treated by APC.  Appreciate Dr. Soto.          COPD (chronic obstructive pulmonary disease)    Takes albuterol, tiotropium.  Continue.          Hyperlipidemia    Takes simvastatin.  Continue.          History of seizure    Takes phenytoin.  Continue.          BPH (benign prostatic hyperplasia)    Takes tamsulosin.  Continue.          Paroxysmal atrial fibrillation    Takes amiodarone, digoxin, metoprolol.  Continue.          Chronic combined systolic and diastolic heart failure    Status post aortic valve and mitral valve replacement  Pulmonary hypertension  Takes bumetanide, metolazone, spironolactone, potassium.  Hold diuretics while evaluating fluid status.  Check BNP.            VTE Risk Mitigation (From admission, onward)        Ordered     IP VTE HIGH RISK PATIENT  Once      08/21/18 2258     Place UZAIR hose  Until discontinued      08/21/18 2258     Place sequential compression device  Until discontinued      08/21/18 2258              Mesfin Cruz MD  Department of Hospital Medicine   [facility]

## 2018-08-23 NOTE — PLAN OF CARE
Problem: Patient Care Overview  Goal: Plan of Care Review  Outcome: Ongoing (interventions implemented as appropriate)   08/23/18 1048   Coping/Psychosocial   Plan Of Care Reviewed With patient   Care plan reviewed with Pt verbalized plan of care. AAOx4, no respiratory distress noted, on room air. Afib per cardiac monitor, no red alarm noted. Denies any pain of discomfort, education provided on medication effect and side effect, voices understanding. Continue NPO, pending procedure in Endo. IV fluids continues as ordered. Safety measures maintained, call light within his reach, no apparent distress noted, bed in low position, bed alarm on, educated on the importance of calling as needed, voices understanding, stable at this time.

## 2018-08-23 NOTE — PROGRESS NOTES
.Pharmacy New Medication Education    Patient accepted medication education.    Pharmacy educated patient on name and purpose of medications and possible side effects, using the teach-back method.     Current Inpatient Medication Orders   0.9% NaCl infusion (for blood administration)   0.9% NaCl infusion   acetaminophen tablet 650 mg   amiodarone tablet 200 mg   colchicine capsule/tablet 0.6 mg   * digoxin tablet 125 mcg   ferrous sulfate EC tablet 325 mg   HYDROcodone-acetaminophen 5-325 mg per tablet 1 tablet   * metoprolol succinate (TOPROL-XL) 24 hr tablet 200 mg   ondansetron disintegrating tablet 8 mg   ondansetron injection 4 mg   pantoprazole EC tablet 40 mg   phenytoin (DILANTIN) ER capsule 200 mg   simvastatin tablet 40 mg   sodium chloride 0.9% flush 3 mL   sodium chloride 0.9% flush 3 mL   tamsulosin 24 hr capsule 0.4 mg   tiotropium inhalation capsule 18 mcg       Learners of pharmacy medication education included:  Patient    Patient +/- learner response:  Verbalized Understanding, Teachback

## 2018-08-23 NOTE — TRANSFER OF CARE
Anesthesia Transfer of Care Note    Patient: Stefano Granger    Procedure(s) Performed: Procedure(s) (LRB):  ENTEROSCOPY, DISTAL-Lower DBE (N/A)    Patient location: GI    Anesthesia Type: MAC    Transport from OR: Transported from OR on room air with adequate spontaneous ventilation    Post pain: adequate analgesia    Post assessment: no apparent anesthetic complications and tolerated procedure well    Post vital signs: stable    Level of consciousness: awake, alert and oriented    Nausea/Vomiting: no nausea/vomiting    Complications: none    Transfer of care protocol was followed      Last vitals:   Visit Vitals  /65   Pulse 78   Temp 36.4 °C (97.5 °F) (Tympanic)   Resp 20   Ht 6' (1.829 m)   Wt 67.8 kg (149 lb 7.6 oz)   SpO2 97%   BMI 20.27 kg/m²

## 2018-08-23 NOTE — ASSESSMENT & PLAN NOTE
Status post aortic valve and mitral valve replacement  Pulmonary hypertension  Takes bumetanide, metolazone, spironolactone, potassium.  Hold diuretics while evaluating fluid status.  Check BNP.

## 2018-08-23 NOTE — PLAN OF CARE
Enteroscopy done and okay to discharge from a GI standpoint, however he has developed acute kidney injury.  Would be good to keep overnight and repeat BMP to follow up renal function in the morning.

## 2018-08-23 NOTE — PHYSICIAN QUERY
PT Name: Stefano Granger  MR #: [id number]    Physician Query Form - CardioPulmonary Clarification      CDS/: Haley MCKEON,RN,CDI            Contact information:[phone]  This form is a permanent document in the medical record.    Query Date: August 23, 2018    By submitting this query, we are merely seeking further clarification of documentation. Please utilize your independent clinical judgment when addressing the question(s) below.    The Medical record contains the following:   Indicators   Supporting Clinical Findings Location in Medical Record   x Pulmonary Hypertension documented     Pulmonary hypertension     Takes bumetanide, metolazone, spironolactone,     potassium.           08/22/18 Hospital Medicine Assessment and Plan Note       x Acute/Chronic Illness     Chronic combined systolic and diastolic congestive      heart failure,COPD, HTN, Mitral Stenosis, Cardiac      Defibrillator in place, atrial  fibrillation                  08/22/18 Hospital Medicine Subjective and Objective   x Echo and/or Heart Cath Findings        echo from [facility] showed EF 45-50 (12/2017)          08/23/18 Anesthesia Preprocedure Evaluation    BiPAP/Intubation/Supplemental O2      SOB, DYSON, Fatigue, Dizziness, LE Edema, Cyanosis, Chest Pain, Respiratory Distress, Hypoxia, etc.     x Treatment         Medication     metoprolol succinate (TOPROL-XL) 24 hr tablet 200 mg     Dose: 200 mg Freq: 2 times daily Route: Oral    bumetanide tablet 4 mg Dose: 4 mg    Freq: 2 times daily Route: Oral    bumetanide tablet 4 mg     Dose: 4 mg Freq: 2 times daily Route: Oral                         08/21---------------------->08/23 MAR                               Other     Provider, please specify the type of pulmonary hypertension:    [ x  ]  Group 2:  Pulmonary Hypertension due to Left Heart Disease, including left heart failure and/or left heart valve disease    [   ]  Pulmonary Hypertension, unspecified    [   ]  Other  Cardiopulmonary Condition (please specify):  _____________________________________    [   ]  Clinically Undetermined    Please document in your progress notes daily for the duration of treatment, until resolved, and include in your discharge summary.

## 2018-08-23 NOTE — SUBJECTIVE & OBJECTIVE
Interval History: Feels like he could be dehydrated.  Says he drank a lot.  Nursing documentation shows he took 4 liters by mouth yesterday, so azotemia may be cardiorenal syndrome due to hypervolemia rather than hypovolemia.  Will check BNP.    Review of Systems   Constitutional: Negative for chills and fever.   Respiratory: Negative for cough and shortness of breath.      Objective:     Vital Signs (Most Recent):  Temp: 97.9 °F (36.6 °C) (08/23/18 1506)  Pulse: 72 (08/23/18 1506)  Resp: 16 (08/23/18 1506)  BP: (!) 96/52 (08/23/18 1506)  SpO2: 98 % (08/23/18 1506) Vital Signs (24h Range):  Temp:  [97 °F (36.1 °C)-98 °F (36.7 °C)] 97.9 °F (36.6 °C)  Pulse:  [66-90] 72  Resp:  [14-20] 16  SpO2:  [96 %-100 %] 98 %  BP: ()/(52-66) 96/52     Weight: 67.8 kg (149 lb 7.6 oz)  Body mass index is 20.27 kg/m².    Intake/Output Summary (Last 24 hours) at 8/23/2018 1550  Last data filed at 8/23/2018 1453  Gross per 24 hour   Intake 5900 ml   Output 500 ml   Net 5400 ml      Physical Exam   Constitutional: He is oriented to person, place, and time. He appears well-developed. No distress.   Pulmonary/Chest: Effort normal. No respiratory distress. He has rales.   Musculoskeletal: He exhibits edema.   Neurological: He is alert and oriented to person, place, and time.   Psychiatric: He has a normal mood and affect.   Nursing note and vitals reviewed.      Significant Labs:   CBC:   Recent Labs   Lab  08/21/18 2013 08/22/18   0020  08/22/18   0835  08/23/18   0936   WBC  7.59   --   27.06*  8.10   HGB  5.9*  5.7*  7.3*  8.0*   HCT  21.3*  20.4*  25.4*  27.0*   PLT  350   --   313  270     CMP:   Recent Labs   Lab  08/21/18 2013 08/22/18   0835  08/23/18   0936   NA  136  132*  135*   K  4.8  5.9*  4.3   CL  97  98  96   CO2  27  20*  29   GLU  119*  121*  142*   BUN  36*  46*  52*   CREATININE  1.4  1.7*  1.9*   CALCIUM  8.5*  8.4*  8.3*   PROT  6.6   --    --    ALBUMIN  3.3*   --    --    BILITOT  0.4   --    --     ALKPHOS  333*   --    --    AST  65*   --    --    ALT  44   --    --    ANIONGAP  12  14  10   EGFRNONAA  49*  39*  34*       Significant Imaging: I have reviewed all pertinent imaging results/findings within the past 24 hours.   LOWER DEVICE-ASSISTED ENTEROSCOPY WITHOUT FLUOROSCOPY 8/23/18:  Findings:       A single angioectasia with bleeding was found in the cecum.        Coagulation for hemostasis using argon plasma at 1 liter/minute and 20 santoyo was successful.       The distal ileum contained a single small angioectasia. Coagulation for hemostasis using argon plasma at 1 liter/minute and 25 santoyo was successful.  Impression:   - Two small angioectasias ablated in the cecum (1) and distal ileum (1)                        - 73yrM male with recurrent severe anemia likely due to intestinal angioectasias with a demonstrated dense angioectasia associated with active bleeding in the mid-distal ileum by video capsule. His anemia had improved with Octreotide injections such that he is getting PRBC every 6 months instead of every 2 weeks but his anemia has been worse recently  Recommendation:       Regular diet today and likely discharge to home tomorrow.  If anemia fails to improve, I will recommend a trial of thalidomide 50mg bid x 3 months as next step.  Continue monthly Octreotide injections.

## 2018-08-24 VITALS
TEMPERATURE: 98 F | HEIGHT: 72 IN | SYSTOLIC BLOOD PRESSURE: 130 MMHG | BODY MASS INDEX: 20.25 KG/M2 | DIASTOLIC BLOOD PRESSURE: 60 MMHG | HEART RATE: 78 BPM | OXYGEN SATURATION: 100 % | RESPIRATION RATE: 18 BRPM | WEIGHT: 149.5 LBS

## 2018-08-24 PROBLEM — K92.2 ACUTE LOWER GI BLEEDING: Status: RESOLVED | Noted: 2018-08-21 | Resolved: 2018-08-24

## 2018-08-24 PROBLEM — D62 ACUTE BLOOD LOSS ANEMIA: Status: ACTIVE | Noted: 2018-02-02

## 2018-08-24 LAB
ANION GAP SERPL CALC-SCNC: 13 MMOL/L
BUN SERPL-MCNC: 54 MG/DL
CALCIUM SERPL-MCNC: 7.6 MG/DL
CHLORIDE SERPL-SCNC: 89 MMOL/L
CO2 SERPL-SCNC: 29 MMOL/L
CREAT SERPL-MCNC: 1.8 MG/DL
EST. GFR  (AFRICAN AMERICAN): 42 ML/MIN/1.73 M^2
EST. GFR  (NON AFRICAN AMERICAN): 37 ML/MIN/1.73 M^2
GLUCOSE SERPL-MCNC: 222 MG/DL
POCT GLUCOSE: 148 MG/DL (ref 70–110)
POTASSIUM SERPL-SCNC: 3.4 MMOL/L
SODIUM SERPL-SCNC: 131 MMOL/L

## 2018-08-24 PROCEDURE — 25000242 PHARM REV CODE 250 ALT 637 W/ HCPCS: Performed by: HOSPITALIST

## 2018-08-24 PROCEDURE — 36415 COLL VENOUS BLD VENIPUNCTURE: CPT

## 2018-08-24 PROCEDURE — 94640 AIRWAY INHALATION TREATMENT: CPT

## 2018-08-24 PROCEDURE — S0171 BUMETANIDE 0.5 MG: HCPCS | Performed by: HOSPITALIST

## 2018-08-24 PROCEDURE — 25000003 PHARM REV CODE 250: Performed by: HOSPITALIST

## 2018-08-24 PROCEDURE — 25000003 PHARM REV CODE 250: Performed by: EMERGENCY MEDICINE

## 2018-08-24 PROCEDURE — 94761 N-INVAS EAR/PLS OXIMETRY MLT: CPT

## 2018-08-24 PROCEDURE — 80048 BASIC METABOLIC PNL TOTAL CA: CPT

## 2018-08-24 PROCEDURE — A4216 STERILE WATER/SALINE, 10 ML: HCPCS | Performed by: EMERGENCY MEDICINE

## 2018-08-24 RX ORDER — BUMETANIDE 0.25 MG/ML
2 INJECTION INTRAMUSCULAR; INTRAVENOUS ONCE
Status: COMPLETED | OUTPATIENT
Start: 2018-08-24 | End: 2018-08-24

## 2018-08-24 RX ORDER — METOLAZONE 2.5 MG/1
5 TABLET ORAL
Status: DISCONTINUED | OUTPATIENT
Start: 2018-08-24 | End: 2018-08-24 | Stop reason: HOSPADM

## 2018-08-24 RX ORDER — POTASSIUM CHLORIDE 20 MEQ/1
40 TABLET, EXTENDED RELEASE ORAL ONCE
Status: COMPLETED | OUTPATIENT
Start: 2018-08-24 | End: 2018-08-24

## 2018-08-24 RX ORDER — SPIRONOLACTONE 25 MG/1
25 TABLET ORAL DAILY
Status: DISCONTINUED | OUTPATIENT
Start: 2018-08-24 | End: 2018-08-24 | Stop reason: HOSPADM

## 2018-08-24 RX ADMIN — TIOTROPIUM BROMIDE 18 MCG: 18 CAPSULE ORAL; RESPIRATORY (INHALATION) at 12:08

## 2018-08-24 RX ADMIN — Medication 3 ML: at 05:08

## 2018-08-24 RX ADMIN — POTASSIUM CHLORIDE 40 MEQ: 1500 TABLET, EXTENDED RELEASE ORAL at 09:08

## 2018-08-24 RX ADMIN — TAMSULOSIN HYDROCHLORIDE 0.4 MG: 0.4 CAPSULE ORAL at 09:08

## 2018-08-24 RX ADMIN — PHENYTOIN SODIUM 200 MG: 100 CAPSULE ORAL at 09:08

## 2018-08-24 RX ADMIN — COLCHICINE 0.6 MG: 0.6 TABLET, FILM COATED ORAL at 09:08

## 2018-08-24 RX ADMIN — BUMETANIDE 2 MG: 0.25 INJECTION INTRAMUSCULAR; INTRAVENOUS at 07:08

## 2018-08-24 RX ADMIN — METOPROLOL SUCCINATE 200 MG: 50 TABLET, EXTENDED RELEASE ORAL at 09:08

## 2018-08-24 RX ADMIN — METOLAZONE 5 MG: 2.5 TABLET ORAL at 09:08

## 2018-08-24 RX ADMIN — AMIODARONE HYDROCHLORIDE 200 MG: 200 TABLET ORAL at 09:08

## 2018-08-24 RX ADMIN — PANTOPRAZOLE SODIUM 40 MG: 40 TABLET, DELAYED RELEASE ORAL at 09:08

## 2018-08-24 RX ADMIN — SPIRONOLACTONE 25 MG: 25 TABLET, FILM COATED ORAL at 09:08

## 2018-08-24 NOTE — ANESTHESIA POSTPROCEDURE EVALUATION
Anesthesia Post Evaluation    Patient: Stefano Granger    Procedure(s) Performed: Procedure(s) (LRB):  ENTEROSCOPY, DISTAL-Lower DBE (N/A)    Final Anesthesia Type: MAC  Patient location during evaluation: PACU  Patient participation: Yes- Able to Participate  Level of consciousness: awake  Post-procedure vital signs: reviewed and stable  Pain management: adequate  Airway patency: patent  PONV status at discharge: No PONV  Anesthetic complications: no      Cardiovascular status: stable  Respiratory status: unassisted and room air  Hydration status: euvolemic  Follow-up not needed.        Visit Vitals  BP (!) 155/59 (BP Location: Right arm, Patient Position: Lying)   Pulse 82   Temp 36.8 °C (98.2 °F) (Oral)   Resp 20   Ht 6' (1.829 m)   Wt 67.8 kg (149 lb 7.6 oz)   SpO2 98%   BMI 20.27 kg/m²       Pain/William Score: Pain Assessment Performed: Yes (8/24/2018  7:20 AM)  Presence of Pain: denies (8/24/2018  7:20 AM)  William Score: 9 (8/23/2018  3:06 PM)

## 2018-08-24 NOTE — PLAN OF CARE
Discharge instructions provided, voices understanding. Telemetry discontinued and returned to monitor tech. Peripheral line discontinued, tip intact, pressure applied. Awaiting on transport for discharge.

## 2018-08-24 NOTE — PLAN OF CARE
Problem: Patient Care Overview  Goal: Plan of Care Review  Outcome: Ongoing (interventions implemented as appropriate)  The proper method of use, as well as anticipated side effects, of this metered-dose inhaler are discussed and demonstrated to the patient. Will continue to monitor.

## 2018-08-24 NOTE — NURSING
"Patient has orders for discharge, states "daughter will come around 5 pm after work" to pick him up.  "

## 2018-08-24 NOTE — PLAN OF CARE
Discharge orders noted, no HH or HME ordered.    Future Appointments   Date Time Provider Department Center   9/11/2018  2:00 PM CHAIR 07 [facility]       Pt's nurse will go over medications/signs and symptoms prior to discharge       08/24/18 1048   Final Note   Assessment Type Final Discharge Note   What phone number can be called within the next 1-3 days to see how you are doing after discharge? [phone]   Hospital Follow Up  Appt(s) scheduled? Yes   Right Care Referral Info   Post Acute Recommendation No Care     María Crocker, RN Transitional Navigator  [phone]

## 2018-08-24 NOTE — DISCHARGE SUMMARY
[facility] Hospital Medicine  Discharge Summary      Patient Name: Stefano Granger  MRN: [id number]  Admission Date: 8/21/2018  Hospital Length of Stay: 3 days  Discharge Date and Time:  08/24/2018 6:59 PM  Attending Physician: Mesfin Cruz MD   Discharging Provider: Mesfin Cruz MD  Primary Care Provider: [facility]      HPI:   Stefano Granger is a 73 y.o. black man with hypertension, coronary artery disease status post coronary artery bypass graft in 2004, history of bioprosthetic aortic and mitral valve replacement in 2004, chronic systolic and diastolic congestive heart failure, mitral valve stenosis, pulmonary hypertension, atrial fibrillation, automatic implantable cardioverter-defibrillator, cigarette smoking with chronic obstructive pulmonary disease, seizure disorder, chronic blood loss anemia due to small bowel angiodysplasias, benign prostatic hyperplasia, gout, hyperlipidemia.  He lives in [facility].  He gets his medical care at .  His primary care physician is [facility] Internal Medicine resident Dr. Jimmy Andrew.  His cardiologist is [facility] Cardiology fellow Dr. Rosi Montejo.  His angiodysplasia bleeding is followed by gastroenterologist Dr. Ra Soto at [facility].  He gets outpatient octreotide injections for it.   He was hospitalized at [facility] from 7/11/18 to 7/13/18 for recurrent anemia due to active gastrointestinal bleeding.  Hemoglobin and hematocrit were 5.4 and 20.  He was transfused 4 units of pRBCs.  Hemoglobin and hematocrit increased to 9.4 and 31.  Gastroenterology was consulted and Dr. Soto deferred endoscopy at the time because Mr. Granger responded better to octreotide injections than endoscopic ablation.   He presented to [facility] Emergency Department on 8/21/18 with 3 days of worsening weakness due to  recurrent anemia due to active melena that never stopped after his last hospitalization.  Hemoglobin and hematocrit were 5.7 and 20.4.  He was transfused 2 units of pRBCs.  Hemoglobin and hematocrit increased to 7.3 and 25.4.  He was admitted to [facility].  Gastroenterology was consulted.    Procedure(s) (LRB):  ENTEROSCOPY, DISTAL-Lower DBE (N/A)    LOWER DEVICE-ASSISTED ENTEROSCOPY WITHOUT FLUOROSCOPY 8/23/18:  Findings:       A single angioectasia with bleeding was found in the cecum.        Coagulation for hemostasis using argon plasma at 1 liter/minute and 20 santoyo was successful.       The distal ileum contained a single small angioectasia. Coagulation for hemostasis using argon plasma at 1 liter/minute and 25 santoyo was successful.  Impression:   - Two small angioectasias ablated in the cecum (1) and distal ileum (1)                        - 73yrM male with recurrent severe anemia likely due to intestinal angioectasias with a demonstrated dense angioectasia associated with active bleeding in the mid-distal ileum by video capsule. His anemia had improved with Octreotide injections such that he is getting PRBC every 6 months instead of every 2 weeks but his anemia has been worse recently  Recommendation:       Regular diet today and likely discharge to home tomorrow.  If anemia fails to improve, I will recommend a trial of thalidomide 50mg bid x 3 months as next step.  Continue monthly Octreotide injections.    Hospital Course:   Hemoglobin and hematocrit remained stable acutely, due to slow bleeding.  Dr. Soto performed lower double balloon enteroscopy finding two angioectasias in the cecum and distal ileum, treated with argon plasma coagulation.  He developed azotemia, with intake/output documentation showing he was more than 5 liters positive in 2 days.  He had apparently taken in 4 liters by mouth in one day for unknown reasons.  BNP was high at 509.  He was given IV bumetanide in addition  to his home diuretics.  He urinated 2.6 liters overnight and was given another dose of IV bumetanide the next morning before discharge.     Consults:   Consults (From admission, onward)        Status Ordering Provider     Inpatient consult to Anesthesiology  Once     Provider:  (Not yet assigned)    JETT Jaimes     Inpatient consult to Gastroenterology  Once     Provider:  Ra Soto MD    Completed CARLY HARP        Final Active Diagnoses:    Diagnosis Date Noted POA    Cigarette smoker [F17.210] 08/22/2018 Yes     Chronic    Angiodysplasia of small intestine [K55.20]  Yes     Chronic    COPD (chronic obstructive pulmonary disease) [J44.9] 07/11/2018 Yes     Chronic    Hyperlipidemia [E78.5] 02/10/2018 Yes    BPH (benign prostatic hyperplasia) [N40.0] 02/10/2018 Yes     Chronic    History of seizure [Z87.898] 02/09/2018 Not Applicable     Chronic    Paroxysmal atrial fibrillation [I48.0] 02/04/2018 Yes     Chronic    Status post aortic valve and mitral valve replacement [Z95.2]  Not Applicable     Chronic    Chronic combined systolic and diastolic heart failure [I50.42] 01/31/2018 Yes     Chronic    Pulmonary hypertension [I27.20] 01/04/2018 Yes     Chronic    Iron deficiency anemia due to chronic blood loss [D50.0] 06/02/2016 Yes     Chronic      Problems Resolved During this Admission:    Diagnosis Date Noted Date Resolved POA    PRINCIPAL PROBLEM:  Symptomatic anemia [D64.9] 01/02/2018 08/23/2018 Yes    Acute lower GI bleeding [K92.2] 08/21/2018 08/24/2018 Yes    Melena [K92.1] 02/02/2018 08/23/2018 Yes       Discharged Condition: good    Disposition: Home or Self Care    Follow Up:  Follow-up Information     [facility].    Specialties:  Neurosurgery, Plastic Surgery, Podiatry, Surgical Oncology, Allergy, Cardiothoracic Surgery, Otolaryngology, Gastroenterology, Breast Surgery, Oral Surgery, Oral and Maxillofacial Surgery, Cardiology,  Bariatrics, Internal Medicine, Family Medicine, Colon and Rectal Surgery, Dental General Practice, Gynecology, Orthopedic Surgery, Genetics, Endocrinology, Vascular Surgery, Physical Medicine and Rehabilitation, Urology, Neurology, Dermatology, Rheumatology, Occupational Therapy  Contact information:  [address] [place] [address] [id number] [phone]                 Patient Instructions:      Diet Adult Regular     Order Specific Question Answer Comments   Na restriction, if any: 2gNa      Notify your health care provider if you experience any of the following:  difficulty breathing or increased cough     Notify your health care provider if you experience any of the following:  persistent dizziness, light-headedness, or visual disturbances     Activity as tolerated       Significant Diagnostic Studies: Labs:   CBC   Recent Labs   Lab  08/22/18   0835  08/23/18   0936   WBC  27.06*  8.10   HGB  7.3*  8.0*   HCT  25.4*  27.0*   PLT  313  270      Medications:  Reconciled Home Medications:      Medication List      CHANGE how you take these medications    metoprolol succinate 100 MG 24 hr tablet  Commonly known as:  TOPROL-XL  Take 3 tablets (300 mg total) by mouth once daily.  What changed:    · how much to take  · when to take this        CONTINUE taking these medications    albuterol 90 mcg/actuation inhaler  Inhale 2 puffs into the lungs every 6 (six) hours as needed for Wheezing. Rescue     amiodarone 200 MG Tab  Commonly known as:  PACERONE  Take 200 mg by mouth.     bumetanide 2 MG tablet  Commonly known as:  BUMEX  Take 2 tablets (4 mg total) by mouth 2 (two) times daily.     colchicine 0.6 mg tablet  Take 0.6 mg by mouth 2 (two) times daily.     digoxin 125 mcg tablet  Commonly known as:  LANOXIN  Take 125 mcg by mouth every other day.     ferrous sulfate 325 (65 FE) MG EC tablet  Take 1 tablet (325 mg total) by mouth 3 (three) times daily.     metOLazone 5 MG tablet  Commonly known as:  ZAROXOLYN  Take 1  "tablet (5 mg total) by mouth every 48 hours.     needle (disp) 21 G 21 gauge x 1 1/2" Ndle  1 each by Misc.(Non-Drug; Combo Route) route every 30 days.     octreotide lar 20 mg injection  Commonly known as:  SANDOSTATIN LAR  Inject 20 mg into the muscle every 28 days.     pantoprazole 20 MG tablet  Commonly known as:  PROTONIX  Take 2 tablets (40 mg total) by mouth once daily.     phenytoin 100 MG ER capsule  Commonly known as:  DILANTIN  Take 200 mg by mouth 2 (two) times daily.     potassium chloride 10 MEQ Tbsr  Commonly known as:  KLOR-CON  Take 20 mEq by mouth 2 (two) times daily.     sildenafil 20 mg Tab  Commonly known as:  REVATIO  Take 1 tablet (20 mg total) by mouth 3 (three) times daily.     simvastatin 40 MG tablet  Commonly known as:  ZOCOR  Take 40 mg by mouth every evening.     SPIRIVA WITH HANDIHALER 18 mcg inhalation capsule  Generic drug:  tiotropium  Inhale 1 capsule (18 mcg total) into the lungs once daily. Controller     spironolactone 25 MG tablet  Commonly known as:  ALDACTONE  Take 25 mg by mouth once daily.     tamsulosin 0.4 mg Cap  Commonly known as:  FLOMAX  Take 1 capsule (0.4 mg total) by mouth once daily.     travoprost 0.004 % ophthalmic solution  Commonly known as:  TRAVATAN Z  Place 1 drop into both eyes once daily.     vacuum erection device system Kit  Take as directed     ZOSTAVAX (PF) 19,400 unit/0.65 mL injection  Generic drug:  zoster vaccine live (PF)        STOP taking these medications    aspirin 325 MG tablet            Indwelling Lines/Drains at time of discharge: None    Time spent on the discharge of patient: 35 minutes  Patient was seen and examined on the date of discharge and determined to be suitable for discharge.         Mesfin Cruz MD  Department of Hospital Medicine  [facility]  "

## 2018-08-24 NOTE — PLAN OF CARE
Problem: Patient Care Overview (Adult)  Goal: Plan of Care Review   08/24/18 1250   Coping/Psychosocial   Plan Of Care Reviewed With patient   Care plan reviewed with Pt verbalized plan of care. AAOx4, family at bedside, no respiratory distress noted, on room air. Afib per cardiac monitor, no red alarm noted. Denies any pain of discomfort, education provided on medication effect and side effect, voices understanding. Safety measures maintained, call light within his reach, no apparent distress noted, bed in low position, bed alarm on, educated on the importance of calling as needed, voices understanding, stable at this time.

## 2018-08-24 NOTE — PLAN OF CARE
08/24/18 1037 Medicare Message   Important Message from Medicare regarding Discharge Appeal Rights Given to patient/caregiver;Explained to patient/caregiver;Signed/date by patient/caregiver   Date IMM was signed 08/24/18   Time IMM was signed 0905

## 2018-08-24 NOTE — PROGRESS NOTES
.Pharmacy New Medication Education    Patient accepted medication education.    Pharmacy educated patient on name and purpose of medications and possible side effects, using the teach-back method.     Current Inpatient Medication Orders   0.9% NaCl infusion (for blood administration)   acetaminophen tablet 650 mg   amiodarone tablet 200 mg   colchicine capsule/tablet 0.6 mg   digoxin tablet 125 mcg   HYDROcodone-acetaminophen 5-325 mg per tablet 1 tablet   metoprolol succinate (TOPROL-XL) 24 hr tablet 200 mg   ondansetron disintegrating tablet 8 mg   ondansetron injection 4 mg   pantoprazole EC tablet 40 mg   phenytoin (DILANTIN) ER capsule 200 mg   potassium chloride SA CR tablet 40 mEq   simvastatin tablet 40 mg   sodium chloride 0.9% flush 3 mL   sodium chloride 0.9% flush 3 mL   tamsulosin 24 hr capsule 0.4 mg   tiotropium inhalation capsule 18 mcg       Learners of pharmacy medication education included:  Patient    Patient +/- learner response:  Verbalized Understanding, Teachback

## 2018-08-24 NOTE — PLAN OF CARE
Problem: Patient Care Overview  Goal: Plan of Care Review  Outcome: Ongoing (interventions implemented as appropriate)  Patient remained in bed with bed alarm on, yellow safety band and nonskid socks. Ambulated to bathroom with stand-by assistance. Patient tolerating regular diet well. D5 going at 100 ml/hr. Patient denied any pain for duration of the shift. Lung sounds remained clear upon ascultation. Afib on telemetry and HR . Vital signs remained within parameters with SBP on the lower end. Patients IV remained intact. Will continue to monitor.

## 2018-08-24 NOTE — PROGRESS NOTES
[facility]    CC: melena    Lower DBE yesterday with cautery of angioectasia seen in distal small bowel, tolerated well. No further bleeding overnight. Tolerating diet and stable Hb.    Past Medical History    has a past medical history of Angiodysplasia of small intestine, Angiodysplasia of small intestine, except duodenum with bleeding, Atrial fibrillation, BPH (benign prostatic hyperplasia), Cardiac defibrillator in place, Chronic combined systolic and diastolic congestive heart failure, COPD (chronic obstructive pulmonary disease), Coronary artery disease, Gout, Hypertension, Mitral stenosis, Pulmonary hypertension, Seizures, and Stroke.      Review of Systems  General ROS: negative for - chills, fever or weight loss  Cardiovascular ROS: no chest pain or dyspnea on exertion  Gastrointestinal ROS: no abdominal pain, change in bowel habits, or black/ bloody stools    Physical Examination  BP (!) 155/59 (BP Location: Right arm, Patient Position: Lying)   Pulse 82   Temp 98.2 °F (36.8 °C) (Oral)   Resp 20   Ht 6' (1.829 m)   Wt 67.8 kg (149 lb 7.6 oz)   SpO2 98%   BMI 20.27 kg/m²   General appearance: alert, cooperative, no distress  HENT: Normocephalic, atraumatic, neck symmetrical, no nasal discharge   Lungs: clear to auscultation in all fields, symmetric chest wall expansion bilaterally, no wheeze, rale, or rhonchi  Heart: normal rate, regular rhythm without rub; palpable peripheral pulsesI   Abdomen: soft, non-tender; bowel sounds normoactive; no organomegaly  Extremities: extremities symmetric; no clubbing, cyanosis, or edema      Labs:  H&H 8/27  MCV 91  plt 270      Imaging:  Lower DBE:   Impression:    - Two small angioectasias ablated in the cecum (1)                         and distal ileum (1)                        - 73yrM male with recurrent severe anemia likely                         due to intestinal angioectasias with a demonstrated                         dense angioectasia  associated with active bleeding                         in the mid-distal ileum by video capsule. His                         anemia had improved with Octreotide injections such                         that he is getting PRBC every 6 months instead of                         every 2 weeks but his anemia has been worse recently    Assessment:   72 yo with h/o angioectasia presenting with symptomatic anemia and black stools. Patient currently receiving sandostatin and iron supplements outpatient along with questionable every other week transfusions. Most recent VCE with distal small bowel bleed and plan was to do retrograde DBE and tx. On admission patient had low Hb, was tachycardic and BP were soft. He has been given IVF, 2U pRBC and remained hemodynamically stable since then. lower DBE yesterday with two angioectasias in distal small bowel, cauterized with APC. Bleeding has subsided.        Plan:  -continue iron supplementation and octreotide injections monthly  -discharge home today  -if anemia fails to improve, next step would be trial of thalidomide 50mg PO BID x 3 months

## 2018-09-14 ENCOUNTER — HOSPITAL ENCOUNTER (INPATIENT)
Facility: HOSPITAL | Age: 73
LOS: 3 days | Discharge: HOME | DRG: 377 | End: 2018-09-19
Attending: EMERGENCY MEDICINE | Admitting: HOSPITALIST
Payer: MEDICARE

## 2018-09-14 DIAGNOSIS — K92.1 MELENA: Primary | ICD-10-CM

## 2018-09-14 DIAGNOSIS — I50.42 CHRONIC COMBINED SYSTOLIC AND DIASTOLIC HEART FAILURE: Chronic | ICD-10-CM

## 2018-09-14 DIAGNOSIS — R19.7 DIARRHEA, UNSPECIFIED TYPE: ICD-10-CM

## 2018-09-14 DIAGNOSIS — I50.9 CHF (CONGESTIVE HEART FAILURE): ICD-10-CM

## 2018-09-14 DIAGNOSIS — N17.9 AKI (ACUTE KIDNEY INJURY): ICD-10-CM

## 2018-09-14 DIAGNOSIS — R00.0 TACHYCARDIA: ICD-10-CM

## 2018-09-14 DIAGNOSIS — R53.1 WEAKNESS: ICD-10-CM

## 2018-09-14 PROCEDURE — P9612 CATHETERIZE FOR URINE SPEC: HCPCS

## 2018-09-14 PROCEDURE — 96375 TX/PRO/DX INJ NEW DRUG ADDON: CPT

## 2018-09-14 PROCEDURE — 93010 ELECTROCARDIOGRAM REPORT: CPT | Mod: ,,, | Performed by: INTERNAL MEDICINE

## 2018-09-14 PROCEDURE — 99285 EMERGENCY DEPT VISIT HI MDM: CPT

## 2018-09-14 PROCEDURE — 93005 ELECTROCARDIOGRAM TRACING: CPT

## 2018-09-14 PROCEDURE — 96366 THER/PROPH/DIAG IV INF ADDON: CPT

## 2018-09-14 PROCEDURE — 96365 THER/PROPH/DIAG IV INF INIT: CPT

## 2018-09-15 PROBLEM — K92.1 MELENA: Status: ACTIVE | Noted: 2018-09-15

## 2018-09-15 PROBLEM — N17.9 AKI (ACUTE KIDNEY INJURY): Status: ACTIVE | Noted: 2018-09-15

## 2018-09-15 PROBLEM — R19.7 DIARRHEA: Status: ACTIVE | Noted: 2018-09-15

## 2018-09-15 LAB
ABO + RH BLD: NORMAL
ALBUMIN SERPL BCP-MCNC: 3.8 G/DL
ALP SERPL-CCNC: 497 U/L
ALT SERPL W/O P-5'-P-CCNC: 73 U/L
ANION GAP SERPL CALC-SCNC: 14 MMOL/L
ANION GAP SERPL CALC-SCNC: 16 MMOL/L
AST SERPL-CCNC: 104 U/L
BASOPHILS # BLD AUTO: 0.01 K/UL
BASOPHILS NFR BLD: 0.2 %
BILIRUB SERPL-MCNC: 0.6 MG/DL
BLD GP AB SCN CELLS X3 SERPL QL: NORMAL
BUN SERPL-MCNC: 53 MG/DL
BUN SERPL-MCNC: 54 MG/DL
CALCIUM SERPL-MCNC: 8.3 MG/DL
CALCIUM SERPL-MCNC: 8.8 MG/DL
CHLORIDE SERPL-SCNC: 102 MMOL/L
CHLORIDE SERPL-SCNC: 98 MMOL/L
CO2 SERPL-SCNC: 20 MMOL/L
CO2 SERPL-SCNC: 23 MMOL/L
CREAT SERPL-MCNC: 1.8 MG/DL
CREAT SERPL-MCNC: 2.2 MG/DL
DAT IGG-SP REAG RBC-IMP: NORMAL
DIFFERENTIAL METHOD: ABNORMAL
EOSINOPHIL # BLD AUTO: 0.1 K/UL
EOSINOPHIL NFR BLD: 1.6 %
ERYTHROCYTE [DISTWIDTH] IN BLOOD BY AUTOMATED COUNT: 17.5 %
EST. GFR  (AFRICAN AMERICAN): 33 ML/MIN/1.73 M^2
EST. GFR  (AFRICAN AMERICAN): 42 ML/MIN/1.73 M^2
EST. GFR  (NON AFRICAN AMERICAN): 29 ML/MIN/1.73 M^2
EST. GFR  (NON AFRICAN AMERICAN): 37 ML/MIN/1.73 M^2
GLUCOSE SERPL-MCNC: 102 MG/DL
GLUCOSE SERPL-MCNC: 138 MG/DL
HCT VFR BLD AUTO: 28.7 %
HCT VFR BLD AUTO: 30.2 %
HCT VFR BLD AUTO: 33.5 %
HGB BLD-MCNC: 8.2 G/DL
HGB BLD-MCNC: 8.6 G/DL
HGB BLD-MCNC: 9.3 G/DL
INR PPP: 1.2
LYMPHOCYTES # BLD AUTO: 0.9 K/UL
LYMPHOCYTES NFR BLD: 15.3 %
MCH RBC QN AUTO: 25.3 PG
MCHC RBC AUTO-ENTMCNC: 27.8 G/DL
MCV RBC AUTO: 91 FL
MONOCYTES # BLD AUTO: 0.3 K/UL
MONOCYTES NFR BLD: 5.6 %
NEUTROPHILS # BLD AUTO: 4.7 K/UL
NEUTROPHILS NFR BLD: 77.1 %
OB PNL STL: POSITIVE
PLATELET # BLD AUTO: 330 K/UL
PMV BLD AUTO: 11.6 FL
POTASSIUM SERPL-SCNC: 4.4 MMOL/L
POTASSIUM SERPL-SCNC: 4.6 MMOL/L
PROT SERPL-MCNC: 7.8 G/DL
PROTHROMBIN TIME: 12.8 SEC
RBC # BLD AUTO: 3.67 M/UL
SODIUM SERPL-SCNC: 136 MMOL/L
SODIUM SERPL-SCNC: 137 MMOL/L
WBC # BLD AUTO: 6.07 K/UL

## 2018-09-15 PROCEDURE — 25000242 PHARM REV CODE 250 ALT 637 W/ HCPCS: Performed by: PHYSICIAN ASSISTANT

## 2018-09-15 PROCEDURE — 86880 COOMBS TEST DIRECT: CPT

## 2018-09-15 PROCEDURE — G0378 HOSPITAL OBSERVATION PER HR: HCPCS

## 2018-09-15 PROCEDURE — 25000003 PHARM REV CODE 250: Performed by: EMERGENCY MEDICINE

## 2018-09-15 PROCEDURE — 85025 COMPLETE CBC W/AUTO DIFF WBC: CPT

## 2018-09-15 PROCEDURE — 80053 COMPREHEN METABOLIC PANEL: CPT

## 2018-09-15 PROCEDURE — 86870 RBC ANTIBODY IDENTIFICATION: CPT

## 2018-09-15 PROCEDURE — 82272 OCCULT BLD FECES 1-3 TESTS: CPT

## 2018-09-15 PROCEDURE — C9113 INJ PANTOPRAZOLE SODIUM, VIA: HCPCS | Performed by: HOSPITALIST

## 2018-09-15 PROCEDURE — 25000003 PHARM REV CODE 250: Performed by: PHYSICIAN ASSISTANT

## 2018-09-15 PROCEDURE — 85018 HEMOGLOBIN: CPT | Mod: 91

## 2018-09-15 PROCEDURE — C9113 INJ PANTOPRAZOLE SODIUM, VIA: HCPCS | Performed by: EMERGENCY MEDICINE

## 2018-09-15 PROCEDURE — 63600175 PHARM REV CODE 636 W HCPCS: Performed by: HOSPITALIST

## 2018-09-15 PROCEDURE — 63600175 PHARM REV CODE 636 W HCPCS: Performed by: EMERGENCY MEDICINE

## 2018-09-15 PROCEDURE — 85014 HEMATOCRIT: CPT | Mod: 91

## 2018-09-15 PROCEDURE — 80048 BASIC METABOLIC PNL TOTAL CA: CPT

## 2018-09-15 PROCEDURE — 85610 PROTHROMBIN TIME: CPT

## 2018-09-15 PROCEDURE — 86850 RBC ANTIBODY SCREEN: CPT

## 2018-09-15 RX ORDER — IBUPROFEN 200 MG
16 TABLET ORAL
Status: DISCONTINUED | OUTPATIENT
Start: 2018-09-15 | End: 2018-09-19 | Stop reason: HOSPADM

## 2018-09-15 RX ORDER — DIGOXIN 125 MCG
125 TABLET ORAL EVERY OTHER DAY
Status: DISCONTINUED | OUTPATIENT
Start: 2018-09-16 | End: 2018-09-19 | Stop reason: HOSPADM

## 2018-09-15 RX ORDER — TAMSULOSIN HYDROCHLORIDE 0.4 MG/1
0.4 CAPSULE ORAL DAILY
Status: DISCONTINUED | OUTPATIENT
Start: 2018-09-15 | End: 2018-09-19 | Stop reason: HOSPADM

## 2018-09-15 RX ORDER — TIOTROPIUM BROMIDE 18 UG/1
1 CAPSULE ORAL; RESPIRATORY (INHALATION) DAILY
Status: DISCONTINUED | OUTPATIENT
Start: 2018-09-15 | End: 2018-09-19 | Stop reason: HOSPADM

## 2018-09-15 RX ORDER — COLCHICINE 0.6 MG/1
0.6 TABLET, FILM COATED ORAL 2 TIMES DAILY
Status: DISCONTINUED | OUTPATIENT
Start: 2018-09-15 | End: 2018-09-19 | Stop reason: HOSPADM

## 2018-09-15 RX ORDER — GLUCAGON 1 MG
1 KIT INJECTION
Status: DISCONTINUED | OUTPATIENT
Start: 2018-09-15 | End: 2018-09-19 | Stop reason: HOSPADM

## 2018-09-15 RX ORDER — METOPROLOL SUCCINATE 50 MG/1
100 TABLET, EXTENDED RELEASE ORAL DAILY
Status: DISCONTINUED | OUTPATIENT
Start: 2018-09-15 | End: 2018-09-16

## 2018-09-15 RX ORDER — ACETAMINOPHEN 325 MG/1
650 TABLET ORAL EVERY 8 HOURS PRN
Status: DISCONTINUED | OUTPATIENT
Start: 2018-09-15 | End: 2018-09-19 | Stop reason: HOSPADM

## 2018-09-15 RX ORDER — ACETAMINOPHEN 325 MG/1
650 TABLET ORAL EVERY 4 HOURS PRN
Status: DISCONTINUED | OUTPATIENT
Start: 2018-09-15 | End: 2018-09-19 | Stop reason: HOSPADM

## 2018-09-15 RX ORDER — ONDANSETRON 8 MG/1
8 TABLET, ORALLY DISINTEGRATING ORAL EVERY 8 HOURS PRN
Status: DISCONTINUED | OUTPATIENT
Start: 2018-09-15 | End: 2018-09-19 | Stop reason: HOSPADM

## 2018-09-15 RX ORDER — IBUPROFEN 200 MG
24 TABLET ORAL
Status: DISCONTINUED | OUTPATIENT
Start: 2018-09-15 | End: 2018-09-19 | Stop reason: HOSPADM

## 2018-09-15 RX ORDER — PANTOPRAZOLE SODIUM 40 MG/10ML
40 INJECTION, POWDER, LYOPHILIZED, FOR SOLUTION INTRAVENOUS 2 TIMES DAILY
Status: DISCONTINUED | OUTPATIENT
Start: 2018-09-15 | End: 2018-09-17

## 2018-09-15 RX ORDER — FERROUS SULFATE 325(65) MG
325 TABLET, DELAYED RELEASE (ENTERIC COATED) ORAL 3 TIMES DAILY
Status: DISCONTINUED | OUTPATIENT
Start: 2018-09-15 | End: 2018-09-19 | Stop reason: HOSPADM

## 2018-09-15 RX ORDER — TRAVOPROST OPHTHALMIC SOLUTION 0.04 MG/ML
1 SOLUTION OPHTHALMIC DAILY
Status: DISCONTINUED | OUTPATIENT
Start: 2018-09-15 | End: 2018-09-19 | Stop reason: HOSPADM

## 2018-09-15 RX ORDER — SODIUM CHLORIDE 0.9 % (FLUSH) 0.9 %
5 SYRINGE (ML) INJECTION
Status: DISCONTINUED | OUTPATIENT
Start: 2018-09-15 | End: 2018-09-19 | Stop reason: HOSPADM

## 2018-09-15 RX ORDER — SIMVASTATIN 40 MG/1
40 TABLET, FILM COATED ORAL NIGHTLY
Status: DISCONTINUED | OUTPATIENT
Start: 2018-09-15 | End: 2018-09-19 | Stop reason: HOSPADM

## 2018-09-15 RX ORDER — POTASSIUM CHLORIDE 20 MEQ/1
20 TABLET, EXTENDED RELEASE ORAL 2 TIMES DAILY
Status: DISCONTINUED | OUTPATIENT
Start: 2018-09-15 | End: 2018-09-19 | Stop reason: HOSPADM

## 2018-09-15 RX ORDER — SODIUM CHLORIDE 9 MG/ML
INJECTION, SOLUTION INTRAVENOUS CONTINUOUS
Status: DISCONTINUED | OUTPATIENT
Start: 2018-09-15 | End: 2018-09-16

## 2018-09-15 RX ORDER — ALBUTEROL SULFATE 90 UG/1
2 AEROSOL, METERED RESPIRATORY (INHALATION) EVERY 6 HOURS PRN
Status: DISCONTINUED | OUTPATIENT
Start: 2018-09-15 | End: 2018-09-19 | Stop reason: HOSPADM

## 2018-09-15 RX ORDER — PHENYTOIN SODIUM 100 MG/1
200 CAPSULE, EXTENDED RELEASE ORAL 2 TIMES DAILY
Status: DISCONTINUED | OUTPATIENT
Start: 2018-09-15 | End: 2018-09-19 | Stop reason: HOSPADM

## 2018-09-15 RX ADMIN — SODIUM CHLORIDE: 0.9 INJECTION, SOLUTION INTRAVENOUS at 06:09

## 2018-09-15 RX ADMIN — SODIUM CHLORIDE: 0.9 INJECTION, SOLUTION INTRAVENOUS at 10:09

## 2018-09-15 RX ADMIN — METOPROLOL SUCCINATE 100 MG: 25 TABLET, EXTENDED RELEASE ORAL at 02:09

## 2018-09-15 RX ADMIN — FERROUS SULFATE TAB EC 325 MG (65 MG FE EQUIVALENT) 325 MG: 325 (65 FE) TABLET DELAYED RESPONSE at 08:09

## 2018-09-15 RX ADMIN — POTASSIUM CHLORIDE 20 MEQ: 20 TABLET, EXTENDED RELEASE ORAL at 08:09

## 2018-09-15 RX ADMIN — PANTOPRAZOLE SODIUM 40 MG: 40 INJECTION, POWDER, FOR SOLUTION INTRAVENOUS at 09:09

## 2018-09-15 RX ADMIN — TAMSULOSIN HYDROCHLORIDE 0.4 MG: 0.4 CAPSULE ORAL at 02:09

## 2018-09-15 RX ADMIN — SODIUM CHLORIDE: 0.9 INJECTION, SOLUTION INTRAVENOUS at 01:09

## 2018-09-15 RX ADMIN — COLCHICINE 0.6 MG: 0.6 TABLET, FILM COATED ORAL at 08:09

## 2018-09-15 RX ADMIN — TIOTROPIUM BROMIDE 18 MCG: 18 CAPSULE ORAL; RESPIRATORY (INHALATION) at 02:09

## 2018-09-15 RX ADMIN — SIMVASTATIN 40 MG: 40 TABLET, FILM COATED ORAL at 08:09

## 2018-09-15 RX ADMIN — DEXTROSE 8 MG/HR: 50 INJECTION, SOLUTION INTRAVENOUS at 01:09

## 2018-09-15 RX ADMIN — PANTOPRAZOLE SODIUM 40 MG: 40 INJECTION, POWDER, FOR SOLUTION INTRAVENOUS at 08:09

## 2018-09-15 RX ADMIN — TRAVOPROST 1 DROP: 0.04 SOLUTION/ DROPS OPHTHALMIC at 02:09

## 2018-09-15 RX ADMIN — PHENYTOIN SODIUM 200 MG: 100 CAPSULE, EXTENDED RELEASE ORAL at 08:09

## 2018-09-15 NOTE — ED NOTES
Patient had another episode of diarrhea. No blood noted in stool. Bedside commode placed in room.

## 2018-09-15 NOTE — ED NOTES
Let patient know he would be admitted to hospital and staying in ER over night. Patient given another blanket and lights turned off for comfort

## 2018-09-15 NOTE — ED NOTES
Patient ambulated to restroom again. Patient requesting to sleep in recliner instead of stretcher. Nurse will bring in

## 2018-09-15 NOTE — ED PROVIDER NOTES
Encounter Date: 9/14/2018    SCRIBE #1 NOTE: I, Trinidad Concepcion, am scribing for, and in the presence of, Dr. Davalos.       History     Chief Complaint   Patient presents with    Melena     reports had coloscopy this month. States has been having dark stools since that time. Pt reports weakness and SOB.      Stefano Granger is a 73 y.o. male who  has a past medical history of Angiodysplasia of small intestine, Angiodysplasia of small intestine, except duodenum with bleeding, Atrial fibrillation, BPH (benign prostatic hyperplasia), Cardiac defibrillator in place, Chronic combined systolic and diastolic congestive heart failure, COPD (chronic obstructive pulmonary disease), Coronary artery disease, Gout, Hypertension, Mitral stenosis, Pulmonary hypertension, Seizures, and Stroke.     The patient presents to the ED due to weakness. He reports associated SOB and dark stools since coloscopy on 8/23. He states dark stools has been gradually improving. Denies blood in stools. Denies abdominal pain. Pt was seen at the ED on 8/21 for similar symptoms and was diagnosed with acute GI bleeding. No other complaints at this time.      The history is provided by the patient.     Review of patient's allergies indicates:   Allergen Reactions    Coreg [carvedilol] Palpitations     Palpitations^     Past Medical History:   Diagnosis Date    Angiodysplasia of small intestine     Angiodysplasia of small intestine, except duodenum with bleeding     Atrial fibrillation     BPH (benign prostatic hyperplasia)     Cardiac defibrillator in place     Chronic combined systolic and diastolic congestive heart failure     COPD (chronic obstructive pulmonary disease)     Coronary artery disease     Gout     Hypertension     Mitral stenosis     Pulmonary hypertension     Seizures     Stroke      Past Surgical History:   Procedure Laterality Date    CAPSULE ENDOSCOPY N/A 2/21/2018    [facility] at [facility]     COLONOSCOPY      CORONARY ARTERY BYPASS GRAFT  2004    ENDOSCOPY OF DISTAL SMALL INTESTINE N/A 8/23/2018    Procedure: ENTEROSCOPY, DISTAL-Lower DBE;  Surgeon: Ra Soto MD;  Location: [facility];  Service: Endoscopy;  Laterality: N/A;    ENTEROSCOPY, DISTAL-Lower DBE N/A 8/23/2018    [facility]    Lower DBE N/A 2/5/2018    [facility]    SMALL BOWEL ENDOSCOPY-UPPER N/A 7/7/2016    [facility]    SMALL BOWEL ENDOSCOPY-UPPER DBE N/A 1/3/2018    [facility]    TISSUE AORTIC AND MITRAL VALVE REPLACEMENT  2004    TONSILLECTOMY       Family History   Problem Relation Age of Onset    No Known Problems Mother     No Known Problems Father     No Known Problems Maternal Grandmother     No Known Problems Maternal Grandfather     No Known Problems Paternal Grandmother     No Known Problems Paternal Grandfather      Social History     Tobacco Use    Smoking status: Current Every Day Smoker     Years: 28.00     Types: Cigars    Smokeless tobacco: Never Used    Tobacco comment: pt smoke a cigar 2x weekly   Substance Use Topics    Alcohol use: No     Comment: socially    Drug use: Yes     Types: Marijuana     Comment: occassionally     Review of Systems   Constitutional: Negative for chills, fatigue and fever.   HENT: Negative for congestion, sore throat and voice change.    Eyes: Negative for photophobia, pain and redness.   Respiratory: Positive for shortness of breath. Negative for cough and choking.    Cardiovascular: Negative for chest pain, palpitations and leg swelling.   Gastrointestinal: Negative for abdominal pain, blood in stool, diarrhea, nausea and vomiting.        Positive for melena   Genitourinary: Negative for dysuria, frequency and urgency.   Musculoskeletal: Negative for back pain, neck pain and neck stiffness.   Skin: Negative for rash.    Neurological: Positive for weakness. Negative for seizures, speech difficulty, light-headedness, numbness and headaches.   Hematological: Does not bruise/bleed easily.       Physical Exam     Initial Vitals [09/14/18 2240]   BP Pulse Resp Temp SpO2   (!) 94/58 (!) 135 20 98.3 °F (36.8 °C) 97 %      MAP       --         Physical Exam    Nursing note and vitals reviewed.  Constitutional: He appears well-developed and well-nourished. He is not diaphoretic. No distress.   HENT:   Head: Normocephalic and atraumatic.   Mouth/Throat: Oropharynx is clear and moist.   Eyes: EOM are normal. Pupils are equal, round, and reactive to light.   Neck: Normal range of motion. Neck supple.   Cardiovascular: Regular rhythm, normal heart sounds and intact distal pulses. Tachycardia present.    Pulmonary/Chest: Breath sounds normal. No respiratory distress.   Abdominal: Soft. He exhibits no distension. There is no tenderness. There is no rebound and no guarding.   Musculoskeletal: Normal range of motion. He exhibits no edema or tenderness.   Neurological: He is alert and oriented to person, place, and time. He has normal strength.   Skin: Skin is warm and dry. Capillary refill takes less than 2 seconds.   Psychiatric: He has a normal mood and affect. Thought content normal.         ED Course   Procedures  Labs Reviewed   CBC W/ AUTO DIFFERENTIAL - Abnormal; Notable for the following components:       Result Value    RBC 3.67 (*)     Hemoglobin 9.3 (*)     Hematocrit 33.5 (*)     MCH 25.3 (*)     MCHC 27.8 (*)     RDW 17.5 (*)     Lymph # 0.9 (*)     Gran% 77.1 (*)     Lymph% 15.3 (*)     All other components within normal limits   COMPREHENSIVE METABOLIC PANEL - Abnormal; Notable for the following components:    BUN, Bld 54 (*)     Creatinine 2.2 (*)     Alkaline Phosphatase 497 (*)      (*)     ALT 73 (*)     eGFR if  33 (*)     eGFR if non  29 (*)     All other components within normal limits    PROTIME-INR - Abnormal; Notable for the following components:    Prothrombin Time 12.8 (*)     All other components within normal limits   OCCULT BLOOD X 1, STOOL - Abnormal; Notable for the following components:    Occult Blood Positive (*)     All other components within normal limits   OCCULT BLOOD X 1, STOOL   TYPE & SCREEN   ANTIBODY IDENTIFICATION     EKG Readings: (Independently Interpreted)   Rhythm: Atrial Fibrillation. Heart Rate: 117. Clinical Impression: Atrial Fibrillation with RVR       Imaging Results    None          Medical Decision Making:   History:   Old Medical Records: I decided to obtain old medical records.  Independently Interpreted Test(s):   I have ordered and independently interpreted EKG Reading(s) - see prior notes  Clinical Tests:   Lab Tests: Ordered and Reviewed  Medical Tests: Ordered and Reviewed  ED Management:  Hgb better than prior discharge. However, considering pt has melena I believe he should be placed in obs for serial cbc. Also has callie.     1:28 AM   Discussed case with Dr. Encarnacion who will admit pt               Attending Attestation:           Physician Attestation for Scribe:  Physician Attestation Statement for Scribe #1: I, Zander Davalos, reviewed documentation, as scribed by Trinidad Concepcion in my presence, and it is both accurate and complete.                    Clinical Impression:     1. Melena    2. Weakness    3. CALLIE (acute kidney injury)           Disposition:   Disposition: Placed in Observation  Condition: Stable                        Zander Davalos MD  09/15/18 0146

## 2018-09-15 NOTE — ASSESSMENT & PLAN NOTE
-EKG showed Afib with RVR  -Patient takes amiodarone, digoxin, and metoprolol  -Continue metoprolol and digoxin. Hold amiodarone due to hypotension on admission. Monitor on tele

## 2018-09-15 NOTE — HPI
Stefano Granger is a 73 y.o. black man with hypertension, coronary artery disease status post coronary artery bypass graft in 2004, history of bioprosthetic aortic and mitral valve replacement in 2004, chronic systolic and diastolic congestive heart failure, mitral valve stenosis, pulmonary hypertension, atrial fibrillation, automatic implantable cardioverter-defibrillator, cigarette smoking with chronic obstructive pulmonary disease, seizure disorder, chronic blood loss anemia due to small bowel angiodysplasias, benign prostatic hyperplasia, gout, hyperlipidemia.  He lives in [facility].  He gets his medical care at [facility].  His primary care physician is [facility] Internal Medicine resident Dr. Jimmy Andrew.  His cardiologist is [facility] Cardiology fellow Dr. Rosi Montejo.  His angiodysplasia bleeding is followed by gastroenterologist Dr. Ra Soto at [facility].  He gets outpatient octreotide injections for it.              He was hospitalized at [facility] from 7/11/18 to 7/13/18 for recurrent anemia due to active gastrointestinal bleeding.  Hemoglobin and hematocrit were 5.4 and 20.  He was transfused 4 units of pRBCs.  Hemoglobin and hematocrit increased to 9.4 and 31.  Gastroenterology was consulted and Dr. Soto deferred endoscopy at the time because Mr. Granger responded better to octreotide injections than endoscopic ablation.              He was hospitalized at [facility] from 8/21/18 to 8/24/18 for recurrent anemia due to gastrointeestinal bleeding. Hemoglobin and hematocrit were 5.7 and 20.4.  He was transfused 2 units of pRBCs. Hemoglobin and hematocrit increased to 7.3 and 25.4. Gastroenterology was consulted and Dr. Soto performed lower double balloon enteroscopy finding two angioectasias in the cecum and distal ileum, treated with argon plasma coagulation.              He presented to [facility] ED on 9/15/18 with complaint of increased weakness and melena that persisted since his last hospitalization.  He also complained of diarrhea and mild shortness of breath.  He denied blood in the stool, abdominal pain, chest pain, fever, chills, nausea, vomiting, or dizziness.  Hemoglobin and hematocrit were 8.2 and 28.7.  Stool was positive for occult blood.  He was not transfused at the time.  He was started on continuous IV normal saline at 125 mL/hr and given pantoprazole 40 mg IV.  He was admitted to [facility].  Gastroenterology was consulted.

## 2018-09-15 NOTE — CONSULTS
[facility]    CC: symptomatic anemia     HPI   Mr. Granger is a 73 y.o. man with hypertension, coronary artery disease status post coronary artery bypass graft in 2004, history of bioprosthetic aortic and mitral valve replacement in 2004, chronic systolic and diastolic congestive heart failure, mitral valve stenosis, pulmonary hypertension, atrial fibrillation, automatic implantable cardioverter-defibrillator, cigarette smoking with chronic obstructive pulmonary disease, and seizure disorder who presents with chronic, recurrent, painless, blood loss anemia associated with black diarrhea, weakness, and fatigue.   He is well known to [facility] as he is followed by Dr. Soto for small bowel angiodysplasias. He was recently admitted on 8/22/2018 for the same and had lower DBE on 8/23/2018 with APC to angioectasias in the cecum and ileum. He had been able to reduce transfusions from every 2 weeks to every 6 months with octreotide injections, but has had more bleeding recently.     Collateral obtained from nurse. I have reviewed his medical chart and summarized his history here.    Past Medical History   has a past medical history of Angiodysplasia of small intestine, Angiodysplasia of small intestine, except duodenum with bleeding, Atrial fibrillation, BPH (benign prostatic hyperplasia), Cardiac defibrillator in place, Chronic combined systolic and diastolic congestive heart failure, COPD (chronic obstructive pulmonary disease), Coronary artery disease, Gout, Hypertension, Mitral stenosis, Pulmonary hypertension, Seizures, and Stroke.    Past Surgical History   has a past surgical history that includes Tonsillectomy; Colonoscopy; Coronary artery bypass graft (2004); Tissue aortic and mitral valve replacement (2004); ENTEROSCOPY, DISTAL-Lower DBE (N/A, 8/23/2018); CAPSULE ENDOSCOPY (N/A, 2/21/2018); Lower DBE (N/A, 2/5/2018); SMALL BOWEL ENDOSCOPY-UPPER DBE (N/A, 1/3/2018); and SMALL BOWEL ENDOSCOPY-UPPER (N/A,  7/7/2016).    Social History  Social History     Tobacco Use    Smoking status: Current Every Day Smoker     Years: 28.00     Types: Cigars    Smokeless tobacco: Never Used    Tobacco comment: pt smoke a cigar 2x weekly   Substance Use Topics    Alcohol use: No     Comment: socially    Drug use: Yes     Types: Marijuana     Comment: occassionally       Family History  Family History   Problem Relation Age of Onset    No Known Problems Mother     No Known Problems Father     No Known Problems Maternal Grandmother     No Known Problems Maternal Grandfather     No Known Problems Paternal Grandmother     No Known Problems Paternal Grandfather        Review of Systems  General ROS: negative for chills, fever or weight loss Positive for weakness and fatigue  Psychological ROS: negative for hallucination, depression or suicidal ideation  Ophthalmic ROS: negative for blurry vision, photophobia or eye pain  ENT ROS: negative for epistaxis, sore throat or rhinorrhea  Respiratory ROS: no cough, shortness of breath, or wheezing  Cardiovascular ROS: no chest pain or dyspnea on exertion  Gastrointestinal ROS: Positive for melena, diarrhea, no abdominal pain or vomiting  Genito-Urinary ROS: no dysuria, trouble voiding, or hematuria  Musculoskeletal ROS: negative for gait disturbance or muscular weakness  Neurological ROS: no syncope or seizures; no ataxia  Dermatological ROS: negative for pruritis, rash and jaundice    Physical Examination  BP (!) 103/58 (BP Location: Left arm, Patient Position: Lying)   Pulse (!) 128   Temp 98.4 °F (36.9 °C)   Resp 18   Ht 6' (1.829 m)   Wt 71.2 kg (157 lb)   SpO2 100%   BMI 21.29 kg/m²   General appearance: alert, cooperative, no distress  HENT: Normocephalic, atraumatic, neck symmetrical, no nasal discharge   Eyes: conjunctivae/corneas clear, PERRL, EOM's intact  Lungs: clear to auscultation in all fields, no dullness to percussion bilaterally, no wheeze  Heart: normal rate,  regular rhythm with holosystolic murmur; palpable peripheral pulsesI pacer  In place in left upper chest wall  Abdomen: soft, NT, ND, BS present  Rectal: refused  Extremities: extremities symmetric; no clubbing, cyanosis, or edema  Integument: Skin color, texture, turgor normal; no rashes; hair distrubution normal  Neurologic: Alert and oriented X 3, normal strength, normal coordination  Psychiatric: no pressured speech; normal affect; no evidence of impaired cognition     Labs:  9.3 -> 8.2 since admission (baseline 7-9)  MCV 91  Plt 330    BUN 53  Cr 1.8    INR 1.2    Imaging:  Nothing new from this admission    CXR 7/11/2018  Cardiomegaly noted    I have personally reviewed these images    Assessment:   Mr. Granger is a 73 year old man with multiple comorbidities including bioprosthetic aortic and mitral valve replacement in 2004 and CAD with bypass who presents with melena from likely small bowel and colonic angioectasias. He is on iron and octreotide. Last lower DBE 8/23/2018 with Dr. Soto.     Plan:  - continue octreotide here  - transfuse with goal Hg >7  - will consider timing and utility of repeat endoscopy     Thank you for this consult. Please call with questions. Case discussed with staff, Dr. Prakash.    Beba Raines MD MPH  [facility] PGY 4  [phone] cell  [phone] pager

## 2018-09-15 NOTE — H&P
[facility]  History & Physical    Patient Name: Stefano Granger  MRN: [id number]  Admission Date: 9/14/2018  Attending Physician: Vamsi Marie MD   Primary Care Provider: [facility]       Patient information was obtained from patient, past medical records and ER records.     Subjective:     Principal Problem:Melena    Chief Complaint:   Chief Complaint   Patient presents with    Melena     reports had coloscopy this month. States has been having dark stools since that time. Pt reports weakness and SOB.         HPI: Mr. Granger is a 72 y/o black male with hypertension, CAD s/p CABG in 2004, history of bioprosthetic aortic and mitral valve replacement in 2000, chronic systolic and diastolic CHF, mitral valve stenosis, pulmonary hypertension, atrial fibrillation, automatic implantable cardioverter-defibrillator, cigarette smoking with chronic obstructive pulmonary disease, seizure disorder, chronic blood loss anemia due to small bowel angiodysplasias, benign prostatic hyperplasia, gout, and hyperlipidemia. He lives in [place]. His receives medical care through [facility]. His PCP is Dr. Jimmy Andrew. His cardiologist is Dr. Rosi Montejo. His gastroenterologist is Dr. Ra Soto.    He was hospitalized at [facility] from 7/11/18 to 7/13/18 for recurrent anemia due to active gastrointestinal bleeding.  Hemoglobin and hematocrit were 5.4 and 20.  He was transfused 4 units of pRBCs.  Hemoglobin and hematocrit increased to 9.4 and 31.  Gastroenterology was consulted and Dr. Soto deferred endoscopy at the time because Mr. Granger responded better to octreotide injections than endoscopic ablation.    He was hospitalized at [facility] from 8/21/18 to 8/24/18 for recurrent anemia due to gastrointeestinal bleeding. Hemoglobin and hematocrit were 5.7 and 20.4. He was transfused  2 units of pRBCs. Hemoglobin and hematocrit increased to 7.3 and 25.4. Gastroenterology was consulted and Dr. Soto performed lower double balloon enteroscopy finding two angioectasias in the cecum and distal ileum, treated with argon plasma coagulation.    He presented to [facility] ED on 9/15/18 with complaint of increased weakness and melena that has persisted since his last hospitalization. He also complained of diarrhea and mild shortness of breath. He denies blood in the stool, abdominal pain, chest pain, fever, chills, N/V, or dizziness. Hemoglobin and hematocrit were 8.2 and 28.7. Positive FOB. He was not transfused at the time. He was given IVF and pantoprazole 40 mg IV. He was admitted to [facility]. Gastroenterology was consulted.      Past Medical History:   Diagnosis Date    Angiodysplasia of small intestine     Angiodysplasia of small intestine, except duodenum with bleeding     Atrial fibrillation     BPH (benign prostatic hyperplasia)     Cardiac defibrillator in place     Chronic combined systolic and diastolic congestive heart failure     COPD (chronic obstructive pulmonary disease)     Coronary artery disease     Gout     Hypertension     Mitral stenosis     Pulmonary hypertension     Seizures     Stroke        Past Surgical History:   Procedure Laterality Date    CAPSULE ENDOSCOPY N/A 2/21/2018    [facility]formed [facility]    COLONOSCOPY      CORONARY ARTERY BYPASS GRAFT  2004    ENDOSCOPY OF DISTAL SMALL INTESTINE N/A 8/23/2018    Procedure: ENTEROSCOPY, DISTAL-Lower DBE;  Surgeon: Ra Soto MD;  Location: [facility] ENDO;  Service: Endoscopy;  Laterality: N/A;    ENTEROSCOPY, DISTAL-Lower DBE N/A 8/23/2018    [facility]    Lower DBE N/A 2/5/2018    [facility]    SMALL BOWEL ENDOSCOPY-UPPER N/A 7/7/2016    [facility]    SMALL BOWEL ENDOSCOPY-UPPER DBE N/A 1/3/2018    [facility]    TISSUE AORTIC AND MITRAL VALVE REPLACEMENT  2004    TONSILLECTOMY         Review of patient's allergies indicates:   Allergen Reactions    Coreg [carvedilol] Palpitations     Palpitations^       No current facility-administered medications on file prior to encounter.      Current Outpatient Medications on File Prior to Encounter   Medication Sig    albuterol 90 mcg/actuation inhaler Inhale 2 puffs into the lungs every 6 (six) hours as needed for Wheezing. Rescue    amiodarone (PACERONE) 200 MG Tab Take 200 mg by mouth.    bumetanide (BUMEX) 2 MG tablet Take 2 tablets (4 mg total) by mouth 2 (two) times daily.    colchicine 0.6 mg tablet Take 0.6 mg by mouth 2 (two) times daily.     digoxin (LANOXIN) 125 mcg tablet Take 125 mcg by mouth every other day.    ferrous sulfate 325 (65 FE) MG EC tablet Take 1 tablet (325 mg total) by mouth 3 (three) times daily.    metOLazone (ZAROXOLYN) 5 MG tablet Take 1 tablet (5 mg total) by mouth every 48 hours.    metoprolol succinate (TOPROL-XL) 100 MG 24 hr tablet Take 3 tablets (300 mg total) by mouth once daily. (Patient taking differently: Take 200 mg by mouth 2 (two) times daily. )    octreotide lar (SANDOSTATIN LAR) 20 mg injection Inject 20 mg into the muscle every 28 days.    pantoprazole (PROTONIX) 20 MG tablet Take 2 tablets (40 mg total) by mouth once daily.    phenytoin (DILANTIN) 100 MG ER capsule Take 200 mg by mouth 2 (two) times daily.     potassium chloride (KLOR-CON) 10 MEQ TbSR Take 20 mEq by mouth 2 (two) times daily.     sildenafil, antihypertensive, (REVATIO) 20 mg Tab Take 1 tablet (20 mg total) by mouth 3 (three) times daily.    simvastatin (ZOCOR) 40 MG tablet Take 40 mg by mouth every evening.     spironolactone (ALDACTONE) 25 MG tablet Take 25 mg by mouth once daily.     tamsulosin (FLOMAX) 0.4 mg Cp24 Take 1 capsule (0.4 mg  "total) by mouth once daily.    tiotropium (SPIRIVA WITH HANDIHALER) 18 mcg inhalation capsule Inhale 1 capsule (18 mcg total) into the lungs once daily. Controller    travoprost (TRAVATAN Z) 0.004 % Drop Place 1 drop into both eyes once daily.    needle, disp, 21 G 21 gauge x 1 1/2" Ndle 1 each by Misc.(Non-Drug; Combo Route) route every 30 days.    vacuum erection device system Kit Take as directed    ZOSTAVAX, PF, 19,400 unit/0.65 mL injection      Family History     Problem Relation (Age of Onset)    No Known Problems Mother, Father, Maternal Grandmother, Maternal Grandfather, Paternal Grandmother, Paternal Grandfather        Tobacco Use    Smoking status: Current Every Day Smoker     Years: 28.00     Types: Cigars    Smokeless tobacco: Never Used    Tobacco comment: pt smoke a cigar 2x weekly   Substance and Sexual Activity    Alcohol use: No     Comment: socially    Drug use: Yes     Types: Marijuana     Comment: occassionally    Sexual activity: Not on file     Review of Systems   Constitutional: Positive for fatigue. Negative for appetite change, chills, fever and unexpected weight change.   HENT: Negative for congestion, rhinorrhea and sore throat.    Eyes: Negative for photophobia and visual disturbance.   Respiratory: Positive for shortness of breath. Negative for cough.    Cardiovascular: Negative for chest pain and leg swelling.   Gastrointestinal: Positive for diarrhea. Negative for abdominal pain, constipation, nausea and vomiting.        Melena   Endocrine: Negative for cold intolerance, heat intolerance, polydipsia, polyphagia and polyuria.   Genitourinary: Negative for dysuria, flank pain and urgency.   Musculoskeletal: Negative for arthralgias, myalgias, neck pain and neck stiffness.   Skin: Negative for color change and rash.   Allergic/Immunologic: Negative for environmental allergies, food allergies and immunocompromised state.   Neurological: Positive for weakness (Generalized). " Negative for dizziness, syncope and headaches.   Psychiatric/Behavioral: Negative for confusion.   All other systems reviewed and are negative.    Objective:     Vital Signs (Most Recent):  Temp: 98.4 °F (36.9 °C) (09/15/18 0400)  Pulse: 102 (09/15/18 1243)  Resp: 20 (09/15/18 1243)  BP: (!) 97/50 (09/15/18 1243)  SpO2: 97 % (09/15/18 1243) Vital Signs (24h Range):  Temp:  [98.3 °F (36.8 °C)-98.4 °F (36.9 °C)] 98.4 °F (36.9 °C)  Pulse:  [] 102  Resp:  [15-20] 20  SpO2:  [97 %-100 %] 97 %  BP: ()/(49-84) 97/50     Weight: 71.2 kg (157 lb)  Body mass index is 21.29 kg/m².    Physical Exam   Constitutional: He is oriented to person, place, and time. He appears well-developed and well-nourished. No distress.   HENT:   Head: Normocephalic and atraumatic.   Right Ear: External ear normal.   Left Ear: External ear normal.   Mouth/Throat: Oropharynx is clear and moist.   Eyes: Conjunctivae and EOM are normal. Pupils are equal, round, and reactive to light.   Neck: Normal range of motion. Neck supple.   Cardiovascular: Normal heart sounds and intact distal pulses. An irregular rhythm present. Tachycardia present.   No murmur heard.  Pulmonary/Chest: Effort normal and breath sounds normal. No respiratory distress. He has no wheezes. He has no rales.   Abdominal: Soft. Bowel sounds are normal. He exhibits no distension. There is tenderness (Mild to palpation) in the suprapubic area. There is no rigidity and no guarding.   Genitourinary:   Genitourinary Comments: +FOB   Musculoskeletal: Normal range of motion. He exhibits no edema.   Neurological: He is alert and oriented to person, place, and time. He has normal strength. GCS eye subscore is 4. GCS verbal subscore is 5. GCS motor subscore is 6.   Skin: Skin is warm and dry. Capillary refill takes less than 2 seconds. He is not diaphoretic.   Psychiatric: He has a normal mood and affect. His behavior is normal. Judgment and thought content normal.   Nursing note and  vitals reviewed.        CRANIAL NERVES     CN III, IV, VI   Pupils are equal, round, and reactive to light.  Extraocular motions are normal.        Significant Labs: All pertinent labs within the past 24 hours have been reviewed.    Significant Imaging: I have reviewed all pertinent imaging results/findings within the past 24 hours.    Assessment/Plan:     * Melena    Iron deficiency anemia due to chronic blood loss  Angiodysplasia of small intestine  Diarrhea  -H/H stable at 8.2/28.7. +FOB. No evidence of BRB in stool. Trend H/H and monitor  -Gets outpatient octreotide injections  -GI consulted          CALLIE (acute kidney injury)    -BUN/Cr at 54/2.2 on admission. Down to 53/1.8.  -Gentle rehydration and monitor          COPD (chronic obstructive pulmonary disease)    Patient takes albuterol and tiotropium at home. Continue          Hyperlipidemia    Continue home zimvastatin          History of seizure    Patient takes phenytoin at home. Continue          BPH (benign prostatic hyperplasia)    Patient takes tamsulosin at home. Continue          Paroxysmal atrial fibrillation    -EKG showed Afib with RVR  -Patient takes amiodarone, digoxin, and metoprolol  -Continue metoprolol and digoxin. Hold amiodarone due to hypotension on admission. Monitor on tele          Chronic combined systolic and diastolic heart failure    S/p aortic valve and mitral valve replacement  Pulmonary hypertension  Patient takes bumetanide, metolazone, spironolactone, and potassium. Hold diuretics due to hypotension on admission.            VTE Risk Mitigation (From admission, onward)        Ordered     Place UZAIR hose  Until discontinued      09/15/18 1427     Place sequential compression device  Until discontinued      09/15/18 1427     IP VTE LOW RISK PATIENT  Once      09/15/18 0151          Destinee Oh PA-C  Department of Hospital Medicine   [facility]

## 2018-09-15 NOTE — ED NOTES
Received report assumed care, introduced self, call light in reach, went over care plan, patient verbalized understanding.   Patient resting with eyes closed, easy to arouse, no fluids infusing at this time. IV assessed no s/s of infiltration, flushed with NS no resistance and no pain.

## 2018-09-15 NOTE — SUBJECTIVE & OBJECTIVE
Past Medical History:   Diagnosis Date    Angiodysplasia of small intestine     Angiodysplasia of small intestine, except duodenum with bleeding     Atrial fibrillation     BPH (benign prostatic hyperplasia)     Cardiac defibrillator in place     Chronic combined systolic and diastolic congestive heart failure     COPD (chronic obstructive pulmonary disease)     Coronary artery disease     Gout     Hypertension     Mitral stenosis     Pulmonary hypertension     Seizures     Stroke        Past Surgical History:   Procedure Laterality Date    CAPSULE ENDOSCOPY N/A 2/21/2018    [facility]    COLONOSCOPY      CORONARY ARTERY BYPASS GRAFT  2004    ENDOSCOPY OF DISTAL SMALL INTESTINE N/A 8/23/2018    Procedure: ENTEROSCOPY, DISTAL-Lower DBE;  Surgeon: Ra Soto MD;  Location: [facility];  Service: Endoscopy;  Laterality: N/A;    ENTEROSCOPY, DISTAL-Lower DBE N/A 8/23/2018    [facility] END[facility]    Lower DBE N/A 2/5/2018    [facility]    SMALL BOWEL ENDOSCOPY-UPPER N/A 7/7/2016    [facility] END[facility]    SMALL BOWEL ENDOSCOPY-UPPER DBE N/A 1/3/2018    [facility] END[facility]    TISSUE AORTIC AND MITRAL VALVE REPLACEMENT  2004    TONSILLECTOMY         Review of patient's allergies indicates:   Allergen Reactions    Coreg [carvedilol] Palpitations     Palpitations^       No current facility-administered medications on file prior to encounter.      Current Outpatient Medications on File Prior to Encounter   Medication Sig    albuterol 90 mcg/actuation inhaler Inhale 2 puffs into the lungs every 6 (six) hours as needed for Wheezing. Rescue    amiodarone (PACERONE) 200 MG Tab Take 200 mg by mouth.    bumetanide (BUMEX) 2 MG tablet Take 2 tablets (4 mg total) by mouth 2 (two) times daily.    colchicine 0.6 mg tablet Take 0.6 mg by mouth 2 (two)  "times daily.     digoxin (LANOXIN) 125 mcg tablet Take 125 mcg by mouth every other day.    ferrous sulfate 325 (65 FE) MG EC tablet Take 1 tablet (325 mg total) by mouth 3 (three) times daily.    metOLazone (ZAROXOLYN) 5 MG tablet Take 1 tablet (5 mg total) by mouth every 48 hours.    metoprolol succinate (TOPROL-XL) 100 MG 24 hr tablet Take 3 tablets (300 mg total) by mouth once daily. (Patient taking differently: Take 200 mg by mouth 2 (two) times daily. )    octreotide lar (SANDOSTATIN LAR) 20 mg injection Inject 20 mg into the muscle every 28 days.    pantoprazole (PROTONIX) 20 MG tablet Take 2 tablets (40 mg total) by mouth once daily.    phenytoin (DILANTIN) 100 MG ER capsule Take 200 mg by mouth 2 (two) times daily.     potassium chloride (KLOR-CON) 10 MEQ TbSR Take 20 mEq by mouth 2 (two) times daily.     sildenafil, antihypertensive, (REVATIO) 20 mg Tab Take 1 tablet (20 mg total) by mouth 3 (three) times daily.    simvastatin (ZOCOR) 40 MG tablet Take 40 mg by mouth every evening.     spironolactone (ALDACTONE) 25 MG tablet Take 25 mg by mouth once daily.     tamsulosin (FLOMAX) 0.4 mg Cp24 Take 1 capsule (0.4 mg total) by mouth once daily.    tiotropium (SPIRIVA WITH HANDIHALER) 18 mcg inhalation capsule Inhale 1 capsule (18 mcg total) into the lungs once daily. Controller    travoprost (TRAVATAN Z) 0.004 % Drop Place 1 drop into both eyes once daily.    needle, disp, 21 G 21 gauge x 1 1/2" Ndle 1 each by Misc.(Non-Drug; Combo Route) route every 30 days.    vacuum erection device system Kit Take as directed    ZOSTAVAX, PF, 19,400 unit/0.65 mL injection      Family History     Problem Relation (Age of Onset)    No Known Problems Mother, Father, Maternal Grandmother, Maternal Grandfather, Paternal Grandmother, Paternal Grandfather        Tobacco Use    Smoking status: Current Every Day Smoker     Years: 28.00     Types: Cigars    Smokeless tobacco: Never Used    Tobacco comment: pt " smoke a cigar 2x weekly   Substance and Sexual Activity    Alcohol use: No     Comment: socially    Drug use: Yes     Types: Marijuana     Comment: occassionally    Sexual activity: Not on file     Review of Systems   Constitutional: Positive for fatigue. Negative for appetite change, chills, fever and unexpected weight change.   HENT: Negative for congestion, rhinorrhea and sore throat.    Eyes: Negative for photophobia and visual disturbance.   Respiratory: Positive for shortness of breath. Negative for cough.    Cardiovascular: Negative for chest pain and leg swelling.   Gastrointestinal: Positive for diarrhea. Negative for abdominal pain, constipation, nausea and vomiting.        Melena   Endocrine: Negative for cold intolerance, heat intolerance, polydipsia, polyphagia and polyuria.   Genitourinary: Negative for dysuria, flank pain and urgency.   Musculoskeletal: Negative for arthralgias, myalgias, neck pain and neck stiffness.   Skin: Negative for color change and rash.   Allergic/Immunologic: Negative for environmental allergies, food allergies and immunocompromised state.   Neurological: Positive for weakness (Generalized). Negative for dizziness, syncope and headaches.   Psychiatric/Behavioral: Negative for confusion.   All other systems reviewed and are negative.    Objective:     Vital Signs (Most Recent):  Temp: 98.4 °F (36.9 °C) (09/15/18 0400)  Pulse: 102 (09/15/18 1243)  Resp: 20 (09/15/18 1243)  BP: (!) 97/50 (09/15/18 1243)  SpO2: 97 % (09/15/18 1243) Vital Signs (24h Range):  Temp:  [98.3 °F (36.8 °C)-98.4 °F (36.9 °C)] 98.4 °F (36.9 °C)  Pulse:  [] 102  Resp:  [15-20] 20  SpO2:  [97 %-100 %] 97 %  BP: ()/(49-84) 97/50     Weight: 71.2 kg (157 lb)  Body mass index is 21.29 kg/m².    Physical Exam   Constitutional: He is oriented to person, place, and time. He appears well-developed and well-nourished. No distress.   HENT:   Head: Normocephalic and atraumatic.   Right Ear: External  ear normal.   Left Ear: External ear normal.   Mouth/Throat: Oropharynx is clear and moist.   Eyes: Conjunctivae and EOM are normal. Pupils are equal, round, and reactive to light.   Neck: Normal range of motion. Neck supple.   Cardiovascular: Normal heart sounds and intact distal pulses. An irregular rhythm present. Tachycardia present.   No murmur heard.  Pulmonary/Chest: Effort normal and breath sounds normal. No respiratory distress. He has no wheezes. He has no rales.   Abdominal: Soft. Bowel sounds are normal. He exhibits no distension. There is tenderness (Mild to palpation) in the suprapubic area. There is no rigidity and no guarding.   Genitourinary:   Genitourinary Comments: +FOB   Musculoskeletal: Normal range of motion. He exhibits no edema.   Neurological: He is alert and oriented to person, place, and time. He has normal strength. GCS eye subscore is 4. GCS verbal subscore is 5. GCS motor subscore is 6.   Skin: Skin is warm and dry. Capillary refill takes less than 2 seconds. He is not diaphoretic.   Psychiatric: He has a normal mood and affect. His behavior is normal. Judgment and thought content normal.   Nursing note and vitals reviewed.        CRANIAL NERVES     CN III, IV, VI   Pupils are equal, round, and reactive to light.  Extraocular motions are normal.        Significant Labs: All pertinent labs within the past 24 hours have been reviewed.    Significant Imaging: I have reviewed all pertinent imaging results/findings within the past 24 hours.

## 2018-09-15 NOTE — ED NOTES
Inquiring on eating, paged MD to see if patient can have a food tray, MD said he will page GI to get orders, notified patient, patient verbalized understanding

## 2018-09-15 NOTE — ASSESSMENT & PLAN NOTE
S/p aortic valve and mitral valve replacement  Pulmonary hypertension  Patient takes bumetanide, metolazone, spironolactone, and potassium. Hold diuretics due to hypotension on admission.

## 2018-09-15 NOTE — ASSESSMENT & PLAN NOTE
Iron deficiency anemia due to chronic blood loss  Angiodysplasia of small intestine  Diarrhea  -H/H stable at 8.2/28.7. +FOB. No evidence of BRB in stool. Trend H/H and monitor  -Gets outpatient octreotide injections  -GI consulted

## 2018-09-15 NOTE — ED TRIAGE NOTES
Patient presents to the ED with complaints of dark stools for a couple of weeks. Patient was recently discharged from hospital for same symptoms. He states he had a colonoscopy done and was told it was negative but reports still having dark stools. Patients BP is low in triage. He stated he usually needs blood transfusions when he is in hospital. Patient was wheelchair ed from triage. States he just feels weak. Patient denies actively bleeding from rectum.     Review of patient's allergies indicates:   Allergen Reactions    Coreg [carvedilol] Palpitations     Palpitations^        Patient has verified the spelling of their name and  on armband.   APPEARANCE: Patient is alert, calm, oriented x 4, and does not appear distressed.  SKIN: Skin is normal for race, warm, and dry. Normal skin turgor and mucous membranes moist.  CARDIAC: Normal rate and rhythm, no murmur heard.   RESPIRATORY:Normal rate and effort. Breath sounds clear bilaterally throughout chest. Respirations are equal and unlabored.    GASTRO: Bowel sounds normal, abdomen is soft, no tenderness, and no abdominal distention. +dark stools  MUSCULAR: +generlized weakness  PERIPHERAL VASCULAR: bilateral feet swelling

## 2018-09-16 PROBLEM — R74.8 ABNORMAL LIVER ENZYMES: Status: ACTIVE | Noted: 2018-09-16

## 2018-09-16 LAB
ALBUMIN SERPL BCP-MCNC: 3.2 G/DL
ALP SERPL-CCNC: 389 U/L
ALT SERPL W/O P-5'-P-CCNC: 55 U/L
ANION GAP SERPL CALC-SCNC: 12 MMOL/L
AST SERPL-CCNC: 82 U/L
BASOPHILS # BLD AUTO: 0.07 K/UL
BASOPHILS NFR BLD: 1.3 %
BILIRUB SERPL-MCNC: 0.5 MG/DL
BUN SERPL-MCNC: 46 MG/DL
CALCIUM SERPL-MCNC: 8.7 MG/DL
CHLORIDE SERPL-SCNC: 106 MMOL/L
CO2 SERPL-SCNC: 17 MMOL/L
CREAT SERPL-MCNC: 1.6 MG/DL
DIFFERENTIAL METHOD: ABNORMAL
EOSINOPHIL # BLD AUTO: 0.1 K/UL
EOSINOPHIL NFR BLD: 1.3 %
ERYTHROCYTE [DISTWIDTH] IN BLOOD BY AUTOMATED COUNT: 17.3 %
EST. GFR  (AFRICAN AMERICAN): 49 ML/MIN/1.73 M^2
EST. GFR  (NON AFRICAN AMERICAN): 42 ML/MIN/1.73 M^2
GLUCOSE SERPL-MCNC: 142 MG/DL
HCT VFR BLD AUTO: 28.3 %
HGB BLD-MCNC: 7.9 G/DL
HGB BLD-MCNC: 8.6 G/DL
LYMPHOCYTES # BLD AUTO: 0.4 K/UL
LYMPHOCYTES NFR BLD: 7.8 %
MCH RBC QN AUTO: 24.8 PG
MCHC RBC AUTO-ENTMCNC: 27.9 G/DL
MCV RBC AUTO: 89 FL
MONOCYTES # BLD AUTO: 0.8 K/UL
MONOCYTES NFR BLD: 14.7 %
NEUTROPHILS # BLD AUTO: 3.9 K/UL
NEUTROPHILS NFR BLD: 73 %
PLATELET # BLD AUTO: 304 K/UL
PMV BLD AUTO: 11.1 FL
POTASSIUM SERPL-SCNC: 5.1 MMOL/L
PROT SERPL-MCNC: 6.6 G/DL
RBC # BLD AUTO: 3.18 M/UL
SODIUM SERPL-SCNC: 135 MMOL/L
WBC # BLD AUTO: 5.37 K/UL

## 2018-09-16 PROCEDURE — 36415 COLL VENOUS BLD VENIPUNCTURE: CPT

## 2018-09-16 PROCEDURE — 93005 ELECTROCARDIOGRAM TRACING: CPT

## 2018-09-16 PROCEDURE — 94761 N-INVAS EAR/PLS OXIMETRY MLT: CPT

## 2018-09-16 PROCEDURE — 85025 COMPLETE CBC W/AUTO DIFF WBC: CPT

## 2018-09-16 PROCEDURE — 11000001 HC ACUTE MED/SURG PRIVATE ROOM

## 2018-09-16 PROCEDURE — 94640 AIRWAY INHALATION TREATMENT: CPT

## 2018-09-16 PROCEDURE — 80053 COMPREHEN METABOLIC PANEL: CPT

## 2018-09-16 PROCEDURE — S0171 BUMETANIDE 0.5 MG: HCPCS | Performed by: HOSPITALIST

## 2018-09-16 PROCEDURE — 25000003 PHARM REV CODE 250: Performed by: PHYSICIAN ASSISTANT

## 2018-09-16 PROCEDURE — C9113 INJ PANTOPRAZOLE SODIUM, VIA: HCPCS | Performed by: HOSPITALIST

## 2018-09-16 PROCEDURE — 25000003 PHARM REV CODE 250: Performed by: HOSPITALIST

## 2018-09-16 PROCEDURE — 25000242 PHARM REV CODE 250 ALT 637 W/ HCPCS: Performed by: PHYSICIAN ASSISTANT

## 2018-09-16 PROCEDURE — 63600175 PHARM REV CODE 636 W HCPCS: Performed by: HOSPITALIST

## 2018-09-16 PROCEDURE — 25000003 PHARM REV CODE 250: Performed by: EMERGENCY MEDICINE

## 2018-09-16 PROCEDURE — 85018 HEMOGLOBIN: CPT

## 2018-09-16 PROCEDURE — 93010 ELECTROCARDIOGRAM REPORT: CPT | Mod: ,,, | Performed by: INTERNAL MEDICINE

## 2018-09-16 RX ORDER — VERAPAMIL HYDROCHLORIDE 2.5 MG/ML
5 INJECTION, SOLUTION INTRAVENOUS ONCE
Status: DISCONTINUED | OUTPATIENT
Start: 2018-09-16 | End: 2018-09-16

## 2018-09-16 RX ORDER — OCTREOTIDE ACETATE 100 UG/ML
100 INJECTION, SOLUTION INTRAVENOUS; SUBCUTANEOUS ONCE
Status: COMPLETED | OUTPATIENT
Start: 2018-09-16 | End: 2018-09-16

## 2018-09-16 RX ORDER — AMIODARONE HYDROCHLORIDE 200 MG/1
200 TABLET ORAL DAILY
Status: DISCONTINUED | OUTPATIENT
Start: 2018-09-16 | End: 2018-09-19 | Stop reason: HOSPADM

## 2018-09-16 RX ORDER — BUMETANIDE 0.25 MG/ML
3 INJECTION INTRAMUSCULAR; INTRAVENOUS ONCE
Status: COMPLETED | OUTPATIENT
Start: 2018-09-16 | End: 2018-09-16

## 2018-09-16 RX ORDER — METOPROLOL TARTRATE 1 MG/ML
5 INJECTION, SOLUTION INTRAVENOUS
Status: COMPLETED | OUTPATIENT
Start: 2018-09-16 | End: 2018-09-16

## 2018-09-16 RX ORDER — METOPROLOL TARTRATE 1 MG/ML
5 INJECTION, SOLUTION INTRAVENOUS ONCE
Status: COMPLETED | OUTPATIENT
Start: 2018-09-16 | End: 2018-09-16

## 2018-09-16 RX ORDER — METOPROLOL SUCCINATE 50 MG/1
200 TABLET, EXTENDED RELEASE ORAL 2 TIMES DAILY
Status: DISCONTINUED | OUTPATIENT
Start: 2018-09-16 | End: 2018-09-19 | Stop reason: HOSPADM

## 2018-09-16 RX ADMIN — SIMVASTATIN 40 MG: 40 TABLET, FILM COATED ORAL at 09:09

## 2018-09-16 RX ADMIN — COLCHICINE 0.6 MG: 0.6 TABLET, FILM COATED ORAL at 09:09

## 2018-09-16 RX ADMIN — FERROUS SULFATE TAB EC 325 MG (65 MG FE EQUIVALENT) 325 MG: 325 (65 FE) TABLET DELAYED RESPONSE at 09:09

## 2018-09-16 RX ADMIN — PHENYTOIN SODIUM 200 MG: 100 CAPSULE, EXTENDED RELEASE ORAL at 09:09

## 2018-09-16 RX ADMIN — PANTOPRAZOLE SODIUM 40 MG: 40 INJECTION, POWDER, FOR SOLUTION INTRAVENOUS at 09:09

## 2018-09-16 RX ADMIN — TRAVOPROST 1 DROP: 0.04 SOLUTION/ DROPS OPHTHALMIC at 09:09

## 2018-09-16 RX ADMIN — METOPROLOL TARTRATE 5 MG: 1 INJECTION, SOLUTION INTRAVENOUS at 03:09

## 2018-09-16 RX ADMIN — BUMETANIDE 3 MG: 0.25 INJECTION INTRAMUSCULAR; INTRAVENOUS at 12:09

## 2018-09-16 RX ADMIN — POTASSIUM CHLORIDE 20 MEQ: 20 TABLET, EXTENDED RELEASE ORAL at 09:09

## 2018-09-16 RX ADMIN — TIOTROPIUM BROMIDE 18 MCG: 18 CAPSULE ORAL; RESPIRATORY (INHALATION) at 07:09

## 2018-09-16 RX ADMIN — AMIODARONE HYDROCHLORIDE 200 MG: 200 TABLET ORAL at 08:09

## 2018-09-16 RX ADMIN — DIGOXIN 125 MCG: 125 TABLET ORAL at 09:09

## 2018-09-16 RX ADMIN — SODIUM CHLORIDE: 0.9 INJECTION, SOLUTION INTRAVENOUS at 03:09

## 2018-09-16 RX ADMIN — OCTREOTIDE ACETATE 100 MCG: 100 INJECTION, SOLUTION INTRAVENOUS; SUBCUTANEOUS at 12:09

## 2018-09-16 RX ADMIN — METOPROLOL SUCCINATE 200 MG: 50 TABLET, EXTENDED RELEASE ORAL at 09:09

## 2018-09-16 RX ADMIN — SODIUM CHLORIDE: 0.9 INJECTION, SOLUTION INTRAVENOUS at 09:09

## 2018-09-16 RX ADMIN — FERROUS SULFATE TAB EC 325 MG (65 MG FE EQUIVALENT) 325 MG: 325 (65 FE) TABLET DELAYED RESPONSE at 03:09

## 2018-09-16 RX ADMIN — TAMSULOSIN HYDROCHLORIDE 0.4 MG: 0.4 CAPSULE ORAL at 09:09

## 2018-09-16 RX ADMIN — METOPROLOL TARTRATE 5 MG: 1 INJECTION, SOLUTION INTRAVENOUS at 12:09

## 2018-09-16 NOTE — SIGNIFICANT EVENT
Paged by nurse regarding pt being tachycardic consistently and normal BP - ordered EKG and stat labs - EKG showed wide complex tachycardia - already on beta blocker - will give one time dose of verapamil IV - likely from GI Bleed ane anemia - if persisting tachycardia - may need cardiology consult      Toro Encarnacion MD

## 2018-09-16 NOTE — ASSESSMENT & PLAN NOTE
-Elevated on admission: ALK-P (497 > 389), AST (104>82), ALT (73>55), t bili normal. Trending down. Monitor  -Pt asymptomatic and with known right HF  -GGT elevated at 818 on 6/3/18   -Hepatitis panel performed on 2/10/18 negative  -CTA abdomen/pelvis performed on 1/11/18 showed no gallstones, normal liver, no hepatic lesions

## 2018-09-16 NOTE — PLAN OF CARE
Problem: Patient Care Overview  Goal: Plan of Care Review  Outcome: Ongoing (interventions implemented as appropriate)  Reviewed the plan of care with the pt. The pt had 2 tarry, black bowel movements. Pt HR sustained in the 130's today.  Metoprolol IV and PO were administered per MD. Pt is asymptomatic and denies any pain. Bumex was administered for BLE edema. Will monitor I/O. Will continue to monitor BP. Safety measures are in place. The pt verbalizes full understanding of their plan of care.

## 2018-09-16 NOTE — NURSING
Notified Dr. Encarnacion that patient's recheck VS was /68, .  Asked MD to order anything for patient's asymptomatic tachycardia.  MD stated to put order of CBC, CMP, EKG stat orders.  Read-back done via telephone.

## 2018-09-16 NOTE — HOSPITAL COURSE
He received half of his regular dose of metoprolol due to hypotension on admission, but developed asymptomatic tachycardia in the 140s overnight and was given metoprolol 5 mg IV in addition to resumption of his regular dose of metoprolol, with improvement of tachycardia.  By the time someone noticed he was on continuous IV saline and stopped it, it had been over 24 hours.  Acute kidney injury resolved on 9/15/18.  Gastroenterology recommended conservative medical management.  He was given octreotide 100 mcg IV.  He continued to have black, loose stools.  Hemoglobin remained relatively stable (8.6 -> 7.9 -> 8.2).  His fluid intake increased due to being placed on a clear liquid diet.  He takes bumetanide 4 mg twice daily at home, but he received only bumetanide 3 mg IV once daily in the hospital.  Furthermore, his home metolazone was withheld.  He developed abdominal ascites.  His oxygen saturation remained at % on room air.  Hypotension resolved and his home bumetanide was resumed.  He was seen by Dr. Soto' colleagues until Monday 9/17/18 when Dr. Soto was able to see him personally in the evening.  His melena had resolved so he was cleared for discharge.  However, his iatrogenic decompensated heart failure now had to be treated, so he was given more IV bumetanide, and his home metolazone was resumed.  After a new hospitalist took over on 9/18/18, he was put on bumetanide 4 mg IV three times a day and his home metolazone.  He urinated 2100 mL overnight (net fluid status was -1382 mL) and he was discharged the next day after his morning doses of diuretics.

## 2018-09-16 NOTE — ASSESSMENT & PLAN NOTE
Iron deficiency anemia due to chronic blood loss  Angiodysplasia of small intestine  Diarrhea  Pt with frequent tarry, black stools. +FOB. No evidence of BRB in stool    -Hemoglobin at 8.6 on admission. Down to 7.9 today. Trend H/H and monitor  -Gets outpatient octreotide injections  -GI consulted, appreciate recommendations. Continue conservative tx with octreotide IV. Endoscopy timing per Dr. Soto.

## 2018-09-16 NOTE — PLAN OF CARE
Problem: Patient Care Overview  Goal: Plan of Care Review  Outcome: Ongoing (interventions implemented as appropriate)  The proper method of use, as well as anticipated side effects, of this metered-dose inhaler are discussed and demonstrated to the patient. Pt on RA with documented sats. Will continue to monitor.

## 2018-09-16 NOTE — ASSESSMENT & PLAN NOTE
-EKG on admission showed Afib with RVR  -Takes amiodarone, digoxin, and metoprolol at home. Continue home meds.  -Pt with asymptomatic tachycardia overnight. Repeat EKG showed tachycardia with rate of 141 bpm. Given lopressor 5 mg IV and toprol 200 mg PO. Will continue optimization of rate control and monitoring on telemetry

## 2018-09-16 NOTE — PLAN OF CARE
Problem: Patient Care Overview  Goal: Plan of Care Review  Outcome: Ongoing (interventions implemented as appropriate)  Reviewed plan of care with patient, verbalized understanding.  AAOx4.  On continuous telemetry monitor, afib with sinus to tachycardia.  Patient started to have HR in 130s around 0300, MD notified, order followed.  Patient had BM this morning, nurse reviewed; tarry black color stools.  Fall precaution measures maintained.  Bed locked, alarm on, in the lowest position, side rails up x2, non-skid socks applied, and call light within reach.  Will continue to monitor.

## 2018-09-16 NOTE — ASSESSMENT & PLAN NOTE
S/p aortic valve and mitral valve replacement  Pulmonary hypertension  -Patient takes bumetanide, metolazone, spironolactone, and potassium. Diuretics held due to hypotension on admission. BP still marginal but will give bumetanide injection given LE edema. SpO2 99% on RA.  -Monitor I&Os. Discontinue IV fluids. Monitor O2 sats  -No echo noted in the last 6 months. Will order echo

## 2018-09-16 NOTE — NURSING
Notified Dr. Encarnacion that patient's stat EKG result came out with wide QRS tachycardia.  Lab results are still in process at this moment.  Asked any order he wants to put at this moment.  MD stated he will look up patient's chart and will put order for the patient.

## 2018-09-16 NOTE — PROGRESS NOTES
[facility]    CC: symptomatic anemia     HPI   Mr. Granger is a 73 y.o. man with hypertension, coronary artery disease status post coronary artery bypass graft in 2004, history of bioprosthetic aortic and mitral valve replacement in 2004, chronic systolic and diastolic congestive heart failure, mitral valve stenosis, pulmonary hypertension, atrial fibrillation, automatic implantable cardioverter-defibrillator, cigarette smoking with chronic obstructive pulmonary disease, and seizure disorder who presents with chronic, recurrent, painless, blood loss anemia associated with black diarrhea, weakness, and fatigue.   He is well known to [facility] as he is followed by Dr. Soto for small bowel angiodysplasias. He was recently admitted on 8/22/2018 for the same and had lower DBE on 8/23/2018 with APC to angioectasias in the cecum and ileum. He had been able to reduce transfusions from every 2 weeks to every 6 months with octreotide injections, but has had more bleeding recently.     Interval History:  Mr. Granger reports no BM overnight until this morning. He saved it for us to see and it is black bristol 1 stools. Denies abdominal pain or vomiting.     Past Medical History   has a past medical history of Angiodysplasia of small intestine, Angiodysplasia of small intestine, except duodenum with bleeding, Atrial fibrillation, BPH (benign prostatic hyperplasia), Cardiac defibrillator in place, Chronic combined systolic and diastolic congestive heart failure, COPD (chronic obstructive pulmonary disease), Coronary artery disease, Gout, Hypertension, Mitral stenosis, Pulmonary hypertension, Seizures, and Stroke.    Past Surgical History   has a past surgical history that includes Tonsillectomy; Colonoscopy; Coronary artery bypass graft (2004); Tissue aortic and mitral valve replacement (2004); ENTEROSCOPY, DISTAL-Lower DBE (N/A, 8/23/2018); CAPSULE ENDOSCOPY (N/A, 2/21/2018); Lower DBE (N/A, 2/5/2018); SMALL BOWEL  ENDOSCOPY-UPPER DBE (N/A, 1/3/2018); and SMALL BOWEL ENDOSCOPY-UPPER (N/A, 7/7/2016).    Social History  Social History     Tobacco Use    Smoking status: Current Every Day Smoker     Years: 28.00     Types: Cigars    Smokeless tobacco: Never Used    Tobacco comment: pt smoke a cigar 2x weekly   Substance Use Topics    Alcohol use: No     Comment: socially    Drug use: Yes     Types: Marijuana     Comment: occassionally       Family History  Family History   Problem Relation Age of Onset    No Known Problems Mother     No Known Problems Father     No Known Problems Maternal Grandmother     No Known Problems Maternal Grandfather     No Known Problems Paternal Grandmother     No Known Problems Paternal Grandfather        Review of Systems  General ROS: negative for chills, fever or weight loss Positive for weakness and fatigue  Psychological ROS: negative for hallucination, depression or suicidal ideation  Ophthalmic ROS: negative for blurry vision, photophobia or eye pain  ENT ROS: negative for epistaxis, sore throat or rhinorrhea  Respiratory ROS: no cough, shortness of breath, or wheezing  Cardiovascular ROS: no chest pain or dyspnea on exertion  Gastrointestinal ROS: Positive for melena, diarrhea, no abdominal pain or vomiting  Genito-Urinary ROS: no dysuria, trouble voiding, or hematuria  Musculoskeletal ROS: negative for gait disturbance or muscular weakness  Neurological ROS: no syncope or seizures; no ataxia  Dermatological ROS: negative for pruritis, rash and jaundice    Physical Examination  /68 (Patient Position: Lying)   Pulse (!) 144   Temp 98 °F (36.7 °C) (Oral)   Resp 18   Ht 6' (1.829 m)   Wt 71.2 kg (157 lb 0 oz)   SpO2 99%   BMI 21.29 kg/m²   General appearance: alert, cooperative, no distress  HENT: Normocephalic, atraumatic, neck symmetrical, no nasal discharge   Eyes: conjunctivae/corneas clear, PERRL, EOM's intact  Lungs: clear to auscultation in all fields, no dullness to  percussion bilaterally, no wheeze  Heart: normal rate, regular rhythm with holosystolic murmur; palpable peripheral pulsesI pacer  In place in left upper chest wall  Abdomen: soft, NT, ND, BS present  Rectal: refused  Extremities: extremities symmetric; no clubbing, cyanosis, or edema  Integument: Skin color, texture, turgor normal; no rashes; hair distrubution normal  Neurologic: Alert and oriented X 3, normal strength, normal coordination  Psychiatric: no pressured speech; normal affect; no evidence of impaired cognition     Labs:   Hg 9.3 -> 8.2 -> 8.6      Imaging:  Nothing new from this admission    CXR 7/11/2018  Cardiomegaly noted    I have personally reviewed these images    Assessment:   Mr. Granger is a 73 year old man with multiple comorbidities including bioprosthetic aortic and mitral valve replacement in 2004 and CAD with bypass who presents with melena from likely small bowel and colonic angioectasias. He is on iron and octreotide. Last lower DBE 8/23/2018 with Dr. Soto.     Plan:  - continue octreotide here  - transfuse with goal Hg >7  - will consider timing and utility of repeat endoscopy, likely Monday or Wednesday    Thank you for this consult. Please call with questions.    Beba Raines MD MPH  [facility] PGY 4  [phone] cell  [phone] pager

## 2018-09-16 NOTE — PROGRESS NOTES
[facility]  Progress Note    Patient Name: Stefano Granger  MRN: [id number]  Patient Class: IP- Inpatient   Admission Date: 9/14/2018  Length of Stay: 0 days  Attending Physician: Vamsi Marie MD  Primary Care Provider: [facility]      Subjective:     Principal Problem:Melena    HPI:  Mr. Granger is a 74 y/o black male with hypertension, CAD s/p CABG in 2004, history of bioprosthetic aortic and mitral valve replacement in 2000, chronic systolic and diastolic CHF, mitral valve stenosis, pulmonary hypertension, atrial fibrillation, automatic implantable cardioverter-defibrillator, cigarette smoking with chronic obstructive pulmonary disease, seizure disorder, chronic blood loss anemia due to small bowel angiodysplasias, benign prostatic hyperplasia, gout, and hyperlipidemia. He lives in [place]. His receives medical care through [facility]. His PCP is Dr. Jimmy Andrew. His cardiologist is Dr. Rosi Montejo. His gastroenterologist is Dr. Ra Soto.    He was hospitalized at [facility] from 7/11/18 to 7/13/18 for recurrent anemia due to active gastrointestinal bleeding.  Hemoglobin and hematocrit were 5.4 and 20.  He was transfused 4 units of pRBCs.  Hemoglobin and hematocrit increased to 9.4 and 31.  Gastroenterology was consulted and Dr. Soto deferred endoscopy at the time because Mr. Granger responded better to octreotide injections than endoscopic ablation.    He was hospitalized at [facility] from 8/21/18 to 8/24/18 for recurrent anemia due to gastrointeestinal bleeding. Hemoglobin and hematocrit were 5.7 and 20.4. He was transfused 2 units of pRBCs. Hemoglobin and hematocrit increased to 7.3 and 25.4. Gastroenterology was consulted and Dr. Soto performed lower double balloon enteroscopy finding two angioectasias in the cecum and distal ileum, treated with argon  plasma coagulation.    He presented to [facility] ED on 9/15/18 with complaint of increased weakness and melena that has persisted since his last hospitalization. He also complained of diarrhea and mild shortness of breath. He denies blood in the stool, abdominal pain, chest pain, fever, chills, N/V, or dizziness. Hemoglobin and hematocrit were 8.2 and 28.7. Positive FOB. He was not transfused at the time. He was given IVF and pantoprazole 40 mg IV. He was admitted to [facility]. Gastroenterology was consulted.      Hospital Course:  Patient received half regular dose of metoprolol due to hypotension on admission. However, he developed asymptomatic tachycardia in the 140s overnight and his regular dose of toprol was resumed. He also received metoprolol 5 mg IV. Gastroenterology recommended conservative medical management and he was given octreotide 100mcg IV. Hemoglobin 8.6 > 7.9.    Interval History: See above. Pt seen and examined at bedside. He continues with black, tarry, loose stools without evidence of BRB. He denies SOB, chest pain, abdominal pain, N/V. He wants to eat. Otherwise, no acute complaints.    Review of Systems   Constitutional: Positive for fatigue. Negative for appetite change, chills, fever and unexpected weight change.   Respiratory: Negative for cough, chest tightness and shortness of breath.    Cardiovascular: Positive for leg swelling. Negative for chest pain.   Gastrointestinal: Positive for diarrhea. Negative for abdominal pain, constipation, nausea and vomiting.        Melena   Genitourinary: Negative for dysuria, flank pain and urgency.   Musculoskeletal: Negative for arthralgias, myalgias, neck pain and neck stiffness.   Skin: Negative for color change and rash.   Neurological: Positive for weakness (Generalized). Negative for dizziness, syncope and headaches.   All other systems reviewed and are negative.    Objective:     Vital Signs (Most  Recent):  Temp: 97.6 °F (36.4 °C) (09/16/18 1148)  Pulse: (!) 131 (09/16/18 1200)  Resp: 18 (09/16/18 1148)  BP: 118/75 (09/16/18 1148)  SpO2: 98 % (09/16/18 0730) Vital Signs (24h Range):  Temp:  [96.2 °F (35.7 °C)-98 °F (36.7 °C)] 97.6 °F (36.4 °C)  Pulse:  [] 131  Resp:  [17-20] 18  SpO2:  [97 %-100 %] 98 %  BP: ()/(55-75) 118/75     Weight: 71.2 kg (157 lb 0 oz)  Body mass index is 21.29 kg/m².    Intake/Output Summary (Last 24 hours) at 9/16/2018 1449  Last data filed at 9/16/2018 0600  Gross per 24 hour   Intake --   Output 250 ml   Net -250 ml      Physical Exam   Constitutional: He is oriented to person, place, and time. He appears well-developed and well-nourished. No distress.   HENT:   Head: Normocephalic and atraumatic.   Neck: Normal range of motion. Neck supple. No JVD present.   Cardiovascular: Regular rhythm, normal heart sounds and intact distal pulses. Tachycardia present.   No murmur heard.  Pulmonary/Chest: Effort normal and breath sounds normal. No respiratory distress. He has no wheezes. He has no rales.   Abdominal: Soft. Bowel sounds are normal. He exhibits no distension. There is no tenderness. There is no rigidity and no guarding.   Musculoskeletal: Normal range of motion. He exhibits edema (1+ LE bilaterally).   Neurological: He is alert and oriented to person, place, and time. He has normal strength. GCS eye subscore is 4. GCS verbal subscore is 5. GCS motor subscore is 6.   Skin: Skin is warm and dry. Capillary refill takes less than 2 seconds. He is not diaphoretic.   Psychiatric: He has a normal mood and affect. His behavior is normal. Judgment and thought content normal.   Nursing note and vitals reviewed.      Significant Labs: All pertinent labs within the past 24 hours have been reviewed.    Significant Imaging: I have reviewed all pertinent imaging results/findings within the past 24 hours.    Assessment/Plan:      * Melena    Iron deficiency anemia due to chronic  blood loss  Angiodysplasia of small intestine  Diarrhea  Pt with frequent tarry, black stools. +FOB. No evidence of BRB in stool    -Hemoglobin at 8.6 on admission. Down to 7.9 today. Trend H/H and monitor  -Gets outpatient octreotide injections  -GI consulted, appreciate recommendations. Continue conservative tx with octreotide IV. Endoscopy timing per Dr. Soto.          Abnormal liver enzymes    -Elevated on admission: ALK-P (497 > 389), AST (104>82), ALT (73>55), t bili normal. Trending down. Monitor  -Pt asymptomatic and with known right HF  -GGT elevated at 818 on 6/3/18   -Hepatitis panel performed on 2/10/18 negative  -CTA abdomen/pelvis performed on 1/11/18 showed no gallstones, normal liver, no hepatic lesions          CALLIE (acute kidney injury)    -BUN/Cr at 54/2.2 on admission. Trending down 53/1.8 > 46/1.6  -Avoid nephrotoxins and monitor          COPD (chronic obstructive pulmonary disease)    Patient takes albuterol and tiotropium at home. Continue          Hyperlipidemia    Continue home zimvastatin          History of seizure    Patient takes phenytoin at home. Continue          BPH (benign prostatic hyperplasia)    Patient takes tamsulosin at home. Continue          Paroxysmal atrial fibrillation    -EKG on admission showed Afib with RVR  -Takes amiodarone, digoxin, and metoprolol at home. Continue home meds.  -Pt with asymptomatic tachycardia overnight. Repeat EKG showed tachycardia with rate of 141 bpm. Given lopressor 5 mg IV and toprol 200 mg PO. Will continue optimization of rate control and monitoring on telemetry          Chronic combined systolic and diastolic heart failure    S/p aortic valve and mitral valve replacement  Pulmonary hypertension  -Patient takes bumetanide, metolazone, spironolactone, and potassium. Diuretics held due to hypotension on admission. BP still marginal but will give bumetanide injection given LE edema. SpO2 99% on RA.  -Monitor I&Os. Discontinue IV fluids.  Monitor O2 sats  -No echo noted in the last 6 months. Will order echo            VTE Risk Mitigation (From admission, onward)        Ordered     Place UZAIR hose  Until discontinued      09/15/18 1427     Place sequential compression device  Until discontinued      09/15/18 1427     IP VTE LOW RISK PATIENT  Once      09/15/18 0151          Destinee Oh PA-C  Department of Hospital Medicine   [facility]

## 2018-09-16 NOTE — SUBJECTIVE & OBJECTIVE
Interval History: See above. Pt seen and examined at bedside. He continues with black, tarry, loose stools without evidence of BRB. He denies SOB, chest pain, abdominal pain, N/V. He wants to eat. Otherwise, no acute complaints.    Review of Systems   Constitutional: Positive for fatigue. Negative for appetite change, chills, fever and unexpected weight change.   Respiratory: Negative for cough, chest tightness and shortness of breath.    Cardiovascular: Positive for leg swelling. Negative for chest pain.   Gastrointestinal: Positive for diarrhea. Negative for abdominal pain, constipation, nausea and vomiting.        Melena   Genitourinary: Negative for dysuria, flank pain and urgency.   Musculoskeletal: Negative for arthralgias, myalgias, neck pain and neck stiffness.   Skin: Negative for color change and rash.   Neurological: Positive for weakness (Generalized). Negative for dizziness, syncope and headaches.   All other systems reviewed and are negative.    Objective:     Vital Signs (Most Recent):  Temp: 97.6 °F (36.4 °C) (09/16/18 1148)  Pulse: (!) 131 (09/16/18 1200)  Resp: 18 (09/16/18 1148)  BP: 118/75 (09/16/18 1148)  SpO2: 98 % (09/16/18 0730) Vital Signs (24h Range):  Temp:  [96.2 °F (35.7 °C)-98 °F (36.7 °C)] 97.6 °F (36.4 °C)  Pulse:  [] 131  Resp:  [17-20] 18  SpO2:  [97 %-100 %] 98 %  BP: ()/(55-75) 118/75     Weight: 71.2 kg (157 lb 0 oz)  Body mass index is 21.29 kg/m².    Intake/Output Summary (Last 24 hours) at 9/16/2018 1449  Last data filed at 9/16/2018 0600  Gross per 24 hour   Intake --   Output 250 ml   Net -250 ml      Physical Exam   Constitutional: He is oriented to person, place, and time. He appears well-developed and well-nourished. No distress.   HENT:   Head: Normocephalic and atraumatic.   Neck: Normal range of motion. Neck supple. No JVD present.   Cardiovascular: Regular rhythm, normal heart sounds and intact distal pulses. Tachycardia present.   No murmur  heard.  Pulmonary/Chest: Effort normal and breath sounds normal. No respiratory distress. He has no wheezes. He has no rales.   Abdominal: Soft. Bowel sounds are normal. He exhibits no distension. There is no tenderness. There is no rigidity and no guarding.   Musculoskeletal: Normal range of motion. He exhibits edema (1+ LE bilaterally).   Neurological: He is alert and oriented to person, place, and time. He has normal strength. GCS eye subscore is 4. GCS verbal subscore is 5. GCS motor subscore is 6.   Skin: Skin is warm and dry. Capillary refill takes less than 2 seconds. He is not diaphoretic.   Psychiatric: He has a normal mood and affect. His behavior is normal. Judgment and thought content normal.   Nursing note and vitals reviewed.      Significant Labs: All pertinent labs within the past 24 hours have been reviewed.    Significant Imaging: I have reviewed all pertinent imaging results/findings within the past 24 hours.

## 2018-09-16 NOTE — NURSING
Notified Dr. Encarnacion that patient's HR has been sustaining in 130s around 0300.  Informed patient's VS BP 99/55, , O2 sat 100 % and patient has no symptom at this moment.  MD stated recheck patient's VS in 30 minutes later.

## 2018-09-17 PROBLEM — K92.1 MELENA: Status: RESOLVED | Noted: 2018-09-15 | Resolved: 2018-09-17

## 2018-09-17 PROBLEM — N17.9 AKI (ACUTE KIDNEY INJURY): Status: RESOLVED | Noted: 2018-09-15 | Resolved: 2018-09-17

## 2018-09-17 LAB
ANION GAP SERPL CALC-SCNC: 8 MMOL/L
ANISOCYTOSIS BLD QL SMEAR: SLIGHT
BASOPHILS # BLD AUTO: 0.01 K/UL
BASOPHILS NFR BLD: 0.2 %
BLOOD GROUP ANTIBODIES SERPL: NORMAL
BNP SERPL-MCNC: 449 PG/ML
BUN SERPL-MCNC: 38 MG/DL
CALCIUM SERPL-MCNC: 8.4 MG/DL
CHLORIDE SERPL-SCNC: 104 MMOL/L
CO2 SERPL-SCNC: 21 MMOL/L
CREAT SERPL-MCNC: 1.4 MG/DL
DIFFERENTIAL METHOD: ABNORMAL
EOSINOPHIL # BLD AUTO: 0.2 K/UL
EOSINOPHIL NFR BLD: 3.7 %
ERYTHROCYTE [DISTWIDTH] IN BLOOD BY AUTOMATED COUNT: 17.1 %
EST. GFR  (AFRICAN AMERICAN): 57 ML/MIN/1.73 M^2
EST. GFR  (NON AFRICAN AMERICAN): 49 ML/MIN/1.73 M^2
ESTIMATED PA SYSTOLIC PRESSURE: 109.87
GLUCOSE SERPL-MCNC: 135 MG/DL
HCT VFR BLD AUTO: 29.1 %
HGB BLD-MCNC: 8.2 G/DL
HYPOCHROMIA BLD QL SMEAR: ABNORMAL
LYMPHOCYTES # BLD AUTO: 0.8 K/UL
LYMPHOCYTES NFR BLD: 18.3 %
MAGNESIUM SERPL-MCNC: 2.1 MG/DL
MCH RBC QN AUTO: 25.5 PG
MCHC RBC AUTO-ENTMCNC: 28.2 G/DL
MCV RBC AUTO: 90 FL
MITRAL VALVE MOBILITY: NORMAL
MITRAL VALVE REGURGITATION: ABNORMAL
MONOCYTES # BLD AUTO: 0.4 K/UL
MONOCYTES NFR BLD: 8.8 %
NEUTROPHILS # BLD AUTO: 2.8 K/UL
NEUTROPHILS NFR BLD: 69 %
PHOSPHATE SERPL-MCNC: 3.3 MG/DL
PLATELET # BLD AUTO: 215 K/UL
PLATELET BLD QL SMEAR: ABNORMAL
PMV BLD AUTO: 10.5 FL
POIKILOCYTOSIS BLD QL SMEAR: SLIGHT
POLYCHROMASIA BLD QL SMEAR: ABNORMAL
POTASSIUM SERPL-SCNC: 4.6 MMOL/L
RBC # BLD AUTO: 3.22 M/UL
RETIRED EF AND QEF - SEE NOTES: 20 (ref 55–65)
SODIUM SERPL-SCNC: 133 MMOL/L
TRICUSPID VALVE REGURGITATION: ABNORMAL
WBC # BLD AUTO: 4.09 K/UL

## 2018-09-17 PROCEDURE — 94640 AIRWAY INHALATION TREATMENT: CPT

## 2018-09-17 PROCEDURE — 25000242 PHARM REV CODE 250 ALT 637 W/ HCPCS: Performed by: PHYSICIAN ASSISTANT

## 2018-09-17 PROCEDURE — S0171 BUMETANIDE 0.5 MG: HCPCS | Performed by: PHYSICIAN ASSISTANT

## 2018-09-17 PROCEDURE — 36415 COLL VENOUS BLD VENIPUNCTURE: CPT

## 2018-09-17 PROCEDURE — 63600175 PHARM REV CODE 636 W HCPCS: Performed by: HOSPITALIST

## 2018-09-17 PROCEDURE — 84100 ASSAY OF PHOSPHORUS: CPT

## 2018-09-17 PROCEDURE — 93306 TTE W/DOPPLER COMPLETE: CPT

## 2018-09-17 PROCEDURE — 83880 ASSAY OF NATRIURETIC PEPTIDE: CPT

## 2018-09-17 PROCEDURE — 94761 N-INVAS EAR/PLS OXIMETRY MLT: CPT

## 2018-09-17 PROCEDURE — 83735 ASSAY OF MAGNESIUM: CPT

## 2018-09-17 PROCEDURE — C9113 INJ PANTOPRAZOLE SODIUM, VIA: HCPCS | Performed by: HOSPITALIST

## 2018-09-17 PROCEDURE — 85025 COMPLETE CBC W/AUTO DIFF WBC: CPT

## 2018-09-17 PROCEDURE — 80048 BASIC METABOLIC PNL TOTAL CA: CPT

## 2018-09-17 PROCEDURE — 93306 TTE W/DOPPLER COMPLETE: CPT | Mod: 26,,, | Performed by: INTERNAL MEDICINE

## 2018-09-17 PROCEDURE — 25000003 PHARM REV CODE 250: Performed by: HOSPITALIST

## 2018-09-17 PROCEDURE — 25000003 PHARM REV CODE 250: Performed by: PHYSICIAN ASSISTANT

## 2018-09-17 PROCEDURE — 11000001 HC ACUTE MED/SURG PRIVATE ROOM

## 2018-09-17 RX ORDER — SPIRONOLACTONE 25 MG/1
25 TABLET ORAL DAILY
Status: DISCONTINUED | OUTPATIENT
Start: 2018-09-17 | End: 2018-09-19 | Stop reason: HOSPADM

## 2018-09-17 RX ORDER — BUMETANIDE 1 MG/1
4 TABLET ORAL 2 TIMES DAILY
Status: DISCONTINUED | OUTPATIENT
Start: 2018-09-17 | End: 2018-09-18

## 2018-09-17 RX ORDER — PANTOPRAZOLE SODIUM 40 MG/1
40 TABLET, DELAYED RELEASE ORAL EVERY 12 HOURS
Status: DISCONTINUED | OUTPATIENT
Start: 2018-09-17 | End: 2018-09-19 | Stop reason: HOSPADM

## 2018-09-17 RX ORDER — BUMETANIDE 0.25 MG/ML
3 INJECTION INTRAMUSCULAR; INTRAVENOUS ONCE
Status: COMPLETED | OUTPATIENT
Start: 2018-09-17 | End: 2018-09-17

## 2018-09-17 RX ADMIN — POTASSIUM CHLORIDE 20 MEQ: 20 TABLET, EXTENDED RELEASE ORAL at 09:09

## 2018-09-17 RX ADMIN — SIMVASTATIN 40 MG: 40 TABLET, FILM COATED ORAL at 09:09

## 2018-09-17 RX ADMIN — PHENYTOIN SODIUM 200 MG: 100 CAPSULE, EXTENDED RELEASE ORAL at 09:09

## 2018-09-17 RX ADMIN — FERROUS SULFATE TAB EC 325 MG (65 MG FE EQUIVALENT) 325 MG: 325 (65 FE) TABLET DELAYED RESPONSE at 09:09

## 2018-09-17 RX ADMIN — TIOTROPIUM BROMIDE 18 MCG: 18 CAPSULE ORAL; RESPIRATORY (INHALATION) at 09:09

## 2018-09-17 RX ADMIN — TAMSULOSIN HYDROCHLORIDE 0.4 MG: 0.4 CAPSULE ORAL at 09:09

## 2018-09-17 RX ADMIN — TRAVOPROST 1 DROP: 0.04 SOLUTION/ DROPS OPHTHALMIC at 09:09

## 2018-09-17 RX ADMIN — AMIODARONE HYDROCHLORIDE 200 MG: 200 TABLET ORAL at 09:09

## 2018-09-17 RX ADMIN — SPIRONOLACTONE 25 MG: 25 TABLET, FILM COATED ORAL at 11:09

## 2018-09-17 RX ADMIN — COLCHICINE 0.6 MG: 0.6 TABLET, FILM COATED ORAL at 09:09

## 2018-09-17 RX ADMIN — PANTOPRAZOLE SODIUM 40 MG: 40 TABLET, DELAYED RELEASE ORAL at 09:09

## 2018-09-17 RX ADMIN — BUMETANIDE 3 MG: 0.25 INJECTION INTRAMUSCULAR; INTRAVENOUS at 11:09

## 2018-09-17 RX ADMIN — PANTOPRAZOLE SODIUM 40 MG: 40 INJECTION, POWDER, FOR SOLUTION INTRAVENOUS at 09:09

## 2018-09-17 RX ADMIN — METOPROLOL SUCCINATE 200 MG: 50 TABLET, EXTENDED RELEASE ORAL at 09:09

## 2018-09-17 RX ADMIN — FERROUS SULFATE TAB EC 325 MG (65 MG FE EQUIVALENT) 325 MG: 325 (65 FE) TABLET DELAYED RESPONSE at 03:09

## 2018-09-17 NOTE — PLAN OF CARE
Pt with frequent admits due to GI Bleeds. Pt receives most care at [facility] or [facility] but is followed by Dr. Soto for GI.  His PCP is Dr. Jimmy Andrew. His cardiologist is Dr. Rosi Montejo.     Pt lives with friend and is independent with al ladls including driving self. Pt reports his daughter can provide assistance and transportation as needed.    Tn gave pt d/c folder, brochure, and business card and encouraged to call for needs/concerns.       09/17/18 1056   Discharge Assessment   Confirmed/corrected address and phone number on facesheet? Yes   Assessment information obtained from? Patient   Expected Length of Stay (days) 2   Communicated expected length of stay with patient/caregiver yes   Prior to hospitilization cognitive status: Alert/Oriented   Prior to hospitalization functional status: Independent   Current cognitive status: Alert/Oriented   Current Functional Status: Independent   Lives With friend(s)   Able to Return to Prior Arrangements yes   Is patient able to care for self after discharge? Yes   Who are your caregiver(s) and their phone number(s)? Phoebe (daughter) [phone]   Patient's perception of discharge disposition home or selfcare   Readmission Within The Last 30 Days previous discharge plan unsuccessful   If yes, most recent facility name: [facility]   Patient currently being followed by outpatient case management? No   Patient currently receives any other outside agency services? No   Equipment Currently Used at Home none   Do you have any problems affording any of your prescribed medications? No   Is the patient taking medications as prescribed? yes   Does the patient have transportation home? Yes  (daughter)   Transportation Available car;family or friend will provide   Does the patient receive services at the [facility]? No   Discharge Plan A Home   Discharge Plan B Home;Home Health   Patient/Family In Agreement With Plan yes   Readmission Questionnaire   At the time of  "your discharge, did someone talk to you about what your health problems were? Yes   At the time of discharge, did someone talk to you about what to watch out for regarding worsening of your health problem? Yes   At the time of discharge, did someone talk to you about what to do if you experienced worsening of your health problem? Yes   At the time of discharge, did someone talk to you about which medication to take when you left the hospital and which ones to stop taking? Yes   At the time of discharge, did someone talk to you about when and where to follow up with a doctor after you left the hospital? Yes   What do you believe caused you to be sick enough to be re-admitted? "been since my colonoscopy"   How often do you need to have someone help you when you read instructions, pamphlets, or other written material from your doctor or pharmacy? Never   Do you have problems taking your medications as prescribed? No   Do you have any problems affording any of  your prescribed medications? No   Do you have problems obtaining/receiving your medications? No   Does the patient have transportation to healthcare appointments? Yes   Living Arrangements house   Does the patient have family/friends to help with healtcare needs after discharge? yes   Does your caregiver provide all the help you need? Yes   Are you currently feeling confused? No   Are you currently having problems thinking? No   Are you currently having memory problems? No   Have you felt down, depressed, or hopeless? 0   Have you felt little interest or pleasure in doing things? 0   In the last 7 days, my sleep quality was: poor         "

## 2018-09-17 NOTE — ASSESSMENT & PLAN NOTE
-BUN/Cr at 54/2.2 on admission. Now resolved to baseline 53/1.8 > 46/1.6 > 38/1.4  -Avoid nephrotoxins and monitor

## 2018-09-17 NOTE — ASSESSMENT & PLAN NOTE
-EKG on admission showed Afib with RVR  -Takes amiodarone, digoxin, and metoprolol at home. Continue home meds.  -HR now well controlled in the 60s. Continue rate control and cardiac monitoring

## 2018-09-17 NOTE — PROGRESS NOTES
[facility]    CC: symptomatic anemia     HPI   Mr. Granger is a 73 y.o. who presents with chronic, recurrent, painless, blood loss anemia associated with black diarrhea, weakness, and fatigue.   He is well known to [facility] as he is followed by Dr. Soto for small bowel angiodysplasias. He was recently admitted on 8/22/2018 for the same and had lower DBE on 8/23/2018 with APC to angioectasias in the cecum and ileum. He had been able to reduce transfusions from every 2 weeks to every 6 months with octreotide injections, but has had more bleeding recently.     Interval: Still having melena and diarrhea several times per day. No blood transfusion this admission. Hb stable at 8.2      Past Medical History   has a past medical history of Angiodysplasia of small intestine, Angiodysplasia of small intestine, except duodenum with bleeding, Atrial fibrillation, BPH (benign prostatic hyperplasia), Cardiac defibrillator in place, Chronic combined systolic and diastolic congestive heart failure, COPD (chronic obstructive pulmonary disease), Coronary artery disease, Gout, Hypertension, Mitral stenosis, Pulmonary hypertension, Seizures, and Stroke.    Review of Systems  General ROS: negative for chills, fever or weight loss Positive for weakness and fatigue  Cardiovascular ROS: no chest pain or dyspnea on exertion  Gastrointestinal ROS: Positive for melena, diarrhea, no abdominal pain or vomiting      Physical Examination  /61   Pulse 87   Temp 96.3 °F (35.7 °C)   Resp 18   Ht 6' (1.829 m)   Wt 71.2 kg (157 lb 0 oz)   SpO2 96%   BMI 21.29 kg/m²   General appearance: alert, cooperative, no distress  HENT: Normocephalic, atraumatic, neck symmetrical, no nasal discharge   Eyes: conjunctivae/corneas clear, PERRL, EOM's intact  Lungs: clear to auscultation in all fields, no dullness to percussion bilaterally, no wheeze  Heart: normal rate, regular rhythm with holosystolic murmur; palpable peripheral pulsesI pacer   In place in left upper chest wall  Abdomen: soft, NT, ND, BS present    Labs:  Lab Results   Component Value Date    WBC 4.09 09/17/2018    HGB 8.2 (L) 09/17/2018    HCT 29.1 (L) 09/17/2018    MCV 90 09/17/2018     09/17/2018        Imaging:  CXR 7/11/2018  Cardiomegaly noted    I have personally reviewed these images    Assessment:   Mr. Granger is a 73 year old man with multiple comorbidities including bioprosthetic aortic and mitral valve replacement in 2004 and CAD with bypass who presents with melena from likely small bowel and colonic angioectasias chronic blood loss and ANDREINA. He is on iron and octreotide. Last lower DBE 8/23/2018 with Dr. Soto.     Plan:  - Transfuse with goal Hg >7  - If blood counts drop then trial of Octreotide 50 mcg SC tid    - If requiring blood transfusions, then will discuss starting Thalidomide in close clinic follow up as it will require prior authorization   - No utility in endoscopy at this time  - Stool studies for infectious work up  - Consider holding colchicine to decrease diarrhea  - If stool studies negative, then loperamide 2 mg qid prn     Chris Del Rosario 9/17/2018 2:19 PM

## 2018-09-17 NOTE — PLAN OF CARE
Problem: Patient Care Overview  Goal: Plan of Care Review  Outcome: Ongoing (interventions implemented as appropriate)  The proper method of use, as well as anticipated side effects, of this metered-dose inhaler are discussed and demonstrated to the patient. Pt on RA with documented sats.100. Will continue to monitor.

## 2018-09-17 NOTE — ASSESSMENT & PLAN NOTE
S/p aortic valve and mitral valve replacement  Pulmonary hypertension  -Patient takes bumetanide, metolazone, spironolactone, and potassium. Diuretics held due to hypotension on admission. SBP now 110-117. SpO2 % on RA. Pt on clear liquid diet with I/O measurement of +700 yesterday. Resume home bumex and give another bumex 3mg IV.  -Continue to monitor I&Os and O2 sats  -Echo pending. Check BNP

## 2018-09-17 NOTE — PROGRESS NOTES
[facility]  Progress Note    Patient Name: Stefano Granger  MRN: [id number]  Patient Class: IP- Inpatient   Admission Date: 9/14/2018  Length of Stay: 1 days  Attending Physician: Vamsi Marie MD  Primary Care Provider: [facility]    Subjective:     Principal Problem:Melena    HPI:  Mr. Granger is a 72 y/o black male with hypertension, CAD s/p CABG in 2004, history of bioprosthetic aortic and mitral valve replacement in 2000, chronic systolic and diastolic CHF, mitral valve stenosis, pulmonary hypertension, atrial fibrillation, automatic implantable cardioverter-defibrillator, cigarette smoking with chronic obstructive pulmonary disease, seizure disorder, chronic blood loss anemia due to small bowel angiodysplasias, benign prostatic hyperplasia, gout, and hyperlipidemia. He lives in [place]. His receives medical care through [facility]. His PCP is Dr. Jimmy Andrew. His cardiologist is Dr. Rosi Montejo. His gastroenterologist is Dr. Ra Soto.    He was hospitalized at [facility] from 7/11/18 to 7/13/18 for recurrent anemia due to active gastrointestinal bleeding.  Hemoglobin and hematocrit were 5.4 and 20.  He was transfused 4 units of pRBCs.  Hemoglobin and hematocrit increased to 9.4 and 31.  Gastroenterology was consulted and Dr. Soto deferred endoscopy at the time because Mr. Granger responded better to octreotide injections than endoscopic ablation.    He was hospitalized at [facility] from 8/21/18 to 8/24/18 for recurrent anemia due to gastrointeestinal bleeding. Hemoglobin and hematocrit were 5.7 and 20.4. He was transfused 2 units of pRBCs. Hemoglobin and hematocrit increased to 7.3 and 25.4. Gastroenterology was consulted and Dr. Soto performed lower double balloon enteroscopy finding two angioectasias in the cecum and distal ileum, treated with argon  plasma coagulation.    He presented to [facility] ED on 9/15/18 with complaint of increased weakness and melena that has persisted since his last hospitalization. He also complained of diarrhea and mild shortness of breath. He denies blood in the stool, abdominal pain, chest pain, fever, chills, N/V, or dizziness. Hemoglobin and hematocrit were 8.2 and 28.7. Positive FOB. He was not transfused at the time. He was given IVF and pantoprazole 40 mg IV. He was admitted to [facility]. Gastroenterology was consulted.      Hospital Course:  Patient received half regular dose of metoprolol due to hypotension on admission. However, he developed asymptomatic tachycardia in the 140s overnight and he was given metoprolol 5 mg IV in addition to resumption of his regular dose of toprol. He returned to [facility] with HR in the 60s-100s. Gastroenterology was consulted and recommended conservative medical management. He was given octreotide 100mcg IV. He continued to have black, loose stools. Hemoglobin stable at 8.6 > 7.9 > 8.2. His fluid intake increased due to being placed on a clear liquid diet. He received bumex 3mg IV for mild lower extremity edema. His oxygen saturation remained at % on RA. Hypotension resolved and his home bumex was resumed. Acute kidney injury resolved.    Interval History: Pt seen and examined at bedside. He is ambulating around the room. No family is present. He continues with dark, loose stools. He complains of mild SOB and requests more bumex. O2 sats remain % on RA. Otherwise, no acute complaints.    Review of Systems   Constitutional: Positive for fatigue. Negative for appetite change, chills, fever and unexpected weight change.   Respiratory: Positive for shortness of breath. Negative for cough and chest tightness.    Cardiovascular: Positive for leg swelling. Negative for chest pain.   Gastrointestinal: Positive for diarrhea. Negative for abdominal pain,  constipation, nausea and vomiting.        Melena   Genitourinary: Negative for dysuria, flank pain, hematuria and urgency.   Musculoskeletal: Negative for arthralgias, myalgias, neck pain and neck stiffness.   Skin: Negative for color change and rash.   Neurological: Positive for weakness (Generalized). Negative for dizziness, syncope and headaches.   All other systems reviewed and are negative.    Objective:     Vital Signs (Most Recent):  Temp: 96.3 °F (35.7 °C) (09/17/18 0734)  Pulse: 69 (09/17/18 0946)  Resp: 18 (09/17/18 0946)  BP: 110/61 (09/17/18 0734)  SpO2: 100 % (09/17/18 0946) Vital Signs (24h Range):  Temp:  [96.3 °F (35.7 °C)-97.9 °F (36.6 °C)] 96.3 °F (35.7 °C)  Pulse:  [] 69  Resp:  [18-20] 18  SpO2:  [96 %-100 %] 100 %  BP: ()/(56-75) 110/61     Weight: 71.2 kg (157 lb 0 oz)  Body mass index is 21.29 kg/m².    Intake/Output Summary (Last 24 hours) at 9/17/2018 1050  Last data filed at 9/17/2018 0614  Gross per 24 hour   Intake 1350 ml   Output 650 ml   Net 700 ml      Physical Exam   Constitutional: He is oriented to person, place, and time. He appears well-developed and well-nourished. No distress.   Cardiovascular: Normal rate, normal heart sounds and intact distal pulses. An irregular rhythm present.   No murmur heard.  Pulmonary/Chest: Effort normal and breath sounds normal. No respiratory distress. He has no decreased breath sounds. He has no wheezes. He has no rales.   Abdominal: Soft. Bowel sounds are normal. He exhibits no distension. There is no tenderness. There is no rigidity and no guarding.   Musculoskeletal: Normal range of motion. He exhibits edema (2+ LE bilaterally).   Neurological: He is alert and oriented to person, place, and time. He has normal strength. GCS eye subscore is 4. GCS verbal subscore is 5. GCS motor subscore is 6.   Skin: Skin is warm and dry. Capillary refill takes less than 2 seconds. He is not diaphoretic.   Psychiatric: He has a normal mood and  affect. His behavior is normal. Judgment and thought content normal.   Nursing note and vitals reviewed.      Significant Labs: All pertinent labs within the past 24 hours have been reviewed.    Significant Imaging: I have reviewed all pertinent imaging results/findings within the past 24 hours.    Assessment/Plan:      * Melena    Iron deficiency anemia due to chronic blood loss  Angiodysplasia of small intestine  Diarrhea  Pt with frequent tarry, black stools. +FOB. No evidence of BRB in stool    -Hemoglobin at 8.6 on admission. Remaining stable at 7.9>8.2. Continue to trend H/H and monitor  -Gets outpatient octreotide injections. Octreotide 100mcg IV given.  -GI on board, appreciate recommendations. Continue conservative tx. Endoscopy timing per Dr. Soto.          Abnormal liver enzymes    -Elevated on admission: ALK-P (497 > 389), AST (104>82), ALT (73>55), t bili normal. Trending down. Monitor  -Pt asymptomatic and with known right HF  -GGT elevated at 818 on 6/3/18   -Hepatitis panel performed on 2/10/18 negative  -CTA abdomen/pelvis performed on 1/11/18 showed no gallstones, normal liver, no hepatic lesions          CALLIE (acute kidney injury)    -BUN/Cr at 54/2.2 on admission. Now resolved to baseline 53/1.8 > 46/1.6 > 38/1.4  -Avoid nephrotoxins and monitor          COPD (chronic obstructive pulmonary disease)    No acute respiratory complaints. Patient takes albuterol and tiotropium at home. Continue          Hyperlipidemia    Continue home zimvastatin          History of seizure    Patient takes phenytoin at home. Continue          BPH (benign prostatic hyperplasia)    Patient takes tamsulosin at home. Continue          Paroxysmal atrial fibrillation    -EKG on admission showed Afib with RVR  -Takes amiodarone, digoxin, and metoprolol at home. Continue home meds.  -HR now well controlled in the 60s. Continue rate control and cardiac monitoring          Chronic combined systolic and diastolic heart failure     S/p aortic valve and mitral valve replacement  Pulmonary hypertension  -Patient takes bumetanide, metolazone, spironolactone, and potassium. Diuretics held due to hypotension on admission. SBP now 110-117. SpO2 % on RA. Pt on clear liquid diet with I/O measurement of +700 yesterday. Resume home bumex and give another bumex 3mg IV.  -Continue to monitor I&Os and O2 sats  -Echo pending. Check BNP            VTE Risk Mitigation (From admission, onward)        Ordered     Place UZAIR hose  Until discontinued      09/15/18 1427     Place sequential compression device  Until discontinued      09/15/18 1427     IP VTE LOW RISK PATIENT  Once      09/15/18 0151            Destinee Oh PA-C  Department of Hospital Medicine   [facility]

## 2018-09-17 NOTE — SUBJECTIVE & OBJECTIVE
Interval History: Pt seen and examined at bedside. He is ambulating around the room. No family is present. He continues with dark, loose stools. He complains of mild SOB and requests more bumex. O2 sats remain % on RA. Otherwise, no acute complaints.    Review of Systems   Constitutional: Positive for fatigue. Negative for appetite change, chills, fever and unexpected weight change.   Respiratory: Positive for shortness of breath. Negative for cough and chest tightness.    Cardiovascular: Positive for leg swelling. Negative for chest pain.   Gastrointestinal: Positive for diarrhea. Negative for abdominal pain, constipation, nausea and vomiting.        Melena   Genitourinary: Negative for dysuria, flank pain, hematuria and urgency.   Musculoskeletal: Negative for arthralgias, myalgias, neck pain and neck stiffness.   Skin: Negative for color change and rash.   Neurological: Positive for weakness (Generalized). Negative for dizziness, syncope and headaches.   All other systems reviewed and are negative.    Objective:     Vital Signs (Most Recent):  Temp: 96.3 °F (35.7 °C) (09/17/18 0734)  Pulse: 69 (09/17/18 0946)  Resp: 18 (09/17/18 0946)  BP: 110/61 (09/17/18 0734)  SpO2: 100 % (09/17/18 0946) Vital Signs (24h Range):  Temp:  [96.3 °F (35.7 °C)-97.9 °F (36.6 °C)] 96.3 °F (35.7 °C)  Pulse:  [] 69  Resp:  [18-20] 18  SpO2:  [96 %-100 %] 100 %  BP: ()/(56-75) 110/61     Weight: 71.2 kg (157 lb 0 oz)  Body mass index is 21.29 kg/m².    Intake/Output Summary (Last 24 hours) at 9/17/2018 1050  Last data filed at 9/17/2018 0614  Gross per 24 hour   Intake 1350 ml   Output 650 ml   Net 700 ml      Physical Exam   Constitutional: He is oriented to person, place, and time. He appears well-developed and well-nourished. No distress.   Cardiovascular: Normal rate, normal heart sounds and intact distal pulses. An irregular rhythm present.   No murmur heard.  Pulmonary/Chest: Effort normal and breath sounds  normal. No respiratory distress. He has no decreased breath sounds. He has no wheezes. He has no rales.   Abdominal: Soft. Bowel sounds are normal. He exhibits no distension. There is no tenderness. There is no rigidity and no guarding.   Musculoskeletal: Normal range of motion. He exhibits edema (2+ LE bilaterally).   Neurological: He is alert and oriented to person, place, and time. He has normal strength. GCS eye subscore is 4. GCS verbal subscore is 5. GCS motor subscore is 6.   Skin: Skin is warm and dry. Capillary refill takes less than 2 seconds. He is not diaphoretic.   Psychiatric: He has a normal mood and affect. His behavior is normal. Judgment and thought content normal.   Nursing note and vitals reviewed.      Significant Labs: All pertinent labs within the past 24 hours have been reviewed.    Significant Imaging: I have reviewed all pertinent imaging results/findings within the past 24 hours.

## 2018-09-17 NOTE — PROGRESS NOTES
Pharmacist Intervention IV to PO Note    Stefano Granger is a 73 y.o. male being treated with IV medication pantoprazole    Patient Data:    Vital Signs (Most Recent):  Temp: 96.3 °F (35.7 °C) (09/17/18 0734)  Pulse: 69 (09/17/18 0946)  Resp: 18 (09/17/18 0946)  BP: 110/61 (09/17/18 0734)  SpO2: 100 % (09/17/18 0946) Vital Signs (72h Range):  Temp:  [96.2 °F (35.7 °C)-98.4 °F (36.9 °C)]   Pulse:  []   Resp:  [15-21]   BP: ()/(49-84)   SpO2:  [96 %-100 %]      CBC:  Recent Labs   Lab  09/15/18   0035   09/15/18   1703  09/16/18   0808  09/16/18   1506  09/17/18   0433   WBC  6.07   --    --   5.37   --   4.09   RBC  3.67*   --    --   3.18*   --   3.22*   HGB  9.3*   < >  8.6*  7.9*  8.6*  8.2*   HCT  33.5*   < >  30.2*  28.3*   --   29.1*   PLT  330   --    --   304   --   215   MCV  91   --    --   89   --   90   MCH  25.3*   --    --   24.8*   --   25.5*   MCHC  27.8*   --    --   27.9*   --   28.2*    < > = values in this interval not displayed.     CMP:     Recent Labs   Lab  09/15/18   0035  09/15/18   0645  09/16/18   0808  09/17/18   0433   GLU  102  138*  142*  135*   CALCIUM  8.8  8.3*  8.7  8.4*   ALBUMIN  3.8   --   3.2*   --    PROT  7.8   --   6.6   --    NA  137  136  135*  133*   K  4.4  4.6  5.1  4.6   CO2  23  20*  17*  21*   CL  98  102  106  104   BUN  54*  53*  46*  38*   CREATININE  2.2*  1.8*  1.6*  1.4   ALKPHOS  497*   --   389*   --    ALT  73*   --   55*   --    AST  104*   --   82*   --    BILITOT  0.6   --   0.5   --        Dietary Orders:  Diet Orders            Diet clear liquid: Clear Liquid starting at 09/15 1637            Based on the following criteria, this patient qualifies for intravenous to oral conversion:  [x] The patients gastrointestinal tract is functioning (tolerating medications via oral or enteral route for 24 hours and tolerating food or enteral feeds for 24 hours).  [x] The patient is hemodynamically stable for 24 hours (heart rate <100 beats per minute,  systolic blood pressure >99 mm Hg, and respiratory rate <20 breaths per minute).  [x] The patient shows clinical improvement (afebrile for at least 24 hours and white blood cell count downtrending or normalized). Additionally, the patient must be non-neutropenic (absolute neutrophil count >500 cells/mm3).  [x] For antimicrobials, the patient has received IV therapy for at least 24 hours.    IV medication Pantoprazole 40mg IV Q12H will be changed to oral medication Pantoprazole 40mg PO Q12H.  Next dose due 9/17/18 2100    Pharmacist's Name: Carlee Bains  Pharmacist's Extension: [phone]

## 2018-09-17 NOTE — PLAN OF CARE
Problem: Patient Care Overview  Goal: Plan of Care Review  Outcome: Ongoing (interventions implemented as appropriate)  Reviewed plan of care with patient, verbalized understanding.  AAOx4.  On continuous telemetry monitor, afib with HR in 60s-100s.  No complaint of pain or any other distress noted.  No BM during shift.  Remained clear liquid diet.  Reinforced education about the fall risk.  Fall precaution measures maintained.  Bed locked, alarm on, in the lowest position, side rails up x2, non-skid socks applied, and call light within reach.  Instructed to call for any assistance.  Will continue to monitor.

## 2018-09-18 PROBLEM — I50.43 ACUTE ON CHRONIC COMBINED SYSTOLIC AND DIASTOLIC CONGESTIVE HEART FAILURE: Status: ACTIVE | Noted: 2018-09-18

## 2018-09-18 LAB
ALBUMIN SERPL BCP-MCNC: 3.2 G/DL
ALP SERPL-CCNC: 450 U/L
ALT SERPL W/O P-5'-P-CCNC: 54 U/L
ANION GAP SERPL CALC-SCNC: 12 MMOL/L
ANISOCYTOSIS BLD QL SMEAR: SLIGHT
AST SERPL-CCNC: 96 U/L
BASOPHILS # BLD AUTO: ABNORMAL K/UL
BASOPHILS NFR BLD: 0 %
BILIRUB SERPL-MCNC: 0.7 MG/DL
BUN SERPL-MCNC: 32 MG/DL
CALCIUM SERPL-MCNC: 8.8 MG/DL
CHLORIDE SERPL-SCNC: 107 MMOL/L
CO2 SERPL-SCNC: 18 MMOL/L
CREAT SERPL-MCNC: 1.4 MG/DL
DIFFERENTIAL METHOD: ABNORMAL
EOSINOPHIL # BLD AUTO: ABNORMAL K/UL
EOSINOPHIL NFR BLD: 0 %
ERYTHROCYTE [DISTWIDTH] IN BLOOD BY AUTOMATED COUNT: 16.8 %
EST. GFR  (AFRICAN AMERICAN): 57 ML/MIN/1.73 M^2
EST. GFR  (NON AFRICAN AMERICAN): 49 ML/MIN/1.73 M^2
GLUCOSE SERPL-MCNC: 140 MG/DL
HCT VFR BLD AUTO: 30.6 %
HGB BLD-MCNC: 8.6 G/DL
HYPOCHROMIA BLD QL SMEAR: ABNORMAL
LYMPHOCYTES # BLD AUTO: ABNORMAL K/UL
LYMPHOCYTES NFR BLD: 11 %
MAGNESIUM SERPL-MCNC: 2.5 MG/DL
MCH RBC QN AUTO: 25.2 PG
MCHC RBC AUTO-ENTMCNC: 28.1 G/DL
MCV RBC AUTO: 90 FL
MONOCYTES # BLD AUTO: ABNORMAL K/UL
MONOCYTES NFR BLD: 5 %
NEUTROPHILS NFR BLD: 81 %
NEUTS BAND NFR BLD MANUAL: 3 %
PHOSPHATE SERPL-MCNC: 3 MG/DL
PLATELET # BLD AUTO: 293 K/UL
PLATELET BLD QL SMEAR: ABNORMAL
PMV BLD AUTO: 11.9 FL
POTASSIUM SERPL-SCNC: 4.9 MMOL/L
PROT SERPL-MCNC: 6.8 G/DL
RBC # BLD AUTO: 3.41 M/UL
SODIUM SERPL-SCNC: 137 MMOL/L
WBC # BLD AUTO: 6.38 K/UL

## 2018-09-18 PROCEDURE — 25000003 PHARM REV CODE 250: Performed by: HOSPITALIST

## 2018-09-18 PROCEDURE — 25000003 PHARM REV CODE 250: Performed by: PHYSICIAN ASSISTANT

## 2018-09-18 PROCEDURE — 85007 BL SMEAR W/DIFF WBC COUNT: CPT

## 2018-09-18 PROCEDURE — 36415 COLL VENOUS BLD VENIPUNCTURE: CPT

## 2018-09-18 PROCEDURE — 94761 N-INVAS EAR/PLS OXIMETRY MLT: CPT

## 2018-09-18 PROCEDURE — 93005 ELECTROCARDIOGRAM TRACING: CPT

## 2018-09-18 PROCEDURE — 85027 COMPLETE CBC AUTOMATED: CPT

## 2018-09-18 PROCEDURE — S0171 BUMETANIDE 0.5 MG: HCPCS | Performed by: HOSPITALIST

## 2018-09-18 PROCEDURE — 94640 AIRWAY INHALATION TREATMENT: CPT

## 2018-09-18 PROCEDURE — 11000001 HC ACUTE MED/SURG PRIVATE ROOM

## 2018-09-18 PROCEDURE — 84100 ASSAY OF PHOSPHORUS: CPT

## 2018-09-18 PROCEDURE — 63600175 PHARM REV CODE 636 W HCPCS: Performed by: STUDENT IN AN ORGANIZED HEALTH CARE EDUCATION/TRAINING PROGRAM

## 2018-09-18 PROCEDURE — 80053 COMPREHEN METABOLIC PANEL: CPT

## 2018-09-18 PROCEDURE — 83735 ASSAY OF MAGNESIUM: CPT

## 2018-09-18 PROCEDURE — 25000242 PHARM REV CODE 250 ALT 637 W/ HCPCS: Performed by: PHYSICIAN ASSISTANT

## 2018-09-18 RX ORDER — METOPROLOL TARTRATE 1 MG/ML
5 INJECTION, SOLUTION INTRAVENOUS EVERY 5 MIN PRN
Status: COMPLETED | OUTPATIENT
Start: 2018-09-18 | End: 2018-09-18

## 2018-09-18 RX ORDER — METOLAZONE 2.5 MG/1
5 TABLET ORAL
Status: DISCONTINUED | OUTPATIENT
Start: 2018-09-18 | End: 2018-09-19

## 2018-09-18 RX ORDER — BUMETANIDE 0.25 MG/ML
4 INJECTION INTRAMUSCULAR; INTRAVENOUS 3 TIMES DAILY
Status: DISCONTINUED | OUTPATIENT
Start: 2018-09-18 | End: 2018-09-19

## 2018-09-18 RX ORDER — BUMETANIDE 0.25 MG/ML
4 INJECTION INTRAMUSCULAR; INTRAVENOUS 2 TIMES DAILY
Status: DISCONTINUED | OUTPATIENT
Start: 2018-09-18 | End: 2018-09-18

## 2018-09-18 RX ORDER — OCTREOTIDE ACETATE 50 UG/ML
50 INJECTION, SOLUTION INTRAVENOUS; SUBCUTANEOUS EVERY 8 HOURS
Status: DISCONTINUED | OUTPATIENT
Start: 2018-09-18 | End: 2018-09-19 | Stop reason: HOSPADM

## 2018-09-18 RX ADMIN — METOPROLOL TARTRATE 5 MG: 1 INJECTION, SOLUTION INTRAVENOUS at 05:09

## 2018-09-18 RX ADMIN — METOPROLOL SUCCINATE 200 MG: 50 TABLET, EXTENDED RELEASE ORAL at 08:09

## 2018-09-18 RX ADMIN — PANTOPRAZOLE SODIUM 40 MG: 40 TABLET, DELAYED RELEASE ORAL at 08:09

## 2018-09-18 RX ADMIN — BUMETANIDE 4 MG: 0.25 INJECTION INTRAMUSCULAR; INTRAVENOUS at 11:09

## 2018-09-18 RX ADMIN — DIGOXIN 125 MCG: 125 TABLET ORAL at 08:09

## 2018-09-18 RX ADMIN — POTASSIUM CHLORIDE 20 MEQ: 20 TABLET, EXTENDED RELEASE ORAL at 08:09

## 2018-09-18 RX ADMIN — METOPROLOL TARTRATE 5 MG: 1 INJECTION, SOLUTION INTRAVENOUS at 06:09

## 2018-09-18 RX ADMIN — BUMETANIDE 4 MG: 0.25 INJECTION INTRAMUSCULAR; INTRAVENOUS at 04:09

## 2018-09-18 RX ADMIN — COLCHICINE 0.6 MG: 0.6 TABLET, FILM COATED ORAL at 08:09

## 2018-09-18 RX ADMIN — TAMSULOSIN HYDROCHLORIDE 0.4 MG: 0.4 CAPSULE ORAL at 08:09

## 2018-09-18 RX ADMIN — BUMETANIDE 4 MG: 1 TABLET ORAL at 08:09

## 2018-09-18 RX ADMIN — PHENYTOIN SODIUM 200 MG: 100 CAPSULE, EXTENDED RELEASE ORAL at 08:09

## 2018-09-18 RX ADMIN — BUMETANIDE 4 MG: 1 TABLET ORAL at 12:09

## 2018-09-18 RX ADMIN — SPIRONOLACTONE 25 MG: 25 TABLET, FILM COATED ORAL at 08:09

## 2018-09-18 RX ADMIN — FERROUS SULFATE TAB EC 325 MG (65 MG FE EQUIVALENT) 325 MG: 325 (65 FE) TABLET DELAYED RESPONSE at 08:09

## 2018-09-18 RX ADMIN — TIOTROPIUM BROMIDE 18 MCG: 18 CAPSULE ORAL; RESPIRATORY (INHALATION) at 08:09

## 2018-09-18 RX ADMIN — AMIODARONE HYDROCHLORIDE 200 MG: 200 TABLET ORAL at 08:09

## 2018-09-18 RX ADMIN — SIMVASTATIN 40 MG: 40 TABLET, FILM COATED ORAL at 08:09

## 2018-09-18 RX ADMIN — OCTREOTIDE ACETATE 50 MCG: 50 INJECTION, SOLUTION INTRAVENOUS; SUBCUTANEOUS at 05:09

## 2018-09-18 RX ADMIN — TRAVOPROST 1 DROP: 0.04 SOLUTION/ DROPS OPHTHALMIC at 08:09

## 2018-09-18 RX ADMIN — METOLAZONE 5 MG: 2.5 TABLET ORAL at 11:09

## 2018-09-18 RX ADMIN — FERROUS SULFATE TAB EC 325 MG (65 MG FE EQUIVALENT) 325 MG: 325 (65 FE) TABLET DELAYED RESPONSE at 04:09

## 2018-09-18 RX ADMIN — OCTREOTIDE ACETATE 50 MCG: 50 INJECTION, SOLUTION INTRAVENOUS; SUBCUTANEOUS at 11:09

## 2018-09-18 NOTE — NURSING
Adm 5mg of ivb lopressor as ordered pt topl well post adm b/p 118/63 hr 112. Pt denies pain no distress noted call light in reach bed alarm set will continue to monitor

## 2018-09-18 NOTE — NURSING
Went to check on pt not sitting on side of bed, now in bathroom stated he is having a bowel movement. Bathroom door is closed pt sounds less upset at this time. Instructed to pull light when done verbalized understanding

## 2018-09-18 NOTE — NURSING
Dr mera reached via cell phone informed him of pt heart rate being in the mid 140's for 27mins and that pt experienced some palpitations pt did report that it was not a new feeling. B/p stable 98/56 heart rate came down to 105 but at 0446 went back up to 143 pt denies palpitations at this time. New telephone orders received for a stat ekg and to adm metoprolol 5mg iv q5mins for 2 doses parameters given for hr and bp orders repeated back verified placed in computer and will be implemented,

## 2018-09-18 NOTE — NURSING
In bathroom sitting on toilet instructed to pull call light when done in bathroom verbalized understanding no c/o pain no distress noted will continue to monitor

## 2018-09-18 NOTE — PROGRESS NOTES
[facility]    CC: symptomatic anemia, melena    Mr. Granger is a 73 y.o. who presents with chronic, recurrent, painless, blood loss anemia associated with black diarrhea, weakness, and fatigue.  He follows outpatient with [facility] Dr. Soto for small bowel angiodysplasias. He was recently admitted on 8/22/2018 for the same complains and had lower DBE on 8/23/2018 with APC to angioectasias in the cecum and ileum. He had been able to reduce transfusions from every 2 weeks to every 6 months with octreotide injections, but has had more bleeding recently.     Interval:   Patient reports that since last night he had 3-4 episodes of very dark brown diarrhea, denies having fresh blood in stool,  denies abdominal pain, fever, chills or malaise, states he feels good, has good appetite.    Past Medical History   has a past medical history of Angiodysplasia of small intestine, Angiodysplasia of small intestine, except duodenum with bleeding, Atrial fibrillation, BPH (benign prostatic hyperplasia), Cardiac defibrillator in place, Chronic combined systolic and diastolic congestive heart failure, COPD (chronic obstructive pulmonary disease), Coronary artery disease, Gout, Hypertension, Mitral stenosis, Pulmonary hypertension, Seizures, and Stroke.    Review of Systems  General ROS: negative for chills, fever or weight loss, endorses mild fatigue but feels much better since admission  Respiratory ROS: no cough, shortness of breath, or wheezing  Cardiovascular ROS: no chest pain or dyspnea on exertion, positive for leg swelling  Gastrointestinal ROS: 3-4 episodes of dark brown watery diarrhea    Physical Examination  /79   Pulse (!) 139   Temp 96.4 °F (35.8 °C)   Resp 18   Ht 6' (1.829 m)   Wt 79.5 kg (175 lb 4.3 oz)   SpO2 96%   BMI 23.77 kg/m²      General appearance: alert, cooperative, no distress  HEENT: Normocephalic, atraumatic, conjunctivae/corneas clear, PERRL, EOM's intact  Lungs: clear to auscultation  in all fields except mild creps in left base, no wheeze  Heart: tachicardic, irregular rhythm, holosystolic murmur  Abdomen: soft, mildly distended, non tender, + BS  Extremities: peripheral pulses palpable, 2+ pitting edema bilateral ,no clubbing, cyanosis,   Neuro: AAOx3, no focal deficit      Labs:  Lab Results   Component Value Date    WBC 6.38 09/18/2018    HGB 8.6 (L) 09/18/2018    HCT 30.6 (L) 09/18/2018    MCV 90 09/18/2018     09/18/2018     CMP  Lab Results   Component Value Date    ALT 54 (H) 09/18/2018    AST 96 (H) 09/18/2018    ALKPHOS 450 (H) 09/18/2018    BILITOT 0.7 09/18/2018     Lab Results   Component Value Date    INR 1.2 09/15/2018     Lab Results   Component Value Date    IRON 13 (L) 07/12/2018    TIBC 426 07/12/2018    FERRITIN 35 07/12/2018     Imaging:  CXR 7/11/2018  Cardiomegaly noted     I have personally reviewed these images     Assessment:   Mr. Granger is a 73 year old man with multiple comorbidities including bioprosthetic aortic and mitral valve replacement in 2004 and CAD with bypass who presents with melena from likely small bowel and colonic angioectasias chronic blood loss and ANDREINA. He is on home iron and octreotide. Last lower DBE 8/23/2018 with Dr. Soto.        Plan:  Melenotic diarrhea  ANDREINA due to chronic blood loss  Angiodysplasia of small intestine  - H& H stable, Transfuse if Hg <7  - on Octreotide injections outpatient, not transfused this hospital admission,  if blood counts drop then trial of Octreotide 50 mcg SC tid    - If H&H drop to require blood transfusions, then will discuss starting Thalidomide in close clinic follow up as it will require prior authorization   - No utility in endoscopy at this time  - Stool studies for infectious work up were not ordered  - Patient reports 3-4 episodes of dark brown watery diarrhea since last night  - Consider holding colchicine to decrease diarrhea      Sherley Mckeon 9/18/2018 7:35 AM   [facility], [facility]

## 2018-09-18 NOTE — NURSING
Dr mera paged regarding pt's heart rate sustaining in 140's 27mins b/p stable pt reported had feeling of some palpitation but was not new feeling. Hr now 105. No distress noted awaiting call return will continue to closely monitor

## 2018-09-18 NOTE — NURSING
Standby assist given to pt back into bed jimmy well reports had a large bowel movement that was formed. Bed alarm set pt denies pain. No distress noted call light in reach will continue to monitor

## 2018-09-18 NOTE — PLAN OF CARE
Problem: Fall Risk (Adult)  Goal: Absence of Falls  Patient will demonstrate the desired outcomes by discharge/transition of care.  Outcome: Ongoing (interventions implemented as appropriate)  Bed alarm set frequent rounds made

## 2018-09-18 NOTE — ASSESSMENT & PLAN NOTE
Acute on chronic combined systolic and diastolic congestive heart failure  S/p aortic valve and mitral valve replacement  Pulmonary hypertension  Takes bumetanide 4 mg BID, metolazone, spironolactone at home.  Continue metolazone and spironolactone.  Give bumetanide 4 mg IV TID.

## 2018-09-18 NOTE — SUBJECTIVE & OBJECTIVE
Interval History: Does not want to be discharged until his fluid overload is treated.    Review of Systems   Constitutional: Negative for chills and fever.   Gastrointestinal: Positive for abdominal distention and diarrhea.     Objective:     Vital Signs (Most Recent):  Temp: 96.4 °F (35.8 °C) (09/18/18 0724)  Pulse: (!) 116 (09/18/18 0806)  Resp: 16 (09/18/18 0806)  BP: 119/79 (09/18/18 0724)  SpO2: 98 % (09/18/18 0806) Vital Signs (24h Range):  Temp:  [96.4 °F (35.8 °C)-97.6 °F (36.4 °C)] 96.4 °F (35.8 °C)  Pulse:  [] 116  Resp:  [16-20] 16  SpO2:  [95 %-98 %] 98 %  BP: ()/(50-79) 119/79     Weight: 79.5 kg (175 lb 4.3 oz)  Body mass index is 23.77 kg/m².    Intake/Output Summary (Last 24 hours) at 9/18/2018 1049  Last data filed at 9/18/2018 0340  Gross per 24 hour   Intake 420 ml   Output 675 ml   Net -255 ml      Physical Exam   Constitutional: He is oriented to person, place, and time. He appears well-developed. No distress.   Pulmonary/Chest: Effort normal. No respiratory distress.   Abdominal: Soft. He exhibits distension. There is no guarding.   Neurological: He is alert and oriented to person, place, and time.   Psychiatric: He has a normal mood and affect.   Nursing note and vitals reviewed.      Significant Labs: All pertinent labs within the past 24 hours have been reviewed.    Significant Imaging: I have reviewed all pertinent imaging results/findings within the past 24 hours.

## 2018-09-18 NOTE — NURSING
2nd dose adm as ordered pt jimmy well. Post adm b/p is 124/70 hr 93. Pt denies palpitations. No distress noted call light in reach bed alarm set will continue to closely monitor

## 2018-09-18 NOTE — PROGRESS NOTES
Pt has d/c orders. Tn went to visit pt and pt with c/o feeling sob and c/o swelling in feet and abdomen. Pt stated he does not feel well enough to be discharged and that his daughter will pick him up when she gets off work around 5pm and he will have her bring hm to [facility] ER. Tn notified Dr. Cruz and Aga, floor nurse of pt's concerns and Tn requested Dr. Cruz come speak with pt..    1030-- Dr. Cruz in to speak with pt and will hold d/c today and give IV diuretic and restart home Metoprolol. TN informed Aga floor nurse of d/c cancelled.Tn left message for Dr. Soto nurse for follow up appt. Pt to call Cardiologist and PCP to scheddue appts and will inform Tn of dates/times

## 2018-09-18 NOTE — NURSING
In  Bed bed alarm reset. Has no c/o pain at this time. Reported just had another diarrhea stool was not witnessed. No distress noted call light in reach will continue to monitor

## 2018-09-18 NOTE — NURSING
B/p 121/79 hr 131. Denies palpitations. Sitting on side of bed gi md rounding on pt. Patient reporte taht he had about 4 loose stools dk brown in color with no red blood noted to toilet. Informed md that stools were not witnessed by me (the nurse) because they were already flushed by the time staff got to room.no distress noted from pt call light in reach will report and round with oncoming nurse

## 2018-09-18 NOTE — NURSING
Bedside rounding done with offgoing nurse. Received care of pt sitting on recliner chair on side of bed chair alarm armed. Pt is aaox3 has no c/o pain at this time. Clear breath sounds upon auscultation no respiratory distress noted pulses palpable bilat eleazar hose in place. +3 pitting edema to bilat lower exts.uses urinal has left chest wall defiberalltor. Active bowel sounds x4 quads. No distress noted call light in reach will continue to monitor.

## 2018-09-18 NOTE — NURSING
Pt still in bathroom no distress noted pt instructed about new md orders verbalized understanding. Instructed to call when done asap. Hr 139. resp thx notified via telephone of stat ekg as ordered.

## 2018-09-18 NOTE — NURSING
Bed alarm going off pt sitting on side of bed stated he had to use the bathroom hr back up to 140's pt denies palpitations this time. Instructed to pull light when done verbalized understanding.

## 2018-09-18 NOTE — PROGRESS NOTES
[facility]  Progress Note    Patient Name: Stefano Granger  MRN: [id number]  Patient Class: IP- Inpatient   Admission Date: 9/14/2018  Length of Stay: 2 days  Attending Physician: Mesfin Cruz MD  Primary Care Provider: [facility]        Subjective:     Principal Problem:Melena    HPI:  Stefano Granger is a 73 y.o. black man with hypertension, coronary artery disease status post coronary artery bypass graft in 2004, history of bioprosthetic aortic and mitral valve replacement in 2004, chronic systolic and diastolic congestive heart failure, mitral valve stenosis, pulmonary hypertension, atrial fibrillation, automatic implantable cardioverter-defibrillator, cigarette smoking with chronic obstructive pulmonary disease, seizure disorder, chronic blood loss anemia due to small bowel angiodysplasias, benign prostatic hyperplasia, gout, hyperlipidemia.  He lives in [place].  He gets [facility] medical care at [facility].  His primary care physician is [facility] resident Dr. Jimmy Andrew.  His cardiologist is [facility] fellow Dr. Rosi Montejo.  His angiodysplasia bleeding is followed by gastroenterologist Dr. Ra Soto at [facility].  He gets outpatient octreotide injections for it.              He was hospitalized at [facility] from 7/11/18 to 7/13/18 for recurrent anemia due to active gastrointestinal bleeding.  Hemoglobin and hematocrit were 5.4 and 20.  He was transfused 4 units of pRBCs.  Hemoglobin and hematocrit increased to 9.4 and 31.  Gastroenterology was consulted and Dr. Soto deferred endoscopy at the time because Mr. Granger responded better to octreotide injections than endoscopic ablation.              He was hospitalized at [facility] from 8/21/18 to 8/24/18 for recurrent anemia due to gastrointeestinal bleeding.  Hemoglobin and hematocrit were 5.7 and 20.4.  He was transfused 2 units of pRBCs. Hemoglobin and hematocrit increased to 7.3 and 25.4. Gastroenterology was consulted and Dr. Soto performed lower double balloon enteroscopy finding two angioectasias in the cecum and distal ileum, treated with argon plasma coagulation.              He presented to [facility] ED on 9/15/18 with complaint of increased weakness and melena that persisted since his last hospitalization.  He also complained of diarrhea and mild shortness of breath.  He denied blood in the stool, abdominal pain, chest pain, fever, chills, nausea, vomiting, or dizziness.  Hemoglobin and hematocrit were 8.2 and 28.7.  Stool was positive for occult blood.  He was not transfused at the time.  He was started on continuous IV normal saline at 125 mL/hr and given pantoprazole 40 mg IV.  He was admitted to [facility].  Gastroenterology was consulted.    Hospital Course:  He received half of his regular dose of metoprolol due to hypotension on admission, but developed asymptomatic tachycardia in the 140s overnight and was given metoprolol 5 mg IV in addition to resumption of his regular dose of metoprolol, with improvement of tachycardia.  By the time someone noticed he was on continuous IV saline and stopped it, it had been over 24 hours.  Acute kidney injury resolved on 9/15/18.  Gastroenterology recommended conservative medical management.  He was given octreotide 100 mcg IV.  He continued to have black, loose stools.  Hemoglobin remained relatively stable (8.6 -> 7.9 -> 8.2).  His fluid intake increased due to being placed on a clear liquid diet.  He takes bumetanide 4 mg twice daily at home, but he received only bumetanide 3 mg IV once daily in the hospital.  Furthermore, his home metolazone was withheld.  He developed abdominal ascites.  His oxygen saturation remained at % on room air.  Hypotension resolved and his  home bumetanide was resumed.  He was seen by Dr. Soto' colleagues until Monday 9/17/18 when Dr. Soto was able to see him personally in the evening.  His melena had resolved so he was cleared for discharge.  However, his iatrogenic decompensated heart failure now had to be treated, so he was given more IV bumetanide, and his home metolazone was resumed.      Interval History: Does not want to be discharged until his fluid overload is treated.    Review of Systems   Constitutional: Negative for chills and fever.   Gastrointestinal: Positive for abdominal distention and diarrhea.     Objective:     Vital Signs (Most Recent):  Temp: 96.4 °F (35.8 °C) (09/18/18 0724)  Pulse: (!) 116 (09/18/18 0806)  Resp: 16 (09/18/18 0806)  BP: 119/79 (09/18/18 0724)  SpO2: 98 % (09/18/18 0806) Vital Signs (24h Range):  Temp:  [96.4 °F (35.8 °C)-97.6 °F (36.4 °C)] 96.4 °F (35.8 °C)  Pulse:  [] 116  Resp:  [16-20] 16  SpO2:  [95 %-98 %] 98 %  BP: ()/(50-79) 119/79     Weight: 79.5 kg (175 lb 4.3 oz)  Body mass index is 23.77 kg/m².    Intake/Output Summary (Last 24 hours) at 9/18/2018 1049  Last data filed at 9/18/2018 0340  Gross per 24 hour   Intake 420 ml   Output 675 ml   Net -255 ml      Physical Exam   Constitutional: He is oriented to person, place, and time. He appears well-developed. No distress.   Pulmonary/Chest: Effort normal. No respiratory distress.   Abdominal: Soft. He exhibits distension. There is no guarding.   Neurological: He is alert and oriented to person, place, and time.   Psychiatric: He has a normal mood and affect.   Nursing note and vitals reviewed.      Significant Labs: All pertinent labs within the past 24 hours have been reviewed.    Significant Imaging: I have reviewed all pertinent imaging results/findings within the past 24 hours.    Assessment/Plan:      Abnormal liver enzymes    Diarrhea  Due to heart failure.  Treating heart failure.          Angiodysplasia of small intestine     Continue octreotide injections.          COPD (chronic obstructive pulmonary disease)    Takes albuterol and tiotropium at home.  Continue.          Hyperlipidemia    Continue home simvastatin.          History of seizure    Takes phenytoin at home.  Continue.          BPH (benign prostatic hyperplasia)    Takes tamsulosin at home.  Continue.          Paroxysmal atrial fibrillation    Takes amiodarone, digoxin, and metoprolol at home.  Continue.          Chronic combined systolic and diastolic heart failure    Acute on chronic combined systolic and diastolic congestive heart failure  S/p aortic valve and mitral valve replacement  Pulmonary hypertension  Takes bumetanide 4 mg BID, metolazone, spironolactone at home.  Continue metolazone and spironolactone.  Give bumetanide 4 mg IV TID.          VTE Risk Mitigation (From admission, onward)        Ordered     Place UZAIR hose  Until discontinued      09/15/18 1427     Place sequential compression device  Until discontinued      09/15/18 1427     IP VTE LOW RISK PATIENT  Once      09/15/18 0151              Mesfin Cruz MD  Department of Hospital Medicine   [facility]

## 2018-09-18 NOTE — DISCHARGE SUMMARY
[facility] Hospital Medicine  Discharge Summary      Patient Name: Stefano Granger  MRN: [id number]  Admission Date: 9/14/2018  Hospital Length of Stay: 3 days  Discharge Date and Time: 9/19/2018  6:27 PM  Attending Physician: Mesfin Cruz MD   Discharging Provider: Mesfin Cruz MD  Primary Care Provider: [facility]      HPI:   Stefano Granger is a 73 y.o. black man with hypertension, coronary artery disease status post coronary artery bypass graft in 2004, history of bioprosthetic aortic and mitral valve replacement in 2004, chronic systolic and diastolic congestive heart failure, mitral valve stenosis, pulmonary hypertension, atrial fibrillation, automatic implantable cardioverter-defibrillator, cigarette smoking with chronic obstructive pulmonary disease, seizure disorder, chronic blood loss anemia due to small bowel angiodysplasias, benign prostatic hyperplasia, gout, hyperlipidemia.  He lives in [facility].  He gets his medical care at [facility].  His primary care physician is [facility] Internal Medicine resident Dr. Jimmy Andrew.  His cardiologist is [facility] Cardiology fellow Dr. Rosi Montejo.  His angiodysplasia bleeding is followed by gastroenterologist Dr. Ra Soto at [facility].  He gets outpatient octreotide injections for it.              He was hospitalized at [facility] from 7/11/18 to 7/13/18 for recurrent anemia due to active gastrointestinal bleeding.  Hemoglobin and hematocrit were 5.4 and 20.  He was transfused 4 units of pRBCs.  Hemoglobin and hematocrit increased to 9.4 and 31.  Gastroenterology was consulted and Dr. Soto deferred endoscopy at the time because Mr. Granger responded better to octreotide injections than endoscopic ablation.              He was hospitalized at [facility] from 8/21/18 to 8/24/18 for recurrent anemia due to  gastrointeestinal bleeding. Hemoglobin and hematocrit were 5.7 and 20.4.  He was transfused 2 units of pRBCs. Hemoglobin and hematocrit increased to 7.3 and 25.4. Gastroenterology was consulted and Dr. Soto performed lower double balloon enteroscopy finding two angioectasias in the cecum and distal ileum, treated with argon plasma coagulation.              He presented to [facility] ED on 9/15/18 with complaint of increased weakness and melena that persisted since his last hospitalization.  He also complained of diarrhea and mild shortness of breath.  He denied blood in the stool, abdominal pain, chest pain, fever, chills, nausea, vomiting, or dizziness.  Hemoglobin and hematocrit were 8.2 and 28.7.  Stool was positive for occult blood.  He was not transfused at the time.  He was started on continuous IV normal saline at 125 mL/hr and given pantoprazole 40 mg IV.  He was admitted to [facility].  Gastroenterology was consulted.    Hospital Course:   He received half of his regular dose of metoprolol due to hypotension on admission, but developed asymptomatic tachycardia in the 140s overnight and was given metoprolol 5 mg IV in addition to resumption of his regular dose of metoprolol, with improvement of tachycardia.  By the time someone noticed he was on continuous IV saline and stopped it, it had been over 24 hours.  Acute kidney injury resolved on 9/15/18.  Gastroenterology recommended conservative medical management.  He was given octreotide 100 mcg IV.  He continued to have black, loose stools.  Hemoglobin remained relatively stable (8.6 -> 7.9 -> 8.2).  His fluid intake increased due to being placed on a clear liquid diet.  He takes bumetanide 4 mg twice daily at home, but he received only bumetanide 3 mg IV once daily in the hospital.  Furthermore, his home metolazone was withheld.  He developed abdominal ascites.  His oxygen saturation remained at % on room air.   Hypotension resolved and his home bumetanide was resumed.  He was seen by Dr. Soto' colleagues until Monday 9/17/18 when Dr. Soto was able to see him personally in the evening.  His melena had resolved so he was cleared for discharge.  However, his iatrogenic decompensated heart failure now had to be treated, so he was given more IV bumetanide, and his home metolazone was resumed.  After a new hospitalist took over on 9/18/18, he was put on bumetanide 4 mg IV three times a day and his home metolazone.  He urinated 2100 mL overnight (net fluid status was -1382 mL) and he was discharged the next day after his morning doses of diuretics.     Consults:   Consults (From admission, onward)        Status Ordering Provider     Gastroenterology  Once     Provider:  Kendall Prakash MD    Completed WILFRIDO [facility]ON        Final Active Diagnoses:    Diagnosis Date Noted POA    Abnormal liver enzymes [R74.8] 09/16/2018 Yes    Angiodysplasia of small intestine [K55.20]  Yes     Chronic    COPD (chronic obstructive pulmonary disease) [J44.9] 07/11/2018 Yes     Chronic    BPH (benign prostatic hyperplasia) [N40.0] 02/10/2018 Yes     Chronic    Hyperlipidemia [E78.5] 02/10/2018 Yes    History of seizure [Z87.898] 02/09/2018 Not Applicable     Chronic    Paroxysmal atrial fibrillation [I48.0] 02/04/2018 Yes     Chronic    Status post aortic valve and mitral valve replacement [Z95.2]  Not Applicable     Chronic    Chronic combined systolic and diastolic heart failure [I50.42] 01/31/2018 Yes     Chronic    Pulmonary hypertension [I27.20] 01/04/2018 Yes     Chronic    Iron deficiency anemia due to chronic blood loss [D50.0] 06/02/2016 Yes     Chronic      Problems Resolved During this Admission:    Diagnosis Date Noted Date Resolved POA    PRINCIPAL PROBLEM:  Melena [K92.1] 09/15/2018 09/17/2018 Yes    Acute on chronic combined systolic and diastolic congestive heart failure [I50.43] 09/18/2018 09/19/2018 No     Diarrhea [R19.7] 09/15/2018 09/19/2018 No    CALLIE (acute kidney injury) [N17.9] 09/15/2018 09/17/2018 Yes       Discharged Condition: good    Disposition: Home or Self Care    Follow Up:  Follow-up Information     Call Ra Soto MD.    Specialty:  Gastroenterology  Why:  TN left message for Dr. Soto nurse for follow up  Contact information:  [address]  [place] [address]  [phone]             Dr. Andrew On 10/11/2018.    Why:  at 2:30 pm, Primary Care Physician  Contact information:  [address] [facility] [address]  [phone]               Patient Instructions:      Diet Adult Regular     Order Specific Question Answer Comments   Na restriction, if any: 2gNa      Notify your health care provider if you experience any of the following:  increased confusion or weakness     Notify your health care provider if you experience any of the following:  persistent dizziness, light-headedness, or visual disturbances     Activity as tolerated       Significant Diagnostic Studies:   Recent Labs   Lab  09/17/18   0433  09/18/18   0426  09/19/18   0631   WBC  4.09  6.38  6.37   HGB  8.2*  8.6*  7.9*   HCT  29.1*  30.6*  26.9*   PLT  215  293  205     2D echo with color flow doppler 9/17/18:   CONCLUSIONS     1 - Severely depressed left ventricular systolic function (EF 20-25%).     2 - Moderately depressed right ventricular systolic function .     3 - Aortic valve prosthesis.     4 - Mitral valve prosthesis.     5 - Mild to moderate mitral regurgitation.     6 - Increased central venous pressure.     7 - Pulmonary hypertension. The estimated PA systolic pressure is 110 mmHg.     Medications:  Reconciled Home Medications:      Medication List      CHANGE how you take these medications    metoprolol succinate 100 MG 24 hr tablet  Commonly known as:  TOPROL-XL  Take 3 tablets (300 mg total) by mouth once daily.  What changed:    · how much to take  · when to take this        CONTINUE taking these  "medications    albuterol 90 mcg/actuation inhaler  Commonly known as:  PROVENTIL/VENTOLIN HFA  Inhale 2 puffs into the lungs every 6 (six) hours as needed for Wheezing. Rescue     amiodarone 200 MG Tab  Commonly known as:  PACERONE  Take 200 mg by mouth.     bumetanide 2 MG tablet  Commonly known as:  BUMEX  Take 2 tablets (4 mg total) by mouth 2 (two) times daily.     colchicine 0.6 mg tablet  Commonly known as:  COLCRYS  Take 0.6 mg by mouth 2 (two) times daily.     digoxin 125 mcg tablet  Commonly known as:  LANOXIN  Take 125 mcg by mouth every other day.     ferrous sulfate 325 (65 FE) MG EC tablet  Take 1 tablet (325 mg total) by mouth 3 (three) times daily.     metOLazone 5 MG tablet  Commonly known as:  ZAROXOLYN  Take 1 tablet (5 mg total) by mouth every 48 hours.     needle (disp) 21 G 21 gauge x 1 1/2" Ndle  1 each by Misc.(Non-Drug; Combo Route) route every 30 days.     octreotide lar 20 mg injection  Commonly known as:  SANDOSTATIN LAR  Inject 20 mg into the muscle every 28 days.     pantoprazole 20 MG tablet  Commonly known as:  PROTONIX  Take 2 tablets (40 mg total) by mouth once daily.     phenytoin 100 MG ER capsule  Commonly known as:  DILANTIN  Take 200 mg by mouth 2 (two) times daily.     potassium chloride 10 MEQ Tbsr  Commonly known as:  KLOR-CON  Take 20 mEq by mouth 2 (two) times daily.     sildenafil 20 mg Tab  Commonly known as:  REVATIO  Take 1 tablet (20 mg total) by mouth 3 (three) times daily.     simvastatin 40 MG tablet  Commonly known as:  ZOCOR  Take 40 mg by mouth every evening.     SPIRIVA WITH HANDIHALER 18 mcg inhalation capsule  Generic drug:  tiotropium  Inhale 1 capsule (18 mcg total) into the lungs once daily. Controller     spironolactone 25 MG tablet  Commonly known as:  ALDACTONE  Take 25 mg by mouth once daily.     tamsulosin 0.4 mg Cap  Commonly known as:  FLOMAX  Take 1 capsule (0.4 mg total) by mouth once daily.     travoprost 0.004 % ophthalmic solution  Commonly " known as:  TRAVATAN Z  Place 1 drop into both eyes once daily.     vacuum erection device system Kit  Take as directed     ZOSTAVAX (PF) 19,400 unit/0.65 mL injection  Generic drug:  zoster vaccine live (PF)            Indwelling Lines/Drains at time of discharge: None    Time spent on the discharge of patient: 35 minutes  Patient was seen and examined on the date of discharge and determined to be suitable for discharge.         Mesfin Cruz MD  Department of Hospital Medicine  [facility]

## 2018-09-18 NOTE — NURSING
Bed alarm going off, pt sitting on side of  Bed using urinal, pt is upset secondary to bed alarm being set. Pt explained rationale of why bed alarm has to be set. Pt continuing to fuss. Instructed pt to call when done using urinal. Will check in on pt shortly. No distress noted,

## 2018-09-19 VITALS
RESPIRATION RATE: 18 BRPM | HEART RATE: 118 BPM | HEIGHT: 72 IN | SYSTOLIC BLOOD PRESSURE: 131 MMHG | WEIGHT: 175.25 LBS | DIASTOLIC BLOOD PRESSURE: 58 MMHG | OXYGEN SATURATION: 93 % | BODY MASS INDEX: 23.74 KG/M2 | TEMPERATURE: 96 F

## 2018-09-19 PROBLEM — I50.43 ACUTE ON CHRONIC COMBINED SYSTOLIC AND DIASTOLIC CONGESTIVE HEART FAILURE: Status: RESOLVED | Noted: 2018-09-18 | Resolved: 2018-09-19

## 2018-09-19 PROBLEM — R19.7 DIARRHEA: Status: RESOLVED | Noted: 2018-09-15 | Resolved: 2018-09-19

## 2018-09-19 LAB
ALBUMIN SERPL BCP-MCNC: 3 G/DL
ALP SERPL-CCNC: 429 U/L
ALT SERPL W/O P-5'-P-CCNC: 56 U/L
ANION GAP SERPL CALC-SCNC: 13 MMOL/L
ANISOCYTOSIS BLD QL SMEAR: SLIGHT
AST SERPL-CCNC: 107 U/L
BASOPHILS # BLD AUTO: 0.2 K/UL
BASOPHILS NFR BLD: 3.1 %
BILIRUB SERPL-MCNC: 0.6 MG/DL
BUN SERPL-MCNC: 33 MG/DL
CALCIUM SERPL-MCNC: 9 MG/DL
CHLORIDE SERPL-SCNC: 105 MMOL/L
CO2 SERPL-SCNC: 17 MMOL/L
CREAT SERPL-MCNC: 1.5 MG/DL
DIFFERENTIAL METHOD: ABNORMAL
EOSINOPHIL # BLD AUTO: 0.1 K/UL
EOSINOPHIL NFR BLD: 1.7 %
ERYTHROCYTE [DISTWIDTH] IN BLOOD BY AUTOMATED COUNT: 16.5 %
EST. GFR  (AFRICAN AMERICAN): 53 ML/MIN/1.73 M^2
EST. GFR  (NON AFRICAN AMERICAN): 46 ML/MIN/1.73 M^2
GLUCOSE SERPL-MCNC: 133 MG/DL
HCT VFR BLD AUTO: 26.9 %
HGB BLD-MCNC: 7.9 G/DL
HYPOCHROMIA BLD QL SMEAR: ABNORMAL
LYMPHOCYTES # BLD AUTO: 0.8 K/UL
LYMPHOCYTES NFR BLD: 13 %
MAGNESIUM SERPL-MCNC: 2.6 MG/DL
MCH RBC QN AUTO: 25.7 PG
MCHC RBC AUTO-ENTMCNC: 29.4 G/DL
MCV RBC AUTO: 88 FL
MONOCYTES # BLD AUTO: 1.1 K/UL
MONOCYTES NFR BLD: 17 %
NEUTROPHILS # BLD AUTO: 3.8 K/UL
NEUTROPHILS NFR BLD: 65.2 %
OVALOCYTES BLD QL SMEAR: ABNORMAL
PHOSPHATE SERPL-MCNC: 2.9 MG/DL
PLATELET # BLD AUTO: 205 K/UL
PLATELET BLD QL SMEAR: ABNORMAL
PMV BLD AUTO: 10.9 FL
POIKILOCYTOSIS BLD QL SMEAR: SLIGHT
POLYCHROMASIA BLD QL SMEAR: ABNORMAL
POTASSIUM SERPL-SCNC: 5.5 MMOL/L
PROT SERPL-MCNC: 6.4 G/DL
RBC # BLD AUTO: 3.07 M/UL
SCHISTOCYTES BLD QL SMEAR: PRESENT
SODIUM SERPL-SCNC: 135 MMOL/L
WBC # BLD AUTO: 6.37 K/UL

## 2018-09-19 PROCEDURE — 36415 COLL VENOUS BLD VENIPUNCTURE: CPT

## 2018-09-19 PROCEDURE — 80053 COMPREHEN METABOLIC PANEL: CPT

## 2018-09-19 PROCEDURE — 25000003 PHARM REV CODE 250: Performed by: PHYSICIAN ASSISTANT

## 2018-09-19 PROCEDURE — 25000003 PHARM REV CODE 250: Performed by: HOSPITALIST

## 2018-09-19 PROCEDURE — 94640 AIRWAY INHALATION TREATMENT: CPT

## 2018-09-19 PROCEDURE — 25000242 PHARM REV CODE 250 ALT 637 W/ HCPCS: Performed by: PHYSICIAN ASSISTANT

## 2018-09-19 PROCEDURE — 94761 N-INVAS EAR/PLS OXIMETRY MLT: CPT

## 2018-09-19 PROCEDURE — 63600175 PHARM REV CODE 636 W HCPCS: Performed by: STUDENT IN AN ORGANIZED HEALTH CARE EDUCATION/TRAINING PROGRAM

## 2018-09-19 PROCEDURE — S0171 BUMETANIDE 0.5 MG: HCPCS | Performed by: HOSPITALIST

## 2018-09-19 PROCEDURE — 84100 ASSAY OF PHOSPHORUS: CPT

## 2018-09-19 PROCEDURE — 83735 ASSAY OF MAGNESIUM: CPT

## 2018-09-19 PROCEDURE — 85025 COMPLETE CBC W/AUTO DIFF WBC: CPT

## 2018-09-19 RX ORDER — BUMETANIDE 0.25 MG/ML
4 INJECTION INTRAMUSCULAR; INTRAVENOUS 3 TIMES DAILY
Status: DISCONTINUED | OUTPATIENT
Start: 2018-09-19 | End: 2018-09-19 | Stop reason: HOSPADM

## 2018-09-19 RX ORDER — METOLAZONE 2.5 MG/1
5 TABLET ORAL DAILY
Status: DISCONTINUED | OUTPATIENT
Start: 2018-09-19 | End: 2018-09-19 | Stop reason: HOSPADM

## 2018-09-19 RX ADMIN — METOPROLOL SUCCINATE 200 MG: 50 TABLET, EXTENDED RELEASE ORAL at 10:09

## 2018-09-19 RX ADMIN — OCTREOTIDE ACETATE 50 MCG: 50 INJECTION, SOLUTION INTRAVENOUS; SUBCUTANEOUS at 07:09

## 2018-09-19 RX ADMIN — TRAVOPROST 1 DROP: 0.04 SOLUTION/ DROPS OPHTHALMIC at 10:09

## 2018-09-19 RX ADMIN — TAMSULOSIN HYDROCHLORIDE 0.4 MG: 0.4 CAPSULE ORAL at 10:09

## 2018-09-19 RX ADMIN — COLCHICINE 0.6 MG: 0.6 TABLET, FILM COATED ORAL at 10:09

## 2018-09-19 RX ADMIN — BUMETANIDE 4 MG: 0.25 INJECTION INTRAMUSCULAR; INTRAVENOUS at 07:09

## 2018-09-19 RX ADMIN — METOLAZONE 5 MG: 2.5 TABLET ORAL at 07:09

## 2018-09-19 RX ADMIN — SPIRONOLACTONE 25 MG: 25 TABLET, FILM COATED ORAL at 10:09

## 2018-09-19 RX ADMIN — FERROUS SULFATE TAB EC 325 MG (65 MG FE EQUIVALENT) 325 MG: 325 (65 FE) TABLET DELAYED RESPONSE at 02:09

## 2018-09-19 RX ADMIN — FERROUS SULFATE TAB EC 325 MG (65 MG FE EQUIVALENT) 325 MG: 325 (65 FE) TABLET DELAYED RESPONSE at 10:09

## 2018-09-19 RX ADMIN — BUMETANIDE 4 MG: 0.25 INJECTION INTRAMUSCULAR; INTRAVENOUS at 02:09

## 2018-09-19 RX ADMIN — POTASSIUM CHLORIDE 20 MEQ: 20 TABLET, EXTENDED RELEASE ORAL at 10:09

## 2018-09-19 RX ADMIN — PANTOPRAZOLE SODIUM 40 MG: 40 TABLET, DELAYED RELEASE ORAL at 10:09

## 2018-09-19 RX ADMIN — PHENYTOIN SODIUM 200 MG: 100 CAPSULE, EXTENDED RELEASE ORAL at 10:09

## 2018-09-19 RX ADMIN — TIOTROPIUM BROMIDE 18 MCG: 18 CAPSULE ORAL; RESPIRATORY (INHALATION) at 07:09

## 2018-09-19 RX ADMIN — AMIODARONE HYDROCHLORIDE 200 MG: 200 TABLET ORAL at 10:09

## 2018-09-19 NOTE — NURSING
Bedside rounding done received care of pt sitting in recliner chair on side of bed. aaox3 has no c/o pain/discomfort denies chest pain/palpitations. St 136 on telemetry at this time. Instructed pt about stool specimen verbalizeed understanding. Clear breath sounds upon auscultation no respiratory distress noted pulses palpable left chest wall debillator in place. +4 Pitting edema to ble. bilat eleazar hose in place. Active bowel sounds x4 quads. Urinal in reach. No distress noted call light in reach will continue to monitor also informed pt that he would be moving closer to nurses station. Verbalized understanding.

## 2018-09-19 NOTE — NURSING
Pt has been sitting in chair most of the day. Chair alarm on. New IV started today. See assessment. Pt is now on contact precautions for possible c-diff. Telemetry running 110-130s. MD aware. Will continue to monitor

## 2018-09-19 NOTE — NURSING
Pt transferred to  465 via wheelchair by nursing staff. no distress noted pt safely transferred pt denies pain personal belongings transferred with pt. Pt opting to sit in recliner with feet elevated at this time. Call light in reach chair alarm set. Pt oriented to new room. Will continue to monitor

## 2018-09-19 NOTE — PROGRESS NOTES
.Pharmacy New Medication Education    Patient accepted medication education.    Pharmacy educated patient on name and purpose of medications and possible side effects, using the teach-back method.     Current Inpatient Medication Orders   acetaminophen tablet 650 mg   acetaminophen tablet 650 mg   albuterol inhaler 2 puff   amiodarone tablet 200 mg   bumetanide injection 4 mg   colchicine tablet 0.6 mg   dextrose 50% injection 12.5 g   dextrose 50% injection 25 g   digoxin tablet 125 mcg   ferrous sulfate EC tablet 325 mg   glucagon (human recombinant) injection 1 mg   glucose chewable tablet 16 g   glucose chewable tablet 24 g   metOLazone tablet 5 mg   metoprolol succinate (TOPROL-XL) 24 hr tablet 200 mg   octreotide injection 50 mcg   ondansetron disintegrating tablet 8 mg   pantoprazole EC tablet 40 mg   phenytoin (DILANTIN) ER capsule 200 mg   potassium chloride SA CR tablet 20 mEq   simvastatin tablet 40 mg   sodium chloride 0.9% flush 5 mL   spironolactone tablet 25 mg   tamsulosin 24 hr capsule 0.4 mg   tiotropium inhalation capsule 18 mcg   travoprost 0.004 % ophthalmic solution 1 drop       Learners of pharmacy medication education included:  Patient    Patient +/- learner response:  Verbalized Understanding, Teachback

## 2018-09-19 NOTE — NURSING
Patient d/c home d/c instructions given to the patient. Patient verbalized understanding. Patient advised there are no changes to his medications. Education given, refer to clinical reference for education. IV removed tip intact. Telemetry d/c. Waiting for the patient's daughter to come and pick him up.

## 2018-09-19 NOTE — PLAN OF CARE
Problem: Gastrointestinal Bleeding (Adult)  Goal: Signs and Symptoms of Listed Potential Problems Will be Absent, Minimized or Managed (Gastrointestinal Bleeding)  Signs and symptoms of listed potential problems will be absent, minimized or managed by discharge/transition of care (reference Gastrointestinal Bleeding (Adult) CPG).  Outcome: Ongoing (interventions implemented as appropriate)  Awaiting stool sample for c-diff as ordered

## 2018-09-19 NOTE — NURSING
Pt had dk brown formed stool sample collected and sent to lab as ordered. Pt jimmy well. Back in recliner with feet elevated denies pain chair alarm set. Call light in reach will continue to monitor. Once  Lab received sample  ariel called and said by the stool being formed it does not qualify for stool for c-diff collection. Will notify md later this am.

## 2018-09-19 NOTE — PHYSICIAN QUERY
PT Name: Stefano Granger  MR #: [id number]    Physician Query Form - CardioPulmonary Clarification      CDS/: Mecca MCKEON, RN, CCDS               Contact information: [email or web address]    This form is a permanent document in the medical record.    Query Date: September 19, 2018    By submitting this query, we are merely seeking further clarification of documentation. Please utilize your independent clinical judgment when addressing the question(s) below.    The Medical record contains the following:   Indicators   Supporting Clinical Findings Location in Medical Record    x Pulmonary Hypertension documented  Pulmonary hypertension  Patient takes bumetanide, metolazone, spironolactone, and potassium. Hold diuretics due to hypotension on admission.    H&P 9/15/18    x Acute/Chronic Illness   Acute on chronic combined systolic and diastolic congestive heart failure     cigarette smoking with chronic obstructive pulmonary disease    Hospital medicine Progress note 9/18/18    x Echo and/or Heart Cath Findings  Right Ventricle: The right ventricle is normal in size measuring 3.2 cm at the base in the apical right ventricle-focused view. Global right ventricular systolic function appears moderately depressed. The estimated PA systolic pressure is 110 mmHg.  ECHO 9/17/18    BiPAP/Intubation/Supplemental O2      SOB, DYSON, Fatigue, Dizziness, LE Edema, Cyanosis, Chest Pain, Respiratory Distress, Hypoxia, etc.       Treatment         Medication      Other     Provider, please specify the type of pulmonary hypertension:    [  x ]  Group 2:  Pulmonary Hypertension due to Left Heart Disease, including left heart failure and/or left heart valve disease    [   ]  Group 3:  Pulmonary Hypertension due to Lung Disease    [   ]  Group 5:  Pulmonary Hypertension due to other, multifactorial, or unclear mechanisms    [   ]  Pulmonary Hypertension, unspecified    [   ]  Other Cardiopulmonary Condition (please specify):   _____________________________________    [   ]  Clinically Undetermined    Please document in your progress notes daily for the duration of treatment, until resolved, and include in your discharge summary.

## 2018-09-19 NOTE — NURSING
In bed alarm set pt denies pain/discomfort. No distress  Noted hr stable on monitor. No distress noted call light in reach will report and round with oncoming nurse.

## 2018-09-19 NOTE — NURSING
Informed dr mera over telephone of pt' stools are more formed therefore lab is unable to run test on stool sample, md aware. No new orders noted at this time

## 2018-09-19 NOTE — PROGRESS NOTES
[facility]    CC: symptomatic anemia, melena       Interval:   No active issues overnight, this morning patient reports that his diarrhea resolved, had 2 soft formed brown stools, denies nausea, abdominal pain. States he feels good.       Past Medical History   has a past medical history of Angiodysplasia of small intestine, Angiodysplasia of small intestine, except duodenum with bleeding, Atrial fibrillation, BPH (benign prostatic hyperplasia), Cardiac defibrillator in place, Chronic combined systolic and diastolic congestive heart failure, COPD (chronic obstructive pulmonary disease), Coronary artery disease, Gout, Hypertension, Mitral stenosis, Pulmonary hypertension, Seizures, and Stroke.    Review of Systems  General ROS: negative for chills, fever or weight loss  Respiratory ROS: no cough, shortness of breath, or wheezing  Cardiovascular ROS: no chest pain,  Has mild dyspnea on exertion  Gastrointestinal ROS: diarrhea resolved, has now soft formed brown stool, denies abdominal pain, nausea or vomiting    Physical Examination  /65 (Patient Position: Lying)   Pulse 68   Temp 96.7 °F (35.9 °C) (Oral)   Resp 20   Ht 6' (1.829 m)   Wt 79.5 kg (175 lb 4.3 oz)   SpO2 97%   BMI 23.77 kg/m²        General appearance: alert, cooperative, no distress  HEENT: Normocephalic, atraumatic, conjunctivae/corneas clear, PERRL, EOM's intact  Lungs: clear to auscultation in all fields, no wheeze  Heart:irregular rhythm, S1, S2 appreciated, holosystolic murmur  Abdomen: soft, mildly distended, non tender, + BS  Extremities: peripheral pulses palpable, 2+ pitting edema bilateral ,no clubbing, cyanosis,   Neuro: AAOx3, no focal ficit    Assessment:   Mr. Granger is a 73 year old man with multiple comorbidities including bioprosthetic aortic and mitral valve replacement in 2004 and CAD with bypass who presents with melena from likely small bowel angioectasias chronic blood loss and ANDREINA. He is on home iron and  octreotide. Last lower DBE 8/23/2018 with Dr. Soto.      Plan:  ANDREINA due to chronic blood loss  Angiodysplasia of small intestine  - H& H stable with slight drop in Hgb from 8.6 to 7.9 and Hct 30.6 to 26.9, Transfuse if Hg <7  - continue with Octreotide injections while inpatient and monthly injections as outpatient  -no need for stool studies as patient has formed brown stool  -GI will sign off, patient will continue to follow up with [facility] as outpatient

## 2018-09-19 NOTE — NURSING
Sitting in recliner chair on side of bed chair alarm set. Pt has no c/o pain no distress noted call light in reach will continue to closely monitor denies chest palpitations hr sr 89 with pvcs and pacer spikes noted on telemetry

## 2018-09-19 NOTE — NURSING
Spoke with Dr. Cruz regarding heart rate in 110's 120's. Dr. cruz stated that the patient has episodes of ST. Will continue to monitor

## 2018-09-19 NOTE — PLAN OF CARE
Patient is discharged to home. PCP follow-up scheduled.      TN met with patient. His daughter will provide transportation home after 5 pm.    Follow-up With  Details  Why  Contact Info   Ra Soto MD  Call  TN left message for Dr. Soto nurse for follow up  [address]  [place] [address]  [phone]   Dr. Andrew  On 10/11/2018  at 2:30 pm, Primary Care Physician  [address] [place] [address]  [phone]      09/19/18 0919   Final Note   Assessment Type Final Discharge Note   Discharge Disposition Home   What phone number can be called within the next 1-3 days to see how you are doing after discharge? [phone]   Hospital Follow Up  Appt(s) scheduled? Yes   Right Care Referral Info   Post Acute Recommendation No Care     Citlali Garcia RN  Transition Navigator  [phone]

## 2018-09-19 NOTE — PLAN OF CARE
Problem: Patient Care Overview  Goal: Plan of Care Review  Outcome: Ongoing (interventions implemented as appropriate)  Patient on RA with sats as documented.  The proper method of use, as well as anticipated side effects, of this metered-dose inhaler are discussed and demonstrated to the patient. Pt on prn therapy and doesn't require respiratory tx at this time. Pt with no apparent distress noted. Will continue to monitor.

## 2018-09-19 NOTE — NURSING
Pt was going to be discharged and monitor was removed for a couple of hours. However the discharge was cancelled and monitor was placed back on. Pt running ST in the 120's. Dr. Cruz was notified.. No new orders at this time

## 2018-09-19 NOTE — PLAN OF CARE
09/19/18 1104   Medicare Message   Important Message from Medicare regarding Discharge Appeal Rights Given to patient/caregiver;Explained to patient/caregiver;Signed/date by patient/caregiver   Date IMM was signed 09/19/18   Time IMM was signed 1100

## 2018-09-19 NOTE — NURSING
In bed resting quietly respirations are nonlabored, no distress noted bed alarm set will continue to monitor

## 2019-01-25 NOTE — PROGRESS NOTES
[facility] Resident RUI Progress Note    Subjective:      Stefano Granger has received 5 units pRBC total since admit, last bag hanging now. On admission, his bumex and BB were held given hypotension - however, patient no complaining of SOB, worse with lying flat and LE edema. HR was labile overnight, ranging from 90 at rest to 190 with activity. Will resume 1/2 home dose of BB and bumex this AM and, if well tolerated, will resume full dose of home regimen. He had 2 tarry stools overnight. He denies any other problems overnight.      Objective:   Last 24 Hour Vital Signs:  BP  Min: 80/51  Max: 141/65  Temp  Av.1 °F (36.7 °C)  Min: 97.1 °F (36.2 °C)  Max: 98.9 °F (37.2 °C)  Pulse  Av.7  Min: 88  Max: 136  Resp  Av.1  Min: 15  Max: 20  SpO2  Av.1 %  Min: 95 %  Max: 100 %  Height  Av' (182.9 cm)  Min: 6' (182.9 cm)  Max: 6' (182.9 cm)  Weight  Av.8 kg (164 lb 14.5 oz)  Min: 74.8 kg (164 lb 14.5 oz)  Max: 74.8 kg (164 lb 14.5 oz)  I/O last 3 completed shifts:  In: 3522 [P.O.:572; Blood:950; IV Piggyback:2000]  Out: 3000 [Urine:3000]    Physical Examination:  General: Alert, Awake, Oriented x 3, No Acute Distress  Head: normocephalic, atraumatic  Eyes: PERRL, EOMI, no scleral icterus, conjunctival pallor  Nose: no tenderness to palpation, no drainage or erythema appreciated  Mouth and Throat: poor dentition, no lesions noted, moist mucus membranes  Neck: no lymphadenopathy appreciated, No carotid bruits  Respiratory: bibasilar crackles appreciated in lower lung fields, no accessory use of muscles   Cardiovascular: regular rate with regular rhythm, no mumurs, rubs, or S3, S4, normal apical impulse.  Gastrointestinal: soft, nontender, nondistended,no rebound or guarding, normoactive bowel sounds.   Extremities: pulses 2+ in all extremities, 2+ pitting edema to knee with venous stasis changes bilaterally, normal ROM   Neuro:  full strength even in all extremities, no sensory  deficits.       Laboratory:  Laboratory Data Reviewed:  Trended Lab Data:    Recent Labs  Lab 01/31/18  1414 02/01/18  0519  02/01/18  1906 02/01/18 2355 02/02/18  0401   WBC 6.56 7.23  < > 6.98 8.56 9.16   HGB 4.3* 6.0*  < > 7.1* 6.2* 6.2*   HCT 14.9* 19.7*  < > 22.9* 20.2* 20.2*    245  < > 230 187 221   MCV 93 92  < > 91 91 91   RDW 17.3* 15.8*  < > 16.2* 16.1* 16.0*   * 137  --   --   --  137   K 3.7 3.7  --   --   --  4.2   CL 95 98  --   --   --  98   CO2 32* 30*  --   --   --  29   BUN 67* 65*  --   --   --  57*   CREATININE 1.3 1.2  --   --   --  1.3   * 118*  --   --   --  146*   PROT 5.4* 5.3*  --   --   --  5.3*   ALBUMIN 2.4* 2.5*  --   --   --  2.6*   BILITOT 0.3 0.5  --   --   --  0.4   AST 27 25  --   --   --  33   ALKPHOS 223* 212*  --   --   --  229*   ALT 28 25  --   --   --  31   < > = values in this interval not displayed.    Trended Cardiac Data:    Recent Labs  Lab 02/01/18 1906 02/01/18 2355 02/02/18  0401   TROPONINI 0.037* 0.039* 0.041*       Microbiology Data   Microbiology Results (last 7 days)     ** No results found for the last 168 hours. **          Radiology:  Imaging Results    None         Current Medications:     Infusions:       Scheduled:   atorvastatin  80 mg Oral QHS    bumetanide  2 mg Oral BID    digoxin  125 mcg Oral Every other day    metoprolol succinate  100 mg Oral Daily    pantoprazole  40 mg Intravenous BID    phenytoin  200 mg Oral BID    tamsulosin  0.4 mg Oral Daily    tiotropium  1 capsule Inhalation Daily    travoprost  1 drop Both Eyes Daily        PRN:  sodium chloride, sodium chloride, ramelteon    Lines and Day Number of Therapy:  PIV x2    Assessment and Plan     Stefano RACHAEL Granger is a 72 y.o.male with    Hypovolemic shock 2/2 to Acute blood loss Anemia 2/2 to Recurrent GI bleed  - Hx of recurrent melena, h/o AVM, h/o multiple blood transfusions  -pt recently discharged on 01/14/2018 from this hospital due to GI bleed. Was  transfused 2 units at that time.   - pt cared for in 11/2017, 12/2017, and early 1/2018 at [facility] for DBE w/ Dr. Soto - no source of bleeding found at that time   - presented with Hb=4.3, will receive a total of 4 units. 2 units first with recheck CBC, then another 2 with appropriate response. Necessitated an additional 1 unit overnight 2/2.   -imaging: CTA abdomen pelvis negative from 1/11/18  - GI consulted, appreciate recs: transfuse as needed, considering octreotide ggt, endoscopy with Brittany Monday if still in house.  - H/H 6.2 on AM labs. Receiving 5th unit this Am. Will continue to monitor and transfuse as necessary.     Intermediate Troponin  -trop =0.030 --> 0.039 --> 0.041   -EKG with no acute ST segment changes, likely secondary to demand given elevated HRs   -Will continue to monitor    Pre-renal azotemia  - Cr 1.2 BUN 67 on admit --> Cr 1.3 (2/2)  - likely pre-renal secondary to GI bleed  - Receiving IVF and pRBC, will continue to monitor with daily CMP     Combined H F s/p AICD  -On home metoprolol and bumex, held all meds that will effect his BP    -Echo from [facility] showed EF 45-50 (12/2017)  -AICD placement evaluated via CXR  - Patient appeared volume overloaded this morning (2/2), resuming 1/2 dose home lasix. May resume 4mg BID this afternoon if well tolerated.  - HRs elevated overnight, resumed 100 toprol XL this Am (1/2 home dose). Will resume full dose if well tolerated.    BPH  -continuing flomax   -no acute concerns     Hx of AO and mitral Valve replacements  -currently not on AC, home med aspirin is being held given GIB     HTN  -holding home antihypertensive meds as above      Afib  -giving Digitalis, initially held beta-blocker due to hypotension  - resumed metoprolol today (2/2) given elevated HRs overnight.      PulmHTN  -no issues, holding sildenafil, due to hypotension      Diet: Regular  Ppx: SCD  Dispo: admit to floor, pending stabilization and cessation of GIB, possible endoscopic  procedure Monday w/ Dr. Brittany Richards  [facility] HO-I  [facility] Hospitalitis Service Team A    [facility] Medicine Hospitalist Pager numbers:   [facility] Hospitalist Medicine Team A (Lola/Larry): [phone]  [facility] Hospitalist Medicine Team B (Jessie/Reinaldo):  [phone]     complains of pain/discomfort

## 2022-11-22 NOTE — ASSESSMENT & PLAN NOTE
----- Message from Kala Lema MD sent at 11/22/2022  8:53 AM EST -----  Please inform patient his PSA has remained elevated.   I would like to refer him to see a urologist.  Let me know if he does not want to go  ----- Message -----  From: Edith Domingo Incoming [place] [facility]  Sent: 11/21/2022   9:23 PM EST  To: Kala Lema MD Iron deficiency anemia due to chronic blood loss  Angiodysplasia of small intestine  Diarrhea  Pt with frequent tarry, black stools. +FOB. No evidence of BRB in stool    -Hemoglobin at 8.6 on admission. Remaining stable at 7.9>8.2. Continue to trend H/H and monitor  -Gets outpatient octreotide injections. Octreotide 100mcg IV given.  -GI on board, appreciate recommendations. Continue conservative tx. Endoscopy timing per Dr. Soto.

## 2023-06-28 NOTE — ANESTHESIA POSTPROCEDURE EVALUATION
Anesthesia Post Evaluation    Patient: Stefano Granger    Procedure(s) Performed: Procedure(s) (LRB):  Lower DBE (N/A)    Final Anesthesia Type: MAC  Patient location during evaluation: GI PACU  Patient participation: Yes- Able to Participate  Level of consciousness: awake and alert and oriented  Post-procedure vital signs: reviewed and stable  Pain management: adequate  Airway patency: patent  PONV status at discharge: No PONV  Anesthetic complications: no      Cardiovascular status: blood pressure returned to baseline  Respiratory status: unassisted  Hydration status: euvolemic  Follow-up not needed.        Visit Vitals  /64 (BP Location: Left arm, Patient Position: Lying)   Pulse 75   Temp 37.1 °C (98.8 °F) (Oral)   Resp 20   Ht 6' (1.829 m)   Wt 74.8 kg (164 lb 14.5 oz)   SpO2 99%   BMI 22.37 kg/m²       Pain/William Score: Pain Assessment Performed: Yes (2/5/2018  1:39 PM)  Presence of Pain: denies (2/5/2018  1:39 PM)  Pain Rating Prior to Med Admin: 6 (2/4/2018 10:16 PM)  William Score: 10 (2/5/2018  1:39 PM)      
(2) good, crying

## 2025-04-19 NOTE — PROGRESS NOTES
Pharmacist Intervention IV to PO Note    Stefanodennis Granger is a 72 y.o. male being treated with IV medication pantoprazole    Patient Data:    Vital Signs (Most Recent):  Temp: 98.7 °F (37.1 °C) (02/05/18 1530)  Pulse: 84 (02/05/18 1530)  Resp: 14 (02/05/18 1530)  BP: (!) 111/58 (02/05/18 1530)  SpO2: 99 % (02/05/18 1338)   Vital Signs (72h Range):  Temp:  [97.3 °F (36.3 °C)-99.2 °F (37.3 °C)]   Pulse:  [66-95]   Resp:  [12-20]   BP: ()/(51-78)   SpO2:  [92 %-100 %]      CBC:    Recent Labs     Lab 02/04/18  0850 02/05/18  0943 02/05/18  0944   WBC 8.15 7.56 7.71   RBC 3.17* 3.92* 3.88*   HGB 8.7* 10.8* 10.9*   HCT 29.2* 34.4* 34.1*    257 252   MCV 92 88 88   MCH 27.4 27.6 28.1   MCHC 29.8* 31.4* 32.0     CMP:     Recent Labs     Lab 02/03/18  0534 02/04/18  0608 02/05/18  0943   * 107 96   CALCIUM 8.4* 8.2* 8.9   ALBUMIN 2.6* 2.6* 2.7*   PROT 5.8* 5.8* 6.3    137 136   K 4.6 4.2 3.8   CO2 32* 32* 29   CL 99 99 96   BUN 39* 36* 21   CREATININE 1.1 1.1 0.9   ALKPHOS 215* 205* 246*   ALT 25 20 22   AST 21 16 29   BILITOT 0.4 0.5 0.9       Dietary Orders:  Diet Orders            Diet Adult Regular: Regular starting at 02/05 1324            Based on the following criteria, this patient qualifies for intravenous to oral conversion:  [x] The patients gastrointestinal tract is functioning (tolerating medications via oral or enteral route for 24 hours and tolerating food or enteral feeds for 24 hours).  [x] The patient is hemodynamically stable for 24 hours (heart rate <100 beats per minute, systolic blood pressure >99 mm Hg, and respiratory rate <20 breaths per minute).  [x] The patient shows clinical improvement (afebrile for at least 24 hours and white blood cell count downtrending or normalized). Additionally, the patient must be non-neutropenic (absolute neutrophil count >500 cells/mm3).  [x] For antimicrobials, the patient has received IV therapy for at least 24 hours.    IV medication  Protonix will be changed to oral medication protonix    Pharmacist's Name: Avelina Martinez  Pharmacist's Extension: [phone]     DISPLAY PLAN FREE TEXT